# Patient Record
Sex: MALE | Race: BLACK OR AFRICAN AMERICAN | NOT HISPANIC OR LATINO
[De-identification: names, ages, dates, MRNs, and addresses within clinical notes are randomized per-mention and may not be internally consistent; named-entity substitution may affect disease eponyms.]

---

## 2018-02-27 ENCOUNTER — RESULT REVIEW (OUTPATIENT)
Age: 63
End: 2018-02-27

## 2018-04-19 ENCOUNTER — APPOINTMENT (OUTPATIENT)
Dept: SURGERY | Facility: CLINIC | Age: 63
End: 2018-04-19
Payer: COMMERCIAL

## 2018-04-19 VITALS
DIASTOLIC BLOOD PRESSURE: 89 MMHG | HEART RATE: 111 BPM | WEIGHT: 160 LBS | TEMPERATURE: 98.2 F | HEIGHT: 65 IN | BODY MASS INDEX: 26.66 KG/M2 | SYSTOLIC BLOOD PRESSURE: 155 MMHG

## 2018-04-19 PROCEDURE — 99243 OFF/OP CNSLTJ NEW/EST LOW 30: CPT

## 2018-05-02 ENCOUNTER — FORM ENCOUNTER (OUTPATIENT)
Age: 63
End: 2018-05-02

## 2018-05-03 ENCOUNTER — OUTPATIENT (OUTPATIENT)
Dept: OUTPATIENT SERVICES | Facility: HOSPITAL | Age: 63
LOS: 1 days | End: 2018-05-03
Payer: COMMERCIAL

## 2018-05-03 ENCOUNTER — APPOINTMENT (OUTPATIENT)
Dept: CT IMAGING | Facility: IMAGING CENTER | Age: 63
End: 2018-05-03
Payer: COMMERCIAL

## 2018-05-03 DIAGNOSIS — D49.0 NEOPLASM OF UNSPECIFIED BEHAVIOR OF DIGESTIVE SYSTEM: ICD-10-CM

## 2018-05-03 DIAGNOSIS — R16.0 HEPATOMEGALY, NOT ELSEWHERE CLASSIFIED: ICD-10-CM

## 2018-05-03 PROCEDURE — 74160 CT ABDOMEN W/CONTRAST: CPT

## 2018-05-03 PROCEDURE — 74160 CT ABDOMEN W/CONTRAST: CPT | Mod: 26

## 2018-05-03 PROCEDURE — 82565 ASSAY OF CREATININE: CPT

## 2018-05-31 ENCOUNTER — APPOINTMENT (OUTPATIENT)
Dept: INFECTIOUS DISEASE | Facility: CLINIC | Age: 63
End: 2018-05-31
Payer: COMMERCIAL

## 2018-05-31 VITALS
HEIGHT: 65 IN | TEMPERATURE: 98.5 F | RESPIRATION RATE: 18 BRPM | DIASTOLIC BLOOD PRESSURE: 95 MMHG | BODY MASS INDEX: 26.99 KG/M2 | WEIGHT: 162 LBS | SYSTOLIC BLOOD PRESSURE: 154 MMHG | OXYGEN SATURATION: 97 % | HEART RATE: 99 BPM

## 2018-05-31 DIAGNOSIS — Z85.46 PERSONAL HISTORY OF MALIGNANT NEOPLASM OF PROSTATE: ICD-10-CM

## 2018-05-31 DIAGNOSIS — D72.10 EOSINOPHILIA, UNSPECIFIED: ICD-10-CM

## 2018-05-31 LAB
BASOPHILS # BLD AUTO: 0.03 K/UL
BASOPHILS NFR BLD AUTO: 0.5 %
EOSINOPHIL # BLD AUTO: 0.75 K/UL
EOSINOPHIL NFR BLD AUTO: 13 %
HCT VFR BLD CALC: 45.7 %
HGB BLD-MCNC: 14.9 G/DL
IMM GRANULOCYTES NFR BLD AUTO: 0 %
LYMPHOCYTES # BLD AUTO: 1.94 K/UL
LYMPHOCYTES NFR BLD AUTO: 33.5 %
MAN DIFF?: NORMAL
MCHC RBC-ENTMCNC: 31.1 PG
MCHC RBC-ENTMCNC: 32.6 GM/DL
MCV RBC AUTO: 95.4 FL
MONOCYTES # BLD AUTO: 0.44 K/UL
MONOCYTES NFR BLD AUTO: 7.6 %
NEUTROPHILS # BLD AUTO: 2.63 K/UL
NEUTROPHILS NFR BLD AUTO: 45.4 %
PLATELET # BLD AUTO: 242 K/UL
RBC # BLD: 4.79 M/UL
RBC # FLD: 12.3 %
WBC # FLD AUTO: 5.79 K/UL

## 2018-05-31 PROCEDURE — 99204 OFFICE O/P NEW MOD 45 MIN: CPT

## 2018-05-31 RX ORDER — AMLODIPINE BESYLATE 5 MG/1
5 TABLET ORAL
Refills: 0 | Status: ACTIVE | COMMUNITY

## 2018-06-04 LAB
ADJUSTED MITOGEN: >10 IU/ML
ADJUSTED TB AG: 2.28 IU/ML
ALBUMIN SERPL ELPH-MCNC: 4.1 G/DL
ALP BLD-CCNC: 85 U/L
ALT SERPL-CCNC: 26 U/L
ANION GAP SERPL CALC-SCNC: 16 MMOL/L
APPEARANCE: CLEAR
AST SERPL-CCNC: 28 U/L
BACTERIA: NEGATIVE
BILIRUB SERPL-MCNC: 0.4 MG/DL
BILIRUBIN URINE: NEGATIVE
BLOOD URINE: ABNORMAL
BUN SERPL-MCNC: 15 MG/DL
CALCIUM SERPL-MCNC: 9.4 MG/DL
CHLORIDE SERPL-SCNC: 104 MMOL/L
CO2 SERPL-SCNC: 22 MMOL/L
COLOR: YELLOW
CREAT SERPL-MCNC: 1.23 MG/DL
GLUCOSE QUALITATIVE U: NEGATIVE MG/DL
GLUCOSE SERPL-MCNC: 95 MG/DL
HBV CORE IGG+IGM SER QL: NONREACTIVE
HBV SURFACE AB SER QL: ABNORMAL
HBV SURFACE AG SER QL: NONREACTIVE
HCV AB SER QL: NONREACTIVE
HCV S/CO RATIO: 0.43 S/CO
HIV1+2 AB SPEC QL IA.RAPID: NONREACTIVE
HYALINE CASTS: 0 /LPF
KETONES URINE: NEGATIVE
LEUKOCYTE ESTERASE URINE: NEGATIVE
M TB IFN-G BLD-IMP: POSITIVE
MICROSCOPIC-UA: NORMAL
NITRITE URINE: NEGATIVE
PH URINE: 6.5
POTASSIUM SERPL-SCNC: 3.6 MMOL/L
PROT SERPL-MCNC: 8 G/DL
PROTEIN URINE: ABNORMAL MG/DL
QUANTIFERON GOLD NIL: 0.33 IU/ML
RED BLOOD CELLS URINE: 6 /HPF
SODIUM SERPL-SCNC: 142 MMOL/L
SPECIFIC GRAVITY URINE: 1.02
SQUAMOUS EPITHELIAL CELLS: 0 /HPF
UROBILINOGEN URINE: NEGATIVE MG/DL
WHITE BLOOD CELLS URINE: 1 /HPF

## 2018-06-05 LAB
BACTERIA BLD CULT: NORMAL
BACTERIA BLD CULT: NORMAL
E HISTOLYT AB SER-ACNC: NEGATIVE
ECHINOCOCCUS AB TITR SER: NEGATIVE
STRONGYLOIDES AB SER IA-ACNC: POSITIVE

## 2018-06-06 ENCOUNTER — FORM ENCOUNTER (OUTPATIENT)
Age: 63
End: 2018-06-06

## 2018-06-07 ENCOUNTER — APPOINTMENT (OUTPATIENT)
Dept: MRI IMAGING | Facility: IMAGING CENTER | Age: 63
End: 2018-06-07
Payer: COMMERCIAL

## 2018-06-07 ENCOUNTER — OUTPATIENT (OUTPATIENT)
Dept: OUTPATIENT SERVICES | Facility: HOSPITAL | Age: 63
LOS: 1 days | End: 2018-06-07
Payer: COMMERCIAL

## 2018-06-07 DIAGNOSIS — R16.0 HEPATOMEGALY, NOT ELSEWHERE CLASSIFIED: ICD-10-CM

## 2018-06-07 LAB
FILARIA IGG SER-ACNC: 1.44
SCHISTOSOMA IGG SER QL: NORMAL

## 2018-06-07 PROCEDURE — 74183 MRI ABD W/O CNTR FLWD CNTR: CPT | Mod: 26

## 2018-06-07 PROCEDURE — A9585: CPT

## 2018-06-07 PROCEDURE — 74183 MRI ABD W/O CNTR FLWD CNTR: CPT

## 2018-06-11 LAB
DEPRECATED O AND P PREP STL: ABNORMAL
DEPRECATED O AND P PREP STL: NORMAL
DEPRECATED O AND P PREP STL: NORMAL
GI PCR PANEL, STOOL: ABNORMAL

## 2018-06-15 ENCOUNTER — APPOINTMENT (OUTPATIENT)
Dept: INFECTIOUS DISEASE | Facility: CLINIC | Age: 63
End: 2018-06-15
Payer: COMMERCIAL

## 2018-06-15 VITALS
WEIGHT: 165 LBS | DIASTOLIC BLOOD PRESSURE: 86 MMHG | BODY MASS INDEX: 27.49 KG/M2 | TEMPERATURE: 98.3 F | HEIGHT: 65 IN | OXYGEN SATURATION: 93 % | RESPIRATION RATE: 18 BRPM | SYSTOLIC BLOOD PRESSURE: 145 MMHG | HEART RATE: 104 BPM

## 2018-06-15 DIAGNOSIS — B78.9 STRONGYLOIDIASIS, UNSPECIFIED: ICD-10-CM

## 2018-06-15 DIAGNOSIS — R76.11 NONSPECIFIC REACTION TO TUBERCULIN SKIN TEST W/OUT ACTIVE TUBERCULOSIS: ICD-10-CM

## 2018-06-15 PROCEDURE — 99214 OFFICE O/P EST MOD 30 MIN: CPT

## 2018-06-15 RX ORDER — HYDROCODONE BITARTRATE AND ACETAMINOPHEN 5; 300 MG/1; MG/1
5-300 TABLET ORAL
Qty: 12 | Refills: 0 | Status: COMPLETED | COMMUNITY
Start: 2018-02-21

## 2018-06-15 RX ORDER — LIDOCAINE 5 G/100G
5 OINTMENT TOPICAL
Qty: 106 | Refills: 0 | Status: COMPLETED | COMMUNITY
Start: 2018-05-04

## 2018-06-15 RX ORDER — AMOXICILLIN 875 MG/1
875 TABLET, FILM COATED ORAL
Qty: 14 | Refills: 0 | Status: COMPLETED | COMMUNITY
Start: 2018-02-21

## 2018-06-15 RX ORDER — ASPIRIN 81 MG/1
81 TABLET, CHEWABLE ORAL
Refills: 0 | Status: ACTIVE | COMMUNITY

## 2018-06-15 RX ORDER — CHLORHEXIDINE GLUCONATE, 0.12% ORAL RINSE 1.2 MG/ML
0.12 SOLUTION DENTAL
Qty: 473 | Refills: 0 | Status: COMPLETED | COMMUNITY
Start: 2018-02-21

## 2018-08-03 ENCOUNTER — APPOINTMENT (OUTPATIENT)
Dept: INFECTIOUS DISEASE | Facility: CLINIC | Age: 63
End: 2018-08-03
Payer: COMMERCIAL

## 2018-08-03 VITALS
SYSTOLIC BLOOD PRESSURE: 144 MMHG | WEIGHT: 165 LBS | BODY MASS INDEX: 27.49 KG/M2 | HEIGHT: 65 IN | OXYGEN SATURATION: 98 % | DIASTOLIC BLOOD PRESSURE: 89 MMHG | TEMPERATURE: 98.7 F | RESPIRATION RATE: 18 BRPM | HEART RATE: 90 BPM

## 2018-08-03 PROCEDURE — 99214 OFFICE O/P EST MOD 30 MIN: CPT

## 2018-08-03 RX ORDER — NAPROXEN 500 MG/1
500 TABLET ORAL
Qty: 60 | Refills: 0 | Status: DISCONTINUED | COMMUNITY
Start: 2018-05-04 | End: 2018-08-03

## 2018-08-06 LAB
ALBUMIN SERPL ELPH-MCNC: 4.1 G/DL
ALP BLD-CCNC: 77 U/L
ALT SERPL-CCNC: 19 U/L
ANION GAP SERPL CALC-SCNC: 14 MMOL/L
AST SERPL-CCNC: 26 U/L
BASOPHILS # BLD AUTO: 0.03 K/UL
BASOPHILS NFR BLD AUTO: 0.6 %
BILIRUB SERPL-MCNC: 0.3 MG/DL
BUN SERPL-MCNC: 13 MG/DL
CALCIUM SERPL-MCNC: 9.3 MG/DL
CHLORIDE SERPL-SCNC: 104 MMOL/L
CO2 SERPL-SCNC: 25 MMOL/L
CREAT SERPL-MCNC: 1.24 MG/DL
EOSINOPHIL # BLD AUTO: 0.18 K/UL
EOSINOPHIL NFR BLD AUTO: 3.8 %
GLUCOSE SERPL-MCNC: 101 MG/DL
HCT VFR BLD CALC: 43.8 %
HGB BLD-MCNC: 14.6 G/DL
IMM GRANULOCYTES NFR BLD AUTO: 0.2 %
LYMPHOCYTES # BLD AUTO: 2.35 K/UL
LYMPHOCYTES NFR BLD AUTO: 49.6 %
MAN DIFF?: NORMAL
MCHC RBC-ENTMCNC: 31.8 PG
MCHC RBC-ENTMCNC: 33.3 GM/DL
MCV RBC AUTO: 95.4 FL
MONOCYTES # BLD AUTO: 0.3 K/UL
MONOCYTES NFR BLD AUTO: 6.3 %
NEUTROPHILS # BLD AUTO: 1.87 K/UL
NEUTROPHILS NFR BLD AUTO: 39.5 %
PLATELET # BLD AUTO: 257 K/UL
POTASSIUM SERPL-SCNC: 3.7 MMOL/L
PROT SERPL-MCNC: 7.5 G/DL
RBC # BLD: 4.59 M/UL
RBC # FLD: 12.5 %
SODIUM SERPL-SCNC: 143 MMOL/L
WBC # FLD AUTO: 4.74 K/UL

## 2018-08-07 LAB — STRONGYLOIDES AB SER IA-ACNC: POSITIVE

## 2018-08-27 LAB
DEPRECATED O AND P PREP STL: NORMAL
GI PCR PANEL, STOOL: NORMAL

## 2018-11-09 ENCOUNTER — APPOINTMENT (OUTPATIENT)
Dept: INFECTIOUS DISEASE | Facility: CLINIC | Age: 63
End: 2018-11-09
Payer: COMMERCIAL

## 2018-11-09 VITALS
HEIGHT: 65 IN | BODY MASS INDEX: 26.82 KG/M2 | SYSTOLIC BLOOD PRESSURE: 148 MMHG | HEART RATE: 85 BPM | TEMPERATURE: 97.7 F | DIASTOLIC BLOOD PRESSURE: 94 MMHG | OXYGEN SATURATION: 98 % | WEIGHT: 161 LBS

## 2018-11-09 PROCEDURE — 99214 OFFICE O/P EST MOD 30 MIN: CPT

## 2018-12-20 ENCOUNTER — APPOINTMENT (OUTPATIENT)
Dept: SURGICAL ONCOLOGY | Facility: CLINIC | Age: 63
End: 2018-12-20
Payer: COMMERCIAL

## 2018-12-20 VITALS
HEIGHT: 65 IN | RESPIRATION RATE: 15 BRPM | WEIGHT: 165 LBS | BODY MASS INDEX: 27.49 KG/M2 | HEART RATE: 105 BPM | DIASTOLIC BLOOD PRESSURE: 90 MMHG | SYSTOLIC BLOOD PRESSURE: 158 MMHG

## 2018-12-20 DIAGNOSIS — Z86.79 PERSONAL HISTORY OF OTHER DISEASES OF THE CIRCULATORY SYSTEM: ICD-10-CM

## 2018-12-20 DIAGNOSIS — K76.9 LIVER DISEASE, UNSPECIFIED: ICD-10-CM

## 2018-12-20 PROCEDURE — 99244 OFF/OP CNSLTJ NEW/EST MOD 40: CPT

## 2018-12-20 RX ORDER — IVERMECTIN 3 MG/1
3 TABLET ORAL DAILY
Qty: 10 | Refills: 0 | Status: DISCONTINUED | COMMUNITY
Start: 2018-06-15 | End: 2018-12-20

## 2018-12-28 ENCOUNTER — OTHER (OUTPATIENT)
Age: 63
End: 2018-12-28

## 2019-01-11 ENCOUNTER — FORM ENCOUNTER (OUTPATIENT)
Age: 64
End: 2019-01-11

## 2019-01-12 ENCOUNTER — OUTPATIENT (OUTPATIENT)
Dept: OUTPATIENT SERVICES | Facility: HOSPITAL | Age: 64
LOS: 1 days | End: 2019-01-12
Payer: COMMERCIAL

## 2019-01-12 ENCOUNTER — APPOINTMENT (OUTPATIENT)
Dept: MRI IMAGING | Facility: IMAGING CENTER | Age: 64
End: 2019-01-12
Payer: COMMERCIAL

## 2019-01-12 DIAGNOSIS — B78.9 STRONGYLOIDIASIS, UNSPECIFIED: ICD-10-CM

## 2019-01-12 DIAGNOSIS — K76.9 LIVER DISEASE, UNSPECIFIED: ICD-10-CM

## 2019-01-12 DIAGNOSIS — D72.1 EOSINOPHILIA: ICD-10-CM

## 2019-01-12 DIAGNOSIS — R76.11 NONSPECIFIC REACTION TO TUBERCULIN SKIN TEST WITHOUT ACTIVE TUBERCULOSIS: ICD-10-CM

## 2019-01-12 PROCEDURE — A9585: CPT

## 2019-01-12 PROCEDURE — 74183 MRI ABD W/O CNTR FLWD CNTR: CPT

## 2019-01-12 PROCEDURE — 74183 MRI ABD W/O CNTR FLWD CNTR: CPT | Mod: 26

## 2019-11-12 ENCOUNTER — OUTPATIENT (OUTPATIENT)
Dept: OUTPATIENT SERVICES | Facility: HOSPITAL | Age: 64
LOS: 1 days | End: 2019-11-12
Payer: COMMERCIAL

## 2019-11-12 ENCOUNTER — APPOINTMENT (OUTPATIENT)
Dept: MRI IMAGING | Facility: IMAGING CENTER | Age: 64
End: 2019-11-12
Payer: COMMERCIAL

## 2019-11-12 DIAGNOSIS — R16.0 HEPATOMEGALY, NOT ELSEWHERE CLASSIFIED: ICD-10-CM

## 2019-11-12 PROCEDURE — A9585: CPT

## 2019-11-12 PROCEDURE — 74183 MRI ABD W/O CNTR FLWD CNTR: CPT | Mod: 26

## 2019-11-12 PROCEDURE — 74183 MRI ABD W/O CNTR FLWD CNTR: CPT

## 2020-12-31 PROBLEM — D72.10 EOSINOPHILIA: Status: ACTIVE | Noted: 2018-05-31

## 2021-08-09 ENCOUNTER — INPATIENT (INPATIENT)
Facility: HOSPITAL | Age: 66
LOS: 5 days | Discharge: HOME | End: 2021-08-15
Attending: INTERNAL MEDICINE | Admitting: INTERNAL MEDICINE
Payer: MEDICARE

## 2021-08-09 VITALS
SYSTOLIC BLOOD PRESSURE: 118 MMHG | DIASTOLIC BLOOD PRESSURE: 75 MMHG | TEMPERATURE: 98 F | RESPIRATION RATE: 16 BRPM | OXYGEN SATURATION: 100 % | HEIGHT: 66 IN | HEART RATE: 124 BPM

## 2021-08-09 LAB
ALBUMIN SERPL ELPH-MCNC: 3.9 G/DL — SIGNIFICANT CHANGE UP (ref 3.3–5)
ALP SERPL-CCNC: 65 U/L — SIGNIFICANT CHANGE UP (ref 40–120)
ALT FLD-CCNC: 14 U/L — SIGNIFICANT CHANGE UP (ref 4–41)
ANION GAP SERPL CALC-SCNC: 18 MMOL/L — HIGH (ref 7–14)
APTT BLD: 25.5 SEC — LOW (ref 27–36.3)
AST SERPL-CCNC: 24 U/L — SIGNIFICANT CHANGE UP (ref 4–40)
BASOPHILS # BLD AUTO: 0.03 K/UL — SIGNIFICANT CHANGE UP (ref 0–0.2)
BASOPHILS NFR BLD AUTO: 0.3 % — SIGNIFICANT CHANGE UP (ref 0–2)
BILIRUB SERPL-MCNC: <0.2 MG/DL — SIGNIFICANT CHANGE UP (ref 0.2–1.2)
BLD GP AB SCN SERPL QL: NEGATIVE — SIGNIFICANT CHANGE UP
BUN SERPL-MCNC: 20 MG/DL — SIGNIFICANT CHANGE UP (ref 7–23)
CALCIUM SERPL-MCNC: 9.5 MG/DL — SIGNIFICANT CHANGE UP (ref 8.4–10.5)
CHLORIDE SERPL-SCNC: 104 MMOL/L — SIGNIFICANT CHANGE UP (ref 98–107)
CO2 SERPL-SCNC: 19 MMOL/L — LOW (ref 22–31)
CREAT SERPL-MCNC: 1.58 MG/DL — HIGH (ref 0.5–1.3)
EOSINOPHIL # BLD AUTO: 0.02 K/UL — SIGNIFICANT CHANGE UP (ref 0–0.5)
EOSINOPHIL NFR BLD AUTO: 0.2 % — SIGNIFICANT CHANGE UP (ref 0–6)
GLUCOSE SERPL-MCNC: 142 MG/DL — HIGH (ref 70–99)
HCT VFR BLD CALC: 27 % — LOW (ref 39–50)
HGB BLD-MCNC: 8.5 G/DL — LOW (ref 13–17)
IANC: 7.45 K/UL — SIGNIFICANT CHANGE UP (ref 1.5–8.5)
IMM GRANULOCYTES NFR BLD AUTO: 0.5 % — SIGNIFICANT CHANGE UP (ref 0–1.5)
INR BLD: 1.21 RATIO — HIGH (ref 0.88–1.16)
LYMPHOCYTES # BLD AUTO: 2.73 K/UL — SIGNIFICANT CHANGE UP (ref 1–3.3)
LYMPHOCYTES # BLD AUTO: 24.9 % — SIGNIFICANT CHANGE UP (ref 13–44)
MAGNESIUM SERPL-MCNC: 2.2 MG/DL — SIGNIFICANT CHANGE UP (ref 1.6–2.6)
MCHC RBC-ENTMCNC: 29.8 PG — SIGNIFICANT CHANGE UP (ref 27–34)
MCHC RBC-ENTMCNC: 31.5 GM/DL — LOW (ref 32–36)
MCV RBC AUTO: 94.7 FL — SIGNIFICANT CHANGE UP (ref 80–100)
MONOCYTES # BLD AUTO: 0.69 K/UL — SIGNIFICANT CHANGE UP (ref 0–0.9)
MONOCYTES NFR BLD AUTO: 6.3 % — SIGNIFICANT CHANGE UP (ref 2–14)
NEUTROPHILS # BLD AUTO: 7.45 K/UL — HIGH (ref 1.8–7.4)
NEUTROPHILS NFR BLD AUTO: 67.8 % — SIGNIFICANT CHANGE UP (ref 43–77)
NRBC # BLD: 0 /100 WBCS — SIGNIFICANT CHANGE UP
NRBC # FLD: 0 K/UL — SIGNIFICANT CHANGE UP
OB PNL STL: POSITIVE
PHOSPHATE SERPL-MCNC: 4.5 MG/DL — SIGNIFICANT CHANGE UP (ref 2.5–4.5)
PLATELET # BLD AUTO: 287 K/UL — SIGNIFICANT CHANGE UP (ref 150–400)
POTASSIUM SERPL-MCNC: 4.3 MMOL/L — SIGNIFICANT CHANGE UP (ref 3.5–5.3)
POTASSIUM SERPL-SCNC: 4.3 MMOL/L — SIGNIFICANT CHANGE UP (ref 3.5–5.3)
PROT SERPL-MCNC: 7.4 G/DL — SIGNIFICANT CHANGE UP (ref 6–8.3)
PROTHROM AB SERPL-ACNC: 13.7 SEC — HIGH (ref 10.6–13.6)
RBC # BLD: 2.85 M/UL — LOW (ref 4.2–5.8)
RBC # FLD: 12.9 % — SIGNIFICANT CHANGE UP (ref 10.3–14.5)
RH IG SCN BLD-IMP: POSITIVE — SIGNIFICANT CHANGE UP
SODIUM SERPL-SCNC: 141 MMOL/L — SIGNIFICANT CHANGE UP (ref 135–145)
WBC # BLD: 10.98 K/UL — HIGH (ref 3.8–10.5)
WBC # FLD AUTO: 10.98 K/UL — HIGH (ref 3.8–10.5)

## 2021-08-09 PROCEDURE — 99285 EMERGENCY DEPT VISIT HI MDM: CPT

## 2021-08-09 PROCEDURE — 74174 CTA ABD&PLVS W/CONTRAST: CPT | Mod: 26

## 2021-08-09 RX ORDER — PANTOPRAZOLE SODIUM 20 MG/1
40 TABLET, DELAYED RELEASE ORAL DAILY
Refills: 0 | Status: DISCONTINUED | OUTPATIENT
Start: 2021-08-09 | End: 2021-08-12

## 2021-08-09 RX ADMIN — PANTOPRAZOLE SODIUM 40 MILLIGRAM(S): 20 TABLET, DELAYED RELEASE ORAL at 22:42

## 2021-08-09 NOTE — ED PROVIDER NOTE - OBJECTIVE STATEMENT
65 y.o M with PMH of HTN, diverticulosis presents to the hospital for 5 days of BRBPR. Patient stated that 5 days prior to admission he experienced lower abdominal pain, emesis (non-bloody, non-bilious), and BRBPR. Patient states that he felt weak and needing to grasp on the sides of the basin for him to get up. The bleeding slowed down until 1 day prior to admission. The repeat episode of bleeding was stated to be less than his prior episode. The patient then saw his GI doctor and was instructed to go to the ED to get a CT scan and to be admitted for further GI work up

## 2021-08-09 NOTE — ED PROVIDER NOTE - PHYSICAL EXAMINATION
VITALS:     GENERAL: NAD, lying in bed comfortably  HEAD:  Atraumatic, Normocephalic  EYES: EOMI, PERRLA, conjunctival pallor  NECK: Supple  CHEST/LUNG: Clear to auscultation bilaterally; No rales, rhonchi, wheezing, or rubs. Unlabored respirations  HEART: Regular rate and rhythm; No murmurs, rubs, or gallops  ABDOMEN: Bowel sounds present; Soft, Nontender, Nondistended.   RECTAL: rectal vault empty. stool light red  EXTREMITIES:  2+ Peripheral Pulses, brisk capillary refill. No clubbing, cyanosis, or edema  NERVOUS SYSTEM:  Alert & Oriented X3, speech clear. No deficits   MSK: FROM all 4 extremities, full and equal strength  SKIN: No rashes or lesions

## 2021-08-09 NOTE — ED ADULT TRIAGE NOTE - CHIEF COMPLAINT QUOTE
Pt. c/o bloody stools, dizziness, vomiting and weakness since Thursday. States he had several episodes of BRBPR. Sent from MD for CT. Takes baby ASA daily but didn't take today. Denies abdominal pain or fever.

## 2021-08-09 NOTE — ED ADULT NURSE NOTE - OBJECTIVE STATEMENT
Petr RN- Received pt in room 16. pt A&OX3. Pt with hx of BPH, HTN, on daily aspirin. Pt c/o bloody stools, dizziness, vomiting and weakness since Thursday worsening. pt states he had several episodes of BRBPR and sent by MD for CT. pt in no distress at this time. respirations are equal and nonlabored, no respiratory distress noted. pt denies any cp, sob, cough, fever/chills, headache. labs sent pt stable, safety maintained. will endorse report to primary RN Dorita for further plan.

## 2021-08-09 NOTE — ED PROVIDER NOTE - CLINICAL SUMMARY MEDICAL DECISION MAKING FREE TEXT BOX
65 y.o M with PMH of HTN, diverticulosis presents to the hospital for 5 days of BRBPR being admitted for GIB  -likely diverticular bleed given history/presentation  -CT angio A/P negative, bleed not brisk enough to capture, but hemoglobin drop from baseline noted  -to hold off transfusion for now. goal >7.0  -protonix IV qD for now  -admitted to medicine

## 2021-08-09 NOTE — ED PROVIDER NOTE - ATTENDING CONTRIBUTION TO CARE
Pt w/ h/o GI bleed presents with 5 days of BRBPR. No syncopal episodes, abd pain, fevers, palpitations, cp/sob. On exam no active bleeding, abd s/nt, pt with drop in hgb from baseline, will admit to medicine for monitoring, rpt cbc in am, GI consult for poss scope.

## 2021-08-10 DIAGNOSIS — D62 ACUTE POSTHEMORRHAGIC ANEMIA: ICD-10-CM

## 2021-08-10 DIAGNOSIS — K92.2 GASTROINTESTINAL HEMORRHAGE, UNSPECIFIED: ICD-10-CM

## 2021-08-10 DIAGNOSIS — N17.9 ACUTE KIDNEY FAILURE, UNSPECIFIED: ICD-10-CM

## 2021-08-10 DIAGNOSIS — I10 ESSENTIAL (PRIMARY) HYPERTENSION: ICD-10-CM

## 2021-08-10 DIAGNOSIS — Z29.9 ENCOUNTER FOR PROPHYLACTIC MEASURES, UNSPECIFIED: ICD-10-CM

## 2021-08-10 DIAGNOSIS — N40.0 BENIGN PROSTATIC HYPERPLASIA WITHOUT LOWER URINARY TRACT SYMPTOMS: ICD-10-CM

## 2021-08-10 LAB
ANION GAP SERPL CALC-SCNC: 12 MMOL/L — SIGNIFICANT CHANGE UP (ref 7–14)
APTT BLD: 23.8 SEC — LOW (ref 27–36.3)
BASOPHILS # BLD AUTO: 0.03 K/UL — SIGNIFICANT CHANGE UP (ref 0–0.2)
BASOPHILS NFR BLD AUTO: 0.4 % — SIGNIFICANT CHANGE UP (ref 0–2)
BLD GP AB SCN SERPL QL: NEGATIVE — SIGNIFICANT CHANGE UP
BUN SERPL-MCNC: 17 MG/DL — SIGNIFICANT CHANGE UP (ref 7–23)
CALCIUM SERPL-MCNC: 8.7 MG/DL — SIGNIFICANT CHANGE UP (ref 8.4–10.5)
CHLORIDE SERPL-SCNC: 109 MMOL/L — HIGH (ref 98–107)
CO2 SERPL-SCNC: 22 MMOL/L — SIGNIFICANT CHANGE UP (ref 22–31)
CREAT SERPL-MCNC: 1.42 MG/DL — HIGH (ref 0.5–1.3)
EOSINOPHIL # BLD AUTO: 0.07 K/UL — SIGNIFICANT CHANGE UP (ref 0–0.5)
EOSINOPHIL NFR BLD AUTO: 0.9 % — SIGNIFICANT CHANGE UP (ref 0–6)
FERRITIN SERPL-MCNC: 68 NG/ML — SIGNIFICANT CHANGE UP (ref 30–400)
FOLATE SERPL-MCNC: 20 NG/ML — HIGH (ref 3.1–17.5)
GLUCOSE SERPL-MCNC: 97 MG/DL — SIGNIFICANT CHANGE UP (ref 70–99)
HCT VFR BLD CALC: 20.6 % — CRITICAL LOW (ref 39–50)
HCT VFR BLD CALC: 21.4 % — LOW (ref 39–50)
HCT VFR BLD CALC: 28.5 % — LOW (ref 39–50)
HGB BLD-MCNC: 6.7 G/DL — CRITICAL LOW (ref 13–17)
HGB BLD-MCNC: 6.9 G/DL — CRITICAL LOW (ref 13–17)
HGB BLD-MCNC: 9.3 G/DL — LOW (ref 13–17)
IANC: 5.37 K/UL — SIGNIFICANT CHANGE UP (ref 1.5–8.5)
IMM GRANULOCYTES NFR BLD AUTO: 0.1 % — SIGNIFICANT CHANGE UP (ref 0–1.5)
INR BLD: 1.17 RATIO — HIGH (ref 0.88–1.16)
IRON SATN MFR SERPL: 13 % — LOW (ref 14–50)
IRON SATN MFR SERPL: 25 UG/DL — LOW (ref 45–165)
LYMPHOCYTES # BLD AUTO: 1.87 K/UL — SIGNIFICANT CHANGE UP (ref 1–3.3)
LYMPHOCYTES # BLD AUTO: 23.8 % — SIGNIFICANT CHANGE UP (ref 13–44)
MCHC RBC-ENTMCNC: 30.4 PG — SIGNIFICANT CHANGE UP (ref 27–34)
MCHC RBC-ENTMCNC: 30.5 PG — SIGNIFICANT CHANGE UP (ref 27–34)
MCHC RBC-ENTMCNC: 30.7 PG — SIGNIFICANT CHANGE UP (ref 27–34)
MCHC RBC-ENTMCNC: 32.2 GM/DL — SIGNIFICANT CHANGE UP (ref 32–36)
MCHC RBC-ENTMCNC: 32.5 GM/DL — SIGNIFICANT CHANGE UP (ref 32–36)
MCHC RBC-ENTMCNC: 32.6 GM/DL — SIGNIFICANT CHANGE UP (ref 32–36)
MCV RBC AUTO: 93.1 FL — SIGNIFICANT CHANGE UP (ref 80–100)
MCV RBC AUTO: 94.5 FL — SIGNIFICANT CHANGE UP (ref 80–100)
MCV RBC AUTO: 94.7 FL — SIGNIFICANT CHANGE UP (ref 80–100)
MONOCYTES # BLD AUTO: 0.5 K/UL — SIGNIFICANT CHANGE UP (ref 0–0.9)
MONOCYTES NFR BLD AUTO: 6.4 % — SIGNIFICANT CHANGE UP (ref 2–14)
NEUTROPHILS # BLD AUTO: 5.37 K/UL — SIGNIFICANT CHANGE UP (ref 1.8–7.4)
NEUTROPHILS NFR BLD AUTO: 68.4 % — SIGNIFICANT CHANGE UP (ref 43–77)
NRBC # BLD: 0 /100 WBCS — SIGNIFICANT CHANGE UP
NRBC # FLD: 0 K/UL — SIGNIFICANT CHANGE UP
NRBC # FLD: 0.02 K/UL — HIGH
NRBC # FLD: 0.02 K/UL — HIGH
PLATELET # BLD AUTO: 217 K/UL — SIGNIFICANT CHANGE UP (ref 150–400)
PLATELET # BLD AUTO: 238 K/UL — SIGNIFICANT CHANGE UP (ref 150–400)
PLATELET # BLD AUTO: 265 K/UL — SIGNIFICANT CHANGE UP (ref 150–400)
POTASSIUM SERPL-MCNC: 3.8 MMOL/L — SIGNIFICANT CHANGE UP (ref 3.5–5.3)
POTASSIUM SERPL-SCNC: 3.8 MMOL/L — SIGNIFICANT CHANGE UP (ref 3.5–5.3)
PROTHROM AB SERPL-ACNC: 13.4 SEC — SIGNIFICANT CHANGE UP (ref 10.6–13.6)
RBC # BLD: 2.18 M/UL — LOW (ref 4.2–5.8)
RBC # BLD: 2.26 M/UL — LOW (ref 4.2–5.8)
RBC # BLD: 3.06 M/UL — LOW (ref 4.2–5.8)
RBC # FLD: 12.8 % — SIGNIFICANT CHANGE UP (ref 10.3–14.5)
RBC # FLD: 12.9 % — SIGNIFICANT CHANGE UP (ref 10.3–14.5)
RBC # FLD: 13 % — SIGNIFICANT CHANGE UP (ref 10.3–14.5)
RH IG SCN BLD-IMP: POSITIVE — SIGNIFICANT CHANGE UP
SARS-COV-2 RNA SPEC QL NAA+PROBE: SIGNIFICANT CHANGE UP
SODIUM SERPL-SCNC: 143 MMOL/L — SIGNIFICANT CHANGE UP (ref 135–145)
TIBC SERPL-MCNC: 187 UG/DL — LOW (ref 220–430)
UIBC SERPL-MCNC: 162 UG/DL — SIGNIFICANT CHANGE UP (ref 110–370)
VIT B12 SERPL-MCNC: 687 PG/ML — SIGNIFICANT CHANGE UP (ref 200–900)
WBC # BLD: 7.85 K/UL — SIGNIFICANT CHANGE UP (ref 3.8–10.5)
WBC # BLD: 8.81 K/UL — SIGNIFICANT CHANGE UP (ref 3.8–10.5)
WBC # BLD: 9.53 K/UL — SIGNIFICANT CHANGE UP (ref 3.8–10.5)
WBC # FLD AUTO: 7.85 K/UL — SIGNIFICANT CHANGE UP (ref 3.8–10.5)
WBC # FLD AUTO: 8.81 K/UL — SIGNIFICANT CHANGE UP (ref 3.8–10.5)
WBC # FLD AUTO: 9.53 K/UL — SIGNIFICANT CHANGE UP (ref 3.8–10.5)

## 2021-08-10 PROCEDURE — 99223 1ST HOSP IP/OBS HIGH 75: CPT

## 2021-08-10 PROCEDURE — 99222 1ST HOSP IP/OBS MODERATE 55: CPT | Mod: GC

## 2021-08-10 RX ORDER — SOD SULF/SODIUM/NAHCO3/KCL/PEG
4000 SOLUTION, RECONSTITUTED, ORAL ORAL ONCE
Refills: 0 | Status: COMPLETED | OUTPATIENT
Start: 2021-08-10 | End: 2021-08-10

## 2021-08-10 RX ORDER — SODIUM CHLORIDE 9 MG/ML
1000 INJECTION, SOLUTION INTRAVENOUS ONCE
Refills: 0 | Status: COMPLETED | OUTPATIENT
Start: 2021-08-10 | End: 2021-08-10

## 2021-08-10 RX ORDER — TAMSULOSIN HYDROCHLORIDE 0.4 MG/1
0.4 CAPSULE ORAL AT BEDTIME
Refills: 0 | Status: DISCONTINUED | OUTPATIENT
Start: 2021-08-10 | End: 2021-08-15

## 2021-08-10 RX ORDER — FOLIC ACID 0.8 MG
1 TABLET ORAL DAILY
Refills: 0 | Status: DISCONTINUED | OUTPATIENT
Start: 2021-08-10 | End: 2021-08-15

## 2021-08-10 RX ORDER — ACETAMINOPHEN 500 MG
650 TABLET ORAL EVERY 6 HOURS
Refills: 0 | Status: DISCONTINUED | OUTPATIENT
Start: 2021-08-10 | End: 2021-08-15

## 2021-08-10 RX ADMIN — Medication 4000 MILLILITER(S): at 18:48

## 2021-08-10 RX ADMIN — SODIUM CHLORIDE 1000 MILLILITER(S): 9 INJECTION, SOLUTION INTRAVENOUS at 02:00

## 2021-08-10 RX ADMIN — Medication 1 MILLIGRAM(S): at 15:53

## 2021-08-10 RX ADMIN — TAMSULOSIN HYDROCHLORIDE 0.4 MILLIGRAM(S): 0.4 CAPSULE ORAL at 21:39

## 2021-08-10 RX ADMIN — Medication 20 MILLIGRAM(S): at 21:39

## 2021-08-10 RX ADMIN — PANTOPRAZOLE SODIUM 40 MILLIGRAM(S): 20 TABLET, DELAYED RELEASE ORAL at 12:22

## 2021-08-10 NOTE — H&P ADULT - ASSESSMENT
65M with PMHx HTN, diverticulosis, prostate cancer s/p prostatectomy presenting with 5 days of BRBPR. Admitted for GIB w/u

## 2021-08-10 NOTE — H&P ADULT - NSHPPHYSICALEXAM_GEN_ALL_CORE
PHYSICAL EXAM:  Vital Signs Last 24 Hrs  T(C): 37.1 (08-09-21 @ 23:36)  T(F): 98.7 (08-09-21 @ 23:36), Max: 98.7 (08-09-21 @ 23:36)  HR: 103 (08-09-21 @ 23:36) (103 - 124)  BP: 127/72 (08-09-21 @ 23:36)  BP(mean): --  RR: 18 (08-09-21 @ 23:36) (16 - 18)  SpO2: 99% (08-09-21 @ 23:36) (99% - 100%)  Wt(kg): --    Constitutional: NAD, awake and alert, well developed  EYES: EOMI, conjunctiva clear  ENT:  Normal Hearing, no tonsillar exudates   Neck: Soft and supple , no thyromegaly   Respiratory: Breath sounds are clear bilaterally, No wheezing, rales or rhonchi, no tachypnea, no accessory muscle use  Cardiovascular: S1 and S2, regular rate and rhythm, no Murmurs, gallops or rubs, no JVD, no leg edema  Gastrointestinal: Bowel Sounds present, soft, nontender, nondistended, no guarding, no rebound  Extremities: No cyanosis or clubbing; warm to touch  Vascular: 2+ peripheral pulses lower ex  Neurological: No focal deficits, CN II-XII intact bilaterally, sensation to light touch intact in all extremities. Pupils are equally reactive to light and symmetrical in size.   Musculoskeletal: 5/5 strength b/l upper and lower extremities; no joint swelling.  Skin: No rashes, no ulcerations

## 2021-08-10 NOTE — H&P ADULT - PROBLEM SELECTOR PLAN 1
Hgb 8.5, normotensive but with mild tachycardia. Currently asymptomatic. Last bloody BM was yesterday  Rectal bleeding and suspect possible diverticulosis vs AVM vs hemorrhoids. Likely will require C-scope  Given patient's reported hx of gastric ulcer will place on PPI for now though much higher suspicion LGIB based off history  -repeat CBC this AM, active T&S, IV access  -transfuse goal hgb >7  -occ positive  -GI consult emailed  -ferritin, iron with binding capacity, B12, folate  -hold ASA  -CLD  -CTA without active extravasation. Diverticulosis

## 2021-08-10 NOTE — CONSULT NOTE ADULT - ATTENDING COMMENTS
pt seen and examined agree with plan above
Patient seen/examined. History/PE as noted. Requested to evaluate patient for hematochezia. Indicates onset 8/5 with painless bright red blood per rectum that recurred 8/6 and then subsequently on 8/8. Experienced some postural dizziness but no syncope. Had episode of nonbloody emesis. No abdominal pain. History noted for apparent episode of GI bleeding approximately 2010 had a colonoscopy and upper endoscopy at that time. Patient vague but indicates abnormal upper endoscopy with possible ulcer and he describes having been told of "serrated appearance". PE/labs as noted. Not able to perform LENKA- in ER hallway. Patient with acute overt GI bleed-hematochezia. Likely lower GI bleed from diverticulosis. Possible upper GI bleed with rapid transit but suspect less likely. Recommendations as noted. Okay for clear liquids today. Pantoprazole 40 mg q.d. for now and continue to monitor serial hemoglobin/hematocrit. Schedule for colonoscopy and upper endoscopy on 8/11.

## 2021-08-10 NOTE — CONSULT NOTE ADULT - SUBJECTIVE AND OBJECTIVE BOX
[facility]   MD LIAM PINO DO ANAM SIDDIQUI ANGELA WONG, PA        TEL:  OFFICE: [phone]  DR GRIMALDO CELL: [phone]  DR. WALSH CELL: [phone]  DR. JUNE CELL: [phone]  CARROLL KRISHNAMURTHY CELL: [phone]    From 5pm-7am answering service [phone]    --- INITIAL RENAL CONSULT NOTE ---date of service 08-10-21 @ 13:16    HPI:  65M with PMHx HTN, diverticulosis, prostate cancer s/p prostatectomy presenting with 5 days of BRBPR. Multiple episode of blood BM worsen lightheadedness and weakness. denied abd pain, fever or chills.  last GI work up in 2018  nephrology consulted for cristo        Allergies:  No Known Allergies      PAST MEDICAL & SURGICAL HISTORY:  HTN (hypertension)  on med x 3 yrs    BPH (benign prostatic hypertrophy)    H/O endoscopy    H/O colonoscopy  rectal bleed  no issues found    H/O prostate biopsy        Home Medications Reviewed    Hospital Medications:   MEDICATIONS  (STANDING):  folic acid 1 milliGRAM(s) Oral daily  pantoprazole  Injectable 40 milliGRAM(s) IV Push daily  tamsulosin 0.4 milliGRAM(s) Oral at bedtime      SOCIAL HISTORY:  Denies ETOh, Smoking,     FAMILY HISTORY:  No pertinent family history in first degree relatives        REVIEW OF SYSTEMS:  CONSTITUTIONAL: No weakness, fevers or chills  EYES/ENT: No visual changes;  No vertigo or throat pain   NECK: No pain or stiffness  RESPIRATORY: No cough, wheezing, hemoptysis; No shortness of breath  CARDIOVASCULAR: No chest pain or palpitations.  GASTROINTESTINAL: see hpi  GENITOURINARY: No dysuria, frequency, foamy urine, urinary urgency, incontinence or hematuria  NEUROLOGICAL: No numbness or weakness  SKIN: No itching, burning, rashes, or lesions   VASCULAR: No bilateral lower extremity edema.   All other review of systems is negative unless indicated above.    VITALS:  T(F): 98.4 (08-10-21 @ 12:00), Max: 98.7 (08-09-21 @ 23:36)  HR: 95 (08-10-21 @ 12:00)  BP: 138/71 (08-10-21 @ 12:00)  RR: 18 (08-10-21 @ 12:00)  SpO2: 100% (08-10-21 @ 12:00)  Wt(kg): --    Height (cm): 167.6 (08-09 @ 18:09)    PHYSICAL EXAM:  Constitutional: NAD  HEENT: anicteric sclera, oropharynx clear, MMM  Neck: No JVD  Respiratory: CTAB, no wheezes, rales or rhonchi  Cardiovascular: S1, S2, RRR  Gastrointestinal: BS+, soft, NT/ND  Extremities: No cyanosis or clubbing. No peripheral edema  Neurological: A/O x 3, no focal deficits  Psychiatric: Normal mood, normal affect  : No CVA tenderness. No higginbotham.   Skin: No rashes    LABS:  08-10    143  |  109<H>  |  17  ----------------------------<  97  3.8   |  22  |  1.42<H>    Ca    8.7      10 Aug 2021 07:00  Phos  4.5     08-09  Mg     2.20     08-09    TPro  7.4  /  Alb  3.9  /  TBili  <0.2  /  DBili      /  AST  24  /  ALT  14  /  AlkPhos  65  08-09    Creatinine Trend: 1.42 <--, 1.58 <--                        6.9    7.85  )-----------( 238      ( 10 Aug 2021 10:44 )             21.4     Urine Studies:        RADIOLOGY & ADDITIONAL STUDIES:

## 2021-08-10 NOTE — CONSULT NOTE ADULT - ASSESSMENT
ALVARO CEJA is a 65y Male with h/o prostate cancer s/p prostatectomy (no chemo/radiation), reportedly h/o gastric ulcer s/p ablation (before 2010) and presents today as a new consult for hematochezia.    Impression:  #. Hematochezia most likely 2/2 diverticular bleeding vs rectal hemorrhoids, less likely 2/2 gastric/duodenal ulceration - the intermittent episodes of bleeding more suggestive of diverticulosis, although unable to do LENKA to confirm color and will benefit from evaluation for both upper and lower GIB.      Recommendations:  - Keep NPO  - Plan for EGD/colonoscopy tomorrow 8/11  - Prep ordered to start today at 1800  - Continue IV PPI  - Trend Hgb, Transfuse if Hgb < 7    Thank you for involving us in this patient's care.    Case discussed with Attending, Dr. MAGGI Galdamez.    Joanna Guerrier MD  Gastroenterology/Hepatology Fellow, PGY-V  Available via [employer] Teams    NON-URGENT CONSULTS:  Please email [email or web address]  AT NIGHT AND ON WEEKENDS:  Contact on-call GI fellow via answering service ([phone]) from 5pm-8am and on weekends/holidays  MONDAY-FRIDAY 8AM-5PM:  Pager# [phone] ([facility])  GI Phone# [phone] ([facility])     ALVARO CEJA is a 65y Male with h/o prostate cancer s/p prostatectomy (no chemo/radiation), reportedly h/o gastric ulcer s/p ablation (before 2010) and presents today as a new consult for hematochezia.    Impression:  #. Hematochezia most likely 2/2 diverticular bleeding vs rectal hemorrhoids, less likely 2/2 gastric/duodenal ulceration - the intermittent episodes of bleeding more suggestive of diverticulosis, although unable to do LENKA to confirm color and will benefit from evaluation for both upper and lower GIB.      Recommendations:  - Keep NPO  - Plan for EGD/colonoscopy tomorrow 8/11  - Prep ordered to start today at 1800  - Continue IV PPI  - Trend Hgb, Transfuse if Hgb < 7    Thank you for involving us in this patient's care.    Case discussed with Attending, Dr. MAGGI Galdamez.    Joanna Guerrier MD  Gastroenterology/Hepatology Fellow, PGY-V  Available via [employer] Teams    NON-URGENT CONSULTS:  Please email [email or web address]  AT NIGHT AND ON WEEKENDS:  Contact on-call GI fellow via answering service ([phone]) from 5pm-8am and on weekends/holidays  MONDAY-FRIDAY 8AM-5PM:  Pager# [phone] ([facility])  GI Phone# [phone] ([facility])

## 2021-08-10 NOTE — CONSULT NOTE ADULT - ASSESSMENT
Assessment and Plan    65M with PMHx HTN, diverticulosis, prostate cancer s/p prostatectomy presenting with 5 days of BRBPR. 5 days ago patient had 3 large episodes of bloody BMs    EKG: NSR no STT changes    1. Anemia  -2/2 GIB  -s/p PRBC transfusion  -continue to monitor H/H  -plan for colonoscopy, pt denies CP, SOB or palpitations. optimized from cardiac standpoint, RCRI class I, 3.9% risk of 30 day MACE    2. JOYCE  -creat improving  -f/u renal    3. HTN  -controlled  -continue to monitor BP

## 2021-08-10 NOTE — H&P ADULT - PROBLEM SELECTOR PROBLEM 3
Benign prostatic hyperplasia, unspecified whether lower urinary tract symptoms present Hypertension, unspecified type JOYCE (acute kidney injury)

## 2021-08-10 NOTE — H&P ADULT - PROBLEM SELECTOR PLAN 3
Hold home amlodipine 5mg qd for now given normotensive and w/u GIB ongoing Likely due to volume depletion in setting of decreased PO intake and blood loss  -1L IV bolus now and recheck BMP   -bladder scan

## 2021-08-10 NOTE — H&P ADULT - NSHPREVIEWOFSYSTEMS_GEN_ALL_CORE
ROS:    Constitutional: [ ] fevers [ ] chills   HEENT: [ ] dry eyes [ ] eye irritation   CV: [ ] chest pain [ ] orthopnea [ ] palpitations   Resp: [ ] cough [ ] shortness of breath [ ] dyspnea [ ] wheezing   GI: [ ] nausea [x ] vomiting [ ] diarrhea [ ] constipation [x ] abd pain [ ] dysphagia [x] hematochezia  : [ ] dysuria [ ] nocturia [ ] hematuria [ ] increased urinary frequency  Musculoskeletal: [ ] back pain [ ] myalgias [ ] arthralgias [ ] fracture  Skin: [ ] rash [ ] itch  Neurological: [ ] headache [ ] dizziness [ ] syncope   Psychiatric: [ ] anxiety [ ] depression  Endocrine: [ ] diabetes [ ] thyroid problem  Hematologic/Lymphatic: [x ] anemia [x ] bleeding problem  Allergic/Immunologic: [ ] itchy eyes [ ] nasal discharge   [x ] All other systems negative

## 2021-08-10 NOTE — ED ADULT NURSE REASSESSMENT NOTE - NS ED NURSE REASSESS COMMENT FT1
critical lab value received, provider paged, #[phone]. patient remains A&OX4. vital signs stable. Provider aware.
Facilitator RN:

## 2021-08-10 NOTE — H&P ADULT - PROBLEM SELECTOR PROBLEM 4
Need for prophylactic measure Benign prostatic hyperplasia, unspecified whether lower urinary tract symptoms present Hypertension, unspecified type

## 2021-08-10 NOTE — H&P ADULT - NSHPLABSRESULTS_GEN_ALL_CORE
I have personally reviewed this patient's labs below:                        8.5    10.98 )-----------( 287      ( 09 Aug 2021 21:51 )             27.0     08-09-21 @ 21:51    141  |  104  |  20             --------------------------< 142<H>     4.3  |  19<L>  | 1.58<H>    eGFR AA: 52<L>  eGFR N-AA: 45<L>    Calcium: 9.5  Phosphorus: 4.5  Magnesium: 2.20    AST: 24    ALT: 14  AlkPhos: 65  Protein: 7.4  Albumin: 3.9  TBili: <0.2  D-Bili: --      EKG ordered and pending    Imaging personally reviewed below:  EXAM:  CT ANGIO ABD PELV (W)AW IC        PROCEDURE DATE:  Aug  9 2021         INTERPRETATION:  CLINICAL INFORMATION: Bright red blood per rectum.    COMPARISON: None.    CONTRAST/COMPLICATIONS:  IV Contrast: Omnipaque 350  90 cc administered   10 cc discarded  Oral Contrast: NONE  Complications: None reported at time of study completion    PROCEDURE:  CT of the Abdomen and Pelvis was performed.  Precontrast, Arterial and Delayed phases were performed.  Sagittal and coronal reformats were performed.    FINDINGS:  LOWER CHEST: Bibasilar subsegmental atelectasis. Mild bilateral gynecomastia.    LIVER: Tiny low attenuation focus in the posterior right hepatic dome too small to characterize.  BILE DUCTS: Normal caliber.  GALLBLADDER: Cholelithiasis.  SPLEEN: Within normal limits.  PANCREAS: Within normal limits.  ADRENALS: Within normal limits.  KIDNEYS/URETERS: Redemonstration of bilateral renal cysts. Bilateral renal subcentimeter hypodense lesions which are too small for accurate characterization. No hydronephrosis.    BLADDER: Within normal limits.  REPRODUCTIVE ORGANS: Prostatectomy.    BOWEL: No intraluminal extravasation of intravenous contrast to suggest an active gastrointestinal hemorrhage. Colonic diverticulosis without diverticulitis. Small hiatal hernia. No bowel obstruction or inflammation. Appendix is normal.  PERITONEUM: No ascites.  VESSELS: Within normal limits.  RETROPERITONEUM/LYMPH NODES: No lymphadenopathy.  ABDOMINAL WALL: Small fat-containing umbilical hernia.  BONES: Degenerative changes of the spine.    IMPRESSION:  No evidence of an active gastrointestinal hemorrhage.

## 2021-08-10 NOTE — CONSULT NOTE ADULT - SUBJECTIVE AND OBJECTIVE BOX
Hay Mera MD  Interventional Cardiology / Advance Heart Failure and Cardiac Transplant Specialist  [place] Office : [address] [place] [address] [place] [address]  Tel:   [place] Office : [address] [facility] [place] [address]  Tel: [phone]  Cell : [phone]    HISTORY OF PRESENTING ILLNESS:  65M with PMHx HTN, diverticulosis, prostate cancer s/p prostatectomy presenting with 5 days of BRBPR. 5 days ago patient had 3 large episodes of bloody BMs. He then had difficulty getting up from toilet due to lightheadedness. He also had two episodes of vomiting the next day without blood. The next two days he had decreased BM amount and without blood. He also had lower abdominal pain that was mild and he attributed to decreased PO intake. He had some water and sprite and the pain resolved. Since he has been able to tolerate PO but much less overall. Three days ago he saw his PMD who referred him to GI and he saw GI today who sent him to ED. His last bloody BM was yesterday for two episodes but were much smaller in quantity. Denies fevers, chills, CP, SOB or active bleeding elsewhere. Patient denies any cardiac history. Denies chest pain, SOB or palpitations   	  MEDICATIONS:  tamsulosin 0.4 milliGRAM(s) Oral at bedtime        acetaminophen   Tablet .. 650 milliGRAM(s) Oral every 6 hours PRN    bisacodyl 20 milliGRAM(s) Oral once  pantoprazole  Injectable 40 milliGRAM(s) IV Push daily  polyethylene glycol/electrolyte Solution. 4000 milliLiter(s) Oral once      folic acid 1 milliGRAM(s) Oral daily      PAST MEDICAL/SURGICAL HISTORY  PAST MEDICAL & SURGICAL HISTORY:  HTN (hypertension)  on med x 3 yrs    BPH (benign prostatic hypertrophy)    H/O endoscopy    H/O colonoscopy  rectal bleed  no issues found    H/O prostate biopsy        SOCIAL HISTORY: Substance Use (street drugs): ( x ) never used  (  ) other:    FAMILY HISTORY:  No pertinent family history in first degree relatives        REVIEW OF SYSTEMS:  CONSTITUTIONAL: No fever, weight loss, or fatigue  EYES: No eye pain, visual disturbances, or discharge  ENMT:  No difficulty hearing, tinnitus, vertigo; No sinus or throat pain  BREASTS: No pain, masses, or nipple discharge  GASTROINTESTINAL: No abdominal or epigastric pain. No nausea, vomiting, or hematemesis; No diarrhea or constipation. No melena or hematochezia.  GENITOURINARY: No dysuria, frequency, hematuria, or incontinence  NEUROLOGICAL: No headaches, memory loss, loss of strength, numbness, or tremors  ENDOCRINE: No heat or cold intolerance; No hair loss  MUSCULOSKELETAL: No joint pain or swelling; No muscle, back, or extremity pain  PSYCHIATRIC: No depression, anxiety, mood swings, or difficulty sleeping  HEME/LYMPH: No easy bruising, or bleeding gums  All others negative    PHYSICAL EXAM:  T(C): 36.9 (08-10-21 @ 12:00), Max: 37.1 (08-09-21 @ 23:36)  HR: 83 (08-10-21 @ 13:24) (62 - 124)  BP: 124/70 (08-10-21 @ 13:24) (110/63 - 138/71)  RR: 18 (08-10-21 @ 13:24) (16 - 18)  SpO2: 100% (08-10-21 @ 12:00) (99% - 100%)  Wt(kg): --  I&O's Summary    Height (cm): 167.6 (08-09 @ 18:09)    GENERAL: NAD  EYES: EOMI, PERRLA, conjunctiva and sclera clear  ENMT: No tonsillar erythema, exudates, or enlargement  Cardiovascular: Normal S1 S2, No JVD, No murmurs, No edema  Respiratory: Lungs clear to auscultation	  Gastrointestinal:  Soft, Non-tender, + BS	  Extremities: No edema                                    6.9    7.85  )-----------( 238      ( 10 Aug 2021 10:44 )             21.4     08-10    143  |  109<H>  |  17  ----------------------------<  97  3.8   |  22  |  1.42<H>    Ca    8.7      10 Aug 2021 07:00  Phos  4.5     08-09  Mg     2.20     08-09    TPro  7.4  /  Alb  3.9  /  TBili  <0.2  /  DBili  x   /  AST  24  /  ALT  14  /  AlkPhos  65  08-09    proBNP:   Lipid Profile:   HgA1c:   TSH:     Consultant(s) Notes Reviewed:  [x ] YES  [ ] NO    Care Discussed with Consultants/Other Providers [ x] YES  [ ] NO    Imaging Personally Reviewed independently:  [x] YES  [ ] NO    All labs, radiologic studies, vitals, orders and medications list reviewed. Patient is seen and examined at bedside. Case discussed with medical team.

## 2021-08-10 NOTE — H&P ADULT - HISTORY OF PRESENT ILLNESS
65M with PMHx HTN, diverticulosis, prostate cancer s/p prostatectomy presenting with 5 days of BRBPR. 5 days ago patient had 3 large episodes of bloody BMs. He then had difficulty getting up from toilet due to lightheadedness. He also had two episodes of vomiting the next day without blood. The next two days he had decreased BM amount and without blood. He also had lower abdominal pain that was mild and he attributed to decreased PO intake. He had some water and sprite and the pain resolved. Since he has been able to tolerate PO but much less overall. Three days ago he saw his PMD who referred him to GI and he saw GI today who sent him to ED. His last bloody BM was yesterday for two episodes but were much smaller in quantity. Denies fevers, chills, CP, SOB or active bleeding elsewhere. Currently without LH, dizziness, fatigue as well. Wants to take PO. Last C-scope was 2018 which he believes to be normal. States he had hx of gastric ulcers in the past that were intervened on 20 years ago and has been asymptomatic since. In 2012 he had his first episode of bloody BMs but self resolved and ever since only rarely occurs.    In ED, mildly tachycardic 90-100s, normotensive. Guiac positive. Hgb 8.5. Ordered for 1L IVF

## 2021-08-10 NOTE — ED ADULT NURSE REASSESSMENT NOTE - COMFORT CARE
Stretcher in lowest position, wheels locked, appropriate side rails in place, call bell in reach./darkened lights/plan of care explained

## 2021-08-10 NOTE — H&P ADULT - NSICDXPASTSURGICALHX_GEN_ALL_CORE_FT
PAST SURGICAL HISTORY:  H/O colonoscopy rectal bleed  no issues found    H/O endoscopy     H/O prostate biopsy

## 2021-08-10 NOTE — CONSULT NOTE ADULT - SUBJECTIVE AND OBJECTIVE BOX
Chief Complaint:  BRBPR      HPI:ALVARO CEJA is a 65y Male with h/o prostate cancer s/p prostatectomy (no chemo/radiation),  h/o gastric ulcer (before 2010) and presents today as a new consult for BRBPR.    Patient states that on Thursday he had multiple BMs that gushed out bright red blood in his stool that was noted in the toilet bowl water and mixed in with the stool. The following day on Friday, he again had multiple BMs with gushes of even more bright red blood that filled the toilet and that was mixed in with the stool. However, this episode was accompanied by sharp LLQ abdominal pain that felt like constipation pain, according to the patient (though he denied constipation)    PMHX/PSHX:  HTN (hypertension)    BPH (benign prostatic hypertrophy)    H/O endoscopy    H/O colonoscopy    H/O prostate biopsy      Allergies:  No Known Allergies      Home Medications: reviewed  Hospital Medications:  acetaminophen   Tablet .. 650 milliGRAM(s) Oral every 6 hours PRN  bisacodyl 20 milliGRAM(s) Oral once  folic acid 1 milliGRAM(s) Oral daily  pantoprazole  Injectable 40 milliGRAM(s) IV Push daily  polyethylene glycol/electrolyte Solution. 4000 milliLiter(s) Oral once  tamsulosin 0.4 milliGRAM(s) Oral at bedtime      Social History:   Tob: Denies  EtOH: Denies  Illicit Drugs: Denies    Family history:  No pertinent family history in first degree relatives      Denies family history of colon cancer/polyps, stomach cancer/polyps, pancreatic cancer/masses, liver cancer/disease, ovarian cancer and endometrial cancer.    ROS:   General:  No  fevers, chills, night sweats, fatigue  Eyes:  Good vision, no reported pain  ENT:  No sore throat, pain, runny nose  CV:  No pain, palpitations  Pulm:  No dyspnea, cough  GI:  See HPI, otherwise negative  :  No  incontinence, nocturia  Muscle:  No pain, weakness  Neuro:  No memory problems  Psych:  No insomnia, mood problems, depression  Endocrine:  No polyuria, polydipsia, cold/heat intolerance  Heme:  No petechiae, ecchymosis, easy bruisability  Skin:  No rash    PHYSICAL EXAM:   Vital Signs:  Vital Signs Last 24 Hrs  T(C): 36.9 (10 Aug 2021 12:00), Max: 37.1 (09 Aug 2021 23:36)  T(F): 98.4 (10 Aug 2021 12:00), Max: 98.7 (09 Aug 2021 23:36)  HR: 83 (10 Aug 2021 13:24) (62 - 124)  BP: 124/70 (10 Aug 2021 13:24) (110/63 - 138/71)  BP(mean): --  RR: 18 (10 Aug 2021 13:24) (16 - 18)  SpO2: 100% (10 Aug 2021 12:00) (99% - 100%)  Daily Height in cm: 167.64 (09 Aug 2021 18:09)    Daily     GENERAL: no acute distress  NEURO: alert  HEENT: anicteric sclera, no conjunctival pallor appreciated  CHEST: no respiratory distress, no accessory muscle use  CARDIAC: regular rate, rhythm  ABDOMEN: soft, non-tender, non-distended, no rebound or guarding  EXTREMITIES: warm, well perfused, no edema  SKIN: no lesions noted    LABS: reviewed                        6.9    7.85  )-----------( 238      ( 10 Aug 2021 10:44 )             21.4     08-10    143  |  109<H>  |  17  ----------------------------<  97  3.8   |  22  |  1.42<H>    Ca    8.7      10 Aug 2021 07:00  Phos  4.5     08-09  Mg     2.20     08-09    TPro  7.4  /  Alb  3.9  /  TBili  <0.2  /  DBili  x   /  AST  24  /  ALT  14  /  AlkPhos  65  08-09    LIVER FUNCTIONS - ( 09 Aug 2021 21:51 )  Alb: 3.9 g/dL / Pro: 7.4 g/dL / ALK PHOS: 65 U/L / ALT: 14 U/L / AST: 24 U/L / GGT: x               Diagnostic Studies: see sunrise for full report         Chief Complaint:  BRBPR      HPI:ALVARO CEJA is a 65y Male with h/o prostate cancer s/p prostatectomy (no chemo/radiation), reportedly h/o gastric ulcer s/p ablation (before 2010) and presents today as a new consult for BRBPR.    Patient states that on Thursday he had multiple BMs that gushed out bright red blood in his stool that was noted in the toilet bowl water and mixed in with the stool. The following day on Friday, he again had multiple BMs with gushes of even more bright red blood that filled the toilet and that was mixed in with the stool. However, this episode was accompanied by sharp LLQ abdominal pain that felt like constipation pain, according to the patient (though he denied constipation) and this episode was c/b non bilious, non bloody vomiting. The following day the bleeding stopped (Saturday), but then restarted overnight on Sunday, into Monday morning. States he contact local GI after and was instructed to go to the ED. He denies n/constipation or diarrhea, no abdominal pain, no f/c, night sweats but reports about 10 pound weight loss. On baby ASA, no AC, no NSAID use, no alcohol use and no smoking. States that his last bloody BM was on Monday.    Of note, Mr. Ceja's report of previous events are varied, but in summary, he initially reported 1-2 mos of intermittent BRBPR, but then later denied it. He also mentioned that prior to 2010, he was having abdominal pain and subsequently underwent an EGD and was told that he had a gastric ulcer that was burned. He was given medication for it (he cannot recall the name) and has never had a similar pain again. However, in 2010, while working as a [employer], he starting having similar episodes rectal bleeding that he is having now, but less severe. Says he underwent EGD and was told the lining of his stomach or duoden was serrated and that sometimes things may get stuck in it and that is all he could recall from that procedure, but at that same time, says he had a stool study for bacteria done and was given medication for this. Later on in 2018, he had a colonoscopy for bloody stools again, says it was normal and he was instructed to repeat it in 4-5 years     In the ED, he was normotensive but tachycardic with Hgb 8.5    PMHX/PSHX:  HTN (hypertension)    BPH (benign prostatic hypertrophy)    H/O endoscopy    H/O colonoscopy    H/O prostate biopsy      Allergies:  No Known Allergies      Home Medications: reviewed  Hospital Medications:  acetaminophen   Tablet .. 650 milliGRAM(s) Oral every 6 hours PRN  bisacodyl 20 milliGRAM(s) Oral once  folic acid 1 milliGRAM(s) Oral daily  pantoprazole  Injectable 40 milliGRAM(s) IV Push daily  polyethylene glycol/electrolyte Solution. 4000 milliLiter(s) Oral once  tamsulosin 0.4 milliGRAM(s) Oral at bedtime      Social History:   Tob: Denies  EtOH: Denies  Illicit Drugs: Denies    Family history:  No pertinent family history in first degree relatives      Denies family history of colon cancer/polyps, stomach cancer/polyps, pancreatic cancer/masses, liver cancer/disease, ovarian cancer and endometrial cancer.    ROS:   General:  No  fevers, chills, night sweats, fatigue  Eyes:  Good vision, no reported pain  ENT:  No sore throat, pain, runny nose  CV:  No pain, palpitations  Pulm:  No dyspnea, cough  GI:  See HPI, otherwise negative  :  No  incontinence, nocturia  Muscle:  No pain, weakness  Neuro:  No memory problems  Psych:  No insomnia, mood problems, depression  Endocrine:  No polyuria, polydipsia, cold/heat intolerance  Heme:  No petechiae, ecchymosis, easy bruisability  Skin:  No rash    PHYSICAL EXAM:   Vital Signs:  Vital Signs Last 24 Hrs  T(C): 36.9 (10 Aug 2021 12:00), Max: 37.1 (09 Aug 2021 23:36)  T(F): 98.4 (10 Aug 2021 12:00), Max: 98.7 (09 Aug 2021 23:36)  HR: 83 (10 Aug 2021 13:24) (62 - 124)  BP: 124/70 (10 Aug 2021 13:24) (110/63 - 138/71)  BP(mean): --  RR: 18 (10 Aug 2021 13:24) (16 - 18)  SpO2: 100% (10 Aug 2021 12:00) (99% - 100%)  Daily Height in cm: 167.64 (09 Aug 2021 18:09)    Daily     GENERAL: no acute distress  NEURO: alert  HEENT: anicteric sclera, no conjunctival pallor appreciated  CHEST: no respiratory distress, no accessory muscle use  CARDIAC: regular rate, rhythm  ABDOMEN: soft, non-tender, non-distended, no rebound or guarding  EXTREMITIES: warm, well perfused, no edema  SKIN: no lesions noted    LABS: reviewed                        6.9    7.85  )-----------( 238      ( 10 Aug 2021 10:44 )             21.4     08-10    143  |  109<H>  |  17  ----------------------------<  97  3.8   |  22  |  1.42<H>    Ca    8.7      10 Aug 2021 07:00  Phos  4.5     08-09  Mg     2.20     08-09    TPro  7.4  /  Alb  3.9  /  TBili  <0.2  /  DBili  x   /  AST  24  /  ALT  14  /  AlkPhos  65  08-09    LIVER FUNCTIONS - ( 09 Aug 2021 21:51 )  Alb: 3.9 g/dL / Pro: 7.4 g/dL / ALK PHOS: 65 U/L / ALT: 14 U/L / AST: 24 U/L / GGT: x               Diagnostic Studies: see sunrise for full report         Chief Complaint:  BRBPR      HPI:ALVARO CEJA is a 65y Male with h/o prostate cancer s/p prostatectomy (no chemo/radiation), reportedly h/o gastric ulcer s/p ablation (before 2010) and presents today as a new consult for BRBPR.    Patient states that on Thursday he had multiple BMs that gushed out bright red blood in his stool that was noted in the toilet bowl water and mixed in with the stool. The following day on Friday, he again had multiple BMs with gushes of even more bright red blood that filled the toilet and that was mixed in with the stool. However, this episode was accompanied by sharp LLQ abdominal pain that felt like constipation pain, according to the patient (though he denied constipation) and this episode was c/b non bilious, non bloody vomiting. The following day the bleeding stopped (Saturday), but then restarted overnight on Sunday, into Monday morning. States he contact local GI after and was instructed to go to the ED. He denies n/constipation or diarrhea, no abdominal pain, no f/c, night sweats but reports about 10 pound weight loss. On baby ASA, no AC, no NSAID use, no alcohol use and no smoking. States that his last bloody BM was on Monday.    Of note, Mr. Ceja's report of previous events are varied, but in summary, he initially reported 1-2 mos of intermittent BRBPR, but then later denied it. He also mentioned that prior to 2010, he was having abdominal pain and subsequently underwent an EGD and was told that he had a gastric ulcer that was burned. He was given medication for it (he cannot recall the name) and has never had a similar pain again. However, in 2010, while working as a [employer], he starting having similar episodes rectal bleeding that he is having now, but less severe. Says he underwent EGD and was told the lining of his stomach or duoden was serrated and that sometimes things may get stuck in it and that is all he could recall from that procedure, but at that same time, says he had a stool study for bacteria done and was given medication for this. Later on in 2018, he had a colonoscopy for bloody stools again, says it was normal and he was instructed to repeat it in 4-5 years     In the ED, he was normotensive but tachycardic with Hgb 8.5, the following morning it was 6.7, patient denied any BMs, remained HDS in NAD and he was given 1 unit of pRBCs.     PMHX/PSHX:  HTN (hypertension)    BPH (benign prostatic hypertrophy)    H/O endoscopy    H/O colonoscopy    H/O prostate biopsy      Allergies:  No Known Allergies      Home Medications: reviewed  Hospital Medications:  acetaminophen   Tablet .. 650 milliGRAM(s) Oral every 6 hours PRN  bisacodyl 20 milliGRAM(s) Oral once  folic acid 1 milliGRAM(s) Oral daily  pantoprazole  Injectable 40 milliGRAM(s) IV Push daily  polyethylene glycol/electrolyte Solution. 4000 milliLiter(s) Oral once  tamsulosin 0.4 milliGRAM(s) Oral at bedtime      Social History:   Tob: Denies  EtOH: Denies  Illicit Drugs: Denies    Family history:  No pertinent family history in first degree relatives      Denies family history of colon cancer/polyps, stomach cancer/polyps, pancreatic cancer/masses, liver cancer/disease, ovarian cancer and endometrial cancer.    ROS:   General:  No  fevers, chills, night sweats, fatigue  Eyes:  Good vision, no reported pain  ENT:  No sore throat, pain, runny nose  CV:  No pain, palpitations  Pulm:  No dyspnea, cough  GI:  See HPI, otherwise negative  :  No  incontinence, nocturia  Muscle:  No pain, weakness  Neuro:  No memory problems  Psych:  No insomnia, mood problems, depression  Endocrine:  No polyuria, polydipsia, cold/heat intolerance  Heme:  No petechiae, ecchymosis, easy bruisability  Skin:  No rash    PHYSICAL EXAM:   Vital Signs:  Vital Signs Last 24 Hrs  T(C): 36.9 (10 Aug 2021 12:00), Max: 37.1 (09 Aug 2021 23:36)  T(F): 98.4 (10 Aug 2021 12:00), Max: 98.7 (09 Aug 2021 23:36)  HR: 83 (10 Aug 2021 13:24) (62 - 124)  BP: 124/70 (10 Aug 2021 13:24) (110/63 - 138/71)  BP(mean): --  RR: 18 (10 Aug 2021 13:24) (16 - 18)  SpO2: 100% (10 Aug 2021 12:00) (99% - 100%)  Daily Height in cm: 167.64 (09 Aug 2021 18:09)    Daily     GENERAL: no acute distress  NEURO: alert  HEENT: anicteric sclera, no conjunctival pallor appreciated  CHEST: no respiratory distress, no accessory muscle use  CARDIAC: regular rate, rhythm  ABDOMEN: soft, non-tender, non-distended, no rebound or guarding  EXTREMITIES: warm, well perfused, no edema  SKIN: no lesions noted    LABS: reviewed                        6.9    7.85  )-----------( 238      ( 10 Aug 2021 10:44 )             21.4     08-10    143  |  109<H>  |  17  ----------------------------<  97  3.8   |  22  |  1.42<H>    Ca    8.7      10 Aug 2021 07:00  Phos  4.5     08-09  Mg     2.20     08-09    TPro  7.4  /  Alb  3.9  /  TBili  <0.2  /  DBili  x   /  AST  24  /  ALT  14  /  AlkPhos  65  08-09    LIVER FUNCTIONS - ( 09 Aug 2021 21:51 )  Alb: 3.9 g/dL / Pro: 7.4 g/dL / ALK PHOS: 65 U/L / ALT: 14 U/L / AST: 24 U/L / GGT: x               Diagnostic Studies: see sunrise for full report         Chief Complaint:  BRBPR      HPI:ALVARO CEJA is a 65y Male with h/o prostate cancer s/p prostatectomy (no chemo/radiation), reportedly h/o gastric ulcer s/p ablation (before 2010) and presents today as a new consult for BRBPR.    Patient states that on Thursday he had multiple BMs that gushed out bright red blood in his stool that was noted in the toilet bowl water and mixed in with the stool. The following day on Friday, he again had multiple BMs with gushes of even more bright red blood that filled the toilet and that was mixed in with the stool. However, this episode was accompanied by sharp LLQ abdominal pain that felt like constipation pain, according to the patient (though he denied constipation) and this episode was c/b non bilious, non bloody vomiting. The following day the bleeding stopped (Saturday), but then restarted overnight on Sunday, into Monday morning. States he contact local GI after and was instructed to go to the ED. He denies n/constipation or diarrhea, no abdominal pain, no f/c, night sweats but reports about 10 pound weight loss. On baby ASA, no AC, no NSAID use, no alcohol use and no smoking. States that his last bloody BM was on Monday.    Of note, Mr. Ceja's report of previous events are varied, but in summary, he initially reported 1-2 mos of intermittent BRBPR, but then later denied it. He also mentioned that prior to 2010, he was having abdominal pain and subsequently underwent an EGD and was told that he had a gastric ulcer that was burned. He was given medication for it (he cannot recall the name) and has never had a similar pain again. However, in 2010, while working as a [employer], he starting having similar episodes rectal bleeding that he is having now, but less severe. Says he underwent EGD and was told the lining of his stomach or duoden was serrated and that sometimes things may get stuck in it and that is all he could recall from that procedure, but at that same time, says he had a stool study for bacteria done and was given medication for this. Later on in 2018, he had a colonoscopy for bloody stools again, says it was normal and he was instructed to repeat it in 4-5 years     In the ED, he was normotensive but tachycardic with Hgb 8.5, the following morning it was 6.7, patient denied any BMs, remained HDS in NAD and he was given 1 unit of pRBCs. INR 1.2, , Cr 1.58, BUN 20. CTA negative for extravasation, +diverticulosis.    PMHX/PSHX:  HTN (hypertension)    BPH (benign prostatic hypertrophy)    H/O endoscopy    H/O colonoscopy    H/O prostate biopsy      Allergies:  No Known Allergies      Home Medications: reviewed  Hospital Medications:  acetaminophen   Tablet .. 650 milliGRAM(s) Oral every 6 hours PRN  bisacodyl 20 milliGRAM(s) Oral once  folic acid 1 milliGRAM(s) Oral daily  pantoprazole  Injectable 40 milliGRAM(s) IV Push daily  polyethylene glycol/electrolyte Solution. 4000 milliLiter(s) Oral once  tamsulosin 0.4 milliGRAM(s) Oral at bedtime      Social History:   Tob: Denies  EtOH: Denies  Illicit Drugs: Denies    Family history:  No pertinent family history in first degree relatives      Denies family history of colon cancer/polyps, stomach cancer/polyps, pancreatic cancer/masses, liver cancer/disease, ovarian cancer and endometrial cancer.    ROS: complete and normal except as mentioned above    PHYSICAL EXAM:   Vital Signs:  Vital Signs Last 24 Hrs  T(C): 36.9 (10 Aug 2021 12:00), Max: 37.1 (09 Aug 2021 23:36)  T(F): 98.4 (10 Aug 2021 12:00), Max: 98.7 (09 Aug 2021 23:36)  HR: 83 (10 Aug 2021 13:24) (62 - 124)  BP: 124/70 (10 Aug 2021 13:24) (110/63 - 138/71)  BP(mean): --  RR: 18 (10 Aug 2021 13:24) (16 - 18)  SpO2: 100% (10 Aug 2021 12:00) (99% - 100%)  Daily Height in cm: 167.64 (09 Aug 2021 18:09)    Daily     GENERAL: no acute distress  NEURO: alert  HEENT: anicteric sclera, no conjunctival pallor appreciated  CHEST: no respiratory distress, no accessory muscle use  CARDIAC: regular rate, rhythm  ABDOMEN: soft, non-tender, non-distended, no rebound or guarding  EXTREMITIES: warm, well perfused, no edema  LENKA: deferred, patient in ED hallway, no privacy  SKIN: no lesions noted    LABS: reviewed                        6.9    7.85  )-----------( 238      ( 10 Aug 2021 10:44 )             21.4     08-10    143  |  109<H>  |  17  ----------------------------<  97  3.8   |  22  |  1.42<H>    Ca    8.7      10 Aug 2021 07:00  Phos  4.5     08-09  Mg     2.20     08-09    TPro  7.4  /  Alb  3.9  /  TBili  <0.2  /  DBili  x   /  AST  24  /  ALT  14  /  AlkPhos  65  08-09    LIVER FUNCTIONS - ( 09 Aug 2021 21:51 )  Alb: 3.9 g/dL / Pro: 7.4 g/dL / ALK PHOS: 65 U/L / ALT: 14 U/L / AST: 24 U/L / GGT: x               Diagnostic Studies: see sunrise for full report         Chief Complaint:  BRBPR      HPI:ALVARO CEJA is a 65y Male with h/o prostate cancer s/p prostatectomy (no chemo/radiation), reportedly h/o gastric ulcer s/p ablation (before 2010) and presents today as a new consult for BRBPR.    Patient states that on Thursday he had multiple BMs that gushed out bright red blood in his stool that was noted in the toilet bowl water and mixed in with the stool. The following day on Friday, he again had multiple BMs with gushes of even more bright red blood that filled the toilet and that was mixed in with the stool. However, this episode was accompanied by sharp LLQ abdominal pain that felt like constipation pain, according to the patient (though he denied constipation) and this episode was c/b non bilious, non bloody vomiting. The following day the bleeding stopped (Saturday), but then restarted overnight on Sunday, into Monday morning. States he contact local GI after and was instructed to go to the ED. He denies n/constipation or diarrhea, no abdominal pain, no f/c, night sweats but reports about 10 pound weight loss. On baby ASA, no AC, no NSAID use, no alcohol use and no smoking. States that his last bloody BM was on Monday.    Of note, Mr. Ceja's report of previous events are varied, but in summary, he initially reported 1-2 mos of intermittent BRBPR, but then later denied it. He also mentioned that prior to 2010, he was having abdominal pain and subsequently underwent an EGD and was told that he had a gastric ulcer that was burned. He was given medication for it (he cannot recall the name) and has never had a similar pain again. However, in 2010, while working as a [employer], he starting having similar episodes rectal bleeding that he is having now, but less severe. Says he underwent EGD and was told the lining of his stomach or duoden was serrated and that sometimes things may get stuck in it and that is all he could recall from that procedure, but at that same time, says he had a stool study for bacteria done and was given medication for this. Later on in 2018, he had a colonoscopy for bloody stools again, says it was normal and he was instructed to repeat it in 4-5 years     In the ED, he was normotensive but tachycardic with Hgb 8.5, the following morning it was 6.7, patient denied any BMs, remained HDS in NAD and he was given 1 unit of pRBCs. INR 1.2, , Cr 1.58, BUN 20. CTA negative for extravasation, +diverticulosis.    PMHX/PSHX:  HTN (hypertension)    BPH (benign prostatic hypertrophy)    H/O endoscopy    H/O colonoscopy    H/O prostate biopsy      Allergies:  No Known Allergies      Home Medications: reviewed  Hospital Medications:  acetaminophen   Tablet .. 650 milliGRAM(s) Oral every 6 hours PRN  bisacodyl 20 milliGRAM(s) Oral once  folic acid 1 milliGRAM(s) Oral daily  pantoprazole  Injectable 40 milliGRAM(s) IV Push daily  polyethylene glycol/electrolyte Solution. 4000 milliLiter(s) Oral once  tamsulosin 0.4 milliGRAM(s) Oral at bedtime      Social History:   Tob: Denies  EtOH: Denies  Illicit Drugs: Denies    Family history:  No pertinent family history in first degree relatives      Denies family history of colon cancer/polyps, stomach cancer/polyps, pancreatic cancer/masses, liver cancer/disease, ovarian cancer and endometrial cancer.    ROS: complete and normal except as mentioned above    PHYSICAL EXAM:   Vital Signs:  Vital Signs Last 24 Hrs  T(C): 36.9 (10 Aug 2021 12:00), Max: 37.1 (09 Aug 2021 23:36)  T(F): 98.4 (10 Aug 2021 12:00), Max: 98.7 (09 Aug 2021 23:36)  HR: 83 (10 Aug 2021 13:24) (62 - 124)  BP: 124/70 (10 Aug 2021 13:24) (110/63 - 138/71)  BP(mean): --  RR: 18 (10 Aug 2021 13:24) (16 - 18)  SpO2: 100% (10 Aug 2021 12:00) (99% - 100%)  Daily Height in cm: 167.64 (09 Aug 2021 18:09)    Daily     GENERAL: no acute distress  NEURO: alert  HEENT: anicteric sclera but pigmented, no conjunctival pallor appreciated  CHEST: no respiratory distress, no accessory muscle use  CARDIAC: regular rate, rhythm  ABDOMEN: soft, non-tender, non-distended, no rebound or guarding  EXTREMITIES: warm, well perfused, no edema  LENKA: deferred, patient in ED hallway, no privacy  SKIN: no lesions noted    LABS: reviewed                        6.9    7.85  )-----------( 238      ( 10 Aug 2021 10:44 )             21.4     08-10    143  |  109<H>  |  17  ----------------------------<  97  3.8   |  22  |  1.42<H>    Ca    8.7      10 Aug 2021 07:00  Phos  4.5     08-09  Mg     2.20     08-09    TPro  7.4  /  Alb  3.9  /  TBili  <0.2  /  DBili  x   /  AST  24  /  ALT  14  /  AlkPhos  65  08-09    LIVER FUNCTIONS - ( 09 Aug 2021 21:51 )  Alb: 3.9 g/dL / Pro: 7.4 g/dL / ALK PHOS: 65 U/L / ALT: 14 U/L / AST: 24 U/L / GGT: x               Diagnostic Studies: see sunrise for full report

## 2021-08-10 NOTE — CONSULT NOTE ADULT - ASSESSMENT
65M with PMHx HTN, diverticulosis, prostate cancer s/p prostatectomy presenting with 5 days of BRBPR. Multiple episode of blood BM worsen lightheadedness and weakness. denied abd pain, fever or chills.  last GI work up in 2018  nephrology consulted for joyce    JOYCE  pt denied any hx of ckd  admitted with scr 1.58 now improving  JOYCE possible ATN vs prerenal  check ua, urine cr, na  transfuse per team  monitor  avoid nephrotoxic agents    anemia  likely GIb  GI eval  transfuse per team  monitor

## 2021-08-11 ENCOUNTER — RESULT REVIEW (OUTPATIENT)
Age: 66
End: 2021-08-11

## 2021-08-11 LAB
ANION GAP SERPL CALC-SCNC: 15 MMOL/L — HIGH (ref 7–14)
BUN SERPL-MCNC: 12 MG/DL — SIGNIFICANT CHANGE UP (ref 7–23)
CALCIUM SERPL-MCNC: 8.6 MG/DL — SIGNIFICANT CHANGE UP (ref 8.4–10.5)
CHLORIDE SERPL-SCNC: 103 MMOL/L — SIGNIFICANT CHANGE UP (ref 98–107)
CO2 SERPL-SCNC: 21 MMOL/L — LOW (ref 22–31)
CREAT SERPL-MCNC: 1.3 MG/DL — SIGNIFICANT CHANGE UP (ref 0.5–1.3)
GLUCOSE SERPL-MCNC: 79 MG/DL — SIGNIFICANT CHANGE UP (ref 70–99)
HCT VFR BLD CALC: 25.5 % — LOW (ref 39–50)
HGB BLD-MCNC: 8.6 G/DL — LOW (ref 13–17)
MAGNESIUM SERPL-MCNC: 1.9 MG/DL — SIGNIFICANT CHANGE UP (ref 1.6–2.6)
MCHC RBC-ENTMCNC: 30.7 PG — SIGNIFICANT CHANGE UP (ref 27–34)
MCHC RBC-ENTMCNC: 33.7 GM/DL — SIGNIFICANT CHANGE UP (ref 32–36)
MCV RBC AUTO: 91.1 FL — SIGNIFICANT CHANGE UP (ref 80–100)
NRBC # BLD: 0 /100 WBCS — SIGNIFICANT CHANGE UP
NRBC # FLD: 0.03 K/UL — HIGH
PHOSPHATE SERPL-MCNC: 3.9 MG/DL — SIGNIFICANT CHANGE UP (ref 2.5–4.5)
PLATELET # BLD AUTO: 271 K/UL — SIGNIFICANT CHANGE UP (ref 150–400)
POTASSIUM SERPL-MCNC: 3.4 MMOL/L — LOW (ref 3.5–5.3)
POTASSIUM SERPL-SCNC: 3.4 MMOL/L — LOW (ref 3.5–5.3)
RBC # BLD: 2.8 M/UL — LOW (ref 4.2–5.8)
RBC # FLD: 12.9 % — SIGNIFICANT CHANGE UP (ref 10.3–14.5)
SODIUM SERPL-SCNC: 139 MMOL/L — SIGNIFICANT CHANGE UP (ref 135–145)
WBC # BLD: 8.26 K/UL — SIGNIFICANT CHANGE UP (ref 3.8–10.5)
WBC # FLD AUTO: 8.26 K/UL — SIGNIFICANT CHANGE UP (ref 3.8–10.5)

## 2021-08-11 PROCEDURE — 88305 TISSUE EXAM BY PATHOLOGIST: CPT | Mod: 26

## 2021-08-11 PROCEDURE — 43235 EGD DIAGNOSTIC BRUSH WASH: CPT | Mod: GC

## 2021-08-11 PROCEDURE — 45385 COLONOSCOPY W/LESION REMOVAL: CPT | Mod: GC

## 2021-08-11 RX ORDER — METOPROLOL TARTRATE 50 MG
2.5 TABLET ORAL ONCE
Refills: 0 | Status: COMPLETED | OUTPATIENT
Start: 2021-08-11 | End: 2021-08-11

## 2021-08-11 RX ORDER — ASPIRIN/CALCIUM CARB/MAGNESIUM 324 MG
81 TABLET ORAL DAILY
Refills: 0 | Status: DISCONTINUED | OUTPATIENT
Start: 2021-08-12 | End: 2021-08-15

## 2021-08-11 RX ORDER — POTASSIUM CHLORIDE 20 MEQ
10 PACKET (EA) ORAL
Refills: 0 | Status: COMPLETED | OUTPATIENT
Start: 2021-08-11 | End: 2021-08-11

## 2021-08-11 RX ADMIN — PANTOPRAZOLE SODIUM 40 MILLIGRAM(S): 20 TABLET, DELAYED RELEASE ORAL at 17:28

## 2021-08-11 RX ADMIN — TAMSULOSIN HYDROCHLORIDE 0.4 MILLIGRAM(S): 0.4 CAPSULE ORAL at 21:36

## 2021-08-11 RX ADMIN — Medication 1 MILLIGRAM(S): at 17:28

## 2021-08-11 RX ADMIN — Medication 100 MILLIEQUIVALENT(S): at 17:28

## 2021-08-11 RX ADMIN — Medication 2.5 MILLIGRAM(S): at 10:00

## 2021-08-11 RX ADMIN — Medication 100 MILLIEQUIVALENT(S): at 19:36

## 2021-08-11 RX ADMIN — Medication 2.5 MILLIGRAM(S): at 12:00

## 2021-08-11 RX ADMIN — Medication 100 MILLIEQUIVALENT(S): at 21:35

## 2021-08-11 NOTE — PROGRESS NOTE ADULT - ASSESSMENT
65M with PMHx HTN, diverticulosis, prostate cancer s/p prostatectomy presenting with 5 days of BRBPR. Multiple episode of blood BM worsen lightheadedness and weakness. denied abd pain, fever or chills.  last GI work up in 2018  nephrology consulted for joyce    JOYCE  pt denied any hx of ckd  admitted with scr 1.58 now improving  JOYCE possible ATN vs prerenal  check ua, urine cr, na  transfuse per team  s/p ct with contrast 8/10  monitor  avoid nephrotoxic agents    anemia  likely GIb  f/u GI  transfuse per team  monitor

## 2021-08-11 NOTE — CHART NOTE - NSCHARTNOTEFT_GEN_A_CORE
Notified by Endoscopy NP. Pt's Hr in the 130's  Pt seen and examined at endoscopy suit. Pt did not appear to be in any acute distress. Pt denied Chest pain, SOB, Dizziness.  Pt stated that he had pain to Right Shoulder and this is not new it is his arthritic pain. Pt also stated that he was feeling a little anxious   as well.   Vital Signs Last 24 Hrs  T(C): 36.8 (11 Aug 2021 09:21), Max: 37 (11 Aug 2021 05:39)  T(F): 98.3 (11 Aug 2021 09:21), Max: 98.6 (11 Aug 2021 05:39)  HR: 126 (11 Aug 2021 09:21) (83 - 126)  BP: 137/79 (11 Aug 2021 09:21) (110/70 - 138/72)  BP(mean): --  RR: 19 (11 Aug 2021 09:21) (17 - 19)  SpO2: 97% (11 Aug 2021 09:21) (97% - 100%)    REVIEW OF SYSTEMS:  CONSTITUTIONAL: No weakness, fevers or chills  EYES/ENT: No visual changes  RESPIRATORY: No cough, wheezing, no shortness of breath  CARDIOVASCULAR: No chest pain denies palpitations   GASTROINTESTINAL: Abd soft nondistended, no hematezia   GENITOURINARY: No dysuria, frequency, urinary urgency, incontinence or hematuria  NEUROLOGICAL: No numbness or weakness  SKIN: No itching, burning, rashes, or lesions   VASCULAR: No bilateral lower extremity edema.       PHYSICAL EXAM:  Constitutional: NAD  Neck: No JVD  Respiratory: Clear to B/L auscultations, no wheezes, rales or rhonchi  Cardiovascular: S1, S2, RRR, Tachycardia  Gastrointestinal: BS+, soft, NT/ND  Extremities: No cyanosis or clubbing. No peripheral edema  Neurological: A/O x 3, no focal deficits  Psychiatric: Normal mood, normal affect  : No CVA tenderness.   Skin: No rashes    65M with PMHx HTN, diverticulosis, prostate cancer s/p prostatectomy presenting with 5 days of BRBPR. now with Tachycardia     Plan  EKG: Sinus tachycardia L anterior fasicular block  Supplement K  Pt cleared to proceed with Endoscopy by Cardiology EKG reviewed and rec Lopressor 2.5 mg X's 1  Pt also seen by attending and pt is also medically cleared to proceed with endoscopy  Plan discussed with Endoscopy NP and Pt's Nurse  Will continue to monitor patients status.

## 2021-08-11 NOTE — PROGRESS NOTE ADULT - ASSESSMENT
65M with PMHx HTN, diverticulosis, prostate cancer s/p prostatectomy presenting with 5 days of BRBPR. Admitted for GIB w/u     Problem/Plan - 1:  ·  Problem: Gastrointestinal hemorrhage, unspecified gastrointestinal hemorrhage type.  Plan: Awaiting EGD and Colonoscopy.   Hgb 8.5, normotensive but with mild tachycardia. Currently asymptomatic. Last bloody BM was yesterday  Rectal bleeding and suspect possible diverticulosis vs AVM vs hemorrhoids. Likely will require C-scope  Given patient's reported hx of gastric ulcer will place on PPI for now though much higher suspicion LGIB based off history  -repeat CBC this AM, active T&S, IV access  -transfuse goal hgb >7  -occ positive  -GI consult emailed  -ferritin, iron with binding capacity, B12, folate  -hold ASA  -CLD  -CTA without active extravasation. Diverticulosis.      Problem/Plan - 2:  ·  Problem: Anemia due to acute blood loss.  Plan: Plan as above  GI c/s, repeat CBC, transfuse hgb <7.5.      Problem/Plan - 3:  ·  Problem: JOYCE (acute kidney injury). Plan: Resolved.  -Likely due to volume depletion in setting of decreased PO intake and blood loss  -1L IV bolus now and recheck BMP   -bladder scan.     Problem/Plan - 4:  ·  Problem: Hypertension, unspecified type. Plan: Hold home amlodipine 5mg qd for now given normotensive and w/u GIB ongoing.     Problem/Plan - 5:  ·  Problem: Benign prostatic hyperplasia, unspecified whether lower urinary tract symptoms present. Plan: C/w flomax.     Problem/Plan - 6:  Problem: Sinus Tachycardia . Plan: IV BB . Cardiology helping.

## 2021-08-12 LAB
ANION GAP SERPL CALC-SCNC: 15 MMOL/L — HIGH (ref 7–14)
BUN SERPL-MCNC: 8 MG/DL — SIGNIFICANT CHANGE UP (ref 7–23)
CALCIUM SERPL-MCNC: 8.1 MG/DL — LOW (ref 8.4–10.5)
CHLORIDE SERPL-SCNC: 102 MMOL/L — SIGNIFICANT CHANGE UP (ref 98–107)
CO2 SERPL-SCNC: 22 MMOL/L — SIGNIFICANT CHANGE UP (ref 22–31)
CREAT SERPL-MCNC: 1.24 MG/DL — SIGNIFICANT CHANGE UP (ref 0.5–1.3)
GLUCOSE SERPL-MCNC: 90 MG/DL — SIGNIFICANT CHANGE UP (ref 70–99)
HCT VFR BLD CALC: 22.3 % — LOW (ref 39–50)
HCT VFR BLD CALC: 23.6 % — LOW (ref 39–50)
HGB BLD-MCNC: 7.4 G/DL — LOW (ref 13–17)
HGB BLD-MCNC: 7.9 G/DL — LOW (ref 13–17)
MAGNESIUM SERPL-MCNC: 1.7 MG/DL — SIGNIFICANT CHANGE UP (ref 1.6–2.6)
MCHC RBC-ENTMCNC: 30.7 PG — SIGNIFICANT CHANGE UP (ref 27–34)
MCHC RBC-ENTMCNC: 31 PG — SIGNIFICANT CHANGE UP (ref 27–34)
MCHC RBC-ENTMCNC: 33.2 GM/DL — SIGNIFICANT CHANGE UP (ref 32–36)
MCHC RBC-ENTMCNC: 33.5 GM/DL — SIGNIFICANT CHANGE UP (ref 32–36)
MCV RBC AUTO: 91.8 FL — SIGNIFICANT CHANGE UP (ref 80–100)
MCV RBC AUTO: 93.3 FL — SIGNIFICANT CHANGE UP (ref 80–100)
NRBC # BLD: 0 /100 WBCS — SIGNIFICANT CHANGE UP
NRBC # BLD: 0 /100 WBCS — SIGNIFICANT CHANGE UP
NRBC # FLD: 0 K/UL — SIGNIFICANT CHANGE UP
NRBC # FLD: 0.02 K/UL — HIGH
PHOSPHATE SERPL-MCNC: 3.2 MG/DL — SIGNIFICANT CHANGE UP (ref 2.5–4.5)
PLATELET # BLD AUTO: 249 K/UL — SIGNIFICANT CHANGE UP (ref 150–400)
PLATELET # BLD AUTO: 275 K/UL — SIGNIFICANT CHANGE UP (ref 150–400)
POTASSIUM SERPL-MCNC: 3.7 MMOL/L — SIGNIFICANT CHANGE UP (ref 3.5–5.3)
POTASSIUM SERPL-SCNC: 3.7 MMOL/L — SIGNIFICANT CHANGE UP (ref 3.5–5.3)
RBC # BLD: 2.39 M/UL — LOW (ref 4.2–5.8)
RBC # BLD: 2.57 M/UL — LOW (ref 4.2–5.8)
RBC # FLD: 13 % — SIGNIFICANT CHANGE UP (ref 10.3–14.5)
RBC # FLD: 13.2 % — SIGNIFICANT CHANGE UP (ref 10.3–14.5)
SODIUM SERPL-SCNC: 139 MMOL/L — SIGNIFICANT CHANGE UP (ref 135–145)
WBC # BLD: 5.33 K/UL — SIGNIFICANT CHANGE UP (ref 3.8–10.5)
WBC # BLD: 5.68 K/UL — SIGNIFICANT CHANGE UP (ref 3.8–10.5)
WBC # FLD AUTO: 5.33 K/UL — SIGNIFICANT CHANGE UP (ref 3.8–10.5)
WBC # FLD AUTO: 5.68 K/UL — SIGNIFICANT CHANGE UP (ref 3.8–10.5)

## 2021-08-12 PROCEDURE — 99232 SBSQ HOSP IP/OBS MODERATE 35: CPT | Mod: GC

## 2021-08-12 RX ORDER — PANTOPRAZOLE SODIUM 20 MG/1
40 TABLET, DELAYED RELEASE ORAL
Refills: 0 | Status: DISCONTINUED | OUTPATIENT
Start: 2021-08-12 | End: 2021-08-15

## 2021-08-12 RX ADMIN — Medication 81 MILLIGRAM(S): at 12:23

## 2021-08-12 RX ADMIN — TAMSULOSIN HYDROCHLORIDE 0.4 MILLIGRAM(S): 0.4 CAPSULE ORAL at 21:54

## 2021-08-12 RX ADMIN — PANTOPRAZOLE SODIUM 40 MILLIGRAM(S): 20 TABLET, DELAYED RELEASE ORAL at 12:23

## 2021-08-12 RX ADMIN — Medication 1 MILLIGRAM(S): at 12:23

## 2021-08-12 NOTE — PROGRESS NOTE ADULT - ASSESSMENT
ALVARO CEJA is a 65y Male with h/o prostate cancer s/p prostatectomy (no chemo/radiation), reportedly h/o gastric ulcer s/p ablation (before 2010) and presents today as a new consult for hematochezia.    Impression:  #. Hematochezia most likely 2/2 diverticular bleeding - the intermittent episodes of bleeding more suggestive of diverticulosis and patient with extensive pancolonic diverticulosis on colonoscopy, negative EGD.     Recommendations:  - Advance to regular diet  - Trend Hgb, Transfuse if Hgb < 7  - Okay to resume home ASA once hgb stable  - Transition to po pantoprazole  - If develops overt rectal bleeding or worsening anemia, consider CTA.    Thank you for involving us in this patient's care. Signing off.    Case discussed with ---    Joanna Guerrier MD  Gastroenterology/Hepatology Fellow, PGY-V  Available via [employer] Teams    NON-URGENT CONSULTS:  Please email [email or web address]  [email or web address]  AT NIGHT AND ON WEEKENDS:  Contact on-call GI fellow via answering service ([phone]) from 5pm-8am and on weekends/holidays  MONDAY-FRIDAY 8AM-5PM:  Pager# [phone] ([facility])  GI Phone# [phone] ([facility])     ALVARO CEJA is a 65y Male with h/o prostate cancer s/p prostatectomy (no chemo/radiation), reportedly h/o gastric ulcer s/p ablation (before 2010) and presents today as a new consult for hematochezia.    Impression:  #. Hematochezia most likely 2/2 diverticular bleeding - the intermittent episodes of bleeding more suggestive of diverticulosis and patient with extensive pancolonic diverticulosis on colonoscopy, negative EGD. However, he continues to have a decrease in Hgb in setting of low Fe and warrants further investigation.    Recommendations:  - Plan for capsule endoscopy tomorrow morning  - NPO after midnight  - Trend Hgb, Transfuse if Hgb < 7  - Continue to hold ASA  - c/w IV PPI     Thank you for involving us in this patient's care.  .    Patient seen and discussed with Attending, Dr. Karl Galdamez.    Joanna Guerrier MD  Gastroenterology/Hepatology Fellow, PGY-V  Available via [employer] Teams    NON-URGENT CONSULTS:  Please email [email or web address]  [email or web address]  AT NIGHT AND ON WEEKENDS:  Contact on-call GI fellow via answering service ([phone]) from 5pm-8am and on weekends/holidays  MONDAY-FRIDAY 8AM-5PM:  Pager# [phone] ([facility])  GI Phone# [phone] ([facility])

## 2021-08-12 NOTE — PROGRESS NOTE ADULT - ATTENDING COMMENTS
pt seen and examined agree with plan above
Decrease hemoglobin 7.4 g noted. Feels well. No bowel movement. No further hematochezia. Stable status post colonoscopy/EGD. Likely diverticular bleed. However, in view of detection of iron deficiency and decreased hemoglobin will plan for small bowel capsule endoscopy. Risks, benefits and alternatives discussed.

## 2021-08-12 NOTE — PROGRESS NOTE ADULT - ASSESSMENT
65M with PMHx HTN, diverticulosis, prostate cancer s/p prostatectomy presenting with 5 days of BRBPR. Admitted for GIB w/u     Problem/Plan - 1:  ·  Problem: Gastrointestinal hemorrhage, unspecified gastrointestinal hemorrhage type.  Plan: S/P EGD and Colonoscopy. Awaiting Capsule Endoscopy.   Hgb 8.5, normotensive but with mild tachycardia. Currently asymptomatic. Last bloody BM was yesterday  Rectal bleeding and suspect possible diverticulosis vs AVM vs hemorrhoids. Likely will require C-scope  Given patient's reported hx of gastric ulcer will place on PPI for now though much higher suspicion LGIB based off history  -occ positive  -GI consult emailed  -ferritin, iron with binding capacity, B12, folate  -hold ASA  -CLD  -CTA without active extravasation. Diverticulosis.      Problem/Plan - 2:  ·  Problem: Anemia due to acute blood loss.  Plan: Plan as above  GI c/s, repeat CBC, transfuse hgb <7.5.      Problem/Plan - 3:  ·  Problem: JOYCE (acute kidney injury). Plan: Resolved.  -Likely due to volume depletion in setting of decreased PO intake and blood loss  -1L IV bolus now and recheck BMP   -bladder scan.     Problem/Plan - 4:  ·  Problem: Hypertension, unspecified type. Plan: Hold home amlodipine 5mg qd for now given normotensive and w/u GIB ongoing.     Problem/Plan - 5:  ·  Problem: Benign prostatic hyperplasia, unspecified whether lower urinary tract symptoms present. Plan: C/w flomax.     Problem/Plan - 6:  Problem: Sinus Tachycardia . Plan: IV BB . Cardiology helping.

## 2021-08-12 NOTE — PROGRESS NOTE ADULT - ASSESSMENT
Assessment and Plan    65M with PMHx HTN, diverticulosis, prostate cancer s/p prostatectomy presenting with 5 days of BRBPR. 5 days ago patient had 3 large episodes of bloody BMs    EKG: NSR no STT changes    1. Anemia  -2/2 GIB  -s/p PRBC transfusion  -continue to monitor H/H  -s/p EGD/colonoscopy f/u GI    2. JOYCE  -creat improving  -f/u renal    3. HTN  -controlled  -continue to monitor BP     4. Tachycardia  -sinus tach s/p IV lopressor yesterday  -denies CP, SOB  -orthostatics today negative  -PO fluid intake encouraged  -possibly 2/2 anemia Assessment and Plan    65M with PMHx HTN, diverticulosis, prostate cancer s/p prostatectomy presenting with 5 days of BRBPR. 5 days ago patient had 3 large episodes of bloody BMs    EKG: NSR no STT changes    1. Anemia  -2/2 GIB  -s/p PRBC transfusion  -continue to monitor H/H  -s/p EGD/colonoscopy f/u GI, likely diverticular bleed    2. JOYCE  -creat improving  -f/u renal    3. HTN  -controlled  -continue to monitor BP     4. Tachycardia  -sinus tach s/p IV lopressor yesterday  -denies CP, SOB  -orthostatics today negative  -PO fluid intake encouraged  -possibly 2/2 anemia

## 2021-08-13 LAB
ANION GAP SERPL CALC-SCNC: 12 MMOL/L — SIGNIFICANT CHANGE UP (ref 7–14)
BUN SERPL-MCNC: 9 MG/DL — SIGNIFICANT CHANGE UP (ref 7–23)
CALCIUM SERPL-MCNC: 8.5 MG/DL — SIGNIFICANT CHANGE UP (ref 8.4–10.5)
CHLORIDE SERPL-SCNC: 107 MMOL/L — SIGNIFICANT CHANGE UP (ref 98–107)
CO2 SERPL-SCNC: 23 MMOL/L — SIGNIFICANT CHANGE UP (ref 22–31)
CREAT SERPL-MCNC: 1.24 MG/DL — SIGNIFICANT CHANGE UP (ref 0.5–1.3)
GLUCOSE SERPL-MCNC: 89 MG/DL — SIGNIFICANT CHANGE UP (ref 70–99)
HCT VFR BLD CALC: 23.4 % — LOW (ref 39–50)
HGB BLD-MCNC: 7.6 G/DL — LOW (ref 13–17)
MAGNESIUM SERPL-MCNC: 1.9 MG/DL — SIGNIFICANT CHANGE UP (ref 1.6–2.6)
MCHC RBC-ENTMCNC: 30 PG — SIGNIFICANT CHANGE UP (ref 27–34)
MCHC RBC-ENTMCNC: 32.5 GM/DL — SIGNIFICANT CHANGE UP (ref 32–36)
MCV RBC AUTO: 92.5 FL — SIGNIFICANT CHANGE UP (ref 80–100)
NRBC # BLD: 0 /100 WBCS — SIGNIFICANT CHANGE UP
NRBC # FLD: 0.02 K/UL — HIGH
PHOSPHATE SERPL-MCNC: 3 MG/DL — SIGNIFICANT CHANGE UP (ref 2.5–4.5)
PLATELET # BLD AUTO: 306 K/UL — SIGNIFICANT CHANGE UP (ref 150–400)
POTASSIUM SERPL-MCNC: 3.9 MMOL/L — SIGNIFICANT CHANGE UP (ref 3.5–5.3)
POTASSIUM SERPL-SCNC: 3.9 MMOL/L — SIGNIFICANT CHANGE UP (ref 3.5–5.3)
RBC # BLD: 2.53 M/UL — LOW (ref 4.2–5.8)
RBC # FLD: 13.2 % — SIGNIFICANT CHANGE UP (ref 10.3–14.5)
SODIUM SERPL-SCNC: 142 MMOL/L — SIGNIFICANT CHANGE UP (ref 135–145)
SURGICAL PATHOLOGY STUDY: SIGNIFICANT CHANGE UP
WBC # BLD: 6.04 K/UL — SIGNIFICANT CHANGE UP (ref 3.8–10.5)
WBC # FLD AUTO: 6.04 K/UL — SIGNIFICANT CHANGE UP (ref 3.8–10.5)

## 2021-08-13 PROCEDURE — 93010 ELECTROCARDIOGRAM REPORT: CPT

## 2021-08-13 RX ADMIN — PANTOPRAZOLE SODIUM 40 MILLIGRAM(S): 20 TABLET, DELAYED RELEASE ORAL at 08:27

## 2021-08-13 RX ADMIN — Medication 1 MILLIGRAM(S): at 12:33

## 2021-08-13 RX ADMIN — Medication 81 MILLIGRAM(S): at 12:35

## 2021-08-13 RX ADMIN — TAMSULOSIN HYDROCHLORIDE 0.4 MILLIGRAM(S): 0.4 CAPSULE ORAL at 22:07

## 2021-08-13 NOTE — PROGRESS NOTE ADULT - ASSESSMENT
65M with PMHx HTN, diverticulosis, prostate cancer s/p prostatectomy presenting with 5 days of BRBPR. Multiple episode of blood BM worsen lightheadedness and weakness. denied abd pain, fever or chills.  last GI work up in 2018  nephrology consulted for joyce    JOYCE  admitted with scr 1.58   JOYCE possible ATN vs prerenal  s/p ct with contrast 8/10  Renal function stable today  monitor  avoid nephrotoxic agents    anemia  f/u GI  transfuse per team  monitor Hb  s/p EGD and Colonscopy  pending capsule study

## 2021-08-13 NOTE — PROGRESS NOTE ADULT - ASSESSMENT
Assessment and Plan    65M with PMHx HTN, diverticulosis, prostate cancer s/p prostatectomy presenting with 5 days of BRBPR. 5 days ago patient had 3 large episodes of bloody BMs    EKG: NSR no STT changes    1. Anemia  -2/2 GIB  -s/p PRBC transfusion  -continue to monitor H/H  -s/p EGD/colonoscopy f/u GI, likely diverticular bleed  -for capsule endoscopy     2. JOYCE  -creat improving  -f/u renal    3. HTN  -controlled  -continue to monitor BP     4. Tachycardia  -sinus tach s/p IV lopressor yesterday  -denies CP, SOB  -orthostatics today negative  -PO fluid intake encouraged  -possibly 2/2 anemia

## 2021-08-13 NOTE — CHART NOTE - NSCHARTNOTEFT_GEN_A_CORE
Risks of video capsule endoscopy explained, including risk of retained capsule, potentially requiring surgery for removal.  Patient understands and agrees to risks.    Capsule ID: [id number]    Capsule swallowed at: 10:00 AM    Keep patient NPO now.  Resume Clear liquid diet at:  12:00 PM  Resume regular consistency diet at: 2:00 PM    Equipment can be removed at: 10:00 PM today    GI fellow will retrieve equipment in the morning.    NO MRI UNTIL CAPSULE CLEARANCE CONFIRMED. CAPSULE IS NOT MRI COMPATIBLE.

## 2021-08-14 LAB
ANION GAP SERPL CALC-SCNC: 12 MMOL/L — SIGNIFICANT CHANGE UP (ref 7–14)
BLD GP AB SCN SERPL QL: NEGATIVE — SIGNIFICANT CHANGE UP
BUN SERPL-MCNC: 16 MG/DL — SIGNIFICANT CHANGE UP (ref 7–23)
CALCIUM SERPL-MCNC: 8.5 MG/DL — SIGNIFICANT CHANGE UP (ref 8.4–10.5)
CHLORIDE SERPL-SCNC: 107 MMOL/L — SIGNIFICANT CHANGE UP (ref 98–107)
CO2 SERPL-SCNC: 23 MMOL/L — SIGNIFICANT CHANGE UP (ref 22–31)
CREAT SERPL-MCNC: 1.41 MG/DL — HIGH (ref 0.5–1.3)
GLUCOSE SERPL-MCNC: 92 MG/DL — SIGNIFICANT CHANGE UP (ref 70–99)
HCT VFR BLD CALC: 24.4 % — LOW (ref 39–50)
HGB BLD-MCNC: 8 G/DL — LOW (ref 13–17)
MAGNESIUM SERPL-MCNC: 2 MG/DL — SIGNIFICANT CHANGE UP (ref 1.6–2.6)
MCHC RBC-ENTMCNC: 30.3 PG — SIGNIFICANT CHANGE UP (ref 27–34)
MCHC RBC-ENTMCNC: 32.8 GM/DL — SIGNIFICANT CHANGE UP (ref 32–36)
MCV RBC AUTO: 92.4 FL — SIGNIFICANT CHANGE UP (ref 80–100)
NRBC # BLD: 0 /100 WBCS — SIGNIFICANT CHANGE UP
NRBC # FLD: 0.04 K/UL — HIGH
PHOSPHATE SERPL-MCNC: 3.5 MG/DL — SIGNIFICANT CHANGE UP (ref 2.5–4.5)
PLATELET # BLD AUTO: 344 K/UL — SIGNIFICANT CHANGE UP (ref 150–400)
POTASSIUM SERPL-MCNC: 3.8 MMOL/L — SIGNIFICANT CHANGE UP (ref 3.5–5.3)
POTASSIUM SERPL-SCNC: 3.8 MMOL/L — SIGNIFICANT CHANGE UP (ref 3.5–5.3)
RBC # BLD: 2.64 M/UL — LOW (ref 4.2–5.8)
RBC # FLD: 13.2 % — SIGNIFICANT CHANGE UP (ref 10.3–14.5)
RH IG SCN BLD-IMP: POSITIVE — SIGNIFICANT CHANGE UP
SODIUM SERPL-SCNC: 142 MMOL/L — SIGNIFICANT CHANGE UP (ref 135–145)
WBC # BLD: 6.86 K/UL — SIGNIFICANT CHANGE UP (ref 3.8–10.5)
WBC # FLD AUTO: 6.86 K/UL — SIGNIFICANT CHANGE UP (ref 3.8–10.5)

## 2021-08-14 RX ADMIN — Medication 1 MILLIGRAM(S): at 13:01

## 2021-08-14 RX ADMIN — TAMSULOSIN HYDROCHLORIDE 0.4 MILLIGRAM(S): 0.4 CAPSULE ORAL at 21:52

## 2021-08-14 RX ADMIN — PANTOPRAZOLE SODIUM 40 MILLIGRAM(S): 20 TABLET, DELAYED RELEASE ORAL at 07:03

## 2021-08-14 RX ADMIN — Medication 81 MILLIGRAM(S): at 13:01

## 2021-08-14 NOTE — CHART NOTE - NSCHARTNOTESELECT_GEN_ALL_CORE
Event Note
ACP-NP note/Event Note
ACP/Event Note
Event Note
GI/Event Note
Video capsule endoscopy/Event Note

## 2021-08-14 NOTE — PROGRESS NOTE ADULT - ASSESSMENT
65M with PMHx HTN, diverticulosis, prostate cancer s/p prostatectomy presenting with 5 days of BRBPR. Admitted for GIB w/u     Problem/Plan - 1:  ·  Problem: Gastrointestinal hemorrhage, unspecified gastrointestinal hemorrhage type.  Plan: S/P EGD and Colonoscopy. S/P Capsule Endoscopy.   Hgb 8.5, normotensive but with mild tachycardia. Currently asymptomatic. Last bloody BM was yesterday  Rectal bleeding and suspect possible diverticulosis vs AVM vs hemorrhoids. Likely will require C-scope  Given patient's reported hx of gastric ulcer will place on PPI for now though much higher suspicion LGIB based off history  -occ positive  -GI consult emailed  -ferritin, iron with binding capacity, B12, folate  -hold ASA  -CLD  -CTA without active extravasation. Diverticulosis.      Problem/Plan - 2:  ·  Problem: Anemia due to acute blood loss.  Plan: Plan as above  GI c/s, repeat CBC, transfuse hgb <7.5.      Problem/Plan - 3:  ·  Problem: JOYCE (acute kidney injury). Plan: Resolved.  -Likely due to volume depletion in setting of decreased PO intake and blood loss  -1L IV bolus now and recheck BMP   -bladder scan.     Problem/Plan - 4:  ·  Problem: Hypertension, unspecified type. Plan: Hold home amlodipine 5mg qd for now given normotensive and w/u GIB ongoing.     Problem/Plan - 5:  ·  Problem: Benign prostatic hyperplasia, unspecified whether lower urinary tract symptoms present. Plan: C/w flomax.     Problem/Plan - 6:  Problem: Sinus Tachycardia . Plan: IV BB . Cardiology helping.     Disposition : DC planning home.

## 2021-08-14 NOTE — CHART NOTE - NSCHARTNOTEFT_GEN_A_CORE
Capsule endoscopy without active bleeding or sources of bleeding seen. No further plan for endoscopic evaluation. GI bleeding may have been 2/2 diverticulosis seen on colonoscopy.     Thank you for involving us in the care of this patient, please reach out if any further questions.     Juan Lau MD  Gastroenterology Fellow, PGY5    Available on [employer] Teams  [phone] ([facility])  [phone] ([facility])  Please contact on call fellow weekdays after 5pm-7am and weekends: [phone]

## 2021-08-14 NOTE — PROGRESS NOTE ADULT - ASSESSMENT
65M with PMHx HTN, diverticulosis, prostate cancer s/p prostatectomy presenting with 5 days of BRBPR. Multiple episode of blood BM worsen lightheadedness and weakness. denied abd pain, fever or chills.  last GI work up in 2018  nephrology consulted for joyce    JOYCE  admitted with scr 1.58   JOYCE initially pre-renal   s/p ct with contrast 8/10  Renal function worsening today possibly sec to contrast   monitor  avoid nephrotoxic agents    anemia  f/u GI  transfuse per team  monitor Hb  s/p EGD and Colonscopy  pending capsule study

## 2021-08-14 NOTE — PATIENT PROFILE ADULT - BRAND OF COVID-19 VACCINATION
[employer] dose 1 and 2
fell from 6 stairs. Pt missed the step lost balance & fell C/O  pain in head & neck  right fourth finger

## 2021-08-14 NOTE — CHART NOTE - NSCHARTNOTEFT_GEN_A_CORE
Notified by RN that patient tachycardic to 112. Patient asymptomatic and currently has no complaints. EKG NSR at 93 bpm with TWI in V1. QTc 449. Will continue to monitor.     Vital Signs Last 24 Hrs  T(C): 36.7 (13 Aug 2021 21:11), Max: 36.9 (13 Aug 2021 05:49)  T(F): 98 (13 Aug 2021 21:11), Max: 98.4 (13 Aug 2021 05:49)  HR: 112 (13 Aug 2021 22:11) (92 - 116)  BP: 110/60 (13 Aug 2021 21:11) (110/60 - 145/93)  BP(mean): --  RR: 18 (13 Aug 2021 21:11) (16 - 18)  SpO2: 100% (13 Aug 2021 21:11) (99% - 100%) Notified by RN that patient tachycardic to 112. Patient asymptomatic and currently has no complaints. EKG NSR at 93 bpm with TWI in V1. QTc 449. Unable to locate previous EKG for comparison. Will continue to monitor. EKG to be repeated in AM to assess for TWI progression. Cardiology following.     Vital Signs Last 24 Hrs  T(C): 36.7 (13 Aug 2021 21:11), Max: 36.9 (13 Aug 2021 05:49)  T(F): 98 (13 Aug 2021 21:11), Max: 98.4 (13 Aug 2021 05:49)  HR: 112 (13 Aug 2021 22:11) (92 - 116)  BP: 110/60 (13 Aug 2021 21:11) (110/60 - 145/93)  BP(mean): --  RR: 18 (13 Aug 2021 21:11) (16 - 18)  SpO2: 100% (13 Aug 2021 21:11) (99% - 100%)

## 2021-08-15 ENCOUNTER — TRANSCRIPTION ENCOUNTER (OUTPATIENT)
Age: 66
End: 2021-08-15

## 2021-08-15 VITALS
SYSTOLIC BLOOD PRESSURE: 132 MMHG | DIASTOLIC BLOOD PRESSURE: 80 MMHG | HEART RATE: 86 BPM | RESPIRATION RATE: 18 BRPM | TEMPERATURE: 98 F | OXYGEN SATURATION: 100 %

## 2021-08-15 LAB
ANION GAP SERPL CALC-SCNC: 12 MMOL/L — SIGNIFICANT CHANGE UP (ref 7–14)
BUN SERPL-MCNC: 13 MG/DL — SIGNIFICANT CHANGE UP (ref 7–23)
CALCIUM SERPL-MCNC: 8.6 MG/DL — SIGNIFICANT CHANGE UP (ref 8.4–10.5)
CHLORIDE SERPL-SCNC: 105 MMOL/L — SIGNIFICANT CHANGE UP (ref 98–107)
CO2 SERPL-SCNC: 22 MMOL/L — SIGNIFICANT CHANGE UP (ref 22–31)
CREAT SERPL-MCNC: 1.3 MG/DL — SIGNIFICANT CHANGE UP (ref 0.5–1.3)
GLUCOSE SERPL-MCNC: 87 MG/DL — SIGNIFICANT CHANGE UP (ref 70–99)
HCT VFR BLD CALC: 24 % — LOW (ref 39–50)
HGB BLD-MCNC: 8 G/DL — LOW (ref 13–17)
MAGNESIUM SERPL-MCNC: 2 MG/DL — SIGNIFICANT CHANGE UP (ref 1.6–2.6)
MCHC RBC-ENTMCNC: 30.5 PG — SIGNIFICANT CHANGE UP (ref 27–34)
MCHC RBC-ENTMCNC: 33.3 GM/DL — SIGNIFICANT CHANGE UP (ref 32–36)
MCV RBC AUTO: 91.6 FL — SIGNIFICANT CHANGE UP (ref 80–100)
NRBC # BLD: 0 /100 WBCS — SIGNIFICANT CHANGE UP
NRBC # FLD: 0.02 K/UL — HIGH
PHOSPHATE SERPL-MCNC: 3.4 MG/DL — SIGNIFICANT CHANGE UP (ref 2.5–4.5)
PLATELET # BLD AUTO: 373 K/UL — SIGNIFICANT CHANGE UP (ref 150–400)
POTASSIUM SERPL-MCNC: 3.6 MMOL/L — SIGNIFICANT CHANGE UP (ref 3.5–5.3)
POTASSIUM SERPL-SCNC: 3.6 MMOL/L — SIGNIFICANT CHANGE UP (ref 3.5–5.3)
RBC # BLD: 2.62 M/UL — LOW (ref 4.2–5.8)
RBC # FLD: 13.4 % — SIGNIFICANT CHANGE UP (ref 10.3–14.5)
SODIUM SERPL-SCNC: 139 MMOL/L — SIGNIFICANT CHANGE UP (ref 135–145)
WBC # BLD: 6.28 K/UL — SIGNIFICANT CHANGE UP (ref 3.8–10.5)
WBC # FLD AUTO: 6.28 K/UL — SIGNIFICANT CHANGE UP (ref 3.8–10.5)

## 2021-08-15 RX ORDER — PANTOPRAZOLE SODIUM 20 MG/1
1 TABLET, DELAYED RELEASE ORAL
Qty: 30 | Refills: 0
Start: 2021-08-15 | End: 2021-09-13

## 2021-08-15 RX ADMIN — Medication 1 MILLIGRAM(S): at 11:58

## 2021-08-15 RX ADMIN — Medication 81 MILLIGRAM(S): at 11:58

## 2021-08-15 RX ADMIN — PANTOPRAZOLE SODIUM 40 MILLIGRAM(S): 20 TABLET, DELAYED RELEASE ORAL at 07:26

## 2021-08-15 NOTE — PROGRESS NOTE ADULT - SUBJECTIVE AND OBJECTIVE BOX
Date of Service  : 08-12-21 @ 14:59    INTERVAL HPI/OVERNIGHT EVENTS: I feel fine.   Vital Signs Last 24 Hrs  T(C): 36.7 (12 Aug 2021 13:17), Max: 37 (12 Aug 2021 11:54)  T(F): 98.1 (12 Aug 2021 13:17), Max: 98.6 (12 Aug 2021 11:54)  HR: 89 (12 Aug 2021 13:17) (89 - 112)  BP: 124/77 (12 Aug 2021 13:17) (104/66 - 127/68)  BP(mean): --  RR: 16 (12 Aug 2021 05:41) (16 - 16)  SpO2: 99% (12 Aug 2021 13:17) (97% - 100%)  I&O's Summary    MEDICATIONS  (STANDING):  aspirin  chewable 81 milliGRAM(s) Oral daily  folic acid 1 milliGRAM(s) Oral daily  pantoprazole    Tablet 40 milliGRAM(s) Oral before breakfast  tamsulosin 0.4 milliGRAM(s) Oral at bedtime    MEDICATIONS  (PRN):  acetaminophen   Tablet .. 650 milliGRAM(s) Oral every 6 hours PRN Temp greater or equal to 38.5C (101.3F), Mild Pain (1 - 3)    LABS:                        7.9    5.68  )-----------( 275      ( 12 Aug 2021 12:51 )             23.6     08-12    139  |  102  |  8   ----------------------------<  90  3.7   |  22  |  1.24    Ca    8.1<L>      12 Aug 2021 06:13  Phos  3.2     08-12  Mg     1.70     08-12          CAPILLARY BLOOD GLUCOSE              REVIEW OF SYSTEMS:  CONSTITUTIONAL: No fever, weight loss, or fatigue  EYES: No eye pain, visual disturbances, or discharge  ENMT:  No difficulty hearing, tinnitus, vertigo; No sinus or throat pain  NECK: No pain or stiffness  RESPIRATORY: No cough, wheezing, chills or hemoptysis; No shortness of breath  CARDIOVASCULAR: No chest pain, palpitations, dizziness, or leg swelling  GASTROINTESTINAL: No abdominal or epigastric pain. No nausea, vomiting, or hematemesis; No diarrhea or constipation. No melena or hematochezia.  GENITOURINARY: No dysuria, frequency, hematuria, or incontinence  NEUROLOGICAL: No headaches, memory loss, loss of strength, numbness, or tremors  SKIN: No itching, burning, rashes, or lesions   ENDOCRINE: No heat or cold intolerance; No hair loss  MUSCULOSKELETAL: No joint pain or swelling; No muscle, back, or extremity pain  PSYCHIATRIC: No depression, anxiety, mood swings, or difficulty sleeping  HEME/LYMPH: No easy bruising, or bleeding gums  ALLERY AND IMMUNOLOGIC: No hives or eczema    RADIOLOGY & ADDITIONAL TESTS:    Consultant(s) Notes Reviewed:  [x ] YES  [ ] NO    PHYSICAL EXAM:  GENERAL: NAD, well-groomed, well-developed,not in any distress ,  HEAD:  Atraumatic, Normocephalic  EYES: EOMI, PERRLA, conjunctiva and sclera clear  ENMT: No tonsillar erythema, exudates, or enlargement; Moist mucous membranes, Good dentition, No lesions  NECK: Supple, No JVD, Normal thyroid  NERVOUS SYSTEM:  Alert & Oriented X3, No focal deficit   CHEST/LUNG: Good air entry bilateral with no  rales, rhonchi, wheezing, or rubs  HEART: Regular rate and rhythm; No murmurs, rubs, or gallops  ABDOMEN: Soft, Nontender, Nondistended; Bowel sounds present  EXTREMITIES:  2+ Peripheral Pulses, No clubbing, cyanosis, or edema  SKIN: No rashes or lesions    Care Discussed with Consultants/Other Providers [ x] YES  [ ] NO
Hay Mera MD  Interventional Cardiology / Advance Heart Failure and Cardiac Transplant Specialist  [place] Office : [address] [place] [address] [place]. [address]  Tel:   [place] Office : [address] [facility] [place] [address]  Tel: [phone]  Cell : [phone]    Pt is lying in bed comfortable not in distress, no chest pains no SOB no palpitations  	  MEDICATIONS:  aspirin  chewable 81 milliGRAM(s) Oral daily  tamsulosin 0.4 milliGRAM(s) Oral at bedtime        acetaminophen   Tablet .. 650 milliGRAM(s) Oral every 6 hours PRN    pantoprazole    Tablet 40 milliGRAM(s) Oral before breakfast      folic acid 1 milliGRAM(s) Oral daily      PAST MEDICAL/SURGICAL HISTORY  PAST MEDICAL & SURGICAL HISTORY:  HTN (hypertension)  on med x 3 yrs    BPH (benign prostatic hypertrophy)    H/O endoscopy    H/O colonoscopy  rectal bleed  no issues found    H/O prostate biopsy        SOCIAL HISTORY: Substance Use (street drugs): ( x ) never used  (  ) other:    FAMILY HISTORY:  No pertinent family history in first degree relatives        REVIEW OF SYSTEMS:  CONSTITUTIONAL: No fever, weight loss, or fatigue  EYES: No eye pain, visual disturbances, or discharge  ENMT:  No difficulty hearing, tinnitus, vertigo; No sinus or throat pain  BREASTS: No pain, masses, or nipple discharge  GASTROINTESTINAL: No abdominal or epigastric pain. No nausea, vomiting, or hematemesis; No diarrhea or constipation. No melena or hematochezia.  GENITOURINARY: No dysuria, frequency, hematuria, or incontinence  NEUROLOGICAL: No headaches, memory loss, loss of strength, numbness, or tremors  ENDOCRINE: No heat or cold intolerance; No hair loss  MUSCULOSKELETAL: No joint pain or swelling; No muscle, back, or extremity pain  PSYCHIATRIC: No depression, anxiety, mood swings, or difficulty sleeping  HEME/LYMPH: No easy bruising, or bleeding gums  All others negative    PHYSICAL EXAM:  T(C): 36.7 (08-13-21 @ 21:11), Max: 36.9 (08-13-21 @ 05:49)  HR: 112 (08-13-21 @ 22:11) (92 - 116)  BP: 110/60 (08-13-21 @ 21:11) (110/60 - 145/93)  RR: 18 (08-13-21 @ 21:11) (16 - 18)  SpO2: 100% (08-13-21 @ 21:11) (99% - 100%)  Wt(kg): --  I&O's Summary        GENERAL: NAD  EYES: EOMI, PERRLA, conjunctiva and sclera clear  ENMT: No tonsillar erythema, exudates, or enlargement; Moist mucous membranes, Good dentition, No lesions  Cardiovascular: Normal S1 S2, No JVD, No murmurs, No edema  Respiratory: Lungs clear to auscultation	  Gastrointestinal:  Soft, Non-tender, + BS	  Extremities: Normal range of motion, No clubbing, cyanosis or edema  LYMPH: No lymphadenopathy noted  NERVOUS SYSTEM:  Alert & Oriented X3, Good concentration; Motor Strength 5/5 B/L upper and lower extremities; DTRs 2+ intact and symmetric                                    7.6    6.04  )-----------( 306      ( 13 Aug 2021 08:41 )             23.4     08-13    142  |  107  |  9   ----------------------------<  89  3.9   |  23  |  1.24    Ca    8.5      13 Aug 2021 08:41  Phos  3.0     08-13  Mg     1.90     08-13      proBNP:   Lipid Profile:   HgA1c:   TSH:     Consultant(s) Notes Reviewed:  [x ] YES  [ ] NO    Care Discussed with Consultants/Other Providers [ x] YES  [ ] NO    Imaging Personally Reviewed independently:  [x] YES  [ ] NO    All labs, radiologic studies, vitals, orders and medications list reviewed. Patient is seen and examined at bedside. Case discussed with medical team.        
Hay Mera MD  Interventional Cardiology / Advance Heart Failure and Cardiac Transplant Specialist  [place] Office : [address] [place] [address] [place]  Tel:   [place] Office : [address] [facility] [place] [address]  Tel: [phone]  Cell : [phone]    Subjective/Overnight events: Pt is lying in bed comfortable not in distress, no chest pains no SOB no palpitations  	  MEDICATIONS:  aspirin  chewable 81 milliGRAM(s) Oral daily  tamsulosin 0.4 milliGRAM(s) Oral at bedtime        acetaminophen   Tablet .. 650 milliGRAM(s) Oral every 6 hours PRN    pantoprazole    Tablet 40 milliGRAM(s) Oral before breakfast      folic acid 1 milliGRAM(s) Oral daily      PAST MEDICAL/SURGICAL HISTORY  PAST MEDICAL & SURGICAL HISTORY:  HTN (hypertension)  on med x 3 yrs    BPH (benign prostatic hypertrophy)    H/O endoscopy    H/O colonoscopy  rectal bleed  no issues found    H/O prostate biopsy        SOCIAL HISTORY: Substance Use (street drugs): ( x ) never used  (  ) other:    FAMILY HISTORY:  No pertinent family history in first degree relatives        REVIEW OF SYSTEMS:  CONSTITUTIONAL: No fever, weight loss, or fatigue  EYES: No eye pain, visual disturbances, or discharge  ENMT:  No difficulty hearing, tinnitus, vertigo; No sinus or throat pain  BREASTS: No pain, masses, or nipple discharge  GASTROINTESTINAL: No abdominal or epigastric pain. No nausea, vomiting, or hematemesis; No diarrhea or constipation. No melena or hematochezia.  GENITOURINARY: No dysuria, frequency, hematuria, or incontinence  NEUROLOGICAL: No headaches, memory loss, loss of strength, numbness, or tremors  ENDOCRINE: No heat or cold intolerance; No hair loss  MUSCULOSKELETAL: No joint pain or swelling; No muscle, back, or extremity pain  PSYCHIATRIC: No depression, anxiety, mood swings, or difficulty sleeping  HEME/LYMPH: No easy bruising, or bleeding gums  All others negative    PHYSICAL EXAM:  T(C): 36.5 (08-15-21 @ 07:28), Max: 37.1 (08-15-21 @ 02:21)  HR: 88 (08-15-21 @ 07:28) (86 - 96)  BP: 141/84 (08-15-21 @ 07:28) (121/64 - 141/84)  RR: 18 (08-15-21 @ 07:28) (17 - 18)  SpO2: 100% (08-15-21 @ 07:28) (99% - 100%)  Wt(kg): --  I&O's Summary        GENERAL: NAD  EYES: EOMI, PERRLA, conjunctiva and sclera clear  ENMT: No tonsillar erythema, exudates, or enlargement; Moist mucous membranes, Good dentition, No lesions  Cardiovascular: Normal S1 S2, No JVD, No murmurs, No edema  Respiratory: Lungs clear to auscultation	  Gastrointestinal:  Soft, Non-tender, + BS	  Extremities: Normal range of motion, No clubbing, cyanosis or edema  LYMPH: No lymphadenopathy noted  NERVOUS SYSTEM:  Alert & Oriented X3, Good concentration; Motor Strength 5/5 B/L upper and lower extremities; DTRs 2+ intact and symmetric                                    8.0    6.28  )-----------( 373      ( 15 Aug 2021 07:04 )             24.0     08-15    139  |  105  |  13  ----------------------------<  87  3.6   |  22  |  1.30    Ca    8.6      15 Aug 2021 07:04  Phos  3.4     08-15  Mg     2.00     08-15      proBNP:   Lipid Profile:   HgA1c:   TSH:     Consultant(s) Notes Reviewed:  [x ] YES  [ ] NO    Care Discussed with Consultants/Other Providers [ x] YES  [ ] NO    Imaging Personally Reviewed independently:  [x] YES  [ ] NO    All labs, radiologic studies, vitals, orders and medications list reviewed. Patient is seen and examined at bedside. Case discussed with medical team.        
[facility]   MD ERIC PINO MD RUORU WONG, PA    TEL:  OFFICE: [phone]  DR GRIMALDO CELL: [phone]  GAURAV KRISHNAMURTHY CELL: [phone]  DR. JUNE CELL: [phone]      FROM 5 PM - 7 AM PLEASE CALL ANSWERING SERVICE: [phone]    RENAL FOLLOW UP NOTE--Date of Service 08-15-21 @ 10:09  --------------------------------------------------------------------------------  HPI:      Pt seen and examined at bedside.   Denies SOB, chest pain     PAST HISTORY  --------------------------------------------------------------------------------  No significant changes to PMH, PSH, FHx, SHx, unless otherwise noted    ALLERGIES & MEDICATIONS  --------------------------------------------------------------------------------  Allergies    No Known Allergies    Intolerances      Standing Inpatient Medications  aspirin  chewable 81 milliGRAM(s) Oral daily  folic acid 1 milliGRAM(s) Oral daily  pantoprazole    Tablet 40 milliGRAM(s) Oral before breakfast  tamsulosin 0.4 milliGRAM(s) Oral at bedtime    PRN Inpatient Medications  acetaminophen   Tablet .. 650 milliGRAM(s) Oral every 6 hours PRN      REVIEW OF SYSTEMS  --------------------------------------------------------------------------------  General: no fever  CVS: no chest pain  RESP: no sob, no cough  ABD: no abdominal pain  : no dysuria,  MSK: no edema     VITALS/PHYSICAL EXAM  --------------------------------------------------------------------------------  T(C): 36.5 (08-15-21 @ 07:28), Max: 37.1 (08-15-21 @ 02:21)  HR: 88 (08-15-21 @ 07:28) (86 - 96)  BP: 141/84 (08-15-21 @ 07:28) (121/64 - 141/84)  RR: 18 (08-15-21 @ 07:28) (17 - 18)  SpO2: 100% (08-15-21 @ 07:28) (98% - 100%)  Wt(kg): --        Physical Exam:  	Gen: NAD  	HEENT: MMM  	Pulm: CTA B/L  	CV: S1S2  	Abd: Soft, +BS  	Ext: No LE edema B/L                      Neuro: Awake   	Skin: Warm and Dry   	Vascular access: NO HD catheter            no  higginbotham  LABS/STUDIES  --------------------------------------------------------------------------------              8.0    6.28  >-----------<  373      [08-15-21 @ 07:04]              24.0     139  |  105  |  13  ----------------------------<  87      [08-15-21 @ 07:04]  3.6   |  22  |  1.30        Ca     8.6     [08-15-21 @ 07:04]      Mg     2.00     [08-15-21 @ 07:04]      Phos  3.4     [08-15-21 @ 07:04]            Creatinine Trend:  SCr 1.30 [08-15 @ 07:04]  SCr 1.41 [08-14 @ 06:25]  SCr 1.24 [08-13 @ 08:41]  SCr 1.24 [08-12 @ 06:13]  SCr 1.30 [08-11 @ 06:49]        Iron 25, TIBC 187, %sat 13      [08-10-21 @ 07:00]  Ferritin 68      [08-10-21 @ 07:00]      
[facility]   MD ERIC PINO MD RUORU WONG, PA    TEL:  OFFICE: [phone]  DR GRIMALDO CELL: [phone]  GAURAV KRISHNAMURTHY CELL: [phone]  DR. JUNE CELL: [phone]      FROM 5 PM - 7 AM PLEASE CALL ANSWERING SERVICE: [phone]    RENAL FOLLOW UP NOTE--Date of Service 08-12-21 @ 10:51  --------------------------------------------------------------------------------  HPI:      Pt seen and examined at bedside.   Denies SOB, chest pain     PAST HISTORY  --------------------------------------------------------------------------------  No significant changes to PMH, PSH, FHx, SHx, unless otherwise noted    ALLERGIES & MEDICATIONS  --------------------------------------------------------------------------------  Allergies    No Known Allergies    Intolerances      Standing Inpatient Medications  aspirin  chewable 81 milliGRAM(s) Oral daily  folic acid 1 milliGRAM(s) Oral daily  pantoprazole    Tablet 40 milliGRAM(s) Oral before breakfast  tamsulosin 0.4 milliGRAM(s) Oral at bedtime    PRN Inpatient Medications  acetaminophen   Tablet .. 650 milliGRAM(s) Oral every 6 hours PRN      REVIEW OF SYSTEMS  --------------------------------------------------------------------------------  General: no fever  CVS: no chest pain  RESP: no sob, no cough  ABD: no abdominal pain  : no dysuria,  MSK: no edema     VITALS/PHYSICAL EXAM  --------------------------------------------------------------------------------  T(C): 36.6 (08-12-21 @ 05:41), Max: 36.6 (08-11-21 @ 21:33)  HR: 91 (08-12-21 @ 05:41) (91 - 118)  BP: 127/68 (08-12-21 @ 05:41) (100/60 - 127/68)  RR: 16 (08-12-21 @ 05:41) (16 - 25)  SpO2: 99% (08-12-21 @ 05:41) (97% - 99%)  Wt(kg): --  Height (cm): 165.1 (08-11-21 @ 09:21)  Weight (kg): 71.5 (08-11-21 @ 09:21)  BMI (kg/m2): 26.2 (08-11-21 @ 09:21)  BSA (m2): 1.79 (08-11-21 @ 09:21)      Physical Exam:  	Gen: NAD  	HEENT: MMM  	Pulm: CTA B/L  	CV: S1S2  	Abd: Soft, +BS  	Ext: No LE edema B/L                      Neuro: Awake   	Skin: Warm and Dry   	Vascular access: NO HD catheter             no higginbotham  LABS/STUDIES  --------------------------------------------------------------------------------              7.4    5.33  >-----------<  249      [08-12-21 @ 06:13]              22.3     139  |  102  |  8   ----------------------------<  90      [08-12-21 @ 06:13]  3.7   |  22  |  1.24        Ca     8.1     [08-12-21 @ 06:13]      Mg     1.70     [08-12-21 @ 06:13]      Phos  3.2     [08-12-21 @ 06:13]            Creatinine Trend:  SCr 1.24 [08-12 @ 06:13]  SCr 1.30 [08-11 @ 06:49]  SCr 1.42 [08-10 @ 07:00]  SCr 1.58 [08-09 @ 21:51]        Iron 25, TIBC 187, %sat 13      [08-10-21 @ 07:00]  Ferritin 68      [08-10-21 @ 07:00]      
Hay Mera MD  Interventional Cardiology / Advance Heart Failure and Cardiac Transplant Specialist  [place] Office : [address] [place] [address] [place]  Tel:   [place] Office : [address] [facility] [place] [address]  Tel: [phone]  Cell : [phone]    Subjective/Overnight events: Pt is lying in bed comfortable not in distress, no chest pains no SOB no palpitations  	  MEDICATIONS:  aspirin  chewable 81 milliGRAM(s) Oral daily  tamsulosin 0.4 milliGRAM(s) Oral at bedtime        acetaminophen   Tablet .. 650 milliGRAM(s) Oral every 6 hours PRN    pantoprazole    Tablet 40 milliGRAM(s) Oral before breakfast      folic acid 1 milliGRAM(s) Oral daily      PAST MEDICAL/SURGICAL HISTORY  PAST MEDICAL & SURGICAL HISTORY:  HTN (hypertension)  on med x 3 yrs    BPH (benign prostatic hypertrophy)    H/O endoscopy    H/O colonoscopy  rectal bleed  no issues found    H/O prostate biopsy        SOCIAL HISTORY: Substance Use (street drugs): ( x ) never used  (  ) other:    FAMILY HISTORY:  No pertinent family history in first degree relatives        REVIEW OF SYSTEMS:  CONSTITUTIONAL: No fever, weight loss, or fatigue  EYES: No eye pain, visual disturbances, or discharge  ENMT:  No difficulty hearing, tinnitus, vertigo; No sinus or throat pain  BREASTS: No pain, masses, or nipple discharge  GASTROINTESTINAL: No abdominal or epigastric pain. No nausea, vomiting, or hematemesis; No diarrhea or constipation. No melena or hematochezia.  GENITOURINARY: No dysuria, frequency, hematuria, or incontinence  NEUROLOGICAL: No headaches, memory loss, loss of strength, numbness, or tremors  ENDOCRINE: No heat or cold intolerance; No hair loss  MUSCULOSKELETAL: No joint pain or swelling; No muscle, back, or extremity pain  PSYCHIATRIC: No depression, anxiety, mood swings, or difficulty sleeping  HEME/LYMPH: No easy bruising, or bleeding gums  All others negative    PHYSICAL EXAM:  T(C): 37 (08-12-21 @ 11:54), Max: 37 (08-12-21 @ 11:54)  HR: 112 (08-12-21 @ 11:56) (91 - 112)  BP: 104/66 (08-12-21 @ 11:56) (100/60 - 127/68)  RR: 16 (08-12-21 @ 05:41) (16 - 25)  SpO2: 100% (08-12-21 @ 11:56) (97% - 100%)  Wt(kg): --  I&O's Summary        GENERAL: NAD  EYES: EOMI, PERRLA, conjunctiva and sclera clear  ENMT: No tonsillar erythema, exudates, or enlargement; Moist mucous membranes, Good dentition, No lesions  Cardiovascular: Normal S1 S2, No JVD, No murmurs, No edema  Respiratory: Lungs clear to auscultation	  Gastrointestinal:  Soft, Non-tender, + BS	  Extremities: Normal range of motion, No clubbing, cyanosis or edema  LYMPH: No lymphadenopathy noted  NERVOUS SYSTEM:  Alert & Oriented X3, Good concentration; Motor Strength 5/5 B/L upper and lower extremities; DTRs 2+ intact and symmetric                                    7.4    5.33  )-----------( 249      ( 12 Aug 2021 06:13 )             22.3     08-12    139  |  102  |  8   ----------------------------<  90  3.7   |  22  |  1.24    Ca    8.1<L>      12 Aug 2021 06:13  Phos  3.2     08-12  Mg     1.70     08-12      proBNP:   Lipid Profile:   HgA1c:   TSH:     Consultant(s) Notes Reviewed:  [x ] YES  [ ] NO    Care Discussed with Consultants/Other Providers [ x] YES  [ ] NO    Imaging Personally Reviewed independently:  [x] YES  [ ] NO    All labs, radiologic studies, vitals, orders and medications list reviewed. Patient is seen and examined at bedside. Case discussed with medical team.        
Hay Mera MD  Interventional Cardiology / Advance Heart Failure and Cardiac Transplant Specialist  [place] Office : [address] [place] [address] [address]. [address]  Tel:   [place] Office : [address] [facility] [place] [address]  Tel: [phone]  Cell : [phone]    Subjective/Overnight events: Pt is lying in bed comfortable not in distress, no chest pains no SOB no palpitations  	  MEDICATIONS:  aspirin  chewable 81 milliGRAM(s) Oral daily  tamsulosin 0.4 milliGRAM(s) Oral at bedtime        acetaminophen   Tablet .. 650 milliGRAM(s) Oral every 6 hours PRN    pantoprazole    Tablet 40 milliGRAM(s) Oral before breakfast      folic acid 1 milliGRAM(s) Oral daily      PAST MEDICAL/SURGICAL HISTORY  PAST MEDICAL & SURGICAL HISTORY:  HTN (hypertension)  on med x 3 yrs    BPH (benign prostatic hypertrophy)    H/O endoscopy    H/O colonoscopy  rectal bleed  no issues found    H/O prostate biopsy        SOCIAL HISTORY: Substance Use (street drugs): ( x ) never used  (  ) other:    FAMILY HISTORY:  No pertinent family history in first degree relatives        REVIEW OF SYSTEMS:  CONSTITUTIONAL: No fever, weight loss, or fatigue  EYES: No eye pain, visual disturbances, or discharge  ENMT:  No difficulty hearing, tinnitus, vertigo; No sinus or throat pain  BREASTS: No pain, masses, or nipple discharge  GASTROINTESTINAL: No abdominal or epigastric pain. No nausea, vomiting, or hematemesis; No diarrhea or constipation. No melena or hematochezia.  GENITOURINARY: No dysuria, frequency, hematuria, or incontinence  NEUROLOGICAL: No headaches, memory loss, loss of strength, numbness, or tremors  ENDOCRINE: No heat or cold intolerance; No hair loss  MUSCULOSKELETAL: No joint pain or swelling; No muscle, back, or extremity pain  PSYCHIATRIC: No depression, anxiety, mood swings, or difficulty sleeping  HEME/LYMPH: No easy bruising, or bleeding gums  All others negative    PHYSICAL EXAM:  T(C): 36.4 (08-14-21 @ 10:35), Max: 37.1 (08-14-21 @ 06:13)  HR: 93 (08-14-21 @ 10:35) (81 - 116)  BP: 130/75 (08-14-21 @ 10:35) (110/60 - 138/79)  RR: 18 (08-14-21 @ 10:35) (16 - 18)  SpO2: 98% (08-14-21 @ 10:35) (96% - 100%)  Wt(kg): --  I&O's Summary        GENERAL: NAD  EYES: EOMI, PERRLA, conjunctiva and sclera clear  ENMT: No tonsillar erythema, exudates, or enlargement; Moist mucous membranes, Good dentition, No lesions  Cardiovascular: Normal S1 S2, No JVD, No murmurs, No edema  Respiratory: Lungs clear to auscultation	  Gastrointestinal:  Soft, Non-tender, + BS	  Extremities: Normal range of motion, No clubbing, cyanosis or edema  LYMPH: No lymphadenopathy noted  NERVOUS SYSTEM:  Alert & Oriented X3, Good concentration; Motor Strength 5/5 B/L upper and lower extremities; DTRs 2+ intact and symmetric                                    8.0    6.86  )-----------( 344      ( 14 Aug 2021 06:25 )             24.4     08-14    142  |  107  |  16  ----------------------------<  92  3.8   |  23  |  1.41<H>    Ca    8.5      14 Aug 2021 06:25  Phos  3.5     08-14  Mg     2.00     08-14      proBNP:   Lipid Profile:   HgA1c:   TSH:     Consultant(s) Notes Reviewed:  [x ] YES  [ ] NO    Care Discussed with Consultants/Other Providers [ x] YES  [ ] NO    Imaging Personally Reviewed independently:  [x] YES  [ ] NO    All labs, radiologic studies, vitals, orders and medications list reviewed. Patient is seen and examined at bedside. Case discussed with medical team.        
Chief Complaint:  hematochezia      Reason for consult: hematochezia    Interval Events:   Tolerated EGD/colon; EGD was normal, colonoscopy revealed pancolonic diverticulosis, non bleeding internal hemorrhoids, and 2 polyps s/p bx  Denies any BMs since just prior to the colonoscopy, no n/v/abdominal pain  Hgb 7.4 from  8.6    Hospital Medications:  acetaminophen   Tablet .. 650 milliGRAM(s) Oral every 6 hours PRN  aspirin  chewable 81 milliGRAM(s) Oral daily  folic acid 1 milliGRAM(s) Oral daily  pantoprazole  Injectable 40 milliGRAM(s) IV Push daily  tamsulosin 0.4 milliGRAM(s) Oral at bedtime      ROS: complete and normal except as listed above    PHYSICAL EXAM:   Vital Signs:  Vital Signs Last 24 Hrs  T(C): 36.6 (12 Aug 2021 05:41), Max: 36.6 (11 Aug 2021 21:33)  T(F): 97.8 (12 Aug 2021 05:41), Max: 97.8 (11 Aug 2021 21:33)  HR: 91 (12 Aug 2021 05:41) (91 - 130)  BP: 127/68 (12 Aug 2021 05:41) (100/60 - 127/76)  BP(mean): 86 (11 Aug 2021 12:10) (74 - 93)  RR: 16 (12 Aug 2021 05:41) (16 - 25)  SpO2: 99% (12 Aug 2021 05:41) (97% - 100%)  Daily     Daily     GENERAL: no acute distress  NEURO: alert  HEENT: anicteric sclera but pigmented, no conjunctival pallor appreciated  CHEST: no respiratory distress, no accessory muscle use  CARDIAC: regular rate, rhythm  ABDOMEN: soft, non-tender, non-distended, no rebound or guarding  EXTREMITIES: warm, well perfused, no edema  LENKA: deferred   SKIN: no lesions noted    LABS: reviewed                        7.4    5.33  )-----------( 249      ( 12 Aug 2021 06:13 )             22.3     08-12    139  |  102  |  8   ----------------------------<  90  3.7   |  22  |  1.24    Ca    8.1<L>      12 Aug 2021 06:13  Phos  3.2     08-12  Mg     1.70     08-12          Interval Diagnostic Studies: see sunrise for full report  
[facility]   MD LIAM PINO DO ANAM SIDDIQUI ANGELA WONG, PA    TEL:  OFFICE: [phone]  DR GRIMALDO CELL: [phone]  DR. WALSH CELL: [phone]  DR. JUNE CELL: [phone]  CARROLL KRISHNAMURTHY CELL: [phone]    From 5pm-7am Answering Service [phone]    -- RENAL FOLLOW UP NOTE ---Date of Service 08-11-21 @ 16:18    Patient is a 65y old  Male who presents with a chief complaint of BRBPR x5 days (11 Aug 2021 10:18)      Patient seen and examined at bedside. No chest pain/sob    VITALS:  T(F): 98.3 (08-11-21 @ 09:21), Max: 98.6 (08-11-21 @ 05:39)  HR: 106 (08-11-21 @ 14:45)  BP: 107/64 (08-11-21 @ 14:45)  RR: 20 (08-11-21 @ 14:45)  SpO2: 98% (08-11-21 @ 14:45)  Wt(kg): --    Height (cm): 165.1 (08-11 @ 09:21)  Weight (kg): 71.5 (08-11 @ 09:21)  BMI (kg/m2): 26.2 (08-11 @ 09:21)  BSA (m2): 1.79 (08-11 @ 09:21)    PHYSICAL EXAM:  Constitutional: NAD  Neck: No JVD  Respiratory: CTAB, no wheezes, rales or rhonchi  Cardiovascular: S1, S2, RRR  Gastrointestinal: BS+, soft, NT/ND  Extremities: No peripheral edema    Hospital Medications:   MEDICATIONS  (STANDING):  folic acid 1 milliGRAM(s) Oral daily  pantoprazole  Injectable 40 milliGRAM(s) IV Push daily  potassium chloride  10 mEq/100 mL IVPB 10 milliEquivalent(s) IV Intermittent every 1 hour  tamsulosin 0.4 milliGRAM(s) Oral at bedtime      LABS:  08-11    139  |  103  |  12  ----------------------------<  79  3.4<L>   |  21<L>  |  1.30    Ca    8.6      11 Aug 2021 06:49  Phos  3.9     08-11  Mg     1.90     08-11    TPro  7.4  /  Alb  3.9  /  TBili  <0.2  /  DBili      /  AST  24  /  ALT  14  /  AlkPhos  65  08-09    Creatinine Trend: 1.30 <--, 1.42 <--, 1.58 <--    Phosphorus Level, Serum: 3.9 mg/dL (08-11 @ 06:49)                              8.6    8.26  )-----------( 271      ( 11 Aug 2021 06:49 )             25.5     Urine Studies:      Iron 25, TIBC 187, %sat 13      [08-10-21 @ 07:00]  Ferritin 68      [08-10-21 @ 07:00]        RADIOLOGY & ADDITIONAL STUDIES:  
Date of Service  : 08-11-21 @ 10:18    INTERVAL HPI/OVERNIGHT EVENTS: For scopes but having tachycardia . Denies CP ,Palpitation etc.   Vital Signs Last 24 Hrs  T(C): 36.8 (11 Aug 2021 09:21), Max: 37 (11 Aug 2021 05:39)  T(F): 98.3 (11 Aug 2021 09:21), Max: 98.6 (11 Aug 2021 05:39)  HR: 108 (11 Aug 2021 10:05) (83 - 130)  BP: 127/76 (11 Aug 2021 10:05) (110/70 - 138/72)  BP(mean): 92 (11 Aug 2021 10:05) (92 - 93)  RR: 20 (11 Aug 2021 10:05) (17 - 20)  SpO2: 97% (11 Aug 2021 10:05) (97% - 100%)  I&O's Summary    MEDICATIONS  (STANDING):  folic acid 1 milliGRAM(s) Oral daily  pantoprazole  Injectable 40 milliGRAM(s) IV Push daily  potassium chloride  10 mEq/100 mL IVPB 10 milliEquivalent(s) IV Intermittent every 1 hour  tamsulosin 0.4 milliGRAM(s) Oral at bedtime    MEDICATIONS  (PRN):  acetaminophen   Tablet .. 650 milliGRAM(s) Oral every 6 hours PRN Temp greater or equal to 38.5C (101.3F), Mild Pain (1 - 3)    LABS:                        8.6    8.26  )-----------( 271      ( 11 Aug 2021 06:49 )             25.5     08-11    139  |  103  |  12  ----------------------------<  79  3.4<L>   |  21<L>  |  1.30    Ca    8.6      11 Aug 2021 06:49  Phos  3.9     08-11  Mg     1.90     08-11    TPro  7.4  /  Alb  3.9  /  TBili  <0.2  /  DBili  x   /  AST  24  /  ALT  14  /  AlkPhos  65  08-09    PT/INR - ( 10 Aug 2021 10:44 )   PT: 13.4 sec;   INR: 1.17 ratio         PTT - ( 10 Aug 2021 10:44 )  PTT:23.8 sec    CAPILLARY BLOOD GLUCOSE              REVIEW OF SYSTEMS:  CONSTITUTIONAL: No fever, weight loss, or fatigue  EYES: No eye pain, visual disturbances, or discharge  ENMT:  No difficulty hearing, tinnitus, vertigo; No sinus or throat pain  NECK: No pain or stiffness  RESPIRATORY: No cough, wheezing, chills or hemoptysis; No shortness of breath  CARDIOVASCULAR: No chest pain, palpitations, dizziness, or leg swelling  GASTROINTESTINAL: No abdominal or epigastric pain. No nausea, vomiting, or hematemesis; No diarrhea or constipation. No melena or hematochezia.  GENITOURINARY: No dysuria, frequency, hematuria, or incontinence  NEUROLOGICAL: No headaches, memory loss, loss of strength, numbness, or tremors      Consultant(s) Notes Reviewed:  [x ] YES  [ ] NO    PHYSICAL EXAM:  GENERAL: NAD, well-groomed, well-developed,not in any distress ,  HEAD:  Atraumatic, Normocephalic  EYES: EOMI, PERRLA, conjunctiva and sclera clear  ENMT: No tonsillar erythema, exudates, or enlargement; Moist mucous membranes, Good dentition, No lesions  NECK: Supple, No JVD, Normal thyroid  NERVOUS SYSTEM:  Alert & Oriented X3, No focal deficit   CHEST/LUNG: Good air entry bilateral with no  rales, rhonchi, wheezing, or rubs  HEART: Regular rate and rhythm; No murmurs, rubs, or gallops  ABDOMEN: Soft, Nontender, Nondistended; Bowel sounds present  EXTREMITIES:  2+ Peripheral Pulses, No clubbing, cyanosis, or edema  SKIN: No rashes or lesions    Care Discussed with Consultants/Other Providers [ x] YES  [ ] NO
Date of Service  : 08-13-21    INTERVAL HPI/OVERNIGHT EVENTS: I feel fine.   Vital Signs Last 24 Hrs  T(C): 36.8 (13 Aug 2021 10:23), Max: 36.9 (13 Aug 2021 05:49)  T(F): 98.3 (13 Aug 2021 10:23), Max: 98.4 (13 Aug 2021 05:49)  HR: 92 (13 Aug 2021 10:23) (79 - 96)  BP: 128/86 (13 Aug 2021 10:23) (124/77 - 145/93)  BP(mean): --  RR: 17 (13 Aug 2021 10:23) (16 - 18)  SpO2: 100% (13 Aug 2021 10:23) (99% - 100%)  I&O's Summary    MEDICATIONS  (STANDING):  aspirin  chewable 81 milliGRAM(s) Oral daily  folic acid 1 milliGRAM(s) Oral daily  pantoprazole    Tablet 40 milliGRAM(s) Oral before breakfast  tamsulosin 0.4 milliGRAM(s) Oral at bedtime    MEDICATIONS  (PRN):  acetaminophen   Tablet .. 650 milliGRAM(s) Oral every 6 hours PRN Temp greater or equal to 38.5C (101.3F), Mild Pain (1 - 3)    LABS:                        7.6    6.04  )-----------( 306      ( 13 Aug 2021 08:41 )             23.4     08-13    142  |  107  |  9   ----------------------------<  89  3.9   |  23  |  1.24    Ca    8.5      13 Aug 2021 08:41  Phos  3.0     08-13  Mg     1.90     08-13          CAPILLARY BLOOD GLUCOSE              REVIEW OF SYSTEMS:  CONSTITUTIONAL: No fever, weight loss, or fatigue  EYES: No eye pain, visual disturbances, or discharge  ENMT:  No difficulty hearing, tinnitus, vertigo; No sinus or throat pain  NECK: No pain or stiffness  RESPIRATORY: No cough, wheezing, chills or hemoptysis; No shortness of breath  CARDIOVASCULAR: No chest pain, palpitations, dizziness, or leg swelling  GASTROINTESTINAL: No abdominal or epigastric pain. No nausea, vomiting, or hematemesis; No diarrhea or constipation. No melena or hematochezia.  GENITOURINARY: No dysuria, frequency, hematuria, or incontinence  NEUROLOGICAL: No headaches, memory loss, loss of strength, numbness, or tremors  SKIN: No itching, burning, rashes, or lesions   ENDOCRINE: No heat or cold intolerance; No hair loss  MUSCULOSKELETAL: No joint pain or swelling; No muscle, back, or extremity pain  PSYCHIATRIC: No depression, anxiety, mood swings, or difficulty sleeping  HEME/LYMPH: No easy bruising, or bleeding gums  ALLERY AND IMMUNOLOGIC: No hives or eczema    RADIOLOGY & ADDITIONAL TESTS:    Consultant(s) Notes Reviewed:  [x ] YES  [ ] NO    PHYSICAL EXAM:  GENERAL: NAD, well-groomed, well-developed,not in any distress ,  HEAD:  Atraumatic, Normocephalic  EYES: EOMI, PERRLA, conjunctiva and sclera clear  ENMT: No tonsillar erythema, exudates, or enlargement; Moist mucous membranes, Good dentition, No lesions  NECK: Supple, No JVD, Normal thyroid  NERVOUS SYSTEM:  Alert & Oriented X3, No focal deficit   CHEST/LUNG: Good air entry bilateral with no  rales, rhonchi, wheezing, or rubs  HEART: Regular rate and rhythm; No murmurs, rubs, or gallops  ABDOMEN: Soft, Nontender, Nondistended; Bowel sounds present  EXTREMITIES:  2+ Peripheral Pulses, No clubbing, cyanosis, or edema  SKIN: No rashes or lesions    Care Discussed with Consultants/Other Providers [ x] YES  [ ] NO
Date of Service  : 08-14-21 @ 17:37    INTERVAL HPI/OVERNIGHT EVENTS:  Vital Signs Last 24 Hrs  T(C): 36.7 (14 Aug 2021 14:35), Max: 37.1 (14 Aug 2021 06:13)  T(F): 98 (14 Aug 2021 14:35), Max: 98.7 (14 Aug 2021 06:13)  HR: 86 (14 Aug 2021 14:35) (81 - 116)  BP: 134/85 (14 Aug 2021 14:35) (110/60 - 134/85)  BP(mean): --  RR: 18 (14 Aug 2021 14:35) (17 - 18)  SpO2: 99% (14 Aug 2021 14:35) (96% - 100%)  I&O's Summary    MEDICATIONS  (STANDING):  aspirin  chewable 81 milliGRAM(s) Oral daily  folic acid 1 milliGRAM(s) Oral daily  pantoprazole    Tablet 40 milliGRAM(s) Oral before breakfast  tamsulosin 0.4 milliGRAM(s) Oral at bedtime    MEDICATIONS  (PRN):  acetaminophen   Tablet .. 650 milliGRAM(s) Oral every 6 hours PRN Temp greater or equal to 38.5C (101.3F), Mild Pain (1 - 3)    LABS:                        8.0    6.86  )-----------( 344      ( 14 Aug 2021 06:25 )             24.4     08-14    142  |  107  |  16  ----------------------------<  92  3.8   |  23  |  1.41<H>    Ca    8.5      14 Aug 2021 06:25  Phos  3.5     08-14  Mg     2.00     08-14          CAPILLARY BLOOD GLUCOSE              REVIEW OF SYSTEMS:  CONSTITUTIONAL: No fever, weight loss, or fatigue  EYES: No eye pain, visual disturbances, or discharge  ENMT:  No difficulty hearing, tinnitus, vertigo; No sinus or throat pain  NECK: No pain or stiffness  RESPIRATORY: No cough, wheezing, chills or hemoptysis; No shortness of breath  CARDIOVASCULAR: No chest pain, palpitations, dizziness, or leg swelling  GASTROINTESTINAL: No abdominal or epigastric pain. No nausea, vomiting, or hematemesis; No diarrhea or constipation. No melena or hematochezia.  GENITOURINARY: No dysuria, frequency, hematuria, or incontinence  NEUROLOGICAL: No headaches, memory loss, loss of strength, numbness, or tremors  SKIN: No itching, burning, rashes, or lesions   ENDOCRINE: No heat or cold intolerance; No hair loss  MUSCULOSKELETAL: No joint pain or swelling; No muscle, back, or extremity pain  PSYCHIATRIC: No depression, anxiety, mood swings, or difficulty sleeping  HEME/LYMPH: No easy bruising, or bleeding gums  ALLERY AND IMMUNOLOGIC: No hives or eczema    RADIOLOGY & ADDITIONAL TESTS:    Consultant(s) Notes Reviewed:  [x ] YES  [ ] NO    PHYSICAL EXAM:  GENERAL: NAD, well-groomed, well-developed,not in any distress ,  HEAD:  Atraumatic, Normocephalic  EYES: EOMI, PERRLA, conjunctiva and sclera clear  ENMT: No tonsillar erythema, exudates, or enlargement; Moist mucous membranes, Good dentition, No lesions  NECK: Supple, No JVD, Normal thyroid  NERVOUS SYSTEM:  Alert & Oriented X3, No focal deficit   CHEST/LUNG: Good air entry bilateral with no  rales, rhonchi, wheezing, or rubs  HEART: Regular rate and rhythm; No murmurs, rubs, or gallops  ABDOMEN: Soft, Nontender, Nondistended; Bowel sounds present  EXTREMITIES:  2+ Peripheral Pulses, No clubbing, cyanosis, or edema  SKIN: No rashes or lesions    Care Discussed with Consultants/Other Providers [ x] YES  [ ] NO
[facility]   MD LIAM PINO DO ANAM SIDDIQUI ANGELA WONG, PA    TEL:  OFFICE: [phone]  DR GRIMALDO CELL: [phone]  DR. WALSH CELL: [phone]  DR. JUNE CELL: [phone]  CARROLL KRISHNAMURTHY CELL: [phone]    From 5pm-7am Answering Service [phone]    -- RENAL FOLLOW UP NOTE ---Date of Service 08-13-21 @ 14:29    Patient is a 65y old  Male who presents with a chief complaint of BRBPR x5 days (13 Aug 2021 12:06)      Patient seen and examined at bedside. No chest pain/sob    VITALS:  T(F): 98.3 (08-13-21 @ 10:23), Max: 98.4 (08-13-21 @ 05:49)  HR: 92 (08-13-21 @ 10:23)  BP: 128/86 (08-13-21 @ 10:23)  RR: 17 (08-13-21 @ 10:23)  SpO2: 100% (08-13-21 @ 10:23)  Wt(kg): --        PHYSICAL EXAM:  Constitutional: NAD  Neck: No JVD  Respiratory: CTAB, no wheezes, rales or rhonchi  Cardiovascular: S1, S2, RRR  Gastrointestinal: BS+, soft, NT/ND  Extremities: No peripheral edema    Hospital Medications:   MEDICATIONS  (STANDING):  aspirin  chewable 81 milliGRAM(s) Oral daily  folic acid 1 milliGRAM(s) Oral daily  pantoprazole    Tablet 40 milliGRAM(s) Oral before breakfast  tamsulosin 0.4 milliGRAM(s) Oral at bedtime      LABS:  08-13    142  |  107  |  9   ----------------------------<  89  3.9   |  23  |  1.24    Ca    8.5      13 Aug 2021 08:41  Phos  3.0     08-13  Mg     1.90     08-13      Creatinine Trend: 1.24 <--, 1.24 <--, 1.30 <--, 1.42 <--, 1.58 <--    Phosphorus Level, Serum: 3.0 mg/dL (08-13 @ 08:41)                              7.6    6.04  )-----------( 306      ( 13 Aug 2021 08:41 )             23.4     Urine Studies:      Iron 25, TIBC 187, %sat 13      [08-10-21 @ 07:00]  Ferritin 68      [08-10-21 @ 07:00]        RADIOLOGY & ADDITIONAL STUDIES:  
[facility] [place] [facility]   MD ERIC PINO MD RUORU WONG, PA    TEL:  OFFICE: [phone]  DR GRIMALDO CELL: [phone]  GAURAV KRISHNAMURTHY CELL: [phone]  DR. JUNE CELL: [phone]      FROM 5 PM - 7 AM PLEASE CALL ANSWERING SERVICE: [phone]    RENAL FOLLOW UP NOTE--Date of Service 08-14-21 @ 11:07  --------------------------------------------------------------------------------  HPI:      Pt seen and examined at bedside.   Denies SOB, chest pain     PAST HISTORY  --------------------------------------------------------------------------------  No significant changes to PMH, PSH, FHx, SHx, unless otherwise noted    ALLERGIES & MEDICATIONS  --------------------------------------------------------------------------------  Allergies    No Known Allergies    Intolerances      Standing Inpatient Medications  aspirin  chewable 81 milliGRAM(s) Oral daily  folic acid 1 milliGRAM(s) Oral daily  pantoprazole    Tablet 40 milliGRAM(s) Oral before breakfast  tamsulosin 0.4 milliGRAM(s) Oral at bedtime    PRN Inpatient Medications  acetaminophen   Tablet .. 650 milliGRAM(s) Oral every 6 hours PRN      REVIEW OF SYSTEMS  --------------------------------------------------------------------------------  General: no fever  CVS: no chest pain  RESP: no sob, no cough  ABD: no abdominal pain  : no dysuria,  MSK: no edema     VITALS/PHYSICAL EXAM  --------------------------------------------------------------------------------  T(C): 36.4 (08-14-21 @ 10:35), Max: 37.1 (08-14-21 @ 06:13)  HR: 93 (08-14-21 @ 10:35) (81 - 116)  BP: 130/75 (08-14-21 @ 10:35) (110/60 - 138/79)  RR: 18 (08-14-21 @ 10:35) (16 - 18)  SpO2: 98% (08-14-21 @ 10:35) (96% - 100%)  Wt(kg): --        Physical Exam:  	Gen: NAD  	HEENT: MMM  	Pulm: CTA B/L  	CV: S1S2  	Abd: Soft, +BS  	Ext: No LE edema B/L                      Neuro: Awake   	Skin: Warm and Dry   	Vascular access: NO HD catheter            no  higginbotham  LABS/STUDIES  --------------------------------------------------------------------------------              8.0    6.86  >-----------<  344      [08-14-21 @ 06:25]              24.4     142  |  107  |  16  ----------------------------<  92      [08-14-21 @ 06:25]  3.8   |  23  |  1.41        Ca     8.5     [08-14-21 @ 06:25]      Mg     2.00     [08-14-21 @ 06:25]      Phos  3.5     [08-14-21 @ 06:25]            Creatinine Trend:  SCr 1.41 [08-14 @ 06:25]  SCr 1.24 [08-13 @ 08:41]  SCr 1.24 [08-12 @ 06:13]  SCr 1.30 [08-11 @ 06:49]  SCr 1.42 [08-10 @ 07:00]        Iron 25, TIBC 187, %sat 13      [08-10-21 @ 07:00]  Ferritin 68      [08-10-21 @ 07:00]

## 2021-08-15 NOTE — PROGRESS NOTE ADULT - ASSESSMENT
65M with PMHx HTN, diverticulosis, prostate cancer s/p prostatectomy presenting with 5 days of BRBPR. Multiple episode of blood BM worsen lightheadedness and weakness. denied abd pain, fever or chills.  last GI work up in 2018  nephrology consulted for joyce    JOYCE  admitted with scr 1.58   JOYCE initially pre-renal   s/p ct with contrast 8/10  Renal function improving today  monitor  avoid nephrotoxic agents    anemia  transfuse per team  monitor Hb  s/p EGD and Colonscopy  capsule study neg   Likely sec to diverticular bleed per GI

## 2021-08-15 NOTE — DISCHARGE NOTE PROVIDER - NSDCMRMEDTOKEN_GEN_ALL_CORE_FT
amLODIPine 5 mg oral tablet: 1 tab(s) orally once a day  aspirin 81 mg oral delayed release tablet: 1 tab(s) orally once a day  Flomax 0.4 mg oral capsule: 1 cap(s) orally once a day  folic acid 1 mg oral tablet: 1 tab(s) orally once a day  pantoprazole 40 mg oral delayed release tablet: 1 tab(s) orally once a day (before a meal)

## 2021-08-15 NOTE — PROGRESS NOTE ADULT - REASON FOR ADMISSION
BRBPR x5 days

## 2021-08-15 NOTE — DISCHARGE NOTE NURSING/CASE MANAGEMENT/SOCIAL WORK - PATIENT PORTAL LINK FT
You can access the FollowMyHealth Patient Portal offered by [facility] by registering at the following website: [email or web address]. By joining [employer]’s FollowMyHealth portal, you will also be able to view your health information using other applications (apps) compatible with our system.

## 2021-08-15 NOTE — DISCHARGE NOTE PROVIDER - NSDCCPCAREPLAN_GEN_ALL_CORE_FT
PRINCIPAL DISCHARGE DIAGNOSIS  Diagnosis: Gastrointestinal hemorrhage, unspecified gastrointestinal hemorrhage type  Assessment and Plan of Treatment: You recieved 1 unit of blood. You were evaluated by gastroenterology. You underwent an EGD which was normal. You underwent a colonoscopy which showed diverticulosis (out pouching of colon), polyps and non bleeding internal hemorrhoids. You underwent a video capsule endoscopy which was negative. Please continue protonix. Monitor for recurrence of bleeding. Out patient follow up with primry care provider and gastroenterologist.      SECONDARY DISCHARGE DIAGNOSES  Diagnosis: Hypertension, unspecified type  Assessment and Plan of Treatment: Continue current blood pressure medication regimen as directed. Monitor for any visual changes, headaches or dizziness.  Monitor blood pressure regularly.  Follow up with your PCP for further management for high blood pressure, please call to make appointment within 1 week of discharge    Diagnosis: Benign prostatic hyperplasia, unspecified whether lower urinary tract symptoms present  Assessment and Plan of Treatment: Continue with flomax.

## 2021-08-15 NOTE — PROGRESS NOTE ADULT - PROVIDER SPECIALTY LIST ADULT
Internal Medicine
Gastroenterology
Internal Medicine
Nephrology
Cardiology
Cardiology
Internal Medicine
Nephrology
Cardiology
Cardiology
Internal Medicine
Nephrology

## 2021-08-15 NOTE — DISCHARGE NOTE PROVIDER - NSFOLLOWUPCLINICS_GEN_ALL_ED_FT
[facility] Gastroenter[facility]  Gastroenterology  [address] [place] [address] [address]  [place] [address]  Phone: [phone]  Fax:

## 2021-08-15 NOTE — DISCHARGE NOTE PROVIDER - HOSPITAL COURSE
65M with PMHx HTN, diverticulosis, prostate cancer s/p prostatectomy presenting with 5 days of BRBPR. Admitted for GIB w/u.    Hospital course:    Anemia 2/2 gastrointestinal hemorrhage  -Hgb 8.5, normotensive but with mild tachycardia  -Rectal bleeding and suspect possible diverticulosis vs AVM vs hemorrhoids  -Given patient's reported hx of gastric ulcer, placed on PPI though much higher suspicion LGIB based off history  -ASA initially held   -Occ positive  -S/p 1 u prbc   -GI consulted   -CTA without active extravasation, +Diverticulosis.   -S/p EGD: Normal EGD no bleeding.   -S/p Colonoscopy: marked diverticulosis, polypectomy x 2, non-bleeding internal hemorrhoids GIB likely 2/2 diverticulosis --> Path of transverse colon c/w tubular adenoma and path of descending colon with polyp neg for dysplasia   -Video capsule endoscopy negative  -GI bleeding may have been 2/2 diverticulosis seen on colonoscopy.   -Hemoglobin stable prior to discharge, cleared by GI for discharge     Hypertension, unspecified type.  -Held home amlodipine given normotensive and GI workup   -Resume amlodipine upon discharge     Benign prostatic hyperplasia, unspecified whether lower urinary tract symptoms present.   -Continued with flomax.     Pt medically stable for discharge on 8/15/21 as per discussion with Dr. Grullon.   Dispo: home

## 2021-08-15 NOTE — PROGRESS NOTE ADULT - ASSESSMENT
Assessment and Plan    65M with PMHx HTN, diverticulosis, prostate cancer s/p prostatectomy presenting with 5 days of BRBPR. 5 days ago patient had 3 large episodes of bloody BMs    EKG: NSR no STT changes    1. Anemia  -2/2 GIB  -s/p PRBC transfusion  -continue to monitor H/H  -s/p EGD/colonoscopy f/u GI, likely diverticular bleed  -for capsule endoscopy     2. JOYCE  -creat improving  -f/u renal    3. HTN  -controlled  -continue to monitor BP     4. Tachycardia  -sinus tach s/p IV lopressor   -denies CP, SOB  -orthostatics negative  -PO fluid intake encouraged  -possibly 2/2 anemia  -improving

## 2021-12-19 NOTE — ED ADULT NURSE NOTE - NS ED PATIENT SAFETY CONCERN
[facility] Hospitalist Service  At the heart of better care     Advance Care Planning   Admit Date:  12/15/2021  3:57 PM   Name:  Henny Shaw   Age:  79 y.o. Sex:  female  :  1941   MRN:  [id number]   Room:  [address] [place] [address] is able to make her own decisions: No    If pt unable to make decisions, POA/surrogate decision maker:  Pt's daughter, [phone]    Other members present in the meeting:   none    Patient / surrogate decision-maker directed:  Code Status: DO NOT RESUSCITATE, DO NOT INTUBATE -okay for other aggressive medical and surgical intervention    Other ACP topics discussed, if applicable:   none    Patient or surrogate consented to discussion of the current conditions, workup, management plans, prognosis, and understand the risk for further deterioration. Time spent: 20 minutes in direct discussion (face to face and/or over phone).     Signed:  Carlos Brock MD No

## 2023-11-11 ENCOUNTER — INPATIENT (INPATIENT)
Facility: HOSPITAL | Age: 68
LOS: 16 days | Discharge: HOME HEALTH | End: 2023-11-28
Attending: STUDENT IN AN ORGANIZED HEALTH CARE EDUCATION/TRAINING PROGRAM | Admitting: STUDENT IN AN ORGANIZED HEALTH CARE EDUCATION/TRAINING PROGRAM
Payer: MEDICARE

## 2023-11-11 VITALS
SYSTOLIC BLOOD PRESSURE: 133 MMHG | HEART RATE: 96 BPM | TEMPERATURE: 98 F | DIASTOLIC BLOOD PRESSURE: 64 MMHG | OXYGEN SATURATION: 100 % | RESPIRATION RATE: 15 BRPM

## 2023-11-11 DIAGNOSIS — R53.1 WEAKNESS: ICD-10-CM

## 2023-11-11 DIAGNOSIS — K76.9 LIVER DISEASE, UNSPECIFIED: ICD-10-CM

## 2023-11-11 DIAGNOSIS — I10 ESSENTIAL (PRIMARY) HYPERTENSION: ICD-10-CM

## 2023-11-11 DIAGNOSIS — N18.9 CHRONIC KIDNEY DISEASE, UNSPECIFIED: ICD-10-CM

## 2023-11-11 DIAGNOSIS — I81 PORTAL VEIN THROMBOSIS: ICD-10-CM

## 2023-11-11 DIAGNOSIS — Z29.9 ENCOUNTER FOR PROPHYLACTIC MEASURES, UNSPECIFIED: ICD-10-CM

## 2023-11-11 DIAGNOSIS — D72.829 ELEVATED WHITE BLOOD CELL COUNT, UNSPECIFIED: ICD-10-CM

## 2023-11-11 LAB
ALBUMIN SERPL ELPH-MCNC: 2.8 G/DL — LOW (ref 3.3–5)
ALBUMIN SERPL ELPH-MCNC: 2.8 G/DL — LOW (ref 3.3–5)
ALP SERPL-CCNC: 128 U/L — HIGH (ref 40–120)
ALP SERPL-CCNC: 128 U/L — HIGH (ref 40–120)
ALT FLD-CCNC: 33 U/L — SIGNIFICANT CHANGE UP (ref 4–41)
ALT FLD-CCNC: 33 U/L — SIGNIFICANT CHANGE UP (ref 4–41)
AMORPH SED URNS QL MICRO: PRESENT
AMORPH SED URNS QL MICRO: PRESENT
ANION GAP SERPL CALC-SCNC: 16 MMOL/L — HIGH (ref 7–14)
ANION GAP SERPL CALC-SCNC: 16 MMOL/L — HIGH (ref 7–14)
APPEARANCE UR: ABNORMAL
APPEARANCE UR: ABNORMAL
APTT BLD: 28.3 SEC — SIGNIFICANT CHANGE UP (ref 24.5–35.6)
APTT BLD: 28.3 SEC — SIGNIFICANT CHANGE UP (ref 24.5–35.6)
AST SERPL-CCNC: 46 U/L — HIGH (ref 4–40)
AST SERPL-CCNC: 46 U/L — HIGH (ref 4–40)
BACTERIA # UR AUTO: NEGATIVE /HPF — SIGNIFICANT CHANGE UP
BACTERIA # UR AUTO: NEGATIVE /HPF — SIGNIFICANT CHANGE UP
BILIRUB SERPL-MCNC: 2 MG/DL — HIGH (ref 0.2–1.2)
BILIRUB SERPL-MCNC: 2 MG/DL — HIGH (ref 0.2–1.2)
BILIRUB UR-MCNC: ABNORMAL
BILIRUB UR-MCNC: ABNORMAL
BUN SERPL-MCNC: 26 MG/DL — HIGH (ref 7–23)
BUN SERPL-MCNC: 26 MG/DL — HIGH (ref 7–23)
CALCIUM SERPL-MCNC: 8.8 MG/DL — SIGNIFICANT CHANGE UP (ref 8.4–10.5)
CALCIUM SERPL-MCNC: 8.8 MG/DL — SIGNIFICANT CHANGE UP (ref 8.4–10.5)
CAST: 8 /LPF — HIGH (ref 0–4)
CAST: 8 /LPF — HIGH (ref 0–4)
CHLORIDE SERPL-SCNC: 105 MMOL/L — SIGNIFICANT CHANGE UP (ref 98–107)
CHLORIDE SERPL-SCNC: 105 MMOL/L — SIGNIFICANT CHANGE UP (ref 98–107)
CO2 SERPL-SCNC: 24 MMOL/L — SIGNIFICANT CHANGE UP (ref 22–31)
CO2 SERPL-SCNC: 24 MMOL/L — SIGNIFICANT CHANGE UP (ref 22–31)
COLOR SPEC: SIGNIFICANT CHANGE UP
COLOR SPEC: SIGNIFICANT CHANGE UP
CREAT SERPL-MCNC: 1.46 MG/DL — HIGH (ref 0.5–1.3)
CREAT SERPL-MCNC: 1.46 MG/DL — HIGH (ref 0.5–1.3)
DIFF PNL FLD: NEGATIVE — SIGNIFICANT CHANGE UP
DIFF PNL FLD: NEGATIVE — SIGNIFICANT CHANGE UP
EGFR: 52 ML/MIN/1.73M2 — LOW
EGFR: 52 ML/MIN/1.73M2 — LOW
GIANT PLATELETS BLD QL SMEAR: PRESENT — SIGNIFICANT CHANGE UP
GIANT PLATELETS BLD QL SMEAR: PRESENT — SIGNIFICANT CHANGE UP
GLUCOSE SERPL-MCNC: 111 MG/DL — HIGH (ref 70–99)
GLUCOSE SERPL-MCNC: 111 MG/DL — HIGH (ref 70–99)
GLUCOSE UR QL: NEGATIVE MG/DL — SIGNIFICANT CHANGE UP
GLUCOSE UR QL: NEGATIVE MG/DL — SIGNIFICANT CHANGE UP
HAV IGM SER-ACNC: SIGNIFICANT CHANGE UP
HAV IGM SER-ACNC: SIGNIFICANT CHANGE UP
HBV CORE IGM SER-ACNC: SIGNIFICANT CHANGE UP
HBV CORE IGM SER-ACNC: SIGNIFICANT CHANGE UP
HBV SURFACE AG SER-ACNC: SIGNIFICANT CHANGE UP
HBV SURFACE AG SER-ACNC: SIGNIFICANT CHANGE UP
HCT VFR BLD CALC: 24.9 % — LOW (ref 39–50)
HCT VFR BLD CALC: 24.9 % — LOW (ref 39–50)
HCV AB S/CO SERPL IA: 0.16 S/CO — SIGNIFICANT CHANGE UP (ref 0–0.99)
HCV AB S/CO SERPL IA: 0.16 S/CO — SIGNIFICANT CHANGE UP (ref 0–0.99)
HCV AB SERPL-IMP: SIGNIFICANT CHANGE UP
HCV AB SERPL-IMP: SIGNIFICANT CHANGE UP
HGB BLD-MCNC: 8 G/DL — LOW (ref 13–17)
HGB BLD-MCNC: 8 G/DL — LOW (ref 13–17)
KETONES UR-MCNC: ABNORMAL MG/DL
KETONES UR-MCNC: ABNORMAL MG/DL
LEUKOCYTE ESTERASE UR-ACNC: NEGATIVE — SIGNIFICANT CHANGE UP
LEUKOCYTE ESTERASE UR-ACNC: NEGATIVE — SIGNIFICANT CHANGE UP
MANUAL SMEAR VERIFICATION: SIGNIFICANT CHANGE UP
MANUAL SMEAR VERIFICATION: SIGNIFICANT CHANGE UP
MCHC RBC-ENTMCNC: 30.3 PG — SIGNIFICANT CHANGE UP (ref 27–34)
MCHC RBC-ENTMCNC: 30.3 PG — SIGNIFICANT CHANGE UP (ref 27–34)
MCHC RBC-ENTMCNC: 32.1 GM/DL — SIGNIFICANT CHANGE UP (ref 32–36)
MCHC RBC-ENTMCNC: 32.1 GM/DL — SIGNIFICANT CHANGE UP (ref 32–36)
MCV RBC AUTO: 94.3 FL — SIGNIFICANT CHANGE UP (ref 80–100)
MCV RBC AUTO: 94.3 FL — SIGNIFICANT CHANGE UP (ref 80–100)
NEUTS BAND # BLD: 0.9 % — SIGNIFICANT CHANGE UP (ref 0–6)
NEUTS BAND # BLD: 0.9 % — SIGNIFICANT CHANGE UP (ref 0–6)
NITRITE UR-MCNC: NEGATIVE — SIGNIFICANT CHANGE UP
NITRITE UR-MCNC: NEGATIVE — SIGNIFICANT CHANGE UP
NRBC # BLD: 0 /100 WBCS — SIGNIFICANT CHANGE UP (ref 0–0)
NRBC # BLD: 0 /100 WBCS — SIGNIFICANT CHANGE UP (ref 0–0)
NRBC # BLD: 1 /100 — HIGH (ref 0–0)
NRBC # BLD: 1 /100 — HIGH (ref 0–0)
NRBC # FLD: 0.04 K/UL — HIGH (ref 0–0)
NRBC # FLD: 0.04 K/UL — HIGH (ref 0–0)
NT-PROBNP SERPL-SCNC: 775 PG/ML — HIGH
NT-PROBNP SERPL-SCNC: 775 PG/ML — HIGH
PH UR: 6 — SIGNIFICANT CHANGE UP (ref 5–8)
PH UR: 6 — SIGNIFICANT CHANGE UP (ref 5–8)
PLAT MORPH BLD: NORMAL — SIGNIFICANT CHANGE UP
PLAT MORPH BLD: NORMAL — SIGNIFICANT CHANGE UP
PLATELET # BLD AUTO: 412 K/UL — HIGH (ref 150–400)
PLATELET # BLD AUTO: 412 K/UL — HIGH (ref 150–400)
PLATELET COUNT - ESTIMATE: NORMAL — SIGNIFICANT CHANGE UP
PLATELET COUNT - ESTIMATE: NORMAL — SIGNIFICANT CHANGE UP
POIKILOCYTOSIS BLD QL AUTO: SIGNIFICANT CHANGE UP
POIKILOCYTOSIS BLD QL AUTO: SIGNIFICANT CHANGE UP
POLYCHROMASIA BLD QL SMEAR: SLIGHT — SIGNIFICANT CHANGE UP
POLYCHROMASIA BLD QL SMEAR: SLIGHT — SIGNIFICANT CHANGE UP
POTASSIUM SERPL-MCNC: 3.1 MMOL/L — LOW (ref 3.5–5.3)
POTASSIUM SERPL-MCNC: 3.1 MMOL/L — LOW (ref 3.5–5.3)
POTASSIUM SERPL-SCNC: 3.1 MMOL/L — LOW (ref 3.5–5.3)
POTASSIUM SERPL-SCNC: 3.1 MMOL/L — LOW (ref 3.5–5.3)
PROT SERPL-MCNC: 7.4 G/DL — SIGNIFICANT CHANGE UP (ref 6–8.3)
PROT SERPL-MCNC: 7.4 G/DL — SIGNIFICANT CHANGE UP (ref 6–8.3)
PROT UR-MCNC: 30 MG/DL
PROT UR-MCNC: 30 MG/DL
RBC # BLD: 2.64 M/UL — LOW (ref 4.2–5.8)
RBC # BLD: 2.64 M/UL — LOW (ref 4.2–5.8)
RBC # FLD: 16.6 % — HIGH (ref 10.3–14.5)
RBC # FLD: 16.6 % — HIGH (ref 10.3–14.5)
RBC BLD AUTO: NORMAL — SIGNIFICANT CHANGE UP
RBC BLD AUTO: NORMAL — SIGNIFICANT CHANGE UP
RBC CASTS # UR COMP ASSIST: 1 /HPF — SIGNIFICANT CHANGE UP (ref 0–4)
RBC CASTS # UR COMP ASSIST: 1 /HPF — SIGNIFICANT CHANGE UP (ref 0–4)
REVIEW: SIGNIFICANT CHANGE UP
REVIEW: SIGNIFICANT CHANGE UP
SODIUM SERPL-SCNC: 145 MMOL/L — SIGNIFICANT CHANGE UP (ref 135–145)
SODIUM SERPL-SCNC: 145 MMOL/L — SIGNIFICANT CHANGE UP (ref 135–145)
SP GR SPEC: 1.02 — SIGNIFICANT CHANGE UP (ref 1–1.03)
SP GR SPEC: 1.02 — SIGNIFICANT CHANGE UP (ref 1–1.03)
SQUAMOUS # UR AUTO: 2 /HPF — SIGNIFICANT CHANGE UP (ref 0–5)
SQUAMOUS # UR AUTO: 2 /HPF — SIGNIFICANT CHANGE UP (ref 0–5)
TARGETS BLD QL SMEAR: SIGNIFICANT CHANGE UP
TARGETS BLD QL SMEAR: SIGNIFICANT CHANGE UP
TROPONIN T, HIGH SENSITIVITY RESULT: 22 NG/L — SIGNIFICANT CHANGE UP
TROPONIN T, HIGH SENSITIVITY RESULT: 22 NG/L — SIGNIFICANT CHANGE UP
TROPONIN T, HIGH SENSITIVITY RESULT: 23 NG/L — SIGNIFICANT CHANGE UP
TROPONIN T, HIGH SENSITIVITY RESULT: 23 NG/L — SIGNIFICANT CHANGE UP
TSH SERPL-MCNC: 1.65 UIU/ML — SIGNIFICANT CHANGE UP (ref 0.27–4.2)
TSH SERPL-MCNC: 1.65 UIU/ML — SIGNIFICANT CHANGE UP (ref 0.27–4.2)
UROBILINOGEN FLD QL: 4 MG/DL (ref 0.2–1)
UROBILINOGEN FLD QL: 4 MG/DL (ref 0.2–1)
WBC # BLD: 24.48 K/UL — HIGH (ref 3.8–10.5)
WBC # BLD: 24.48 K/UL — HIGH (ref 3.8–10.5)
WBC # FLD AUTO: 24.48 K/UL — HIGH (ref 3.8–10.5)
WBC # FLD AUTO: 24.48 K/UL — HIGH (ref 3.8–10.5)
WBC UR QL: 1 /HPF — SIGNIFICANT CHANGE UP (ref 0–5)
WBC UR QL: 1 /HPF — SIGNIFICANT CHANGE UP (ref 0–5)

## 2023-11-11 PROCEDURE — 99223 1ST HOSP IP/OBS HIGH 75: CPT

## 2023-11-11 PROCEDURE — 93975 VASCULAR STUDY: CPT | Mod: 26

## 2023-11-11 PROCEDURE — 74177 CT ABD & PELVIS W/CONTRAST: CPT | Mod: 26,MA

## 2023-11-11 PROCEDURE — 71250 CT THORAX DX C-: CPT | Mod: 26

## 2023-11-11 PROCEDURE — 71046 X-RAY EXAM CHEST 2 VIEWS: CPT | Mod: 26

## 2023-11-11 PROCEDURE — 99285 EMERGENCY DEPT VISIT HI MDM: CPT | Mod: 25

## 2023-11-11 RX ORDER — ASPIRIN/CALCIUM CARB/MAGNESIUM 324 MG
1 TABLET ORAL
Qty: 0 | Refills: 0 | DISCHARGE

## 2023-11-11 RX ORDER — AMLODIPINE BESYLATE 2.5 MG/1
1 TABLET ORAL
Qty: 0 | Refills: 0 | DISCHARGE

## 2023-11-11 RX ORDER — TAMSULOSIN HYDROCHLORIDE 0.4 MG/1
0.4 CAPSULE ORAL AT BEDTIME
Refills: 0 | Status: DISCONTINUED | OUTPATIENT
Start: 2023-11-11 | End: 2023-11-28

## 2023-11-11 RX ORDER — AMLODIPINE BESYLATE 2.5 MG/1
5 TABLET ORAL DAILY
Refills: 0 | Status: DISCONTINUED | OUTPATIENT
Start: 2023-11-11 | End: 2023-11-28

## 2023-11-11 RX ORDER — FOLIC ACID 0.8 MG
1 TABLET ORAL DAILY
Refills: 0 | Status: DISCONTINUED | OUTPATIENT
Start: 2023-11-11 | End: 2023-11-28

## 2023-11-11 RX ORDER — TAMSULOSIN HYDROCHLORIDE 0.4 MG/1
1 CAPSULE ORAL
Qty: 0 | Refills: 0 | DISCHARGE

## 2023-11-11 RX ORDER — HEPARIN SODIUM 5000 [USP'U]/ML
5500 INJECTION INTRAVENOUS; SUBCUTANEOUS EVERY 6 HOURS
Refills: 0 | Status: DISCONTINUED | OUTPATIENT
Start: 2023-11-11 | End: 2023-11-13

## 2023-11-11 RX ORDER — HEPARIN SODIUM 5000 [USP'U]/ML
INJECTION INTRAVENOUS; SUBCUTANEOUS
Qty: 25000 | Refills: 0 | Status: DISCONTINUED | OUTPATIENT
Start: 2023-11-11 | End: 2023-11-13

## 2023-11-11 RX ORDER — POTASSIUM CHLORIDE 20 MEQ
40 PACKET (EA) ORAL EVERY 4 HOURS
Refills: 0 | Status: COMPLETED | OUTPATIENT
Start: 2023-11-11 | End: 2023-11-11

## 2023-11-11 RX ORDER — PANTOPRAZOLE SODIUM 20 MG/1
40 TABLET, DELAYED RELEASE ORAL
Refills: 0 | Status: DISCONTINUED | OUTPATIENT
Start: 2023-11-11 | End: 2023-11-21

## 2023-11-11 RX ORDER — HEPARIN SODIUM 5000 [USP'U]/ML
2500 INJECTION INTRAVENOUS; SUBCUTANEOUS EVERY 6 HOURS
Refills: 0 | Status: DISCONTINUED | OUTPATIENT
Start: 2023-11-11 | End: 2023-11-13

## 2023-11-11 RX ORDER — SODIUM CHLORIDE 9 MG/ML
1000 INJECTION INTRAMUSCULAR; INTRAVENOUS; SUBCUTANEOUS ONCE
Refills: 0 | Status: COMPLETED | OUTPATIENT
Start: 2023-11-11 | End: 2023-11-11

## 2023-11-11 RX ORDER — POTASSIUM CHLORIDE 20 MEQ
40 PACKET (EA) ORAL ONCE
Refills: 0 | Status: COMPLETED | OUTPATIENT
Start: 2023-11-11 | End: 2023-11-11

## 2023-11-11 RX ORDER — ASPIRIN/CALCIUM CARB/MAGNESIUM 324 MG
81 TABLET ORAL DAILY
Refills: 0 | Status: DISCONTINUED | OUTPATIENT
Start: 2023-11-11 | End: 2023-11-17

## 2023-11-11 RX ORDER — HEPARIN SODIUM 5000 [USP'U]/ML
5000 INJECTION INTRAVENOUS; SUBCUTANEOUS EVERY 8 HOURS
Refills: 0 | Status: DISCONTINUED | OUTPATIENT
Start: 2023-11-11 | End: 2023-11-11

## 2023-11-11 RX ORDER — FOLIC ACID 0.8 MG
1 TABLET ORAL
Qty: 0 | Refills: 0 | DISCHARGE

## 2023-11-11 RX ORDER — SODIUM CHLORIDE 9 MG/ML
1000 INJECTION, SOLUTION INTRAVENOUS ONCE
Refills: 0 | Status: COMPLETED | OUTPATIENT
Start: 2023-11-11 | End: 2023-11-11

## 2023-11-11 RX ADMIN — Medication 40 MILLIEQUIVALENT(S): at 21:17

## 2023-11-11 RX ADMIN — Medication 40 MILLIEQUIVALENT(S): at 08:54

## 2023-11-11 RX ADMIN — Medication 40 MILLIEQUIVALENT(S): at 18:57

## 2023-11-11 RX ADMIN — SODIUM CHLORIDE 1000 MILLILITER(S): 9 INJECTION, SOLUTION INTRAVENOUS at 08:54

## 2023-11-11 RX ADMIN — SODIUM CHLORIDE 1000 MILLILITER(S): 9 INJECTION INTRAMUSCULAR; INTRAVENOUS; SUBCUTANEOUS at 07:08

## 2023-11-11 RX ADMIN — HEPARIN SODIUM 1200 UNIT(S)/HR: 5000 INJECTION INTRAVENOUS; SUBCUTANEOUS at 22:03

## 2023-11-11 RX ADMIN — TAMSULOSIN HYDROCHLORIDE 0.4 MILLIGRAM(S): 0.4 CAPSULE ORAL at 21:17

## 2023-11-11 NOTE — H&P ADULT - PROBLEM SELECTOR PLAN 6
F: IVF  E: replete PRN  N: Regular  A: OOBAT    DVT ppx: full Lovenox 1mg/kg BID  GI ppx: Protonix 40mg qD    Code status: Full code  Dispo: active F: IVF  E: replete PRN  N: Regular  A: OOBAT    DVT ppx: full AC  GI ppx: Protonix 40mg qD    Code status: Full code  Dispo: active

## 2023-11-11 NOTE — ED PROVIDER NOTE - PROGRESS NOTE DETAILS
Chelo Galaviz PGY2: Pt signed out to me. Pt reassessed and resting comfortably. Pt presenting with early satiety and weight loss, concerning for possible malignancy. CT AP ordered to eval for intra-abdominal malignancy. Pending labs. Chelo Galaviz PGY2: Labs showed elevated LFTs and bilirubin. CT shows intrahepatic mass concerning for possible malignancy. Pt informed of findings along with daughter on phone. Questions answered. Pt demonstrated understanding of findings. CT also shows possible filling defect. Spoke with inpatient team and they recommend US with doppler to further evaluate. Pt TBA to medicine.

## 2023-11-11 NOTE — H&P ADULT - PROBLEM SELECTOR PLAN 1
Recent hx weight loss, fatigue and poor PO intake  CT evidence of new R hepatic lesion with possible portal vein thrombus, labs c/f biliary obstruction with increased Tbili and LFTs, leukocytosis  Hx of prostate cancer s/p prostatectomy, does not follow with Urology or Onc  Recent hx of ? malaria although parasite smear negative     Plan    - CT chest  - f/u MRCP w/wo IV contrast  - will reach out IR for possible liver biopsy  - continue to monitor for now

## 2023-11-11 NOTE — ED PROVIDER NOTE - CLINICAL SUMMARY MEDICAL DECISION MAKING FREE TEXT BOX
69 yo M with h/o HTN who presents to the ED with 1 month of generalized weakness, decreased PO intake, and weight loss (25 lb over the past month). Has been following with his PMD. Differential is wide including malignancy (has B symptoms), electrolyte abnormality, infection (pneumonia, UTI). Plan for labs, IV fluids, XR, UA.

## 2023-11-11 NOTE — H&P ADULT - ASSESSMENT
68M PMH HTN, BPH, prostate cancer s/p prostatectomy 2012, diverticulosis presenting with 5 weeks of generalized weakness, 25 lb weight loss, and poor PO intake, recently treated for suspected malaria (unclear if confirmed with blood work), found to have evidence of new right hepatic lesion with suspected portal venous thrombosis c/f malignancy

## 2023-11-11 NOTE — ED ADULT TRIAGE NOTE - CHIEF COMPLAINT QUOTE
presents c/o poor PO intake and generalized weakness x5 weeks. unintentional weight loss of 22lbs. Pt treated for malaria 1 week ago. No complaints of chest pain, headache,  dizziness, SOB, fever, chills verbalized. Phx HTN BPH

## 2023-11-11 NOTE — H&P ADULT - NSHPLABSRESULTS_GEN_ALL_CORE
.  LABS:                         8.0    24.48 )-----------( 412      ( 2023 07:02 )             24.9         145  |  105  |  26<H>  ----------------------------<  111<H>  3.1<L>   |  24  |  1.46<H>    Ca    8.8      2023 07:02    TPro  7.4  /  Alb  2.8<L>  /  TBili  2.0<H>  /  DBili  x   /  AST  46<H>  /  ALT  33  /  AlkPhos  128<H>        Urinalysis Basic - ( 2023 08:40 )    Color: Dark Yellow / Appearance: Cloudy / S.016 / pH: x  Gluc: x / Ketone: Trace mg/dL  / Bili: Moderate / Urobili: 4.0 mg/dL   Blood: x / Protein: 30 mg/dL / Nitrite: Negative   Leuk Esterase: Negative / RBC: 1 /HPF / WBC 1 /HPF   Sq Epi: x / Non Sq Epi: 2 /HPF / Bacteria: Negative /HPF            RADIOLOGY, EKG & ADDITIONAL TESTS: Reviewed.

## 2023-11-11 NOTE — H&P ADULT - PROBLEM SELECTOR PLAN 3
WBC 25K no fever at this time  Previously treated for ? malaria  Likely 2/2 to malignancy as no clear evidence of infection at this time    Plan  - monitor off abx at this time  - if fever spike will culture and start abx

## 2023-11-11 NOTE — H&P ADULT - PROBLEM SELECTOR PLAN 2
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    Plan  - full Lovenox 1mg/kg BID CT c/f portal vein thrombus pending official read  Duplex US Abdomen occlusive thrombosis of the main and left portal veins with avascular thrombus.    Plan  - full AC

## 2023-11-11 NOTE — H&P ADULT - NSHPPHYSICALEXAM_GEN_ALL_CORE
PHYSICAL EXAM:   T(C): 37.4 (11-11-23 @ 15:13), Max: 37.6 (11-11-23 @ 12:26)  T(F): 99.3 (11-11-23 @ 15:13), Max: 99.6 (11-11-23 @ 12:26)  HR: 97 (11-11-23 @ 15:13) (91 - 97)  BP: 126/74 (11-11-23 @ 15:13) (122/78 - 140/79)  RR: 18 (11-11-23 @ 15:13) (15 - 18)  SpO2: 100% (11-11-23 @ 15:13) (98% - 100%)    GENERAL: Well appearing, NAD  HEENT: NCAT, EOMI, PERRL, no scleral icterus, corneal arcus b/l  NECK: Supple, No JVD  LUNG: Clear to auscultation bilaterally; No wheeze, crackles or rhonci  HEART: Regular rate and rhythm; S1, S2, No murmurs, rubs, or gallops  ABDOMEN: Soft, Nontender, Nondistended, No rebound, gaurding  EXTREMITIES:  No LE edema, 2+ Peripheral Pulses, No clubbing, cyanosis, or edema, shin scars old  PSYCH: AAOx3  NEUROLOGY: non-focal deficits, strength 5/5 in all extremities, sensation intact  SKIN: No rashes or lesions

## 2023-11-11 NOTE — H&P ADULT - HISTORY OF PRESENT ILLNESS
68M PMH HTN, BPH, prostate cancer s/p prostatectomy 2012, diverticulosis presenting with 5 weeks of generalized weakness, 25 lb weight loss, and poor PO intake. Per pt, started having cold sensation as well as shivers 5 weeks ago after which his PMD trailed ibuprofen and abx for possible UTI. Patients symptoms persisted and PMD felt presentation was consistent with malaria (traveled to [facility] 1 year ago, unclear if malaria blood work was performed) after which pt was treated with mefloquine 5 tablets PO 5 days ago. Patient endorsed that his shivers and chills resolved but he continued to have weight loss and poor PO intake. Tried to eat yesterday and then had an episode of vomiting. Has been taking in liquids without difficulty. Also c/o generalized weakness- has to stop walking every few blocks secondary to weakness. Saw his PMD yesterday and was told he had "sepsis" which concerned the daughter. Denies any fever, chills, headache, visual changes, sore throat, cough, runny nose, chest pain, abdominal pain, n/v/d.    In ED patient was afebrile and hemodynamically stable. Labs were significant for leukocytosis to 25 with elevated Tbili and LFTs. Blood parasite screen was negative. CT imaging was obtained which showed a new indeterminate right hepatic lesion concerning for malignancy as well as an expansile filling defect in the right portal, left portal and main hepatic veins, Patient was given IVF and admitted to medicine for further workup,

## 2023-11-11 NOTE — ED PROVIDER NOTE - OBJECTIVE STATEMENT
69 yo M with h/o HTN who presents to the ED with 1 month of generalized weakness and decreased PO intake. When the symptoms began he also had chills at home. Saw his PMD who thought he might have malaria (traveled to [place] 1 year ago, unclear if malaria test was performed) and was treated for malaria with mefloquine 5 tablets PO. He reports that the chills have resolved but has not been eating because "food repulses him." Tried to eat yesterday and then had an episode of vomiting. Has been taking in liquids without difficulty. Also c/o generalized weakness- has to stop walking every few blocks secondary to weakness. Saw his PMD yesterday and was told he had "sepsis" which concerned the daughter. Pt also reports 25 lb weight loss in the past month. No fever, chills, headache, visual changes, sore throat, cough, runny nose, chest pain, abdominal pain, n/v/d.

## 2023-11-11 NOTE — ED ADULT NURSE NOTE - OBJECTIVE STATEMENT
Patient received in room 26, A&Ox4 ambulatory at baseline c/o weakness x5 weeks and decrease PO intake. Weakness began with "cold symptoms," that never subsided, pt went to pcp and was given medications for malaria treatment which provided some relief but continues to have worsened weakness and decreased appetite. Denies SOB, CP, N/V/D, dizziness, lightheadedness. Pt's daughter at bedside and states he has lost close to 22lbs within the 5 weeks. MD at bedside. Breathing even and unlabored. Pending labs.

## 2023-11-11 NOTE — ED ADULT NURSE REASSESSMENT NOTE - NS ED NURSE REASSESS COMMENT FT1
Break RN: received report from CATHY Banda. pt AAOx3, stable resting in bed in non acute distress. Respirations even and unlabored. Denies pain/discomfort. Pt awaiting admission. VS as noted. Safety maintained.

## 2023-11-11 NOTE — H&P ADULT - NSHPREVIEWOFSYSTEMS_GEN_ALL_CORE
REVIEW OF SYSTEMS:    CONSTITUTIONAL: Endorses weakness, No recent fevers or chills  EYES/ENT: No visual changes;  No vertigo or throat pain   NECK: No pain or stiffness  RESPIRATORY: Occasional cough, No wheezing, hemoptysis; No shortness of breath  CARDIOVASCULAR: No chest pain or palpitations  GASTROINTESTINAL: No abdominal or epigastric pain. No nausea, vomiting, or hematemesis; No diarrhea or constipation. No melena or hematochezia.  GENITOURINARY: No dysuria, frequency or hematuria  NEUROLOGICAL: No numbness or weakness  SKIN: No itching, burning, rashes, or lesions   All other review of systems is negative unless indicated above.

## 2023-11-11 NOTE — ED PROVIDER NOTE - ENMT, MLM
Airway patent, Nasal mucosa clear. Throat has no vesicles, no oropharyngeal exudates and uvula is midline.  Dry mucous membranes

## 2023-11-11 NOTE — PATIENT PROFILE ADULT - FALL HARM RISK - HARM RISK INTERVENTIONS

## 2023-11-12 LAB
ALBUMIN SERPL ELPH-MCNC: 2.4 G/DL — LOW (ref 3.3–5)
ALBUMIN SERPL ELPH-MCNC: 2.4 G/DL — LOW (ref 3.3–5)
ALP SERPL-CCNC: 121 U/L — HIGH (ref 40–120)
ALP SERPL-CCNC: 121 U/L — HIGH (ref 40–120)
ALT FLD-CCNC: 33 U/L — SIGNIFICANT CHANGE UP (ref 4–41)
ALT FLD-CCNC: 33 U/L — SIGNIFICANT CHANGE UP (ref 4–41)
ANION GAP SERPL CALC-SCNC: 13 MMOL/L — SIGNIFICANT CHANGE UP (ref 7–14)
ANION GAP SERPL CALC-SCNC: 13 MMOL/L — SIGNIFICANT CHANGE UP (ref 7–14)
APTT BLD: 37.1 SEC — HIGH (ref 24.5–35.6)
APTT BLD: 37.1 SEC — HIGH (ref 24.5–35.6)
APTT BLD: 57.3 SEC — HIGH (ref 24.5–35.6)
APTT BLD: 57.3 SEC — HIGH (ref 24.5–35.6)
APTT BLD: 68.4 SEC — HIGH (ref 24.5–35.6)
APTT BLD: 68.4 SEC — HIGH (ref 24.5–35.6)
AST SERPL-CCNC: 39 U/L — SIGNIFICANT CHANGE UP (ref 4–40)
AST SERPL-CCNC: 39 U/L — SIGNIFICANT CHANGE UP (ref 4–40)
BASOPHILS # BLD AUTO: 0.02 K/UL — SIGNIFICANT CHANGE UP (ref 0–0.2)
BASOPHILS # BLD AUTO: 0.02 K/UL — SIGNIFICANT CHANGE UP (ref 0–0.2)
BASOPHILS NFR BLD AUTO: 0.1 % — SIGNIFICANT CHANGE UP (ref 0–2)
BASOPHILS NFR BLD AUTO: 0.1 % — SIGNIFICANT CHANGE UP (ref 0–2)
BILIRUB SERPL-MCNC: 2.1 MG/DL — HIGH (ref 0.2–1.2)
BILIRUB SERPL-MCNC: 2.1 MG/DL — HIGH (ref 0.2–1.2)
BUN SERPL-MCNC: 19 MG/DL — SIGNIFICANT CHANGE UP (ref 7–23)
BUN SERPL-MCNC: 19 MG/DL — SIGNIFICANT CHANGE UP (ref 7–23)
CALCIUM SERPL-MCNC: 8.4 MG/DL — SIGNIFICANT CHANGE UP (ref 8.4–10.5)
CALCIUM SERPL-MCNC: 8.4 MG/DL — SIGNIFICANT CHANGE UP (ref 8.4–10.5)
CHLORIDE SERPL-SCNC: 107 MMOL/L — SIGNIFICANT CHANGE UP (ref 98–107)
CHLORIDE SERPL-SCNC: 107 MMOL/L — SIGNIFICANT CHANGE UP (ref 98–107)
CO2 SERPL-SCNC: 23 MMOL/L — SIGNIFICANT CHANGE UP (ref 22–31)
CO2 SERPL-SCNC: 23 MMOL/L — SIGNIFICANT CHANGE UP (ref 22–31)
CREAT SERPL-MCNC: 1.27 MG/DL — SIGNIFICANT CHANGE UP (ref 0.5–1.3)
CREAT SERPL-MCNC: 1.27 MG/DL — SIGNIFICANT CHANGE UP (ref 0.5–1.3)
EGFR: 61 ML/MIN/1.73M2 — SIGNIFICANT CHANGE UP
EGFR: 61 ML/MIN/1.73M2 — SIGNIFICANT CHANGE UP
EOSINOPHIL # BLD AUTO: 0 K/UL — SIGNIFICANT CHANGE UP (ref 0–0.5)
EOSINOPHIL # BLD AUTO: 0 K/UL — SIGNIFICANT CHANGE UP (ref 0–0.5)
EOSINOPHIL NFR BLD AUTO: 0 % — SIGNIFICANT CHANGE UP (ref 0–6)
EOSINOPHIL NFR BLD AUTO: 0 % — SIGNIFICANT CHANGE UP (ref 0–6)
GLUCOSE SERPL-MCNC: 103 MG/DL — HIGH (ref 70–99)
GLUCOSE SERPL-MCNC: 103 MG/DL — HIGH (ref 70–99)
HCT VFR BLD CALC: 23.1 % — LOW (ref 39–50)
HCT VFR BLD CALC: 23.1 % — LOW (ref 39–50)
HCV AB S/CO SERPL IA: 0.2 S/CO — SIGNIFICANT CHANGE UP (ref 0–0.99)
HCV AB S/CO SERPL IA: 0.2 S/CO — SIGNIFICANT CHANGE UP (ref 0–0.99)
HCV AB SERPL-IMP: SIGNIFICANT CHANGE UP
HCV AB SERPL-IMP: SIGNIFICANT CHANGE UP
HGB BLD-MCNC: 7.3 G/DL — LOW (ref 13–17)
HGB BLD-MCNC: 7.3 G/DL — LOW (ref 13–17)
IANC: 17.61 K/UL — HIGH (ref 1.8–7.4)
IANC: 17.61 K/UL — HIGH (ref 1.8–7.4)
IMM GRANULOCYTES NFR BLD AUTO: 1.1 % — HIGH (ref 0–0.9)
IMM GRANULOCYTES NFR BLD AUTO: 1.1 % — HIGH (ref 0–0.9)
LYMPHOCYTES # BLD AUTO: 10.4 % — LOW (ref 13–44)
LYMPHOCYTES # BLD AUTO: 10.4 % — LOW (ref 13–44)
LYMPHOCYTES # BLD AUTO: 2.22 K/UL — SIGNIFICANT CHANGE UP (ref 1–3.3)
LYMPHOCYTES # BLD AUTO: 2.22 K/UL — SIGNIFICANT CHANGE UP (ref 1–3.3)
MAGNESIUM SERPL-MCNC: 2.1 MG/DL — SIGNIFICANT CHANGE UP (ref 1.6–2.6)
MAGNESIUM SERPL-MCNC: 2.1 MG/DL — SIGNIFICANT CHANGE UP (ref 1.6–2.6)
MCHC RBC-ENTMCNC: 29.9 PG — SIGNIFICANT CHANGE UP (ref 27–34)
MCHC RBC-ENTMCNC: 29.9 PG — SIGNIFICANT CHANGE UP (ref 27–34)
MCHC RBC-ENTMCNC: 31.6 GM/DL — LOW (ref 32–36)
MCHC RBC-ENTMCNC: 31.6 GM/DL — LOW (ref 32–36)
MCV RBC AUTO: 94.7 FL — SIGNIFICANT CHANGE UP (ref 80–100)
MCV RBC AUTO: 94.7 FL — SIGNIFICANT CHANGE UP (ref 80–100)
MONOCYTES # BLD AUTO: 1.24 K/UL — HIGH (ref 0–0.9)
MONOCYTES # BLD AUTO: 1.24 K/UL — HIGH (ref 0–0.9)
MONOCYTES NFR BLD AUTO: 5.8 % — SIGNIFICANT CHANGE UP (ref 2–14)
MONOCYTES NFR BLD AUTO: 5.8 % — SIGNIFICANT CHANGE UP (ref 2–14)
NEUTROPHILS # BLD AUTO: 17.61 K/UL — HIGH (ref 1.8–7.4)
NEUTROPHILS # BLD AUTO: 17.61 K/UL — HIGH (ref 1.8–7.4)
NEUTROPHILS NFR BLD AUTO: 82.6 % — HIGH (ref 43–77)
NEUTROPHILS NFR BLD AUTO: 82.6 % — HIGH (ref 43–77)
NRBC # BLD: 0 /100 WBCS — SIGNIFICANT CHANGE UP (ref 0–0)
NRBC # BLD: 0 /100 WBCS — SIGNIFICANT CHANGE UP (ref 0–0)
NRBC # FLD: 0.03 K/UL — HIGH (ref 0–0)
NRBC # FLD: 0.03 K/UL — HIGH (ref 0–0)
PHOSPHATE SERPL-MCNC: 2.1 MG/DL — LOW (ref 2.5–4.5)
PHOSPHATE SERPL-MCNC: 2.1 MG/DL — LOW (ref 2.5–4.5)
PLATELET # BLD AUTO: 399 K/UL — SIGNIFICANT CHANGE UP (ref 150–400)
PLATELET # BLD AUTO: 399 K/UL — SIGNIFICANT CHANGE UP (ref 150–400)
POTASSIUM SERPL-MCNC: 3.7 MMOL/L — SIGNIFICANT CHANGE UP (ref 3.5–5.3)
POTASSIUM SERPL-MCNC: 3.7 MMOL/L — SIGNIFICANT CHANGE UP (ref 3.5–5.3)
POTASSIUM SERPL-SCNC: 3.7 MMOL/L — SIGNIFICANT CHANGE UP (ref 3.5–5.3)
POTASSIUM SERPL-SCNC: 3.7 MMOL/L — SIGNIFICANT CHANGE UP (ref 3.5–5.3)
PROT SERPL-MCNC: 6.8 G/DL — SIGNIFICANT CHANGE UP (ref 6–8.3)
PROT SERPL-MCNC: 6.8 G/DL — SIGNIFICANT CHANGE UP (ref 6–8.3)
RBC # BLD: 2.44 M/UL — LOW (ref 4.2–5.8)
RBC # BLD: 2.44 M/UL — LOW (ref 4.2–5.8)
RBC # FLD: 17 % — HIGH (ref 10.3–14.5)
RBC # FLD: 17 % — HIGH (ref 10.3–14.5)
SODIUM SERPL-SCNC: 143 MMOL/L — SIGNIFICANT CHANGE UP (ref 135–145)
SODIUM SERPL-SCNC: 143 MMOL/L — SIGNIFICANT CHANGE UP (ref 135–145)
WBC # BLD: 21.33 K/UL — HIGH (ref 3.8–10.5)
WBC # BLD: 21.33 K/UL — HIGH (ref 3.8–10.5)
WBC # FLD AUTO: 21.33 K/UL — HIGH (ref 3.8–10.5)
WBC # FLD AUTO: 21.33 K/UL — HIGH (ref 3.8–10.5)

## 2023-11-12 PROCEDURE — 99233 SBSQ HOSP IP/OBS HIGH 50: CPT

## 2023-11-12 RX ADMIN — HEPARIN SODIUM 1500 UNIT(S)/HR: 5000 INJECTION INTRAVENOUS; SUBCUTANEOUS at 05:55

## 2023-11-12 RX ADMIN — HEPARIN SODIUM 1600 UNIT(S)/HR: 5000 INJECTION INTRAVENOUS; SUBCUTANEOUS at 19:28

## 2023-11-12 RX ADMIN — HEPARIN SODIUM 1600 UNIT(S)/HR: 5000 INJECTION INTRAVENOUS; SUBCUTANEOUS at 20:22

## 2023-11-12 RX ADMIN — AMLODIPINE BESYLATE 5 MILLIGRAM(S): 2.5 TABLET ORAL at 05:53

## 2023-11-12 RX ADMIN — HEPARIN SODIUM 1600 UNIT(S)/HR: 5000 INJECTION INTRAVENOUS; SUBCUTANEOUS at 16:52

## 2023-11-12 RX ADMIN — HEPARIN SODIUM 1500 UNIT(S)/HR: 5000 INJECTION INTRAVENOUS; SUBCUTANEOUS at 08:42

## 2023-11-12 RX ADMIN — PANTOPRAZOLE SODIUM 40 MILLIGRAM(S): 20 TABLET, DELAYED RELEASE ORAL at 05:53

## 2023-11-12 RX ADMIN — HEPARIN SODIUM 2500 UNIT(S): 5000 INJECTION INTRAVENOUS; SUBCUTANEOUS at 12:50

## 2023-11-12 RX ADMIN — HEPARIN SODIUM 1600 UNIT(S)/HR: 5000 INJECTION INTRAVENOUS; SUBCUTANEOUS at 12:47

## 2023-11-12 RX ADMIN — HEPARIN SODIUM 1600 UNIT(S)/HR: 5000 INJECTION INTRAVENOUS; SUBCUTANEOUS at 20:26

## 2023-11-12 RX ADMIN — Medication 1 MILLIGRAM(S): at 11:33

## 2023-11-12 RX ADMIN — HEPARIN SODIUM 5500 UNIT(S): 5000 INJECTION INTRAVENOUS; SUBCUTANEOUS at 05:53

## 2023-11-12 RX ADMIN — TAMSULOSIN HYDROCHLORIDE 0.4 MILLIGRAM(S): 0.4 CAPSULE ORAL at 21:22

## 2023-11-12 RX ADMIN — Medication 81 MILLIGRAM(S): at 11:32

## 2023-11-12 NOTE — PROGRESS NOTE ADULT - SUBJECTIVE AND OBJECTIVE BOX
***************************************************************  Antoine Martinez, PGY1  Internal Medicine   TEAMS Preferred  ***************************************************************    ALVARO CEJA  68y Male  MRN: [id number]  11-11-23 (1d)    Patient is a 68y old  Male who presents with a chief complaint of     SUBJECTIVE / OVERNIGHT EVENTS:   No acute overnight events.   Patient seen and examined at bedside this AM.  Denies any CP, SOB, N/V, constipation, diarrhea, abdominal pain, dysuria, fever, chills, or headaches.     12 Point ROS negative with the exception of the above    MEDICATIONS  (STANDING):  amLODIPine   Tablet 5 milliGRAM(s) Oral daily  aspirin enteric coated 81 milliGRAM(s) Oral daily  folic acid 1 milliGRAM(s) Oral daily  heparin  Infusion.  Unit(s)/Hr (12 mL/Hr) IV Continuous <Continuous>  pantoprazole    Tablet 40 milliGRAM(s) Oral before breakfast  tamsulosin 0.4 milliGRAM(s) Oral at bedtime    MEDICATIONS  (PRN):  heparin   Injectable 5500 Unit(s) IV Push every 6 hours PRN For aPTT less than 40  heparin   Injectable 2500 Unit(s) IV Push every 6 hours PRN For aPTT between 40 - 57      OBJECTIVE:  Vital Signs Last 24 Hrs  T(C): 36.5 (12 Nov 2023 06:25), Max: 37.6 (11 Nov 2023 12:26)  T(F): 97.7 (12 Nov 2023 06:25), Max: 99.6 (11 Nov 2023 12:26)  HR: 95 (12 Nov 2023 06:25) (91 - 98)  BP: 118/68 (12 Nov 2023 06:25) (118/68 - 140/79)  BP(mean): --  RR: 18 (12 Nov 2023 06:25) (15 - 18)  SpO2: 97% (12 Nov 2023 06:25) (97% - 100%)    Parameters below as of 12 Nov 2023 06:25  Patient On (Oxygen Delivery Method): room air        I&O's Summary      PHYSICAL EXAM:  GENERAL: Laying comfortably, NAD  HEENT: NCAT, PERRLA, EOMI, no scleral icterus, no LAD  NECK: No JVD, supple  LUNG: CTABL; No wheezes, crackles, or rhonchi  HEART: RRR; normal S1/S2; No murmurs, rubs, or gallops  ABDOMEN: +BS, soft, nontender, nondistended, no HSM; No rebound, guarding, or rigidity  EXTREMITIES:  No LE edema b/l, 2+ Peripheral Pulses, No clubbing or cyanosis  NEUROLOGY: AOx3, non-focal, strength 5/5 in all extremities, sensation intact  PSYCH: calm and cooperative  SKIN: No rashes or lesions    LABS:                        7.3    21.33 )-----------( 399      ( 12 Nov 2023 04:43 )             23.1     Auto Eosinophil # 0.00  / Auto Eosinophil % 0.0   / Auto Neutrophil # 17.61 / Auto Neutrophil % 82.6  / BANDS % x                            8.0    24.48 )-----------( 412      ( 11 Nov 2023 07:02 )             24.9     Auto Eosinophil # 0.00  / Auto Eosinophil % 0.0   / Auto Neutrophil # 22.57 / Auto Neutrophil % 91.3  / BANDS % 0.9      11-12    143  |  107  |  19  ----------------------------<  103<H>  3.7   |  23  |  1.27  11-11    145  |  105  |  26<H>  ----------------------------<  111<H>  3.1<L>   |  24  |  1.46<H>    Ca    8.4      12 Nov 2023 04:43  Ca    8.8      11 Nov 2023 07:02  Phos  2.1     11-12  Mg     2.10     11-12    TPro  6.8  /  Alb  2.4<L>  /  TBili  2.1<H>  /  DBili  x   /  AST  39  /  ALT  33  /  AlkPhos  121<H>  11-12  TPro  7.4  /  Alb  2.8<L>  /  TBili  2.0<H>  /  DBili  x   /  AST  46<H>  /  ALT  33  /  AlkPhos  128<H>  11-11    PTT - ( 12 Nov 2023 04:43 )  PTT:37.1 sec      Urinalysis Basic - ( 12 Nov 2023 04:43 )    Color: x / Appearance: x / SG: x / pH: x  Gluc: 103 mg/dL / Ketone: x  / Bili: x / Urobili: x   Blood: x / Protein: x / Nitrite: x   Leuk Esterase: x / RBC: x / WBC x   Sq Epi: x / Non Sq Epi: x / Bacteria: x          CAPILLARY BLOOD GLUCOSE            RADIOLOGY & ADDITIONAL TESTS:     ***************************************************************  Antoine Martinez, PGY1  Internal Medicine   TEAMS Preferred  ***************************************************************    ALVARO CEJA  68y Male  MRN: [id number]  11-11-23 (1d)    Patient is a 68y old  Male who presents with a chief complaint of     SUBJECTIVE / OVERNIGHT EVENTS:   No acute overnight events.   Patient seen and examined at bedside this AM. Doing well. No nausea or vomiting. HAd BM  Denies any CP, SOB, N/V, constipation, diarrhea, abdominal pain, dysuria, fever, chills, or headaches.     12 Point ROS negative with the exception of the above    MEDICATIONS  (STANDING):  amLODIPine   Tablet 5 milliGRAM(s) Oral daily  aspirin enteric coated 81 milliGRAM(s) Oral daily  folic acid 1 milliGRAM(s) Oral daily  heparin  Infusion.  Unit(s)/Hr (12 mL/Hr) IV Continuous <Continuous>  pantoprazole    Tablet 40 milliGRAM(s) Oral before breakfast  tamsulosin 0.4 milliGRAM(s) Oral at bedtime    MEDICATIONS  (PRN):  heparin   Injectable 5500 Unit(s) IV Push every 6 hours PRN For aPTT less than 40  heparin   Injectable 2500 Unit(s) IV Push every 6 hours PRN For aPTT between 40 - 57      OBJECTIVE:  Vital Signs Last 24 Hrs  T(C): 36.5 (12 Nov 2023 06:25), Max: 37.6 (11 Nov 2023 12:26)  T(F): 97.7 (12 Nov 2023 06:25), Max: 99.6 (11 Nov 2023 12:26)  HR: 95 (12 Nov 2023 06:25) (91 - 98)  BP: 118/68 (12 Nov 2023 06:25) (118/68 - 140/79)  BP(mean): --  RR: 18 (12 Nov 2023 06:25) (15 - 18)  SpO2: 97% (12 Nov 2023 06:25) (97% - 100%)    Parameters below as of 12 Nov 2023 06:25  Patient On (Oxygen Delivery Method): room air        I&O's Summary      PHYSICAL EXAM:  GENERAL: Laying comfortably, NAD  HEENT: NCAT, PERRLA, EOMI, no scleral icterus, no LAD  LUNG: CTABL; No wheezes, crackles, or rhonchi  HEART: RRR; normal S1/S2; No murmurs, rubs, or gallops  ABDOMEN: +BS, soft, nontender, nondistended, no HSM; No rebound, guarding, or rigidity  EXTREMITIES:  No LE edema b/l, 2+ Peripheral Pulses, No clubbing or cyanosis  NEUROLOGY: AOx3, non-focal    LABS:                        7.3    21.33 )-----------( 399      ( 12 Nov 2023 04:43 )             23.1     Auto Eosinophil # 0.00  / Auto Eosinophil % 0.0   / Auto Neutrophil # 17.61 / Auto Neutrophil % 82.6  / BANDS % x                            8.0    24.48 )-----------( 412      ( 11 Nov 2023 07:02 )             24.9     Auto Eosinophil # 0.00  / Auto Eosinophil % 0.0   / Auto Neutrophil # 22.57 / Auto Neutrophil % 91.3  / BANDS % 0.9      11-12    143  |  107  |  19  ----------------------------<  103<H>  3.7   |  23  |  1.27  11-11    145  |  105  |  26<H>  ----------------------------<  111<H>  3.1<L>   |  24  |  1.46<H>    Ca    8.4      12 Nov 2023 04:43  Ca    8.8      11 Nov 2023 07:02  Phos  2.1     11-12  Mg     2.10     11-12    TPro  6.8  /  Alb  2.4<L>  /  TBili  2.1<H>  /  DBili  x   /  AST  39  /  ALT  33  /  AlkPhos  121<H>  11-12  TPro  7.4  /  Alb  2.8<L>  /  TBili  2.0<H>  /  DBili  x   /  AST  46<H>  /  ALT  33  /  AlkPhos  128<H>  11-11    PTT - ( 12 Nov 2023 04:43 )  PTT:37.1 sec      Urinalysis Basic - ( 12 Nov 2023 04:43 )    Color: x / Appearance: x / SG: x / pH: x  Gluc: 103 mg/dL / Ketone: x  / Bili: x / Urobili: x   Blood: x / Protein: x / Nitrite: x   Leuk Esterase: x / RBC: x / WBC x   Sq Epi: x / Non Sq Epi: x / Bacteria: x          CAPILLARY BLOOD GLUCOSE            RADIOLOGY & ADDITIONAL TESTS:  CT CHest  IMPRESSION: No evidence of intrathoracic metastatic disease.    Right mid to lower lung areas of subsegmental linear and dependent   atelectasis.    For complete evaluation of the abdominal organs, please refer to   dedicated abdominal CT performed earlier on the same day. Persistent mild   delayed renal parenchymal enhancement on this noncontrast CT suggestive   of renal dysfunction or acute tubular necrosis. Correlation with   creatinine levels is recommended.

## 2023-11-12 NOTE — PROGRESS NOTE ADULT - ATTENDING COMMENTS
69 y/o M with pmhx of  HTN, BPH, prostate cancer s/p prostatectomy 2012, diverticulosis presenting with 5 weeks of generalized weakness, Patient also endorsing 25 lb unintentional weight loss over the same time period. Also endorse some episodes of chills. Patient was seen by PMD and given abx for both suspected UTI and malaria, which he stats improved his chills but his weakness and fatigue remained. He also endorse  decreased appetite and tried to force himself to eat which resulted in NBNB emesis which prompted his daughter to bring him to the hospital.     IN ED, VSS. CT A/P notable for new right hepatic liver mass concerning for malignancy as well as filling defect in hepatic veins concerning  for thrombus. Patient seen and examined at bedside with two daughters. Continues to report poor appetite. Denies alcohol or smoking or hepatitis hx. Exam overall  unremarkable.     #New Liver Mass  - new 3.8 x 3.1 cm lobulate right hepatic mass concerning for malignancy  - CT chest: no intrathoracic mets  - pending MRCP results will either need GI or IR for biopsy       #Hepatic Vein Thrombus   - Seen on CT imaging and confirmed on U/D abdomen, likely in setting of possible malignancy    - given elevated Cr, will opt for heparin gtt for full AC     #Poor appetite  -added ensure  -Nutrition consult    Rest of plan as stated above. Discussed with HS 5.

## 2023-11-12 NOTE — PROGRESS NOTE ADULT - PROBLEM SELECTOR PLAN 2
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    Plan  - full AC CT c/f portal vein thrombus pending official read  Duplex US Abdomen occlusive thrombosis of the main and left portal veins with avascular thrombus.    Plan  - full AC  - continue to monitor

## 2023-11-12 NOTE — PROGRESS NOTE ADULT - PROBLEM SELECTOR PLAN 1
Recent hx weight loss, fatigue and poor PO intake  CT evidence of new R hepatic lesion with possible portal vein thrombus, labs c/f biliary obstruction with increased Tbili and LFTs, leukocytosis  Hx of prostate cancer s/p prostatectomy, does not follow with Urology or Onc  Recent hx of ? malaria although parasite smear negative     Plan    - CT chest  - f/u MRCP w/wo IV contrast  - will reach out IR for possible liver biopsy  - continue to monitor for now Recent hx weight loss, fatigue and poor PO intake  CT evidence of new R hepatic lesion with possible portal vein thrombus, labs c/f biliary obstruction with increased Tbili and LFTs, leukocytosis  Hx of prostate cancer s/p prostatectomy, does not follow with Urology or Onc  Recent hx of ? malaria although parasite smear negative   CT chest without evidence of malignancy or metastasis    Plan  - f/u MRCP w/wo IV contrast  - will reach out IR for possible liver biopsy  - continue to monitor for now

## 2023-11-12 NOTE — PROGRESS NOTE ADULT - PROBLEM SELECTOR PLAN 6
F: IVF  E: replete PRN  N: Regular  A: OOBAT    DVT ppx: full AC  GI ppx: Protonix 40mg qD    Code status: Full code  Dispo: active

## 2023-11-13 LAB
ANION GAP SERPL CALC-SCNC: 14 MMOL/L — SIGNIFICANT CHANGE UP (ref 7–14)
ANION GAP SERPL CALC-SCNC: 14 MMOL/L — SIGNIFICANT CHANGE UP (ref 7–14)
APTT BLD: 71.6 SEC — HIGH (ref 24.5–35.6)
APTT BLD: 71.6 SEC — HIGH (ref 24.5–35.6)
BILIRUB SERPL-MCNC: 3.1 MG/DL — HIGH (ref 0.2–1.2)
BILIRUB SERPL-MCNC: 3.1 MG/DL — HIGH (ref 0.2–1.2)
BUN SERPL-MCNC: 12 MG/DL — SIGNIFICANT CHANGE UP (ref 7–23)
BUN SERPL-MCNC: 12 MG/DL — SIGNIFICANT CHANGE UP (ref 7–23)
CALCIUM SERPL-MCNC: 8 MG/DL — LOW (ref 8.4–10.5)
CALCIUM SERPL-MCNC: 8 MG/DL — LOW (ref 8.4–10.5)
CEA SERPL-MCNC: 6.3 NG/ML — HIGH (ref 1–3.8)
CEA SERPL-MCNC: 6.3 NG/ML — HIGH (ref 1–3.8)
CHLORIDE SERPL-SCNC: 105 MMOL/L — SIGNIFICANT CHANGE UP (ref 98–107)
CHLORIDE SERPL-SCNC: 105 MMOL/L — SIGNIFICANT CHANGE UP (ref 98–107)
CO2 SERPL-SCNC: 22 MMOL/L — SIGNIFICANT CHANGE UP (ref 22–31)
CO2 SERPL-SCNC: 22 MMOL/L — SIGNIFICANT CHANGE UP (ref 22–31)
CREAT SERPL-MCNC: 1.16 MG/DL — SIGNIFICANT CHANGE UP (ref 0.5–1.3)
CREAT SERPL-MCNC: 1.16 MG/DL — SIGNIFICANT CHANGE UP (ref 0.5–1.3)
EGFR: 68 ML/MIN/1.73M2 — SIGNIFICANT CHANGE UP
EGFR: 68 ML/MIN/1.73M2 — SIGNIFICANT CHANGE UP
FERRITIN SERPL-MCNC: 1415 NG/ML — HIGH (ref 30–400)
FERRITIN SERPL-MCNC: 1415 NG/ML — HIGH (ref 30–400)
GLUCOSE SERPL-MCNC: 94 MG/DL — SIGNIFICANT CHANGE UP (ref 70–99)
GLUCOSE SERPL-MCNC: 94 MG/DL — SIGNIFICANT CHANGE UP (ref 70–99)
HCT VFR BLD CALC: 22.4 % — LOW (ref 39–50)
HCT VFR BLD CALC: 22.4 % — LOW (ref 39–50)
HGB BLD-MCNC: 7.2 G/DL — LOW (ref 13–17)
HGB BLD-MCNC: 7.2 G/DL — LOW (ref 13–17)
INR BLD: 1.49 RATIO — HIGH (ref 0.85–1.18)
INR BLD: 1.49 RATIO — HIGH (ref 0.85–1.18)
IRON SATN MFR SERPL: 14 % — SIGNIFICANT CHANGE UP (ref 14–50)
IRON SATN MFR SERPL: 14 % — SIGNIFICANT CHANGE UP (ref 14–50)
IRON SATN MFR SERPL: 17 UG/DL — LOW (ref 45–165)
IRON SATN MFR SERPL: 17 UG/DL — LOW (ref 45–165)
MAGNESIUM SERPL-MCNC: 2 MG/DL — SIGNIFICANT CHANGE UP (ref 1.6–2.6)
MAGNESIUM SERPL-MCNC: 2 MG/DL — SIGNIFICANT CHANGE UP (ref 1.6–2.6)
MCHC RBC-ENTMCNC: 29.5 PG — SIGNIFICANT CHANGE UP (ref 27–34)
MCHC RBC-ENTMCNC: 29.5 PG — SIGNIFICANT CHANGE UP (ref 27–34)
MCHC RBC-ENTMCNC: 32.1 GM/DL — SIGNIFICANT CHANGE UP (ref 32–36)
MCHC RBC-ENTMCNC: 32.1 GM/DL — SIGNIFICANT CHANGE UP (ref 32–36)
MCV RBC AUTO: 91.8 FL — SIGNIFICANT CHANGE UP (ref 80–100)
MCV RBC AUTO: 91.8 FL — SIGNIFICANT CHANGE UP (ref 80–100)
MELD SCORE WITH DIALYSIS: 28 POINTS — SIGNIFICANT CHANGE UP
MELD SCORE WITH DIALYSIS: 28 POINTS — SIGNIFICANT CHANGE UP
MELD SCORE WITHOUT DIALYSIS: 17 POINTS — SIGNIFICANT CHANGE UP
MELD SCORE WITHOUT DIALYSIS: 17 POINTS — SIGNIFICANT CHANGE UP
NRBC # BLD: 0 /100 WBCS — SIGNIFICANT CHANGE UP (ref 0–0)
NRBC # BLD: 0 /100 WBCS — SIGNIFICANT CHANGE UP (ref 0–0)
NRBC # FLD: 0.04 K/UL — HIGH (ref 0–0)
NRBC # FLD: 0.04 K/UL — HIGH (ref 0–0)
PHOSPHATE SERPL-MCNC: 2.7 MG/DL — SIGNIFICANT CHANGE UP (ref 2.5–4.5)
PHOSPHATE SERPL-MCNC: 2.7 MG/DL — SIGNIFICANT CHANGE UP (ref 2.5–4.5)
PLATELET # BLD AUTO: 379 K/UL — SIGNIFICANT CHANGE UP (ref 150–400)
PLATELET # BLD AUTO: 379 K/UL — SIGNIFICANT CHANGE UP (ref 150–400)
POTASSIUM SERPL-MCNC: 3.4 MMOL/L — LOW (ref 3.5–5.3)
POTASSIUM SERPL-MCNC: 3.4 MMOL/L — LOW (ref 3.5–5.3)
POTASSIUM SERPL-SCNC: 3.4 MMOL/L — LOW (ref 3.5–5.3)
POTASSIUM SERPL-SCNC: 3.4 MMOL/L — LOW (ref 3.5–5.3)
PROTHROM AB SERPL-ACNC: 16.6 SEC — HIGH (ref 9.5–13)
PROTHROM AB SERPL-ACNC: 16.6 SEC — HIGH (ref 9.5–13)
RBC # BLD: 2.44 M/UL — LOW (ref 4.2–5.8)
RBC # BLD: 2.44 M/UL — LOW (ref 4.2–5.8)
RBC # FLD: 17.4 % — HIGH (ref 10.3–14.5)
RBC # FLD: 17.4 % — HIGH (ref 10.3–14.5)
SODIUM SERPL-SCNC: 141 MMOL/L — SIGNIFICANT CHANGE UP (ref 135–145)
SODIUM SERPL-SCNC: 141 MMOL/L — SIGNIFICANT CHANGE UP (ref 135–145)
TIBC SERPL-MCNC: 125 UG/DL — LOW (ref 220–430)
TIBC SERPL-MCNC: 125 UG/DL — LOW (ref 220–430)
UIBC SERPL-MCNC: 108 UG/DL — LOW (ref 110–370)
UIBC SERPL-MCNC: 108 UG/DL — LOW (ref 110–370)
WBC # BLD: 23.74 K/UL — HIGH (ref 3.8–10.5)
WBC # BLD: 23.74 K/UL — HIGH (ref 3.8–10.5)
WBC # FLD AUTO: 23.74 K/UL — HIGH (ref 3.8–10.5)
WBC # FLD AUTO: 23.74 K/UL — HIGH (ref 3.8–10.5)

## 2023-11-13 PROCEDURE — 99233 SBSQ HOSP IP/OBS HIGH 50: CPT | Mod: GC

## 2023-11-13 RX ORDER — PIPERACILLIN AND TAZOBACTAM 4; .5 G/20ML; G/20ML
3.38 INJECTION, POWDER, LYOPHILIZED, FOR SOLUTION INTRAVENOUS ONCE
Refills: 0 | Status: COMPLETED | OUTPATIENT
Start: 2023-11-14 | End: 2023-11-14

## 2023-11-13 RX ORDER — POTASSIUM CHLORIDE 20 MEQ
10 PACKET (EA) ORAL
Refills: 0 | Status: COMPLETED | OUTPATIENT
Start: 2023-11-13 | End: 2023-11-13

## 2023-11-13 RX ORDER — ENOXAPARIN SODIUM 100 MG/ML
70 INJECTION SUBCUTANEOUS EVERY 12 HOURS
Refills: 0 | Status: DISCONTINUED | OUTPATIENT
Start: 2023-11-13 | End: 2023-11-15

## 2023-11-13 RX ORDER — PIPERACILLIN AND TAZOBACTAM 4; .5 G/20ML; G/20ML
3.38 INJECTION, POWDER, LYOPHILIZED, FOR SOLUTION INTRAVENOUS EVERY 8 HOURS
Refills: 0 | Status: COMPLETED | OUTPATIENT
Start: 2023-11-14 | End: 2023-11-21

## 2023-11-13 RX ORDER — VANCOMYCIN HCL 1 G
1000 VIAL (EA) INTRAVENOUS EVERY 12 HOURS
Refills: 0 | Status: DISCONTINUED | OUTPATIENT
Start: 2023-11-13 | End: 2023-11-14

## 2023-11-13 RX ORDER — PIPERACILLIN AND TAZOBACTAM 4; .5 G/20ML; G/20ML
3.38 INJECTION, POWDER, LYOPHILIZED, FOR SOLUTION INTRAVENOUS ONCE
Refills: 0 | Status: COMPLETED | OUTPATIENT
Start: 2023-11-13 | End: 2023-11-13

## 2023-11-13 RX ADMIN — AMLODIPINE BESYLATE 5 MILLIGRAM(S): 2.5 TABLET ORAL at 05:05

## 2023-11-13 RX ADMIN — HEPARIN SODIUM 1600 UNIT(S)/HR: 5000 INJECTION INTRAVENOUS; SUBCUTANEOUS at 09:14

## 2023-11-13 RX ADMIN — Medication 100 MILLIEQUIVALENT(S): at 13:14

## 2023-11-13 RX ADMIN — PANTOPRAZOLE SODIUM 40 MILLIGRAM(S): 20 TABLET, DELAYED RELEASE ORAL at 05:05

## 2023-11-13 RX ADMIN — Medication 100 MILLIEQUIVALENT(S): at 09:13

## 2023-11-13 RX ADMIN — ENOXAPARIN SODIUM 70 MILLIGRAM(S): 100 INJECTION SUBCUTANEOUS at 18:02

## 2023-11-13 RX ADMIN — Medication 100 MILLIEQUIVALENT(S): at 11:06

## 2023-11-13 RX ADMIN — Medication 1 MILLIGRAM(S): at 11:07

## 2023-11-13 RX ADMIN — HEPARIN SODIUM 1600 UNIT(S)/HR: 5000 INJECTION INTRAVENOUS; SUBCUTANEOUS at 02:32

## 2023-11-13 RX ADMIN — Medication 81 MILLIGRAM(S): at 11:06

## 2023-11-13 RX ADMIN — PIPERACILLIN AND TAZOBACTAM 200 GRAM(S): 4; .5 INJECTION, POWDER, LYOPHILIZED, FOR SOLUTION INTRAVENOUS at 23:22

## 2023-11-13 RX ADMIN — HEPARIN SODIUM 1600 UNIT(S)/HR: 5000 INJECTION INTRAVENOUS; SUBCUTANEOUS at 08:15

## 2023-11-13 RX ADMIN — TAMSULOSIN HYDROCHLORIDE 0.4 MILLIGRAM(S): 0.4 CAPSULE ORAL at 22:23

## 2023-11-13 NOTE — DIETITIAN INITIAL EVALUATION ADULT - NSICDXPASTMEDICALHX_GEN_ALL_CORE_FT
PAST MEDICAL HISTORY:  BPH (benign prostatic hypertrophy)     HTN (hypertension) on med x 3 yrs    Prostate cancer

## 2023-11-13 NOTE — DIETITIAN NUTRITION RISK NOTIFICATION - TREATMENT: THE FOLLOWING DIET HAS BEEN RECOMMENDED
Diet, Regular:   DASH/TLC {Sodium & Cholesterol Restricted} (DASH)  Supplement Feeding Modality:  Oral  Ensure Enlive Cans or Servings Per Day:  2       Frequency:  Daily (11-12-23 @ 18:37) [Active]

## 2023-11-13 NOTE — PROGRESS NOTE ADULT - PROBLEM SELECTOR PLAN 2
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    Plan  - full AC  - continue to monitor CT c/f portal vein thrombus pending official read  Duplex US Abdomen occlusive thrombosis of the main and left portal veins with avascular thrombus.    Plan  - full AC with Lovenox  - continue to monitor

## 2023-11-13 NOTE — DIETITIAN INITIAL EVALUATION ADULT - ADD RECOMMEND
1. Encourage & assist Pt with meals; Monitor PO diet tolerance;   2. Honor food preferences; Add appetite stimulant to boost Pt's PO intake/appetite;   3. Add Multivitamins 1 tab daily for micronutrient coverage;   4. Monitor labs, hydration status;

## 2023-11-13 NOTE — PROGRESS NOTE ADULT - ATTENDING COMMENTS
69 yo M PMH HTN, BPH, prostate cancer s/p prostatectomy 2012, diverticulosis presenting with 5 weeks of generalized weakness, 25 lb weight loss, and poor PO intake and subjective chills. + travel to [place] returned in 2021. asymptomatic until 5 weeks ago. + treated for malaria.     Hepatic lesion- wt loss and decreased PO intake concern for malignancy                       - Ct R heaptic lesion                        - check CEA; CA 19-9; AFP                       -  MRCP   Portal vein thrombus- CT expansile filling defect in the right portal, left portal and main hepatic veins, which may represent tumor thrombus versus bland thrombus. US doppler occlusive thrombus main and Lt portal veins with avascular thrombus.                         - change heparin to lovenox   Leukocytosis - hx of poss treatement for malaria. no fever documented here. Malaria screen neg. will need to d/w ID if low suspicion for malaria. Bcx in lab   elevated Cr- poss poor PO intake                   - now improved  s/p fluids                    - monitor Cr               Anemia- no overt GIB; check Fe studies

## 2023-11-13 NOTE — DIETITIAN INITIAL EVALUATION ADULT - PERTINENT LABORATORY DATA
11-13    141  |  105  |  12  ----------------------------<  94  3.4<L>   |  22  |  1.16    Ca    8.0<L>      13 Nov 2023 06:09  Phos  2.7     11-13  Mg     2.00     11-13    TPro  x   /  Alb  x   /  TBili  3.1<H>  /  DBili  x   /  AST  x   /  ALT  x   /  AlkPhos  x   11-13

## 2023-11-13 NOTE — DIETITIAN INITIAL EVALUATION ADULT - NS FNS DIET ORDER
Regular: DASH/TLC {Sodium & Cholesterol Restricted}   Supplement Feeding Modality:  Oral  Ensure Enlive Cans or Servings Per Day:  2       Frequency:  Daily (11-12-23 @ 18:37)

## 2023-11-13 NOTE — PROGRESS NOTE ADULT - PROBLEM SELECTOR PLAN 3
WBC 25K no fever at this time  Previously treated for ? malaria  Likely 2/2 to malignancy as no clear evidence of infection at this time    Plan  - monitor off abx at this time  - if fever spike will culture and start abx WBC 25K no fever at this time  Previously treated for ? malaria  Likely 2/2 to malignancy as no clear evidence of infection at this time    Plan  - f/u BCx  - monitor off abx at this time  - if fever spike will culture and start abx

## 2023-11-13 NOTE — PROGRESS NOTE ADULT - SUBJECTIVE AND OBJECTIVE BOX
***************************************************************  Antoine Martinez, PGY1  Internal Medicine   TEAMS Preferred  ***************************************************************    ALVARO CEJA  68y Male  MRN: [id number]  11-11-23 (2d)    Patient is a 68y old  Male who presents with a chief complaint of     SUBJECTIVE / OVERNIGHT EVENTS:   No acute overnight events.   Patient seen and examined at bedside this AM.  TELEMETRY/EKG  Denies any CP, SOB, N/V, constipation, diarrhea, abdominal pain, dysuria, fever, chills, or headaches.     12 Point ROS negative with the exception of the above    MEDICATIONS  (STANDING):  amLODIPine   Tablet 5 milliGRAM(s) Oral daily  aspirin enteric coated 81 milliGRAM(s) Oral daily  folic acid 1 milliGRAM(s) Oral daily  heparin  Infusion.  Unit(s)/Hr (12 mL/Hr) IV Continuous <Continuous>  pantoprazole    Tablet 40 milliGRAM(s) Oral before breakfast  tamsulosin 0.4 milliGRAM(s) Oral at bedtime    MEDICATIONS  (PRN):  heparin   Injectable 5500 Unit(s) IV Push every 6 hours PRN For aPTT less than 40  heparin   Injectable 2500 Unit(s) IV Push every 6 hours PRN For aPTT between 40 - 57      OBJECTIVE:  Vital Signs Last 24 Hrs  T(C): 37.5 (12 Nov 2023 20:43), Max: 37.5 (12 Nov 2023 20:43)  T(F): 99.5 (12 Nov 2023 20:43), Max: 99.5 (12 Nov 2023 20:43)  HR: 94 (12 Nov 2023 20:43) (94 - 101)  BP: 115/62 (12 Nov 2023 20:43) (115/62 - 125/65)  BP(mean): --  RR: 18 (12 Nov 2023 20:43) (18 - 18)  SpO2: 95% (12 Nov 2023 20:43) (95% - 96%)    Parameters below as of 12 Nov 2023 20:43  Patient On (Oxygen Delivery Method): room air        I&O's Summary    12 Nov 2023 07:01  -  13 Nov 2023 07:00  --------------------------------------------------------  IN: 155 mL / OUT: 0 mL / NET: 155 mL        PHYSICAL EXAM:  GENERAL: Laying comfortably, NAD  HEENT: NCAT, PERRLA, EOMI, no scleral icterus, no LAD  NECK: No JVD, supple  LUNG: CTABL; No wheezes, crackles, or rhonchi  HEART: RRR; normal S1/S2; No murmurs, rubs, or gallops  ABDOMEN: +BS, soft, nontender, nondistended, no HSM; No rebound, guarding, or rigidity  EXTREMITIES:  No LE edema b/l, 2+ Peripheral Pulses, No clubbing or cyanosis  NEUROLOGY: AOx3, non-focal, strength 5/5 in all extremities, sensation intact  PSYCH: calm and cooperative  SKIN: No rashes or lesions    LABS:                        7.2    23.74 )-----------( 379      ( 13 Nov 2023 06:09 )             22.4     Auto Eosinophil # x     / Auto Eosinophil % x     / Auto Neutrophil # x     / Auto Neutrophil % x     / BANDS % x                            7.3    21.33 )-----------( 399      ( 12 Nov 2023 04:43 )             23.1     Auto Eosinophil # 0.00  / Auto Eosinophil % 0.0   / Auto Neutrophil # 17.61 / Auto Neutrophil % 82.6  / BANDS % x        11-12    143  |  107  |  19  ----------------------------<  103<H>  3.7   |  23  |  1.27  11-11    145  |  105  |  26<H>  ----------------------------<  111<H>  3.1<L>   |  24  |  1.46<H>    Ca    8.4      12 Nov 2023 04:43  Ca    8.8      11 Nov 2023 07:02  Phos  2.1     11-12  Mg     2.10     11-12    TPro  6.8  /  Alb  2.4<L>  /  TBili  2.1<H>  /  DBili  x   /  AST  39  /  ALT  33  /  AlkPhos  121<H>  11-12  TPro  7.4  /  Alb  2.8<L>  /  TBili  2.0<H>  /  DBili  x   /  AST  46<H>  /  ALT  33  /  AlkPhos  128<H>  11-11    PT/INR - ( 13 Nov 2023 06:09 )   PT: 16.6 sec;   INR: 1.49 ratio         PTT - ( 13 Nov 2023 01:35 )  PTT:71.6 sec      Urinalysis Basic - ( 12 Nov 2023 04:43 )    Color: x / Appearance: x / SG: x / pH: x  Gluc: 103 mg/dL / Ketone: x  / Bili: x / Urobili: x   Blood: x / Protein: x / Nitrite: x   Leuk Esterase: x / RBC: x / WBC x   Sq Epi: x / Non Sq Epi: x / Bacteria: x          CAPILLARY BLOOD GLUCOSE            RADIOLOGY & ADDITIONAL TESTS:     ***************************************************************  Antoine Martinez, PGY1  Internal Medicine   TEAMS Preferred  ***************************************************************    ALVARO CEJA  68y Male  MRN: [id number]  11-11-23 (2d)    Patient is a 68y old  Male who presents with a chief complaint of     SUBJECTIVE / OVERNIGHT EVENTS:   No acute overnight events.   Patient seen and examined at bedside this AM. Mentioned a "Hicup" sensation after coughing. Does not get worse with food. Has poor appetite  Denies any CP, SOB, N/V, constipation, diarrhea, abdominal pain, dysuria, fever, chills, or headaches.     12 Point ROS negative with the exception of the above    MEDICATIONS  (STANDING):  amLODIPine   Tablet 5 milliGRAM(s) Oral daily  aspirin enteric coated 81 milliGRAM(s) Oral daily  folic acid 1 milliGRAM(s) Oral daily  heparin  Infusion.  Unit(s)/Hr (12 mL/Hr) IV Continuous <Continuous>  pantoprazole    Tablet 40 milliGRAM(s) Oral before breakfast  tamsulosin 0.4 milliGRAM(s) Oral at bedtime    MEDICATIONS  (PRN):  heparin   Injectable 5500 Unit(s) IV Push every 6 hours PRN For aPTT less than 40  heparin   Injectable 2500 Unit(s) IV Push every 6 hours PRN For aPTT between 40 - 57      OBJECTIVE:  Vital Signs Last 24 Hrs  T(C): 37.5 (12 Nov 2023 20:43), Max: 37.5 (12 Nov 2023 20:43)  T(F): 99.5 (12 Nov 2023 20:43), Max: 99.5 (12 Nov 2023 20:43)  HR: 94 (12 Nov 2023 20:43) (94 - 101)  BP: 115/62 (12 Nov 2023 20:43) (115/62 - 125/65)  BP(mean): --  RR: 18 (12 Nov 2023 20:43) (18 - 18)  SpO2: 95% (12 Nov 2023 20:43) (95% - 96%)    Parameters below as of 12 Nov 2023 20:43  Patient On (Oxygen Delivery Method): room air        I&O's Summary    12 Nov 2023 07:01  -  13 Nov 2023 07:00  --------------------------------------------------------  IN: 155 mL / OUT: 0 mL / NET: 155 mL        PHYSICAL EXAM:  GENERAL: Laying comfortably, NAD  HEENT: NCAT, PERRLA, EOMI, no scleral icterus, no LAD  LUNG: CTABL; No wheezes, crackles, or rhonchi  HEART: RRR; normal S1/S2; No murmurs, rubs, or gallops  ABDOMEN: +BS, soft, nontender, nondistended, no HSM; No rebound, guarding, or rigidity  EXTREMITIES:  No LE edema b/l, 2+ Peripheral Pulses, No clubbing or cyanosis  NEUROLOGY: AOx3, non-focal    LABS:                        7.2    23.74 )-----------( 379      ( 13 Nov 2023 06:09 )             22.4     Auto Eosinophil # x     / Auto Eosinophil % x     / Auto Neutrophil # x     / Auto Neutrophil % x     / BANDS % x                            7.3    21.33 )-----------( 399      ( 12 Nov 2023 04:43 )             23.1     Auto Eosinophil # 0.00  / Auto Eosinophil % 0.0   / Auto Neutrophil # 17.61 / Auto Neutrophil % 82.6  / BANDS % x        11-12    143  |  107  |  19  ----------------------------<  103<H>  3.7   |  23  |  1.27  11-11    145  |  105  |  26<H>  ----------------------------<  111<H>  3.1<L>   |  24  |  1.46<H>    Ca    8.4      12 Nov 2023 04:43  Ca    8.8      11 Nov 2023 07:02  Phos  2.1     11-12  Mg     2.10     11-12    TPro  6.8  /  Alb  2.4<L>  /  TBili  2.1<H>  /  DBili  x   /  AST  39  /  ALT  33  /  AlkPhos  121<H>  11-12  TPro  7.4  /  Alb  2.8<L>  /  TBili  2.0<H>  /  DBili  x   /  AST  46<H>  /  ALT  33  /  AlkPhos  128<H>  11-11    PT/INR - ( 13 Nov 2023 06:09 )   PT: 16.6 sec;   INR: 1.49 ratio         PTT - ( 13 Nov 2023 01:35 )  PTT:71.6 sec      Urinalysis Basic - ( 12 Nov 2023 04:43 )    Color: x / Appearance: x / SG: x / pH: x  Gluc: 103 mg/dL / Ketone: x  / Bili: x / Urobili: x   Blood: x / Protein: x / Nitrite: x   Leuk Esterase: x / RBC: x / WBC x   Sq Epi: x / Non Sq Epi: x / Bacteria: x          CAPILLARY BLOOD GLUCOSE            RADIOLOGY & ADDITIONAL TESTS:

## 2023-11-13 NOTE — DIETITIAN INITIAL EVALUATION ADULT - OTHER INFO
Pt 69 yo male with PMHx of HTN, BPH, prostate cancer s/p prostatectomy 2012, diverticulosis presented with generalized weakness, weight loss, poor PO intake - per chart review. Of note, Pt recently got treated for suspected malaria; Pt found to have evidence of new right with suspected portal venous thrombosis c/f malignancy  68M PMH HTN, BPH, prostate cancer s/p prostatectomy 2012, diverticulosis presenting with 5 weeks of generalized weakness, 25 lb weight loss, and poor PO intake - per chart review. Of note, Pt recently got treated for suspected malaria; Pt with right hepatic lesion with suspected portal venous thrombosis c/f malignancy.     At time of visit, Pt awake, alert appears weak. Per Pt, his appetite not well for past 4-5 weeks and lost weight ~20-21# during that time frame. Of note, Pt's diet rx includes Ensure - 2x daily. RD encouraged Pt to drink PO supplements Ensure, as ordered. Pt C/O chewing difficulty. RD offered Soft & bite sized consistency meal option, Pt agreed to try. No report of swallowing difficulty; no report of vomiting or diarrhea @ this time.     Per Pt, his height: ~65" & his body weight: ~158#; food preferences discussed. Of note, Pt's diet rx includes DASH/TLC (cholesterol and sodium restricted) restrictions. Prescribed diet discussed with Pt; written materials also provided. RD answered concerns related to diet. RD remains available, Pt made aware.

## 2023-11-13 NOTE — PROGRESS NOTE ADULT - PROBLEM SELECTOR PLAN 1
Recent hx weight loss, fatigue and poor PO intake  CT evidence of new R hepatic lesion with possible portal vein thrombus, labs c/f biliary obstruction with increased Tbili and LFTs, leukocytosis  Hx of prostate cancer s/p prostatectomy, does not follow with Urology or Onc  Recent hx of ? malaria although parasite smear negative   CT chest without evidence of malignancy or metastasis    Plan  - f/u MRCP w/wo IV contrast  - will reach out IR for possible liver biopsy  - continue to monitor for now Recent hx weight loss, fatigue and poor PO intake  CT evidence of new R hepatic lesion with possible portal vein thrombus, labs c/f biliary obstruction with increased Tbili and LFTs, leukocytosis  Hx of prostate cancer s/p prostatectomy, does not follow with Urology or Onc  Recent hx of ? malaria although parasite smear negative   CT chest without evidence of malignancy or metastasis  CEA elevated, iron studies suggest anemia of chronic disease    Plan  - f/u AFP, CA 19-9  - f/u MRCP w/wo IV contrast  - will reach out IR for possible liver biopsy  - continue to monitor for now

## 2023-11-14 LAB
ALBUMIN SERPL ELPH-MCNC: 2.4 G/DL — LOW (ref 3.3–5)
ALBUMIN SERPL ELPH-MCNC: 2.4 G/DL — LOW (ref 3.3–5)
ALP SERPL-CCNC: 153 U/L — HIGH (ref 40–120)
ALP SERPL-CCNC: 153 U/L — HIGH (ref 40–120)
ALT FLD-CCNC: 46 U/L — HIGH (ref 4–41)
ALT FLD-CCNC: 46 U/L — HIGH (ref 4–41)
AMORPH CRY # UR COMP ASSIST: PRESENT
ANION GAP SERPL CALC-SCNC: 11 MMOL/L — SIGNIFICANT CHANGE UP (ref 7–14)
ANION GAP SERPL CALC-SCNC: 11 MMOL/L — SIGNIFICANT CHANGE UP (ref 7–14)
APPEARANCE UR: ABNORMAL
APTT BLD: 31.3 SEC — SIGNIFICANT CHANGE UP (ref 24.5–35.6)
APTT BLD: 31.3 SEC — SIGNIFICANT CHANGE UP (ref 24.5–35.6)
AST SERPL-CCNC: 68 U/L — HIGH (ref 4–40)
AST SERPL-CCNC: 68 U/L — HIGH (ref 4–40)
B PERT DNA SPEC QL NAA+PROBE: SIGNIFICANT CHANGE UP
B PERT DNA SPEC QL NAA+PROBE: SIGNIFICANT CHANGE UP
B PERT+PARAPERT DNA PNL SPEC NAA+PROBE: SIGNIFICANT CHANGE UP
B PERT+PARAPERT DNA PNL SPEC NAA+PROBE: SIGNIFICANT CHANGE UP
BACTERIA # UR AUTO: NEGATIVE /HPF — SIGNIFICANT CHANGE UP
BILIRUB SERPL-MCNC: 4.4 MG/DL — HIGH (ref 0.2–1.2)
BILIRUB SERPL-MCNC: 4.4 MG/DL — HIGH (ref 0.2–1.2)
BILIRUB UR-MCNC: ABNORMAL
BORDETELLA PARAPERTUSSIS (RAPRVP): SIGNIFICANT CHANGE UP
BORDETELLA PARAPERTUSSIS (RAPRVP): SIGNIFICANT CHANGE UP
BUN SERPL-MCNC: 12 MG/DL — SIGNIFICANT CHANGE UP (ref 7–23)
BUN SERPL-MCNC: 12 MG/DL — SIGNIFICANT CHANGE UP (ref 7–23)
C PNEUM DNA SPEC QL NAA+PROBE: SIGNIFICANT CHANGE UP
C PNEUM DNA SPEC QL NAA+PROBE: SIGNIFICANT CHANGE UP
CALCIUM SERPL-MCNC: 8.1 MG/DL — LOW (ref 8.4–10.5)
CALCIUM SERPL-MCNC: 8.1 MG/DL — LOW (ref 8.4–10.5)
CAST: 1 /LPF — SIGNIFICANT CHANGE UP (ref 0–4)
CAST: 1 /LPF — SIGNIFICANT CHANGE UP (ref 0–4)
CAST: 2 /LPF — SIGNIFICANT CHANGE UP (ref 0–4)
CAST: 2 /LPF — SIGNIFICANT CHANGE UP (ref 0–4)
CHLORIDE SERPL-SCNC: 103 MMOL/L — SIGNIFICANT CHANGE UP (ref 98–107)
CHLORIDE SERPL-SCNC: 103 MMOL/L — SIGNIFICANT CHANGE UP (ref 98–107)
CO2 SERPL-SCNC: 25 MMOL/L — SIGNIFICANT CHANGE UP (ref 22–31)
CO2 SERPL-SCNC: 25 MMOL/L — SIGNIFICANT CHANGE UP (ref 22–31)
COLOR SPEC: SIGNIFICANT CHANGE UP
CREAT SERPL-MCNC: 1.3 MG/DL — SIGNIFICANT CHANGE UP (ref 0.5–1.3)
CREAT SERPL-MCNC: 1.3 MG/DL — SIGNIFICANT CHANGE UP (ref 0.5–1.3)
DIFF PNL FLD: NEGATIVE — SIGNIFICANT CHANGE UP
EGFR: 59 ML/MIN/1.73M2 — LOW
EGFR: 59 ML/MIN/1.73M2 — LOW
FINE GRAN CASTS #/AREA URNS AUTO: PRESENT
FINE GRAN CASTS #/AREA URNS AUTO: PRESENT
FLUAV SUBTYP SPEC NAA+PROBE: SIGNIFICANT CHANGE UP
FLUAV SUBTYP SPEC NAA+PROBE: SIGNIFICANT CHANGE UP
FLUBV RNA SPEC QL NAA+PROBE: SIGNIFICANT CHANGE UP
FLUBV RNA SPEC QL NAA+PROBE: SIGNIFICANT CHANGE UP
GLUCOSE SERPL-MCNC: 116 MG/DL — HIGH (ref 70–99)
GLUCOSE SERPL-MCNC: 116 MG/DL — HIGH (ref 70–99)
GLUCOSE UR QL: NEGATIVE MG/DL — SIGNIFICANT CHANGE UP
HADV DNA SPEC QL NAA+PROBE: SIGNIFICANT CHANGE UP
HADV DNA SPEC QL NAA+PROBE: SIGNIFICANT CHANGE UP
HAV IGM SER-ACNC: SIGNIFICANT CHANGE UP
HAV IGM SER-ACNC: SIGNIFICANT CHANGE UP
HBV CORE IGM SER-ACNC: SIGNIFICANT CHANGE UP
HBV CORE IGM SER-ACNC: SIGNIFICANT CHANGE UP
HBV SURFACE AG SER-ACNC: SIGNIFICANT CHANGE UP
HBV SURFACE AG SER-ACNC: SIGNIFICANT CHANGE UP
HCOV 229E RNA SPEC QL NAA+PROBE: SIGNIFICANT CHANGE UP
HCOV 229E RNA SPEC QL NAA+PROBE: SIGNIFICANT CHANGE UP
HCOV HKU1 RNA SPEC QL NAA+PROBE: SIGNIFICANT CHANGE UP
HCOV HKU1 RNA SPEC QL NAA+PROBE: SIGNIFICANT CHANGE UP
HCOV NL63 RNA SPEC QL NAA+PROBE: SIGNIFICANT CHANGE UP
HCOV NL63 RNA SPEC QL NAA+PROBE: SIGNIFICANT CHANGE UP
HCOV OC43 RNA SPEC QL NAA+PROBE: SIGNIFICANT CHANGE UP
HCOV OC43 RNA SPEC QL NAA+PROBE: SIGNIFICANT CHANGE UP
HCT VFR BLD CALC: 21.7 % — LOW (ref 39–50)
HCT VFR BLD CALC: 21.7 % — LOW (ref 39–50)
HCV AB S/CO SERPL IA: 0.17 S/CO — SIGNIFICANT CHANGE UP (ref 0–0.99)
HCV AB S/CO SERPL IA: 0.17 S/CO — SIGNIFICANT CHANGE UP (ref 0–0.99)
HCV AB SERPL-IMP: SIGNIFICANT CHANGE UP
HCV AB SERPL-IMP: SIGNIFICANT CHANGE UP
HGB BLD-MCNC: 7.2 G/DL — LOW (ref 13–17)
HGB BLD-MCNC: 7.2 G/DL — LOW (ref 13–17)
HMPV RNA SPEC QL NAA+PROBE: SIGNIFICANT CHANGE UP
HMPV RNA SPEC QL NAA+PROBE: SIGNIFICANT CHANGE UP
HPIV1 RNA SPEC QL NAA+PROBE: SIGNIFICANT CHANGE UP
HPIV1 RNA SPEC QL NAA+PROBE: SIGNIFICANT CHANGE UP
HPIV2 RNA SPEC QL NAA+PROBE: SIGNIFICANT CHANGE UP
HPIV2 RNA SPEC QL NAA+PROBE: SIGNIFICANT CHANGE UP
HPIV3 RNA SPEC QL NAA+PROBE: SIGNIFICANT CHANGE UP
HPIV3 RNA SPEC QL NAA+PROBE: SIGNIFICANT CHANGE UP
HPIV4 RNA SPEC QL NAA+PROBE: SIGNIFICANT CHANGE UP
HPIV4 RNA SPEC QL NAA+PROBE: SIGNIFICANT CHANGE UP
KETONES UR-MCNC: ABNORMAL MG/DL
LEGIONELLA AG UR QL: NEGATIVE — SIGNIFICANT CHANGE UP
LEGIONELLA AG UR QL: NEGATIVE — SIGNIFICANT CHANGE UP
LEUKOCYTE ESTERASE UR-ACNC: ABNORMAL
M PNEUMO DNA SPEC QL NAA+PROBE: SIGNIFICANT CHANGE UP
M PNEUMO DNA SPEC QL NAA+PROBE: SIGNIFICANT CHANGE UP
MAGNESIUM SERPL-MCNC: 2 MG/DL — SIGNIFICANT CHANGE UP (ref 1.6–2.6)
MAGNESIUM SERPL-MCNC: 2 MG/DL — SIGNIFICANT CHANGE UP (ref 1.6–2.6)
MCHC RBC-ENTMCNC: 29.8 PG — SIGNIFICANT CHANGE UP (ref 27–34)
MCHC RBC-ENTMCNC: 29.8 PG — SIGNIFICANT CHANGE UP (ref 27–34)
MCHC RBC-ENTMCNC: 33.2 GM/DL — SIGNIFICANT CHANGE UP (ref 32–36)
MCHC RBC-ENTMCNC: 33.2 GM/DL — SIGNIFICANT CHANGE UP (ref 32–36)
MCV RBC AUTO: 89.7 FL — SIGNIFICANT CHANGE UP (ref 80–100)
MCV RBC AUTO: 89.7 FL — SIGNIFICANT CHANGE UP (ref 80–100)
MRSA PCR RESULT.: SIGNIFICANT CHANGE UP
MRSA PCR RESULT.: SIGNIFICANT CHANGE UP
NITRITE UR-MCNC: POSITIVE
NRBC # BLD: 0 /100 WBCS — SIGNIFICANT CHANGE UP (ref 0–0)
NRBC # BLD: 0 /100 WBCS — SIGNIFICANT CHANGE UP (ref 0–0)
NRBC # FLD: 0.05 K/UL — HIGH (ref 0–0)
NRBC # FLD: 0.05 K/UL — HIGH (ref 0–0)
PH UR: 5.5 — SIGNIFICANT CHANGE UP (ref 5–8)
PHOSPHATE SERPL-MCNC: 2.5 MG/DL — SIGNIFICANT CHANGE UP (ref 2.5–4.5)
PHOSPHATE SERPL-MCNC: 2.5 MG/DL — SIGNIFICANT CHANGE UP (ref 2.5–4.5)
PLATELET # BLD AUTO: 374 K/UL — SIGNIFICANT CHANGE UP (ref 150–400)
PLATELET # BLD AUTO: 374 K/UL — SIGNIFICANT CHANGE UP (ref 150–400)
POTASSIUM SERPL-MCNC: 3.5 MMOL/L — SIGNIFICANT CHANGE UP (ref 3.5–5.3)
POTASSIUM SERPL-MCNC: 3.5 MMOL/L — SIGNIFICANT CHANGE UP (ref 3.5–5.3)
POTASSIUM SERPL-SCNC: 3.5 MMOL/L — SIGNIFICANT CHANGE UP (ref 3.5–5.3)
POTASSIUM SERPL-SCNC: 3.5 MMOL/L — SIGNIFICANT CHANGE UP (ref 3.5–5.3)
PROCALCITONIN SERPL-MCNC: 1.73 NG/ML — HIGH (ref 0.02–0.1)
PROCALCITONIN SERPL-MCNC: 1.73 NG/ML — HIGH (ref 0.02–0.1)
PROT SERPL-MCNC: 7 G/DL — SIGNIFICANT CHANGE UP (ref 6–8.3)
PROT SERPL-MCNC: 7 G/DL — SIGNIFICANT CHANGE UP (ref 6–8.3)
PROT UR-MCNC: 100 MG/DL
RAPID RVP RESULT: SIGNIFICANT CHANGE UP
RAPID RVP RESULT: SIGNIFICANT CHANGE UP
RBC # BLD: 2.42 M/UL — LOW (ref 4.2–5.8)
RBC # BLD: 2.42 M/UL — LOW (ref 4.2–5.8)
RBC # FLD: 17.2 % — HIGH (ref 10.3–14.5)
RBC # FLD: 17.2 % — HIGH (ref 10.3–14.5)
RBC CASTS # UR COMP ASSIST: 17 /HPF — HIGH (ref 0–4)
RBC CASTS # UR COMP ASSIST: 17 /HPF — HIGH (ref 0–4)
RBC CASTS # UR COMP ASSIST: 24 /HPF — HIGH (ref 0–4)
RBC CASTS # UR COMP ASSIST: 24 /HPF — HIGH (ref 0–4)
REVIEW: SIGNIFICANT CHANGE UP
RSV RNA SPEC QL NAA+PROBE: SIGNIFICANT CHANGE UP
RSV RNA SPEC QL NAA+PROBE: SIGNIFICANT CHANGE UP
RV+EV RNA SPEC QL NAA+PROBE: SIGNIFICANT CHANGE UP
RV+EV RNA SPEC QL NAA+PROBE: SIGNIFICANT CHANGE UP
S AUREUS DNA NOSE QL NAA+PROBE: SIGNIFICANT CHANGE UP
S AUREUS DNA NOSE QL NAA+PROBE: SIGNIFICANT CHANGE UP
S PNEUM AG UR QL: NEGATIVE — SIGNIFICANT CHANGE UP
S PNEUM AG UR QL: NEGATIVE — SIGNIFICANT CHANGE UP
SARS-COV-2 RNA SPEC QL NAA+PROBE: SIGNIFICANT CHANGE UP
SARS-COV-2 RNA SPEC QL NAA+PROBE: SIGNIFICANT CHANGE UP
SODIUM SERPL-SCNC: 139 MMOL/L — SIGNIFICANT CHANGE UP (ref 135–145)
SODIUM SERPL-SCNC: 139 MMOL/L — SIGNIFICANT CHANGE UP (ref 135–145)
SP GR SPEC: 1.02 — SIGNIFICANT CHANGE UP (ref 1–1.03)
SP GR SPEC: 1.02 — SIGNIFICANT CHANGE UP (ref 1–1.03)
SP GR SPEC: 1.03 — SIGNIFICANT CHANGE UP (ref 1–1.03)
SP GR SPEC: 1.03 — SIGNIFICANT CHANGE UP (ref 1–1.03)
SQUAMOUS # UR AUTO: 3 /HPF — SIGNIFICANT CHANGE UP (ref 0–5)
UROBILINOGEN FLD QL: 4 MG/DL (ref 0.2–1)
UROBILINOGEN FLD QL: 4 MG/DL (ref 0.2–1)
UROBILINOGEN FLD QL: >=8 MG/DL (ref 0.2–1)
UROBILINOGEN FLD QL: >=8 MG/DL (ref 0.2–1)
WBC # BLD: 20.73 K/UL — HIGH (ref 3.8–10.5)
WBC # BLD: 20.73 K/UL — HIGH (ref 3.8–10.5)
WBC # FLD AUTO: 20.73 K/UL — HIGH (ref 3.8–10.5)
WBC # FLD AUTO: 20.73 K/UL — HIGH (ref 3.8–10.5)
WBC UR QL: 0 /HPF — SIGNIFICANT CHANGE UP (ref 0–5)

## 2023-11-14 PROCEDURE — 99222 1ST HOSP IP/OBS MODERATE 55: CPT

## 2023-11-14 PROCEDURE — 74183 MRI ABD W/O CNTR FLWD CNTR: CPT | Mod: 26

## 2023-11-14 PROCEDURE — 71045 X-RAY EXAM CHEST 1 VIEW: CPT | Mod: 26

## 2023-11-14 PROCEDURE — 99233 SBSQ HOSP IP/OBS HIGH 50: CPT | Mod: GC

## 2023-11-14 RX ADMIN — Medication 250 MILLIGRAM(S): at 05:53

## 2023-11-14 RX ADMIN — AMLODIPINE BESYLATE 5 MILLIGRAM(S): 2.5 TABLET ORAL at 05:54

## 2023-11-14 RX ADMIN — ENOXAPARIN SODIUM 70 MILLIGRAM(S): 100 INJECTION SUBCUTANEOUS at 18:54

## 2023-11-14 RX ADMIN — TAMSULOSIN HYDROCHLORIDE 0.4 MILLIGRAM(S): 0.4 CAPSULE ORAL at 22:38

## 2023-11-14 RX ADMIN — Medication 1 MILLIGRAM(S): at 11:32

## 2023-11-14 RX ADMIN — Medication 81 MILLIGRAM(S): at 11:32

## 2023-11-14 RX ADMIN — PIPERACILLIN AND TAZOBACTAM 25 GRAM(S): 4; .5 INJECTION, POWDER, LYOPHILIZED, FOR SOLUTION INTRAVENOUS at 07:03

## 2023-11-14 RX ADMIN — PANTOPRAZOLE SODIUM 40 MILLIGRAM(S): 20 TABLET, DELAYED RELEASE ORAL at 06:01

## 2023-11-14 RX ADMIN — PIPERACILLIN AND TAZOBACTAM 25 GRAM(S): 4; .5 INJECTION, POWDER, LYOPHILIZED, FOR SOLUTION INTRAVENOUS at 18:53

## 2023-11-14 RX ADMIN — ENOXAPARIN SODIUM 70 MILLIGRAM(S): 100 INJECTION SUBCUTANEOUS at 05:54

## 2023-11-14 RX ADMIN — PIPERACILLIN AND TAZOBACTAM 25 GRAM(S): 4; .5 INJECTION, POWDER, LYOPHILIZED, FOR SOLUTION INTRAVENOUS at 11:32

## 2023-11-14 NOTE — PROGRESS NOTE ADULT - PROBLEM SELECTOR PLAN 2
Recent hx weight loss, fatigue and poor PO intake  CT evidence of new R hepatic lesion with possible portal vein thrombus, labs c/f biliary obstruction with increased Tbili and LFTs, leukocytosis  Hx of prostate cancer s/p prostatectomy, does not follow with Urology or Onc  Recent hx of ? malaria although parasite smear negative   CT chest without evidence of malignancy or metastasis  CEA elevated, iron studies suggest anemia of chronic disease    Plan  - f/u AFP, CA 19-9  - f/u MRCP w/wo IV contrast  - will reach out IR for possible liver biopsy  - continue to monitor for now Recent hx weight loss, fatigue and poor PO intake  CT evidence of new R hepatic lesion with possible portal vein thrombus, labs c/f biliary obstruction with increased Tbili and LFTs, leukocytosis  Hx of prostate cancer s/p prostatectomy, does not follow with Urology or Onc  Recent hx of ? malaria although parasite smear negative   CT chest without evidence of malignancy or metastasis  CEA elevated, iron studies suggest anemia of chronic disease, LFTs worsening    Plan  - f/u AFP, CA 19-9  - f/u Hepatitis panel  - f/u MRCP w/wo IV contrast  - will reach out IR for possible liver biopsy  - continue to monitor for now

## 2023-11-14 NOTE — PROGRESS NOTE ADULT - PROBLEM SELECTOR PLAN 3
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    Plan  - full AC with Lovenox  - continue to monitor

## 2023-11-14 NOTE — CONSULT NOTE ADULT - ASSESSMENT
Blood pressure appears now much improved, no further recommendations from our service, we will sign off    Please call with any questions 69 yo M PMH HTN, BPH, prostate cancer s/p prostatectomy 2012, diverticulosis presenting with 5 weeks of generalized weakness, 25 lb weight loss, and poor PO intake and subjective fever/chills, now found to have hepatic mass concerning for malignancy and portal vein thrombus now on heparin gtt. ID consulted for recurrent fevers and suspicion for malaria.    #Fevers of unknown origin  - pt w/ fevers for past 5 weeks, was seen by PMD and given mefloquine for concern of malaria; per pt, no blood was taken for testing prior to initiation of treatment  - (11/11) bcx negative for blood parasite seen by giemsa stain  - leukocytosis downtrending (24.48 on admission --> 20.73 on 11/14)   - pt with fever of 101.1 on 11/13, was started on empiric tx with vanc/zosyn  - most likely 2/2 malignancy or portal vein thrombus, less likely to be due to malaria considering extended duration between last travel to [place] in 2021 and symptoms starting 5 weeks ago    Recommendations:  - recommend d/c vancomycin and continue zosyn for now to cover for potential abdominal infection, although low suspicion  - if BCx negative at 48hrs, would recommend d/c zosyn as well  - f/u MRCP    Recommendations preliminary until signed by attending.     Sandra Sotomayor MD PGY-1  Infectious Disease Resident 67 yo M PMH HTN, BPH, prostate cancer s/p prostatectomy 2012, diverticulosis presenting with 5 weeks of generalized weakness, 25 lb weight loss, and poor PO intake and subjective fever/chills, now found to have hepatic mass concerning for malignancy and portal vein thrombus now on heparin gtt. ID consulted for recurrent fevers and suspicion for malaria.    #Fevers of unknown origin  - pt w/ fevers for past 5 weeks, was seen by PMD and given mefloquine for concern of malaria; per pt, no blood was taken for testing prior to initiation of treatment  - (11/11) bcx negative for blood parasite seen by giemsa stain  - leukocytosis downtrending (24.48 on admission --> 20.73 on 11/14)   - pt with fever of 101.1 on 11/13, was started on empiric tx with vanc/zosyn  - most likely 2/2 malignancy or portal vein thrombus, less likely to be due to malaria considering extended duration between last travel to [place] in 2021 and symptoms starting 5 weeks ago    Recommendations:  - recommend d/c vancomycin and continue zosyn for now to cover for potential abdominal infection, although low suspicion  - if blood cultures from 11/13 are negative at 48hrs, would recommend d/c zosyn as well  - f/u MRCP    Recommendations preliminary until signed by attending.     Sandra Sotomayor MD PGY-1  Infectious Disease Resident

## 2023-11-14 NOTE — PROGRESS NOTE ADULT - SUBJECTIVE AND OBJECTIVE BOX
***************************************************************  Antoine Martinez, PGY1  Internal Medicine   TEAMS Preferred  ***************************************************************    ALVARO CEJA  68y Male  MRN: [id number]  11-11-23 (3d)    Patient is a 68y old  Male who presents with a chief complaint of Weakness     (13 Nov 2023 11:25)      SUBJECTIVE / OVERNIGHT EVENTS:   No acute overnight events.   Patient seen and examined at bedside this AM.  Denies any CP, SOB, N/V, constipation, diarrhea, abdominal pain, dysuria, fever, chills, or headaches.     12 Point ROS negative with the exception of the above    MEDICATIONS  (STANDING):  amLODIPine   Tablet 5 milliGRAM(s) Oral daily  aspirin enteric coated 81 milliGRAM(s) Oral daily  enoxaparin Injectable 70 milliGRAM(s) SubCutaneous every 12 hours  folic acid 1 milliGRAM(s) Oral daily  pantoprazole    Tablet 40 milliGRAM(s) Oral before breakfast  piperacillin/tazobactam IVPB.- 3.375 Gram(s) IV Intermittent once  piperacillin/tazobactam IVPB.- 3.375 Gram(s) IV Intermittent once  piperacillin/tazobactam IVPB.. 3.375 Gram(s) IV Intermittent every 8 hours  tamsulosin 0.4 milliGRAM(s) Oral at bedtime  vancomycin  IVPB 1000 milliGRAM(s) IV Intermittent every 12 hours    MEDICATIONS  (PRN):      OBJECTIVE:  Vital Signs Last 24 Hrs  T(C): 37.2 (14 Nov 2023 03:00), Max: 38.4 (13 Nov 2023 20:54)  T(F): 99 (14 Nov 2023 03:00), Max: 101.1 (13 Nov 2023 20:54)  HR: 102 (14 Nov 2023 03:00) (96 - 102)  BP: 114/67 (14 Nov 2023 03:00) (100/50 - 114/67)  BP(mean): 80 (13 Nov 2023 11:38) (80 - 80)  RR: 18 (14 Nov 2023 03:00) (18 - 18)  SpO2: 97% (14 Nov 2023 03:00) (93% - 97%)    Parameters below as of 13 Nov 2023 20:54  Patient On (Oxygen Delivery Method): room air        I&O's Summary    13 Nov 2023 07:01  -  14 Nov 2023 07:00  --------------------------------------------------------  IN: 380 mL / OUT: 800 mL / NET: -420 mL        PHYSICAL EXAM:  GENERAL: Laying comfortably, NAD  HEENT: NCAT, PERRLA, EOMI, no scleral icterus, no LAD  NECK: No JVD, supple  LUNG: CTABL; No wheezes, crackles, or rhonchi  HEART: RRR; normal S1/S2; No murmurs, rubs, or gallops  ABDOMEN: +BS, soft, nontender, nondistended, no HSM; No rebound, guarding, or rigidity  EXTREMITIES:  No LE edema b/l, 2+ Peripheral Pulses, No clubbing or cyanosis  NEUROLOGY: AOx3, non-focal, strength 5/5 in all extremities, sensation intact  PSYCH: calm and cooperative  SKIN: No rashes or lesions    LABS:                        7.2    20.73 )-----------( 374      ( 14 Nov 2023 06:04 )             21.7     Auto Eosinophil # x     / Auto Eosinophil % x     / Auto Neutrophil # x     / Auto Neutrophil % x     / BANDS % x                            7.2    23.74 )-----------( 379      ( 13 Nov 2023 06:09 )             22.4     Auto Eosinophil # x     / Auto Eosinophil % x     / Auto Neutrophil # x     / Auto Neutrophil % x     / BANDS % x        11-14    139  |  103  |  12  ----------------------------<  116<H>  3.5   |  25  |  1.30  11-13    141  |  105  |  12  ----------------------------<  94  3.4<L>   |  22  |  1.16  11-12    143  |  107  |  19  ----------------------------<  103<H>  3.7   |  23  |  1.27    Ca    8.1<L>      14 Nov 2023 06:04  Ca    8.0<L>      13 Nov 2023 06:09  Ca    8.4      12 Nov 2023 04:43  Phos  2.5     11-14  Mg     2.00     11-14    TPro  7.0  /  Alb  2.4<L>  /  TBili  4.4<H>  /  DBili  x   /  AST  68<H>  /  ALT  46<H>  /  AlkPhos  153<H>  11-14  TPro  x   /  Alb  x   /  TBili  3.1<H>  /  DBili  x   /  AST  x   /  ALT  x   /  AlkPhos  x   11-13  TPro  6.8  /  Alb  2.4<L>  /  TBili  2.1<H>  /  DBili  x   /  AST  39  /  ALT  33  /  AlkPhos  121<H>  11-12    PT/INR - ( 13 Nov 2023 06:09 )   PT: 16.6 sec;   INR: 1.49 ratio         PTT - ( 14 Nov 2023 06:04 )  PTT:31.3 sec      Urinalysis Basic - ( 14 Nov 2023 06:04 )    Color: x / Appearance: x / SG: x / pH: x  Gluc: 116 mg/dL / Ketone: x  / Bili: x / Urobili: x   Blood: x / Protein: x / Nitrite: x   Leuk Esterase: x / RBC: x / WBC x   Sq Epi: x / Non Sq Epi: x / Bacteria: x          CAPILLARY BLOOD GLUCOSE            RADIOLOGY & ADDITIONAL TESTS:     ***************************************************************  Antoine Martinez, PGY1  Internal Medicine   TEAMS Preferred  ***************************************************************    ALVARO CEJA  68y Male  MRN: [id number]  11-11-23 (3d)    Patient is a 68y old  Male who presents with a chief complaint of Weakness     (13 Nov 2023 11:25)      SUBJECTIVE / OVERNIGHT EVENTS:   No acute overnight events. Febrile to 101 overnight started on Vanc/Zosyn, Cultures order.   Patient seen and examined at bedside this AM. Continues to have repeated hicuping episodes, coughing, and difficulty breathing. Has not had such symptoms before. Denies night sweats, dysuria, diarrhea, fevers, or chills, CP, N/V, constipation, diarrhea, abdominal pain, dysuria, fever, chills, or headaches.     12 Point ROS negative with the exception of the above    MEDICATIONS  (STANDING):  amLODIPine   Tablet 5 milliGRAM(s) Oral daily  aspirin enteric coated 81 milliGRAM(s) Oral daily  enoxaparin Injectable 70 milliGRAM(s) SubCutaneous every 12 hours  folic acid 1 milliGRAM(s) Oral daily  pantoprazole    Tablet 40 milliGRAM(s) Oral before breakfast  piperacillin/tazobactam IVPB.- 3.375 Gram(s) IV Intermittent once  piperacillin/tazobactam IVPB.- 3.375 Gram(s) IV Intermittent once  piperacillin/tazobactam IVPB.. 3.375 Gram(s) IV Intermittent every 8 hours  tamsulosin 0.4 milliGRAM(s) Oral at bedtime  vancomycin  IVPB 1000 milliGRAM(s) IV Intermittent every 12 hours    MEDICATIONS  (PRN):      OBJECTIVE:  Vital Signs Last 24 Hrs  T(C): 37.2 (14 Nov 2023 03:00), Max: 38.4 (13 Nov 2023 20:54)  T(F): 99 (14 Nov 2023 03:00), Max: 101.1 (13 Nov 2023 20:54)  HR: 102 (14 Nov 2023 03:00) (96 - 102)  BP: 114/67 (14 Nov 2023 03:00) (100/50 - 114/67)  BP(mean): 80 (13 Nov 2023 11:38) (80 - 80)  RR: 18 (14 Nov 2023 03:00) (18 - 18)  SpO2: 97% (14 Nov 2023 03:00) (93% - 97%)    Parameters below as of 13 Nov 2023 20:54  Patient On (Oxygen Delivery Method): room air        I&O's Summary    13 Nov 2023 07:01  -  14 Nov 2023 07:00  --------------------------------------------------------  IN: 380 mL / OUT: 800 mL / NET: -420 mL        PHYSICAL EXAM:  GENERAL: Laying comfortably, NAD  HEENT: NCAT, PERRLA, EOMI, no scleral icterus, ?LAD along sternoclavicualr nodes and postauricular  LUNG: CTABL; No wheezes, crackles, or rhonchi  HEART: RRR; normal S1/S2; No murmurs, rubs, or gallops  ABDOMEN: +BS, soft, nontender, nondistended, no HSM; No rebound, guarding, or rigidity  EXTREMITIES:  No LE edema b/l, 2+ Peripheral Pulses, No clubbing or cyanosis  NEUROLOGY: AOx3, non-focal    LABS:                        7.2    20.73 )-----------( 374      ( 14 Nov 2023 06:04 )             21.7     Auto Eosinophil # x     / Auto Eosinophil % x     / Auto Neutrophil # x     / Auto Neutrophil % x     / BANDS % x                            7.2    23.74 )-----------( 379      ( 13 Nov 2023 06:09 )             22.4     Auto Eosinophil # x     / Auto Eosinophil % x     / Auto Neutrophil # x     / Auto Neutrophil % x     / BANDS % x        11-14    139  |  103  |  12  ----------------------------<  116<H>  3.5   |  25  |  1.30  11-13    141  |  105  |  12  ----------------------------<  94  3.4<L>   |  22  |  1.16  11-12    143  |  107  |  19  ----------------------------<  103<H>  3.7   |  23  |  1.27    Ca    8.1<L>      14 Nov 2023 06:04  Ca    8.0<L>      13 Nov 2023 06:09  Ca    8.4      12 Nov 2023 04:43  Phos  2.5     11-14  Mg     2.00     11-14    TPro  7.0  /  Alb  2.4<L>  /  TBili  4.4<H>  /  DBili  x   /  AST  68<H>  /  ALT  46<H>  /  AlkPhos  153<H>  11-14  TPro  x   /  Alb  x   /  TBili  3.1<H>  /  DBili  x   /  AST  x   /  ALT  x   /  AlkPhos  x   11-13  TPro  6.8  /  Alb  2.4<L>  /  TBili  2.1<H>  /  DBili  x   /  AST  39  /  ALT  33  /  AlkPhos  121<H>  11-12    PT/INR - ( 13 Nov 2023 06:09 )   PT: 16.6 sec;   INR: 1.49 ratio         PTT - ( 14 Nov 2023 06:04 )  PTT:31.3 sec      Urinalysis Basic - ( 14 Nov 2023 06:04 )    Color: x / Appearance: x / SG: x / pH: x  Gluc: 116 mg/dL / Ketone: x  / Bili: x / Urobili: x   Blood: x / Protein: x / Nitrite: x   Leuk Esterase: x / RBC: x / WBC x   Sq Epi: x / Non Sq Epi: x / Bacteria: x          CAPILLARY BLOOD GLUCOSE            RADIOLOGY & ADDITIONAL TESTS:

## 2023-11-14 NOTE — CONSULT NOTE ADULT - SUBJECTIVE AND OBJECTIVE BOX
Consultation Requested by:    Patient is a 68y old  Male who presents with a chief complaint of Weakness     (13 Nov 2023 11:25)    HPI:  68M PMH HTN, BPH, prostate cancer s/p prostatectomy 2012, diverticulosis presenting with 5 weeks of generalized weakness, 25 lb weight loss, and poor PO intake. Per pt, started having cold sensation as well as shivers 5 weeks ago after which his PMD trailed ibuprofen and abx for possible UTI. Patients symptoms persisted and PMD felt presentation was consistent with malaria (traveled to [facility] 1 year ago, unclear if malaria blood work was performed) after which pt was treated with mefloquine 5 tablets PO 5 days ago. Patient endorsed that his shivers and chills resolved but he continued to have weight loss and poor PO intake. Tried to eat yesterday and then had an episode of vomiting. Has been taking in liquids without difficulty. Also c/o generalized weakness- has to stop walking every few blocks secondary to weakness. Saw his PMD yesterday and was told he had "sepsis" which concerned the daughter. Denies any fever, chills, headache, visual changes, sore throat, cough, runny nose, chest pain, abdominal pain, n/v/d.    In ED patient was afebrile and hemodynamically stable. Labs were significant for leukocytosis to 25 with elevated Tbili and LFTs. Blood parasite screen was negative. CT imaging was obtained which showed a new indeterminate right hepatic lesion concerning for malignancy as well as an expansile filling defect in the right portal, left portal and main hepatic veins, Patient was given IVF and admitted to medicine for further workup, (11 Nov 2023 16:44)    ID was consulted for fever and suspicion of malaria. Pt states he has been having fever/chills for the past five weeks associated with weight loss of 25lbs. Pt states he was having fevers almost everyday for the past five weeks and he would take ibuprofen to help with the fever. Pt states he went to his PMD and was prescribed mefloquine and he took five tablets at one time and did not have any other treatment. Pt denies any recent ill contacts. Pt states he was born in [place] and has had malaria before while he was living there. [place] and the last time he visited [facility] was in 2021. Pt states his most recent travel was to [facility] in October of 2023 for three days. Pt denies any other recent travel.       REVIEW OF SYSTEMS  Constitutional:  (+) fever, (+) chills, (+) lethargy  ENMT: (-) nasal discharge, (-) sore throat. (-) neck pain or stiffness  Cardiac: (-) chest pain (-) palpitations  Respiratory:  (-) cough (-) respiratory distress.   GI:  (-) nausea (-) vomiting (-) diarrhea (-) constipation (-) abdominal pain.  :  (-) dysuria (-) frequency (-) burning.  Neuro:  (-) headache (-) numbness (-) tingling   Skin:  (-) rash  Except as documented in the HPI,  all other systems are negative      Recent Ill Contacts:	[X] No	[] Yes:  Recent Travel History:	[] No	[X] Yes:  Recent Animal/Insect Exposure/Tick Bites:	[X] No	[] Yes:    Allergies    No Known Allergies    Intolerances      Antimicrobials:  piperacillin/tazobactam IVPB.- 3.375 Gram(s) IV Intermittent once  piperacillin/tazobactam IVPB.- 3.375 Gram(s) IV Intermittent once  piperacillin/tazobactam IVPB.. 3.375 Gram(s) IV Intermittent every 8 hours  vancomycin  IVPB 1000 milliGRAM(s) IV Intermittent every 12 hours      Other Medications:  amLODIPine   Tablet 5 milliGRAM(s) Oral daily  aspirin enteric coated 81 milliGRAM(s) Oral daily  enoxaparin Injectable 70 milliGRAM(s) SubCutaneous every 12 hours  folic acid 1 milliGRAM(s) Oral daily  pantoprazole    Tablet 40 milliGRAM(s) Oral before breakfast  tamsulosin 0.4 milliGRAM(s) Oral at bedtime      FAMILY HISTORY:    PAST MEDICAL & SURGICAL HISTORY:  HTN (hypertension)  on med x 3 yrs      BPH (benign prostatic hypertrophy)      Prostate cancer      H/O endoscopy      H/O colonoscopy  rectal bleed  no issues found      H/O prostate biopsy        SOCIAL HISTORY:    IMMUNIZATIONS  [X] Up to Date		[] Not Up to Date:  Recent Immunizations:	[] No	[] Yes:    Daily     Daily   Head Circumference:  Vital Signs Last 24 Hrs  T(C): 37.2 (14 Nov 2023 03:00), Max: 38.4 (13 Nov 2023 20:54)  T(F): 99 (14 Nov 2023 03:00), Max: 101.1 (13 Nov 2023 20:54)  HR: 102 (14 Nov 2023 03:00) (96 - 102)  BP: 114/67 (14 Nov 2023 03:00) (100/50 - 114/67)  BP(mean): 80 (13 Nov 2023 11:38) (80 - 80)  RR: 18 (14 Nov 2023 03:00) (18 - 18)  SpO2: 97% (14 Nov 2023 03:00) (93% - 97%)    Parameters below as of 13 Nov 2023 20:54  Patient On (Oxygen Delivery Method): room air        PHYSICAL EXAM    PHYSICAL EXAM:  GENERAL: NAD, well-groomed, well-developed  HEAD:  Atraumatic, Normocephalic  EYES: scleral icterus noted  HEART: Regular rate and rhythm; No murmurs, rubs, or gallops  RESPIRATORY: CTA B/L, No wheezing, rhonchi, rales  ABDOMEN: Soft, Nontender, Nondistended  NEUROLOGY: A&Ox3, moving all extremities  EXTREMITIES: No clubbing, cyanosis, or edema  SKIN: warm, dry, normal color, no rash or abnormal lesions        Respiratory Support:		[X] No	[] Yes:  Vasoactive medication infusion:	[X] No	[] Yes:  Bladder catheter:		[X] No	[] Yes:  Other catheters or tubes:	[X] No	[] Yes:    Lab Results:                        7.2    20.73 )-----------( 374      ( 14 Nov 2023 06:04 )             21.7     11-14    139  |  103  |  12  ----------------------------<  116<H>  3.5   |  25  |  1.30    Ca    8.1<L>      14 Nov 2023 06:04  Phos  2.5     11-14  Mg     2.00     11-14    TPro  7.0  /  Alb  2.4<L>  /  TBili  4.4<H>  /  DBili  x   /  AST  68<H>  /  ALT  46<H>  /  AlkPhos  153<H>  11-14    LIVER FUNCTIONS - ( 14 Nov 2023 06:04 )  Alb: 2.4 g/dL / Pro: 7.0 g/dL / ALK PHOS: 153 U/L / ALT: 46 U/L / AST: 68 U/L / GGT: x           PT/INR - ( 13 Nov 2023 06:09 )   PT: 16.6 sec;   INR: 1.49 ratio         PTT - ( 14 Nov 2023 06:04 )  PTT:31.3 sec  Urinalysis Basic - ( 14 Nov 2023 06:04 )    Color: x / Appearance: x / SG: x / pH: x  Gluc: 116 mg/dL / Ketone: x  / Bili: x / Urobili: x   Blood: x / Protein: x / Nitrite: x   Leuk Esterase: x / RBC: x / WBC x   Sq Epi: x / Non Sq Epi: x / Bacteria: x    Radiology:    < from: CT Chest No Cont (11.11.23 @ 21:29) >  No evidence of intrathoracic metastatic disease.    Right mid to lower lung areas of subsegmental linear and dependent   atelectasis.    For complete evaluation of the abdominal organs, please refer to   dedicated abdominal CT performed earlier on the same day. Persistent mild   delayed renal parenchymal enhancement on this noncontrast CT suggestive   of renal dysfunction or acute tubular necrosis. Correlation with   creatinine levels is recommended.    < end of copied text >  < from: US Abdomen Doppler (11.11.23 @ 17:23) >  Occlusive thrombosis of the main and left portal veins with   avascular thrombus.    Heterogeneous mass in the right lobe of the liver better demonstrated on   same-day CT abdomen pelvis.    < end of copied text >  < from: CT Abdomen and Pelvis w/ IV Cont (11.11.23 @ 10:23) >  New indeterminate right hepatic lesion. Differential considerations   include cholangiocarcinoma, HCC or metastases. There is associated   expansile filling defect in the right portal, left portal and main   hepatic veins, which may represent tumor thrombus versus bland thrombus.   Recommend MRI abdomen with IV contrast for further characterization.    < end of copied text >  < from: Xray Chest 2 Views PA/Lat (11.11.23 @ 09:24) >  Trace right pleural effusion.  No focal opacity.    < end of copied text >

## 2023-11-14 NOTE — CONSULT NOTE ADULT - ATTENDING COMMENTS
This is a 67 y/o M w/ PMHx of HTN, BPH, prostate CA s/p prostatectomy 2012, diverticulosis who is presenting to [facility] on 11/14/23 for fevers a for 5 weeks, seen by his PMD, started on ibuprofen/antibiotic for possible UTI, however given persistence of fevers, there was a concern for malaria, and patient was given Mefloquine 5 days PTA. In the ED, pt was febrile with leukocytosis to 24. CT A/P with findings of 3.8 x 3.1 cm lobulated hypodense R hepatic lobe lesion, filling defect in R/L portal, main hepatic vein.   Pt was initially started on Vancomycin/Zosyn, ID was consulted for further recommendations.     #Fevers  #Leukocytosis   #R hepatic lobe lesion c/f malignancy   #Hepatic vein thrombosis     Overall, 69 yo M PMH HTN, BPH, prostate cancer s/p prostatectomy 2012, diverticulosis presenting for fevers/weight loss, found to have R hepatic lesions and hepatic vein thrombosis, c/f malignancy. ID consulted for SIRS w/o clear source.   At this time, favor malignancy and subsequent hepatic vein thrombosis as the cause of fevers/leukocytosis.     Plan:   1. D/c Vancomycin, c/w Zosyn for now   2. If BCx are negative for 48 hours, can likely stop Zosyn as well  3. Agree with MRCP   4. Low c/f malaria at this time, last time in [place] was 2021.     Thank you for this consult. Inpatient ID team will follow.    Ubaldo Marr M.D.  Attending Physician  Division of Infectious Diseases  Department of Medicine    Please contact through [employer].  Office: [phone] (after 5 PM or weekend)

## 2023-11-14 NOTE — PHYSICAL THERAPY INITIAL EVALUATION ADULT - PERTINENT HX OF CURRENT PROBLEM, REHAB EVAL
Pt is a 68 year old male who presented to hospital with generalized weakness, found to have evidence of new right hepatic lesion with suspected portal venous thrombosis.

## 2023-11-14 NOTE — PHYSICAL THERAPY INITIAL EVALUATION ADULT - ADDITIONAL COMMENTS
Pt lives with his children in an apartment with 0 stairs to enter; Pt resides on the first floor. prior to admission Pt was independent with all mobility and ambulated without an assistive device.    Pt. left comfortable in bed with all tubes/lines intact, head of the bed elevated, call bell in reach and in NAD.

## 2023-11-14 NOTE — PROGRESS NOTE ADULT - PROBLEM SELECTOR PLAN 1
WBC 25K, spiked fever to 101.1  Previously treated for ? malaria, possibly 2/2 to malignancy vs malaria flare vs infection of unclear etiology    Plan  - f/u ID recs  - c/w Vanc/Zosyn  - f/u BCx, UCx, Sputum  - monitor off abx at this time  - if fever spike will culture and start abx WBC 25K, spiked fever to 101.1  Previously treated for ? malaria, possibly 2/2 to malignancy vs malaria flare vs infection of unclear etiology    Plan  - f/u ID recs  - c/w Vanc/Zosyn  - f/u CXR  - f/u BCx, UCx, Sputum Cx, RVP, Legionella, Strep, Quant WBC 25K, spiked fever to 101.1  Previously treated for ? malaria, possibly 2/2 to malignancy vs malaria flare vs infection of unclear etiology  Apr ID recs     Plan  - f/u ID recs  - c/w Zosyn  - f/u CXR  - f/u BCx, UCx, Sputum Cx, RVP, Legionella, Strep, Quant

## 2023-11-14 NOTE — PROGRESS NOTE ADULT - ATTENDING COMMENTS
69 yo M PMH HTN, BPH, prostate cancer s/p prostatectomy 2012, diverticulosis presenting with 5 weeks of generalized weakness, 25 lb weight loss, and poor PO intake and subjective chills. + travel to malaria in 2021 returned in 2021. asymptomatic until 5 weeks ago. + treated for malaria. + hiccpus     Hepatic lesion- wt loss and decreased PO intake concern for malignancy                       - Ct R heaptic lesion w/ mild intrahepatic ductal dilatation : CT chest w/o overt PNA or metastatic disease                       - CEA mildly elevated                        - check CA 19-9; AFP                       -  MRCP                        - monitor LFTs- elevated TB rising fractionated primarily direct; mild rising transaminases and alk phos                        hiccups poss from Diaphragmatic irration- baclofen vs chlorpromazine trial.   Portal vein thrombus- CT expansile filling defect in the right portal, left portal and main hepatic veins, which may represent tumor thrombus versus bland thrombus. US doppler occlusive thrombus main and Lt portal veins with avascular thrombus.                         - c/w lovenox   Leukocytosis - hx of poss treatement for malaria.. Malaria screen neg. Bcx in lab. ID appreciated low suspicion for malaria. febrile 101 11/13; RVP neg; 11/11 initial UA neg; repeat UA + w/o overt symptoms;  BCx /UCx in lab; notes cough today no overt productive sputum; CT chest no overt PNA off vanco; c/w zosyn   elevated Cr- poss poor PO intake                   - now improved  s/p fluids                    - monitor Cr               Anemia- no overt GIB; fe studies consistent with AOCD 69 yo M PMH HTN, BPH, prostate cancer s/p prostatectomy 2012, diverticulosis presenting with 5 weeks of generalized weakness, 25 lb weight loss, and poor PO intake and subjective chills. + travel to malaria in 2021 returned in 2021. asymptomatic until 5 weeks ago. + treated for malaria. + hiccpus     Hepatic lesion- wt loss and decreased PO intake concern for malignancy                       - Ct R heaptic lesion w/ mild intrahepatic ductal dilatation : CT chest w/o overt PNA or metastatic disease                       - CEA mildly elevated; hep panel neg                        - check CA 19-9; AFP                       -  MRCP                        - monitor LFTs- elevated TB rising fractionated primarily direct; mild rising transaminases and alk phos                        hiccups poss from Diaphragmatic irration- baclofen vs chlorpromazine trial.   Portal vein thrombus- CT expansile filling defect in the right portal, left portal and main hepatic veins, which may represent tumor thrombus versus bland thrombus. US doppler occlusive thrombus main and Lt portal veins with avascular thrombus.                         - c/w lovenox   Leukocytosis - hx of poss treatement for malaria.. Malaria screen neg. Bcx in lab. ID appreciated low suspicion for malaria. febrile 101 11/13; RVP neg; 11/11 initial UA neg; repeat UA + w/o overt symptoms;  BCx /UCx in lab; notes cough today no overt productive sputum; CT chest no overt PNA repeat 11/14 CXR neg  off vanco; c/w zosyn   elevated Cr- poss poor PO intake                   - now improved  s/p fluids                    - monitor Cr               Anemia- no overt GIB; fe studies consistent with AOCD

## 2023-11-15 DIAGNOSIS — R06.6 HICCOUGH: ICD-10-CM

## 2023-11-15 LAB
ALBUMIN SERPL ELPH-MCNC: 2.2 G/DL — LOW (ref 3.3–5)
ALBUMIN SERPL ELPH-MCNC: 2.2 G/DL — LOW (ref 3.3–5)
ALP SERPL-CCNC: 141 U/L — HIGH (ref 40–120)
ALP SERPL-CCNC: 141 U/L — HIGH (ref 40–120)
ALPHA-1-FETOPROTEIN-L3: SIGNIFICANT CHANGE UP % (ref 0–9.9)
ALPHA-1-FETOPROTEIN-L3: SIGNIFICANT CHANGE UP % (ref 0–9.9)
ALPHA-1-FETOPROTEIN: 0.4 NG/ML — SIGNIFICANT CHANGE UP (ref 0–8.4)
ALPHA-1-FETOPROTEIN: 0.4 NG/ML — SIGNIFICANT CHANGE UP (ref 0–8.4)
ALT FLD-CCNC: 45 U/L — HIGH (ref 4–41)
ALT FLD-CCNC: 45 U/L — HIGH (ref 4–41)
ANION GAP SERPL CALC-SCNC: 12 MMOL/L — SIGNIFICANT CHANGE UP (ref 7–14)
ANION GAP SERPL CALC-SCNC: 12 MMOL/L — SIGNIFICANT CHANGE UP (ref 7–14)
APTT BLD: 35.5 SEC — SIGNIFICANT CHANGE UP (ref 24.5–35.6)
APTT BLD: 35.5 SEC — SIGNIFICANT CHANGE UP (ref 24.5–35.6)
AST SERPL-CCNC: 57 U/L — HIGH (ref 4–40)
AST SERPL-CCNC: 57 U/L — HIGH (ref 4–40)
BASOPHILS # BLD AUTO: 0.03 K/UL — SIGNIFICANT CHANGE UP (ref 0–0.2)
BASOPHILS # BLD AUTO: 0.03 K/UL — SIGNIFICANT CHANGE UP (ref 0–0.2)
BASOPHILS NFR BLD AUTO: 0.1 % — SIGNIFICANT CHANGE UP (ref 0–2)
BASOPHILS NFR BLD AUTO: 0.1 % — SIGNIFICANT CHANGE UP (ref 0–2)
BILIRUB DIRECT SERPL-MCNC: 2.6 MG/DL — HIGH (ref 0–0.3)
BILIRUB DIRECT SERPL-MCNC: 2.6 MG/DL — HIGH (ref 0–0.3)
BILIRUB SERPL-MCNC: 2.8 MG/DL — HIGH (ref 0.2–1.2)
BILIRUB SERPL-MCNC: 2.8 MG/DL — HIGH (ref 0.2–1.2)
BUN SERPL-MCNC: 13 MG/DL — SIGNIFICANT CHANGE UP (ref 7–23)
BUN SERPL-MCNC: 13 MG/DL — SIGNIFICANT CHANGE UP (ref 7–23)
CALCIUM SERPL-MCNC: 8.3 MG/DL — LOW (ref 8.4–10.5)
CALCIUM SERPL-MCNC: 8.3 MG/DL — LOW (ref 8.4–10.5)
CANCER AG19-9 SERPL-ACNC: 129 U/ML — HIGH
CANCER AG19-9 SERPL-ACNC: 129 U/ML — HIGH
CHLORIDE SERPL-SCNC: 102 MMOL/L — SIGNIFICANT CHANGE UP (ref 98–107)
CHLORIDE SERPL-SCNC: 102 MMOL/L — SIGNIFICANT CHANGE UP (ref 98–107)
CO2 SERPL-SCNC: 24 MMOL/L — SIGNIFICANT CHANGE UP (ref 22–31)
CO2 SERPL-SCNC: 24 MMOL/L — SIGNIFICANT CHANGE UP (ref 22–31)
CREAT SERPL-MCNC: 1.32 MG/DL — HIGH (ref 0.5–1.3)
CREAT SERPL-MCNC: 1.32 MG/DL — HIGH (ref 0.5–1.3)
CULTURE RESULTS: NO GROWTH — SIGNIFICANT CHANGE UP
CULTURE RESULTS: NO GROWTH — SIGNIFICANT CHANGE UP
EGFR: 58 ML/MIN/1.73M2 — LOW
EGFR: 58 ML/MIN/1.73M2 — LOW
EOSINOPHIL # BLD AUTO: 0.14 K/UL — SIGNIFICANT CHANGE UP (ref 0–0.5)
EOSINOPHIL # BLD AUTO: 0.14 K/UL — SIGNIFICANT CHANGE UP (ref 0–0.5)
EOSINOPHIL NFR BLD AUTO: 0.7 % — SIGNIFICANT CHANGE UP (ref 0–6)
EOSINOPHIL NFR BLD AUTO: 0.7 % — SIGNIFICANT CHANGE UP (ref 0–6)
GLUCOSE SERPL-MCNC: 95 MG/DL — SIGNIFICANT CHANGE UP (ref 70–99)
GLUCOSE SERPL-MCNC: 95 MG/DL — SIGNIFICANT CHANGE UP (ref 70–99)
HCT VFR BLD CALC: 22.2 % — LOW (ref 39–50)
HCT VFR BLD CALC: 22.2 % — LOW (ref 39–50)
HGB BLD-MCNC: 7.1 G/DL — LOW (ref 13–17)
HGB BLD-MCNC: 7.1 G/DL — LOW (ref 13–17)
IANC: 16.56 K/UL — HIGH (ref 1.8–7.4)
IANC: 16.56 K/UL — HIGH (ref 1.8–7.4)
IMM GRANULOCYTES NFR BLD AUTO: 1.4 % — HIGH (ref 0–0.9)
IMM GRANULOCYTES NFR BLD AUTO: 1.4 % — HIGH (ref 0–0.9)
INR BLD: 1.53 RATIO — HIGH (ref 0.85–1.18)
INR BLD: 1.53 RATIO — HIGH (ref 0.85–1.18)
LYMPHOCYTES # BLD AUTO: 10.6 % — LOW (ref 13–44)
LYMPHOCYTES # BLD AUTO: 10.6 % — LOW (ref 13–44)
LYMPHOCYTES # BLD AUTO: 2.14 K/UL — SIGNIFICANT CHANGE UP (ref 1–3.3)
LYMPHOCYTES # BLD AUTO: 2.14 K/UL — SIGNIFICANT CHANGE UP (ref 1–3.3)
MAGNESIUM SERPL-MCNC: 2 MG/DL — SIGNIFICANT CHANGE UP (ref 1.6–2.6)
MAGNESIUM SERPL-MCNC: 2 MG/DL — SIGNIFICANT CHANGE UP (ref 1.6–2.6)
MCHC RBC-ENTMCNC: 29.7 PG — SIGNIFICANT CHANGE UP (ref 27–34)
MCHC RBC-ENTMCNC: 29.7 PG — SIGNIFICANT CHANGE UP (ref 27–34)
MCHC RBC-ENTMCNC: 32 GM/DL — SIGNIFICANT CHANGE UP (ref 32–36)
MCHC RBC-ENTMCNC: 32 GM/DL — SIGNIFICANT CHANGE UP (ref 32–36)
MCV RBC AUTO: 92.9 FL — SIGNIFICANT CHANGE UP (ref 80–100)
MCV RBC AUTO: 92.9 FL — SIGNIFICANT CHANGE UP (ref 80–100)
MONOCYTES # BLD AUTO: 1.05 K/UL — HIGH (ref 0–0.9)
MONOCYTES # BLD AUTO: 1.05 K/UL — HIGH (ref 0–0.9)
MONOCYTES NFR BLD AUTO: 5.2 % — SIGNIFICANT CHANGE UP (ref 2–14)
MONOCYTES NFR BLD AUTO: 5.2 % — SIGNIFICANT CHANGE UP (ref 2–14)
NEUTROPHILS # BLD AUTO: 16.56 K/UL — HIGH (ref 1.8–7.4)
NEUTROPHILS # BLD AUTO: 16.56 K/UL — HIGH (ref 1.8–7.4)
NEUTROPHILS NFR BLD AUTO: 82 % — HIGH (ref 43–77)
NEUTROPHILS NFR BLD AUTO: 82 % — HIGH (ref 43–77)
NRBC # BLD: 0 /100 WBCS — SIGNIFICANT CHANGE UP (ref 0–0)
NRBC # BLD: 0 /100 WBCS — SIGNIFICANT CHANGE UP (ref 0–0)
NRBC # FLD: 0.06 K/UL — HIGH (ref 0–0)
NRBC # FLD: 0.06 K/UL — HIGH (ref 0–0)
PHOSPHATE SERPL-MCNC: 2.9 MG/DL — SIGNIFICANT CHANGE UP (ref 2.5–4.5)
PHOSPHATE SERPL-MCNC: 2.9 MG/DL — SIGNIFICANT CHANGE UP (ref 2.5–4.5)
PLATELET # BLD AUTO: 393 K/UL — SIGNIFICANT CHANGE UP (ref 150–400)
PLATELET # BLD AUTO: 393 K/UL — SIGNIFICANT CHANGE UP (ref 150–400)
POTASSIUM SERPL-MCNC: 3.5 MMOL/L — SIGNIFICANT CHANGE UP (ref 3.5–5.3)
POTASSIUM SERPL-MCNC: 3.5 MMOL/L — SIGNIFICANT CHANGE UP (ref 3.5–5.3)
POTASSIUM SERPL-SCNC: 3.5 MMOL/L — SIGNIFICANT CHANGE UP (ref 3.5–5.3)
POTASSIUM SERPL-SCNC: 3.5 MMOL/L — SIGNIFICANT CHANGE UP (ref 3.5–5.3)
PROT SERPL-MCNC: 6.7 G/DL — SIGNIFICANT CHANGE UP (ref 6–8.3)
PROT SERPL-MCNC: 6.7 G/DL — SIGNIFICANT CHANGE UP (ref 6–8.3)
PROTHROM AB SERPL-ACNC: 16.9 SEC — HIGH (ref 9.5–13)
PROTHROM AB SERPL-ACNC: 16.9 SEC — HIGH (ref 9.5–13)
RBC # BLD: 2.39 M/UL — LOW (ref 4.2–5.8)
RBC # BLD: 2.39 M/UL — LOW (ref 4.2–5.8)
RBC # FLD: 17.2 % — HIGH (ref 10.3–14.5)
RBC # FLD: 17.2 % — HIGH (ref 10.3–14.5)
SODIUM SERPL-SCNC: 138 MMOL/L — SIGNIFICANT CHANGE UP (ref 135–145)
SODIUM SERPL-SCNC: 138 MMOL/L — SIGNIFICANT CHANGE UP (ref 135–145)
SPECIMEN SOURCE: SIGNIFICANT CHANGE UP
SPECIMEN SOURCE: SIGNIFICANT CHANGE UP
WBC # BLD: 20.2 K/UL — HIGH (ref 3.8–10.5)
WBC # BLD: 20.2 K/UL — HIGH (ref 3.8–10.5)
WBC # FLD AUTO: 20.2 K/UL — HIGH (ref 3.8–10.5)
WBC # FLD AUTO: 20.2 K/UL — HIGH (ref 3.8–10.5)

## 2023-11-15 PROCEDURE — 99232 SBSQ HOSP IP/OBS MODERATE 35: CPT

## 2023-11-15 PROCEDURE — 93010 ELECTROCARDIOGRAM REPORT: CPT

## 2023-11-15 PROCEDURE — 99222 1ST HOSP IP/OBS MODERATE 55: CPT

## 2023-11-15 PROCEDURE — 99223 1ST HOSP IP/OBS HIGH 75: CPT | Mod: GC

## 2023-11-15 PROCEDURE — 99233 SBSQ HOSP IP/OBS HIGH 50: CPT | Mod: GC

## 2023-11-15 RX ORDER — HEPARIN SODIUM 5000 [USP'U]/ML
INJECTION INTRAVENOUS; SUBCUTANEOUS
Qty: 25000 | Refills: 0 | Status: DISCONTINUED | OUTPATIENT
Start: 2023-11-15 | End: 2023-11-16

## 2023-11-15 RX ORDER — BACLOFEN 100 %
5 POWDER (GRAM) MISCELLANEOUS EVERY 12 HOURS
Refills: 0 | Status: DISCONTINUED | OUTPATIENT
Start: 2023-11-15 | End: 2023-11-28

## 2023-11-15 RX ORDER — HEPARIN SODIUM 5000 [USP'U]/ML
2500 INJECTION INTRAVENOUS; SUBCUTANEOUS EVERY 6 HOURS
Refills: 0 | Status: DISCONTINUED | OUTPATIENT
Start: 2023-11-15 | End: 2023-11-16

## 2023-11-15 RX ORDER — HEPARIN SODIUM 5000 [USP'U]/ML
5500 INJECTION INTRAVENOUS; SUBCUTANEOUS EVERY 6 HOURS
Refills: 0 | Status: DISCONTINUED | OUTPATIENT
Start: 2023-11-15 | End: 2023-11-16

## 2023-11-15 RX ADMIN — PIPERACILLIN AND TAZOBACTAM 25 GRAM(S): 4; .5 INJECTION, POWDER, LYOPHILIZED, FOR SOLUTION INTRAVENOUS at 14:02

## 2023-11-15 RX ADMIN — HEPARIN SODIUM 1200 UNIT(S)/HR: 5000 INJECTION INTRAVENOUS; SUBCUTANEOUS at 18:32

## 2023-11-15 RX ADMIN — HEPARIN SODIUM 1200 UNIT(S)/HR: 5000 INJECTION INTRAVENOUS; SUBCUTANEOUS at 20:39

## 2023-11-15 RX ADMIN — Medication 81 MILLIGRAM(S): at 12:49

## 2023-11-15 RX ADMIN — Medication 1 MILLIGRAM(S): at 12:49

## 2023-11-15 RX ADMIN — PIPERACILLIN AND TAZOBACTAM 25 GRAM(S): 4; .5 INJECTION, POWDER, LYOPHILIZED, FOR SOLUTION INTRAVENOUS at 06:21

## 2023-11-15 RX ADMIN — PANTOPRAZOLE SODIUM 40 MILLIGRAM(S): 20 TABLET, DELAYED RELEASE ORAL at 06:21

## 2023-11-15 RX ADMIN — ENOXAPARIN SODIUM 70 MILLIGRAM(S): 100 INJECTION SUBCUTANEOUS at 06:22

## 2023-11-15 RX ADMIN — Medication 5 MILLIGRAM(S): at 09:51

## 2023-11-15 RX ADMIN — PIPERACILLIN AND TAZOBACTAM 25 GRAM(S): 4; .5 INJECTION, POWDER, LYOPHILIZED, FOR SOLUTION INTRAVENOUS at 22:20

## 2023-11-15 RX ADMIN — TAMSULOSIN HYDROCHLORIDE 0.4 MILLIGRAM(S): 0.4 CAPSULE ORAL at 22:21

## 2023-11-15 RX ADMIN — AMLODIPINE BESYLATE 5 MILLIGRAM(S): 2.5 TABLET ORAL at 14:02

## 2023-11-15 NOTE — PROGRESS NOTE ADULT - PROBLEM SELECTOR PLAN 3
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    Plan  - full AC with Lovenox  - continue to monitor Developed Hiccups, likely 2/2 to diaphragmatic irritation    Plan  - Baclofen  - EKG with QTc 470s  - hold off on Zofran

## 2023-11-15 NOTE — PROGRESS NOTE ADULT - PROBLEM SELECTOR PLAN 4
c/w home amlodipine  - c/w home ASA 81 CT c/f portal vein thrombus pending official read  Duplex US Abdomen occlusive thrombosis of the main and left portal veins with avascular thrombus.    Plan  - full AC with Heparin  - continue to monitor  - Heme consult for hypercoag workup

## 2023-11-15 NOTE — CONSULT NOTE ADULT - SUBJECTIVE AND OBJECTIVE BOX
Hematology Consult Note    HPI as per admitting team:   68M PMH HTN, BPH, prostate cancer s/p prostatectomy 2012, diverticulosis presenting with 5 weeks of generalized weakness, 25 lb weight loss, and poor PO intake. Per pt, started having cold sensation as well as shivers 5 weeks ago after which his PMD trailed ibuprofen and abx for possible UTI. Patients symptoms persisted and PMD felt presentation was consistent with malaria (traveled to [facility] 1 year ago, unclear if malaria blood work was performed) after which pt was treated with mefloquine 5 tablets PO 5 days ago. Patient endorsed that his shivers and chills resolved but he continued to have weight loss and poor PO intake. Tried to eat yesterday and then had an episode of vomiting. Has been taking in liquids without difficulty. Also c/o generalized weakness- has to stop walking every few blocks secondary to weakness. Saw his PMD yesterday and was told he had "sepsis" which concerned the daughter. Denies any fever, chills, headache, visual changes, sore throat, cough, runny nose, chest pain, abdominal pain, n/v/d.    In ED patient was afebrile and hemodynamically stable. Labs were significant for leukocytosis to 25 with elevated Tbili and LFTs. Blood parasite screen was negative. CT imaging was obtained which showed a new indeterminate right hepatic lesion concerning for malignancy as well as an expansile filling defect in the right portal, left portal and main hepatic veins, Patient was given IVF and admitted to medicine for further workup, (11 Nov 2023 16:44)    Heme hx:  History of prostate cancer s/p prostatectomy in 2012. Presented with chills, weakness, decreased appetite. During this admission, he has been having fevers and leukocytosis. CT abd/pelvis w/ IV contrast showed heterogeneous appearance of the liver, likely altered perfusion. New lobulated hypodense right hepatic lobe lesion measures 3.8 x 3.1 cm (2:27). Additional subcentimeter hypodense foci too small to characterize. Right portal, left portal, and main hepatic vein thromboses. MRCP 11/14/23 re-demonstrated right portal, left portal, and main hepatic vein thromboses and also shows liver abscesses.    REVIEW OF SYSTEMS:  CONSTITUTIONAL: No weakness, fevers or chills  EYES/ENT: No visual changes;  No vertigo or throat pain   NECK: No pain or stiffness  RESPIRATORY: No cough, wheezing, hemoptysis; No shortness of breath  CARDIOVASCULAR: No chest pain or palpitations  GASTROINTESTINAL: No abdominal or epigastric pain. No nausea, vomiting, or hematemesis; No diarrhea or constipation. No melena or hematochezia.  GENITOURINARY: No dysuria, frequency or hematuria  NEUROLOGICAL: No numbness or weakness  SKIN: No itching, burning, rashes, or lesions   All other review of systems is negative unless indicated above.    PAST MEDICAL & SURGICAL HISTORY:  HTN (hypertension)  on med x 3 yrs    BPH (benign prostatic hypertrophy)    Prostate cancer    H/O endoscopy    H/O colonoscopy  rectal bleed  no issues found    H/O prostate biopsy    FAMILY HISTORY:  No known family history of clotting disorders or cancer.    SOCIAL HISTORY:   Originally from [place]. No tobacco, EtOH, or illicit drug use. Lives with his daughter.    Allergies  No Known Allergies      MEDICATIONS  (STANDING):  amLODIPine   Tablet 5 milliGRAM(s) Oral daily  aspirin enteric coated 81 milliGRAM(s) Oral daily  folic acid 1 milliGRAM(s) Oral daily  heparin  Infusion.  Unit(s)/Hr (12 mL/Hr) IV Continuous <Continuous>  pantoprazole    Tablet 40 milliGRAM(s) Oral before breakfast  piperacillin/tazobactam IVPB.. 3.375 Gram(s) IV Intermittent every 8 hours  tamsulosin 0.4 milliGRAM(s) Oral at bedtime    MEDICATIONS  (PRN):  baclofen 5 milliGRAM(s) Oral every 12 hours PRN hiccups  heparin   Injectable 2500 Unit(s) IV Push every 6 hours PRN For aPTT between 40 - 57  heparin   Injectable 5500 Unit(s) IV Push every 6 hours PRN For aPTT less than 40      OBJECTIVE   T(F): 98.5 (11-15-23 @ 12:05), Max: 99.3 (11-15-23 @ 06:00)  HR: 96 (11-15-23 @ 14:02)  BP: 117/75 (11-15-23 @ 14:02)  RR: 18 (11-15-23 @ 12:05)  SpO2: 97% (11-15-23 @ 12:05)  Wt(kg): --    PHYSICAL EXAM   GENERAL: NAD, well-developed  HEAD:  Atraumatic, Normocephalic  EYES: EOMI, PERRLA, conjunctiva and sclera clear  CHEST/LUNG: Clear to auscultation bilaterally; No wheeze  HEART: Regular rate and rhythm; No murmurs, rubs, or gallops  ABDOMEN: Soft, tenderness of deep palpation of RUQ, Nondistended; Bowel sounds present. No hepatosplenomegaly appreciated  EXTREMITIES:  2+ Peripheral Pulses, No clubbing, cyanosis, or edema  NEUROLOGY: non-focal  SKIN: Round 3cm hyperpigmented round patch on lower RLE from prior trauma while playing soccer                          7.1    20.20 )-----------( 393      ( 15 Nov 2023 06:00 )             22.2       11-15    138  |  102  |  13  ----------------------------<  95  3.5   |  24  |  1.32<H>    Ca    8.3<L>      15 Nov 2023 06:00  Phos  2.9     11-15  Mg     2.00     11-15    TPro  6.7  /  Alb  2.2<L>  /  TBili  2.8<H>  /  DBili  2.6<H>  /  AST  57<H>  /  ALT  45<H>  /  AlkPhos  141<H>  11-15      Magnesium: 2.00 mg/dL (11-15 @ 06:00)  Phosphorus: 2.9 mg/dL (11-15 @ 06:00)    RADIOLOGY & ADDITIONAL TESTING:   < from: CT Abdomen and Pelvis w/ IV Cont (11.11.23 @ 10:23) >  FINDINGS:  LOWER CHEST: Bibasilar subsegmental atelectasis.    LIVER: Heterogeneous appearance of the liver, likely altered perfusion.   New lobulated hypodense right hepatic lobe lesion measures 3.8 x 3.1 cm   (2:27). Additional subcentimeter hypodense foci too small to characterize.  BILE DUCTS: Mild intrahepatic biliary dilation.  GALLBLADDER: Cholelithiasis.  SPLEEN: Within normal limits.  PANCREAS: Within normal limits.  ADRENALS: Within normal limits.  KIDNEYS/URETERS: No hydronephrosis. Right cysts and bilateral   subcentimeter hypodense foci too small to characterize.    BLADDER: Within normal limits.  REPRODUCTIVE ORGANS: Prostatectomy.    BOWEL: No bowel obstruction. Appendix is normal. Extensive colonic   diverticulosis, without diverticulitis.  PERITONEUM: No ascites.  VESSELS: Expansile filling defect of the right portal, left portal and   main portal veins. Hepatic veins are patent. The IVC and splenic veins   are patent.  RETROPERITONEUM/LYMPH NODES: No lymphadenopathy.  ABDOMINAL WALL: Tiny fat-containing umbilical hernia.  BONES: Degenerative changes. A peripherally sclerotic focus in the left   posterior iliac measures 1.1 x 0.8 cm, unchanged from 8/9/2021.    IMPRESSION:  New indeterminate right hepatic lesion. Differential considerations   include cholangiocarcinoma, HCC or metastases. There is associated   expansile filling defect in the right portal, left portal and main   hepatic veins, which may represent tumor thrombus versus bland thrombus.   Recommend MRI abdomen with IV contrast for further characterization.    Discussed with Dr. Andrea by Dr. Mclain on 11/11/2023 10:51 AM with   readback confirmation.    --- End of Report ---    DIOR MCLAIN MD; Resident Radiologist  This document has been electronically signed.  CARROLL EVANS MD; Attending Radiologist  This document has been electronically signed. Nov 11 2023 12:16PM    < end of copied text >      < from: MR MRCP w/wo IV Cont (11.14.23 @ 18:24) >  FINDINGS:  Limited by motion artifact.    LOWER CHEST: Within normal limits.    LIVER: 2 centrally T2 hyperintense lesion with peripheral enhancement,   surrounding parenchymal hyperemia, and edema consistent with abscess,   increased in size compared to prior exam. These measure as follows:  *  Segment 7/8 lesion measuring 7.0 x 6.7 x 6.9 cm  *  Segment 2 lesion measuring 5.0 x 4.6 x 5.3 cm.  BILE DUCTS: Mild intrahepatic biliary ductal dilatation.  GALLBLADDER: Cholelithiasis.  SPLEEN: Within normal limits.  PANCREAS: Within normal limits.  ADRENALS: Within normal limits.  KIDNEYS/URETERS: No hydronephrosis. Bilateral renal cysts.    VISUALIZED PORTIONS:  BOWEL: Colonic diverticulosis.  PERITONEUM: No ascites.  VESSELS: Expansile, nonenhancing filling defect again noted in the main,   right, and left portal veins. The portosplenic confluence, splenic vein,   and SMV are patent.  RETROPERITONEUM/LYMPH NODES: No lymphadenopathy.  ABDOMINAL WALL: Within normal limits.  BONES: Degenerative changes.    IMPRESSION:  Left and right hepatic lobe liver abscesses, increased in size since   prior CT scan 11/11/2023.    [place] thrombus involving the right, left, and main portal veins.    --- End of Report ---    PRANAY ISAAC MD; Resident Radiologist  This document has been electronically signed.  CHRISTIAN GRANT MD; Attending Radiologist  This document has been electronically signed. Nov 15 2023  9:15AM    < end of copied text >

## 2023-11-15 NOTE — PROGRESS NOTE ADULT - SUBJECTIVE AND OBJECTIVE BOX
***************************************************************  Antoine Martinez, PGY1  Internal Medicine   TEAMS Preferred  ***************************************************************    ALVARO CEJA  68y Male  MRN: [id number]  23 (4d)    Patient is a 68y old  Male who presents with a chief complaint of Weakness     (2023 11:25)      SUBJECTIVE / OVERNIGHT EVENTS:   No acute overnight events.   Patient seen and examined at bedside this AM.  Denies any CP, SOB, N/V, constipation, diarrhea, abdominal pain, dysuria, fever, chills, or headaches.     12 Point ROS negative with the exception of the above    MEDICATIONS  (STANDING):  amLODIPine   Tablet 5 milliGRAM(s) Oral daily  aspirin enteric coated 81 milliGRAM(s) Oral daily  enoxaparin Injectable 70 milliGRAM(s) SubCutaneous every 12 hours  folic acid 1 milliGRAM(s) Oral daily  pantoprazole    Tablet 40 milliGRAM(s) Oral before breakfast  piperacillin/tazobactam IVPB.. 3.375 Gram(s) IV Intermittent every 8 hours  tamsulosin 0.4 milliGRAM(s) Oral at bedtime    MEDICATIONS  (PRN):      OBJECTIVE:  Vital Signs Last 24 Hrs  T(C): 37.4 (15 Nov 2023 06:00), Max: 37.4 (15 Nov 2023 06:00)  T(F): 99.3 (15 Nov 2023 06:00), Max: 99.3 (15 Nov 2023 06:00)  HR: 105 (15 Nov 2023 06:00) (88 - 105)  BP: 101/60 (15 Nov 2023 06:00) (100/58 - 104/56)  BP(mean): --  RR: 18 (15 Nov 2023 06:00) (18 - 18)  SpO2: 97% (15 Nov 2023 06:00) (97% - 99%)    Parameters below as of 15 Nov 2023 06:00  Patient On (Oxygen Delivery Method): room air        I&O's Summary    2023 07:01  -  2023 07:00  --------------------------------------------------------  IN: 380 mL / OUT: 800 mL / NET: -420 mL    2023 07:01  -  15 2023 06:28  --------------------------------------------------------  IN: 125 mL / OUT: 250 mL / NET: -125 mL        PHYSICAL EXAM:  GENERAL: Laying comfortably, NAD  HEENT: NCAT, PERRLA, EOMI, no scleral icterus, no LAD  NECK: No JVD, supple  LUNG: CTABL; No wheezes, crackles, or rhonchi  HEART: RRR; normal S1/S2; No murmurs, rubs, or gallops  ABDOMEN: +BS, soft, nontender, nondistended, no HSM; No rebound, guarding, or rigidity  EXTREMITIES:  No LE edema b/l, 2+ Peripheral Pulses, No clubbing or cyanosis  NEUROLOGY: AOx3, non-focal, strength 5/5 in all extremities, sensation intact  PSYCH: calm and cooperative  SKIN: No rashes or lesions    LABS:                        7.2    20.73 )-----------( 374      ( 2023 06:04 )             21.7     Auto Eosinophil # 0.05  / Auto Eosinophil % 0.2   / Auto Neutrophil # 16.79 / Auto Neutrophil % 80.0  / BANDS % x        -14    139  |  103  |  12  ----------------------------<  116<H>  3.5   |  25  |  1.30  13    141  |  105  |  12  ----------------------------<  94  3.4<L>   |  22  |  1.16    Ca    8.1<L>      2023 06:04  Ca    8.0<L>      2023 06:09  Phos  2.5       Mg     2.00         TPro  7.0  /  Alb  2.4<L>  /  TBili  4.4<H>  /  DBili  3.9<H>  /  AST  68<H>  /  ALT  46<H>  /  AlkPhos  153<H>    TPro  x   /  Alb  x   /  TBili  3.1<H>  /  DBili  x   /  AST  x   /  ALT  x   /  AlkPhos  x       PTT - ( 2023 06:04 )  PTT:31.3 sec      Urinalysis Basic - ( 2023 12:10 )    Color: Dark Yellow / Appearance: Cloudy / S.025 / pH: x  Gluc: x / Ketone: Trace mg/dL  / Bili: Large / Urobili: 4.0 mg/dL   Blood: x / Protein: 100 mg/dL / Nitrite: Positive   Leuk Esterase: Trace / RBC: 17 /HPF / WBC 0 /HPF   Sq Epi: x / Non Sq Epi: 3 /HPF / Bacteria: Negative /HPF          CAPILLARY BLOOD GLUCOSE          Culture - Blood (collected 23 @ 13:15)  Source: .Blood Blood-Peripheral  Preliminary Report (23 @ 17:02):    No growth at 24 hours    Culture - Blood (collected 23 @ 13:00)  Source: .Blood Blood-Peripheral  Preliminary Report (23 @ 17:02):    No growth at 24 hours        RADIOLOGY & ADDITIONAL TESTS:     ***************************************************************  Antoine Martinez, PGY1  Internal Medicine   TEAMS Preferred  ***************************************************************    ALVARO CEJA  68y Male  MRN: [id number]  23 (4d)    Patient is a 68y old  Male who presents with a chief complaint of Weakness     (2023 11:25)      SUBJECTIVE / OVERNIGHT EVENTS:   No acute overnight events.   Patient seen and examined at bedside this AM.  Denies any CP, SOB, N/V, constipation, diarrhea, abdominal pain, dysuria, fever, chills, or headaches.     12 Point ROS negative with the exception of the above    MEDICATIONS  (STANDING):  amLODIPine   Tablet 5 milliGRAM(s) Oral daily  aspirin enteric coated 81 milliGRAM(s) Oral daily  enoxaparin Injectable 70 milliGRAM(s) SubCutaneous every 12 hours  folic acid 1 milliGRAM(s) Oral daily  pantoprazole    Tablet 40 milliGRAM(s) Oral before breakfast  piperacillin/tazobactam IVPB.. 3.375 Gram(s) IV Intermittent every 8 hours  tamsulosin 0.4 milliGRAM(s) Oral at bedtime    MEDICATIONS  (PRN):      OBJECTIVE:  Vital Signs Last 24 Hrs  T(C): 37.4 (15 Nov 2023 06:00), Max: 37.4 (15 Nov 2023 06:00)  T(F): 99.3 (15 Nov 2023 06:00), Max: 99.3 (15 Nov 2023 06:00)  HR: 105 (15 Nov 2023 06:00) (88 - 105)  BP: 101/60 (15 Nov 2023 06:00) (100/58 - 104/56)  BP(mean): --  RR: 18 (15 Nov 2023 06:00) (18 - 18)  SpO2: 97% (15 Nov 2023 06:00) (97% - 99%)    Parameters below as of 15 Nov 2023 06:00  Patient On (Oxygen Delivery Method): room air        I&O's Summary    2023 07:01  -  2023 07:00  --------------------------------------------------------  IN: 380 mL / OUT: 800 mL / NET: -420 mL    2023 07:01  -  15 2023 06:28  --------------------------------------------------------  IN: 125 mL / OUT: 250 mL / NET: -125 mL        PHYSICAL EXAM:  GENERAL: Laying comfortably, NAD  HEENT: NCAT, PERRLA, EOMI, no scleral icterus, no LAD  NECK: No JVD, supple  LUNG: CTABL; No wheezes, crackles, or rhonchi  HEART: RRR; normal S1/S2; No murmurs, rubs, or gallops  ABDOMEN: +BS, soft, nontender, nondistended, no HSM; No rebound, guarding, or rigidity  EXTREMITIES:  No LE edema b/l, 2+ Peripheral Pulses, No clubbing or cyanosis  NEUROLOGY: AOx3, non-focal, strength 5/5 in all extremities, sensation intact  PSYCH: calm and cooperative  SKIN: No rashes or lesions    LABS:                        7.2    20.73 )-----------( 374      ( 2023 06:04 )             21.7     Auto Eosinophil # 0.05  / Auto Eosinophil % 0.2   / Auto Neutrophil # 16.79 / Auto Neutrophil % 80.0  / BANDS % x        -14    139  |  103  |  12  ----------------------------<  116<H>  3.5   |  25  |  1.30  13    141  |  105  |  12  ----------------------------<  94  3.4<L>   |  22  |  1.16    Ca    8.1<L>      2023 06:04  Ca    8.0<L>      2023 06:09  Phos  2.5       Mg     2.00         TPro  7.0  /  Alb  2.4<L>  /  TBili  4.4<H>  /  DBili  3.9<H>  /  AST  68<H>  /  ALT  46<H>  /  AlkPhos  153<H>    TPro  x   /  Alb  x   /  TBili  3.1<H>  /  DBili  x   /  AST  x   /  ALT  x   /  AlkPhos  x       PTT - ( 2023 06:04 )  PTT:31.3 sec      Urinalysis Basic - ( 2023 12:10 )    Color: Dark Yellow / Appearance: Cloudy / S.025 / pH: x  Gluc: x / Ketone: Trace mg/dL  / Bili: Large / Urobili: 4.0 mg/dL   Blood: x / Protein: 100 mg/dL / Nitrite: Positive   Leuk Esterase: Trace / RBC: 17 /HPF / WBC 0 /HPF   Sq Epi: x / Non Sq Epi: 3 /HPF / Bacteria: Negative /HPF          CAPILLARY BLOOD GLUCOSE          Culture - Blood (collected 23 @ 13:15)  Source: .Blood Blood-Peripheral  Preliminary Report (23 @ 17:02):    No growth at 24 hours    Culture - Blood (collected 23 @ 13:00)  Source: .Blood Blood-Peripheral  Preliminary Report (23 @ 17:02):    No growth at 24 hours        RADIOLOGY & ADDITIONAL TESTS:    < from: MR MRCP w/wo IV Cont (23 @ 18:24) >  LOWER CHEST: Within normal limits.    LIVER: 2 centrally T2 hyperintense lesion with peripheral enhancement,   surrounding parenchymal hyperemia, and edema consistent with abscess,   increased in size compared to prior exam. These measure as follows:  *  Segment 7/8 lesion measuring 7.0 x 6.7 x 6.9 cm  *  Segment 2 lesion measuring 5.0 x 4.6 x 5.3 cm.  BILE DUCTS: Mild intrahepatic biliary ductal dilatation.  GALLBLADDER: Cholelithiasis.  SPLEEN: Within normal limits.  PANCREAS: Within normal limits.  ADRENALS: Within normal limits.  KIDNEYS/URETERS: No hydronephrosis. Bilateral renal cysts.    VISUALIZED PORTIONS:  BOWEL: Colonic diverticulosis.  PERITONEUM: No ascites.  VESSELS: Expansile, nonenhancing filling defect again noted in the main,   right, and left portal veins. The portosplenic confluence, splenic vein,   and SMV are patent.  RETROPERITONEUM/LYMPH NODES: No lymphadenopathy.  ABDOMINAL WALL: Within normal limits.  BONES: Degenerative changes.    IMPRESSION:  Left and right hepatic lobe liver abscesses, increased in size since   prior CT scan 2023.    [place] thrombus involving the right, left, and main portal veins.    --- End of Report ---    < end of copied text >       ***************************************************************  Antoine Martinez, PGY1  Internal Medicine   TEAMS Preferred  ***************************************************************    ALVARO CEJA  68y Male  MRN: [id number]  23 (4d)    Patient is a 68y old  Male who presents with a chief complaint of Weakness     (2023 11:25)      SUBJECTIVE / OVERNIGHT EVENTS:   No acute overnight events.   Patient seen and examined at bedside this AM. Continues to have Hicups no more SOB.   Denies any CP, SOB, N/V, constipation, diarrhea, abdominal pain, dysuria, fever, chills, or headaches.     12 Point ROS negative with the exception of the above    MEDICATIONS  (STANDING):  amLODIPine   Tablet 5 milliGRAM(s) Oral daily  aspirin enteric coated 81 milliGRAM(s) Oral daily  enoxaparin Injectable 70 milliGRAM(s) SubCutaneous every 12 hours  folic acid 1 milliGRAM(s) Oral daily  pantoprazole    Tablet 40 milliGRAM(s) Oral before breakfast  piperacillin/tazobactam IVPB.. 3.375 Gram(s) IV Intermittent every 8 hours  tamsulosin 0.4 milliGRAM(s) Oral at bedtime    MEDICATIONS  (PRN):      OBJECTIVE:  Vital Signs Last 24 Hrs  T(C): 37.4 (15 Nov 2023 06:00), Max: 37.4 (15 Nov 2023 06:00)  T(F): 99.3 (15 Nov 2023 06:00), Max: 99.3 (15 Nov 2023 06:00)  HR: 105 (15 Nov 2023 06:00) (88 - 105)  BP: 101/60 (15 Nov 2023 06:00) (100/58 - 104/56)  BP(mean): --  RR: 18 (15 Nov 2023 06:00) (18 - 18)  SpO2: 97% (15 Nov 2023 06:00) (97% - 99%)    Parameters below as of 15 Nov 2023 06:00  Patient On (Oxygen Delivery Method): room air        I&O's Summary    2023 07:01  -  2023 07:00  --------------------------------------------------------  IN: 380 mL / OUT: 800 mL / NET: -420 mL    2023 07:01  -  15 2023 06:28  --------------------------------------------------------  IN: 125 mL / OUT: 250 mL / NET: -125 mL        PHYSICAL EXAM:  GENERAL: Laying comfortably, NAD, hicupping  HEENT: NCAT, PERRLA, EOMI, no scleral icterus, no LAD  NECK: No JVD, supple  LUNG: CTABL; No wheezes, crackles, or rhonchi  HEART: RRR; normal S1/S2; No murmurs, rubs, or gallops  ABDOMEN: +BS, soft, nontender, nondistended, no HSM; No rebound, guarding, or rigidity  EXTREMITIES:  No LE edema b/l, 2+ Peripheral Pulses, No clubbing or cyanosis  NEUROLOGY: AOx3, non-focal, strength 5/5 in all extremities, sensation intact    LABS:                        7.2    20.73 )-----------( 374      ( 2023 06:04 )             21.7     Auto Eosinophil # 0.05  / Auto Eosinophil % 0.2   / Auto Neutrophil # 16.79 / Auto Neutrophil % 80.0  / BANDS % x        14    139  |  103  |  12  ----------------------------<  116<H>  3.5   |  25  |  1.30      141  |  105  |  12  ----------------------------<  94  3.4<L>   |  22  |  1.16    Ca    8.1<L>      2023 06:04  Ca    8.0<L>      2023 06:09  Phos  2.5       Mg     2.00         TPro  7.0  /  Alb  2.4<L>  /  TBili  4.4<H>  /  DBili  3.9<H>  /  AST  68<H>  /  ALT  46<H>  /  AlkPhos  153<H>    TPro  x   /  Alb  x   /  TBili  3.1<H>  /  DBili  x   /  AST  x   /  ALT  x   /  AlkPhos  x       PTT - ( 2023 06:04 )  PTT:31.3 sec      Urinalysis Basic - ( 2023 12:10 )    Color: Dark Yellow / Appearance: Cloudy / S.025 / pH: x  Gluc: x / Ketone: Trace mg/dL  / Bili: Large / Urobili: 4.0 mg/dL   Blood: x / Protein: 100 mg/dL / Nitrite: Positive   Leuk Esterase: Trace / RBC: 17 /HPF / WBC 0 /HPF   Sq Epi: x / Non Sq Epi: 3 /HPF / Bacteria: Negative /HPF          CAPILLARY BLOOD GLUCOSE          Culture - Blood (collected 23 @ 13:15)  Source: .Blood Blood-Peripheral  Preliminary Report (23 @ 17:02):    No growth at 24 hours    Culture - Blood (collected 23 @ 13:00)  Source: .Blood Blood-Peripheral  Preliminary Report (23 @ 17:02):    No growth at 24 hours        RADIOLOGY & ADDITIONAL TESTS:    MR MRCP w/wo IV Cont (23 @ 18:24) >  LOWER CHEST: Within normal limits.    LIVER: 2 centrally T2 hyperintense lesion with peripheral enhancement,   surrounding parenchymal hyperemia, and edema consistent with abscess,   increased in size compared to prior exam. These measure as follows:  *  Segment 7/8 lesion measuring 7.0 x 6.7 x 6.9 cm  *  Segment 2 lesion measuring 5.0 x 4.6 x 5.3 cm.  BILE DUCTS: Mild intrahepatic biliary ductal dilatation.  GALLBLADDER: Cholelithiasis.  SPLEEN: Within normal limits.  PANCREAS: Within normal limits.  ADRENALS: Within normal limits.  KIDNEYS/URETERS: No hydronephrosis. Bilateral renal cysts.    VISUALIZED PORTIONS:  BOWEL: Colonic diverticulosis.  PERITONEUM: No ascites.  VESSELS: Expansile, nonenhancing filling defect again noted in the main,   right, and left portal veins. The portosplenic confluence, splenic vein,   and SMV are patent.  RETROPERITONEUM/LYMPH NODES: No lymphadenopathy.  ABDOMINAL WALL: Within normal limits.  BONES: Degenerative changes.    IMPRESSION:  Left and right hepatic lobe liver abscesses, increased in size since   prior CT scan 2023.    [place] thrombus involving the right, left, and main portal veins.

## 2023-11-15 NOTE — CONSULT NOTE ADULT - SUBJECTIVE AND OBJECTIVE BOX
HPI:  68M PMH HTN, BPH, prostate cancer s/p prostatectomy 2012, diverticulosis presenting with 5 weeks of generalized weakness, 25 lb weight loss, and poor PO intake. Per pt, started having cold sensation as well as shivers 5 weeks ago after which his PMD trailed ibuprofen and abx for possible UTI. Patients symptoms persisted and PMD felt presentation was consistent with malaria (traveled to [facility] 1 year ago, unclear if malaria blood work was performed) after which pt was treated with mefloquine 5 tablets PO 5 days ago. Patient endorsed that his shivers and chills resolved but he continued to have weight loss and poor PO intake. In ED patient was afebrile and hemodynamically stable. Labs were significant for leukocytosis to 25 with elevated Tbili and LFTs. Blood parasite screen was negative. CT imaging was obtained which showed a new indeterminate right hepatic lesion concerning for malignancy as well as an expansile filling defect in the right portal, left portal and main hepatic veins. MRI obtained, now favoring liver abscess with intrahepatic duct dilation. GI called for further evaluation.      Allergies:  No Known Allergies        Hospital Medications:  amLODIPine   Tablet 5 milliGRAM(s) Oral daily  aspirin enteric coated 81 milliGRAM(s) Oral daily  baclofen 5 milliGRAM(s) Oral every 12 hours PRN  enoxaparin Injectable 70 milliGRAM(s) SubCutaneous every 12 hours  folic acid 1 milliGRAM(s) Oral daily  pantoprazole    Tablet 40 milliGRAM(s) Oral before breakfast  piperacillin/tazobactam IVPB.. 3.375 Gram(s) IV Intermittent every 8 hours  tamsulosin 0.4 milliGRAM(s) Oral at bedtime      PMHX/PSHX:  HTN (hypertension)    BPH (benign prostatic hypertrophy)    Prostate cancer    H/O endoscopy    H/O colonoscopy    H/O prostate biopsy        Family history:  No pertinent family history in first degree relatives        Social History: no smoking    ROS:   General:  No fevers, chills or night sweats  ENT:  No sore throat or dysphagia  CV:  No pain or palpitations  Resp:  No dyspnea, cough or  wheezing  GI:  as above  Skin:  No rash or edema  Neuro: no weakness   Hematologic: no bleeding  Musculoskeletal: no muscle pain or join pain  Psych: no agitation     : no dysuria      PHYSICAL EXAM:   GENERAL:  NAD, Appears stated age  HEENT:  NC/AT,  conjunctivae clear and pink, sclera -anicteric  CHEST:  CTA B/L, Normal effort  HEART:  RRR S1/S2,  ABDOMEN:  Soft, non-tender, non-distended,  no masses   EXTREMITIES:  No cyanosis or Edema  SKIN:  Warm & Dry. No rash or erythema  NEURO:  Alert, oriented, no focal deficit    Vital Signs:  Vital Signs Last 24 Hrs  T(C): 37.4 (15 Nov 2023 06:00), Max: 37.4 (15 Nov 2023 06:00)  T(F): 99.3 (15 Nov 2023 06:00), Max: 99.3 (15 Nov 2023 06:00)  HR: 105 (15 Nov 2023 06:00) (88 - 105)  BP: 101/60 (15 Nov 2023 06:00) (100/58 - 104/56)  BP(mean): --  RR: 18 (15 Nov 2023 06:00) (18 - 18)  SpO2: 97% (15 Nov 2023 06:00) (97% - 99%)    Parameters below as of 15 Nov 2023 06:00  Patient On (Oxygen Delivery Method): room air      Daily     Daily     LABS:                        7.1    20.20 )-----------( 393      ( 15 Nov 2023 06:00 )             22.2     Mean Cell Volume: 92.9 fL (11-15-23 @ 06:00)    11-15    138  |  102  |  13  ----------------------------<  95  3.5   |  24  |  1.32<H>    Ca    8.3<L>      15 Nov 2023 06:00  Phos  2.9     11-15  Mg     2.00     11-15    TPro  6.7  /  Alb  2.2<L>  /  TBili  2.8<H>  /  DBili  2.6<H>  /  AST  57<H>  /  ALT  45<H>  /  AlkPhos  141<H>  11-15    LIVER FUNCTIONS - ( 15 Nov 2023 06:00 )  Alb: 2.2 g/dL / Pro: 6.7 g/dL / ALK PHOS: 141 U/L / ALT: 45 U/L / AST: 57 U/L / GGT: x           PTT - ( 14 Nov 2023 06:04 )  PTT:31.3 sec  Urinalysis Basic - ( 15 Nov 2023 06:00 )    Color: x / Appearance: x / SG: x / pH: x  Gluc: 95 mg/dL / Ketone: x  / Bili: x / Urobili: x   Blood: x / Protein: x / Nitrite: x   Leuk Esterase: x / RBC: x / WBC x   Sq Epi: x / Non Sq Epi: x / Bacteria: x                              7.1    20.20 )-----------( 393      ( 15 Nov 2023 06:00 )             22.2                         7.2    20.73 )-----------( 374      ( 14 Nov 2023 06:04 )             21.7                         7.2    23.74 )-----------( 379      ( 13 Nov 2023 06:09 )             22.4     Imaging:  < from: MR MRCP w/wo IV Cont (11.14.23 @ 18:24) >  LIVER: 2 centrally T2 hyperintense lesion with peripheral enhancement,   surrounding parenchymal hyperemia, and edema consistent with abscess,   increased in size compared to prior exam. These measure as follows:  *  Segment 7/8 lesion measuring 7.0 x 6.7 x 6.9 cm  *  Segment 2 lesion measuring 5.0 x 4.6 x 5.3 cm.  BILE DUCTS: Mild intrahepatic biliary ductal dilatation.  GALLBLADDER: Cholelithiasis.  SPLEEN: Within normal limits.  PANCREAS: Within normal limits.  ADRENALS: Within normal limits.  KIDNEYS/URETERS: No hydronephrosis. Bilateral renal cysts.    VISUALIZED PORTIONS:  BOWEL: Colonic diverticulosis.  PERITONEUM: No ascites.  VESSELS: Expansile, nonenhancing filling defect again noted in the main,   right, and left portal veins. The portosplenic confluence, splenic vein,   and SMV are patent.  RETROPERITONEUM/LYMPH NODES: No lymphadenopathy.  ABDOMINAL WALL: Within normal limits.  BONES: Degenerative changes.    IMPRESSION:  Left and right hepatic lobe liver abscesses, increased in size since   prior CT scan 11/11/2023.    [place] thrombus involving the right, left, and main portal veins.    < end of copied text >

## 2023-11-15 NOTE — PROGRESS NOTE ADULT - PROBLEM SELECTOR PLAN 2
Recent hx weight loss, fatigue and poor PO intake  CT evidence of new R hepatic lesion with possible portal vein thrombus, labs c/f biliary obstruction with increased Tbili and LFTs, leukocytosis  Hx of prostate cancer s/p prostatectomy, does not follow with Urology or Onc  Recent hx of ? malaria although parasite smear negative   CT chest without evidence of malignancy or metastasis  CEA elevated, CA 19-9 elevated, iron studies suggest anemia of chronic disease, LFTs worsening    Plan  - f/u AFP  - f/u MRCP w/wo IV contrast  - will reach out IR for possible liver biopsy  - continue to monitor for now Recent hx weight loss, fatigue and poor PO intake  CT evidence of new R hepatic lesion with possible portal vein thrombus, labs c/f biliary obstruction with increased Tbili and LFTs, leukocytosis  Hx of prostate cancer s/p prostatectomy, does not follow with Urology or Onc  CT chest without evidence of malignancy or metastasis   CEA elevated, CA 19-9 elevated, iron studies suggest anemia of chronic disease, LFTs worsening  MRCP with hepatic abscess 7x6.7x6.9cm and 5x4.6x5.3, mild intrahepatic biliary duct dilation, increasing size  Likely source     Plan  - ERCP planned for tomorrow with GI  - NPO at midnight  - hold lovenox transition to IV heparin with hold at 3am  - will reach out IR for possible liver biopsy on Friday although need to hold Heparin

## 2023-11-15 NOTE — PROGRESS NOTE ADULT - PROBLEM SELECTOR PLAN 5
Cr 1.46 on admission, b/l 1.2-1.5  Likely chronic    Continue to monitor c/w home amlodipine  - c/w home ASA 81

## 2023-11-15 NOTE — PROGRESS NOTE ADULT - ATTENDING COMMENTS
69 yo M PMH HTN, BPH, prostate cancer s/p prostatectomy 2012, diverticulosis presenting with 5 weeks of generalized weakness, 25 lb weight loss, and poor PO intake and subjective chills. + travel to [place] in 2021 returned in 2021. asymptomatic until 5 weeks ago. + treated for malaria. + hiccpus + recent travel to [place] febrile 101 11/13; RVP neg; 11/11 initial UA neg; repeat UA + w/o overt symptoms;  Ucx Neg    Hepatic lesion- wt loss and decreased PO intake concern for malignancy/abscess                        - ID appreciated low suspicion for malaria.                       - Ct R heaptic lesion w/ mild intrahepatic ductal dilatation : CT chest w/o overt PNA or metastatic disease                       - CEA mildly elevated; hep panel neg;  129 elevated; procal elevated; AFP low                         - monitor LFTs TB downtrending and alk phos and AST/ALT dontrending                        -  MRCP with Lt and RT hepatic liver abscess increased since and bland thrombosis                         -  hiccups poss from Diaphragmatic irration- s/p baclofen improved                         - c/w zosyn                       - check e histolytica and strongyloides in lab and stool O/P                        -  NPO past MN for poss ERCP; IR c/s for hepatic abscess drainage  Portal vein thrombus- CT expansile filling defect in the right portal, left portal and main hepatic veins, which may represent tumor thrombus versus bland thrombus. US doppler occlusive thrombus main and Lt portal veins with avascular thrombus. MRI as above RT and Lt and main portal vein thrombosis                          -  on lovenox Hold AC past MN   elevated Cr- poss poor PO intake                   - now improved  s/p fluids                    - monitor Cr               Anemia- no overt GIB; fe studies consistent with AOCD

## 2023-11-15 NOTE — CONSULT NOTE ADULT - ATTENDING COMMENTS
68 year old man with hx of HTN, BPH, prostate cancer s/p prostatectomy 2012, diverticulosis presents with generalized weakness, cold sensation, 25 lb weight loss, and poor PO intake. Initially some concern for Malaria, but per ID - low suspicion as travel was not recent. Found to have liver lesions on CT initially concerned for masses, but subsequent MRI concerning for liver abscesses. DDx includes cholangitis given abnormal bili/liver enzymes and intrahepatic biliary dilation on MRI. Also with portal vein thrombosis (on Lovenox). Would benefit from ERCP for possible stent placement with concurrent antibiotics. Unable to perform today given patient has eaten and is on AC. Recommend continue antibiotics and keep NPO @ [facility] for ERCP tomorrow. D/w ID attending. 68 year old man with hx of HTN, BPH, prostate cancer s/p prostatectomy 2012, diverticulosis presents with generalized weakness, cold sensation, 25 lb weight loss, and poor PO intake. Initially some concern for Malaria, but per ID - low suspicion as travel was not recent. Found to have liver lesions on CT initially concerned for masses, but subsequent MRI concerning for liver abscesses. DDx includes cholangitis given abnormal bili/liver enzymes and intrahepatic biliary dilation on MRI. Also with portal vein thrombosis (on Lovenox). Would benefit from ERCP for possible stent placement with concurrent antibiotics. Unable to perform today given patient has eaten and is on AC. Recommend continue antibiotics, switch to Heparin ggt and keep NPO @ [place] for possible ERCP tomorrow. D/w ID attending. 68 year old man with hx of HTN, BPH, prostate cancer s/p prostatectomy 2012, diverticulosis presents with generalized weakness, cold sensation, 25 lb weight loss, and poor PO intake. Initially some concern for Malaria, but per ID - low suspicion as travel was not recent. Found to have liver lesions on CT initially concerned for masses, but subsequent MRI concerning for liver abscesses. DDx includes cholangitis given abnormal bili/liver enzymes and intrahepatic biliary dilation on MRI. Also with portal vein thrombosis (on Lovenox). Would benefit from ERCP for possible stent placement with concurrent antibiotics. Unable to perform today given patient has eaten and is on AC. Recommend continue antibiotics and switch to Heparin ggt. Agree with IR drainage of abscess and send for culture. Keep NPO @ [facility] for possible ERCP tomorrow. D/w ID attending. 68 year old man with hx of HTN, BPH, prostate cancer s/p prostatectomy 2012, diverticulosis presents with generalized weakness, cold sensation, 25 lb weight loss, and poor PO intake. Initially some concern for Malaria, but per ID - low suspicion as travel was not recent. Found to have liver lesions on CT initially concerned for masses, but subsequent MRI concerning for liver abscesses. DDx includes cholangitis given abnormal bili/liver enzymes and intrahepatic biliary dilation on MRI. Also with portal vein thrombosis (on Lovenox).  Recommend continue antibiotics and switch to Heparin ggt. Agree with IR drainage of abscess & send for culture as well as work up for Amebic liver abscess.  D/w ID attending. Pending clinical course we will determine need for ERCP.

## 2023-11-15 NOTE — PROGRESS NOTE ADULT - ASSESSMENT
This is a 69 y/o M w/ PMHx of HTN, BPH, prostate CA s/p prostatectomy 2012, diverticulosis who is presenting to [facility] on 11/14/23 for fevers a for 5 weeks, seen by his PMD, started on ibuprofen/antibiotic for possible UTI, however given persistence of fevers, there was a concern for malaria, and patient was given Mefloquine 5 days PTA. In the ED, pt was febrile with leukocytosis to 24. CT A/P with findings of 3.8 x 3.1 cm lobulated hypodense R hepatic lobe lesion, filling defect in R/L portal, main hepatic vein.   Pt was initially started on Vancomycin/Zosyn, ID was consulted for further recommendations.     #Liver abscess   #Fevers  #Leukocytosis   #R hepatic lobe lesion c/f malignancy   #Hepatic vein thrombosis     Overall, 69 yo M PMH HTN, BPH, prostate cancer s/p prostatectomy 2012, diverticulosis presenting for fevers/weight loss, found to have R hepatic lesions and hepatic vein thrombosis, c/f malignancy. ID consulted for SIRS w/o clear source.   MRCP w/ findings of liver abscess, IG consulted, pending ERCP     Plan:   1. C/w Zosyn for liver abscess  2. Send E histolytica AB  3. Will discuss with GI re ERCP and need for IR drainage of abscess     Thank you for this consult. Inpatient ID team will follow.    Ubaldo Marr M.D.  Attending Physician  Division of Infectious Diseases  Department of Medicine    Please contact through MS Teams message.  Office: [phone] (after 5 PM or weekend). This is a 69 y/o M w/ PMHx of HTN, BPH, prostate CA s/p prostatectomy 2012, diverticulosis who is presenting to [facility] on 11/14/23 for fevers a for 5 weeks, seen by his PMD, started on ibuprofen/antibiotic for possible UTI, however given persistence of fevers, there was a concern for malaria, and patient was given Mefloquine 5 days PTA. In the ED, pt was febrile with leukocytosis to 24. CT A/P with findings of 3.8 x 3.1 cm lobulated hypodense R hepatic lobe lesion, filling defect in R/L portal, main hepatic vein.   Pt was initially started on Vancomycin/Zosyn, ID was consulted for further recommendations.     #Liver abscess   #Fevers  #Leukocytosis   #R hepatic lobe lesion c/f malignancy   #Hepatic vein thrombosis     Overall, 67 yo M PMH HTN, BPH, prostate cancer s/p prostatectomy 2012, diverticulosis presenting for fevers/weight loss, found to have R hepatic lesions and hepatic vein thrombosis, c/f malignancy. ID consulted for SIRS w/o clear source.   MRCP w/ findings of liver abscess, IG consulted, pending ERCP     Plan:   1. C/w Zosyn for liver abscess  2. Send E histolytica AB  3. GI planning on ERCP   4. IR consult for liver abscess, please send bacterial Cx,  5. Send stool O&P     Thank you for this consult. Inpatient ID team will follow.    Ubaldo Marr M.D.  Attending Physician  Division of Infectious Diseases  Department of Medicine    Please contact through MS Teams message.  Office: [phone] (after 5 PM or weekend).

## 2023-11-15 NOTE — CONSULT NOTE ADULT - ATTENDING COMMENTS
-------------------------------------------------------------------------------------------------  Patient seen and examined on rounds on 11/15/23 with [facility] [place] Dr. Amber Ren and Dr. Brian Salcido  Briefly he is a 67 yo male admitted with weakness, fevers noted to have abnormal liver imaging including abscesses and portal/hepatic vein thromboses.  - continue heparin drip and transition to DOAC; short course of anticoagulation given inciting cause of intra-abdominal infection/liver abscesses; no role of hypercoaguable work-up at this time  - patient reported travel to [facility] about 2 months prior to coming to the ER. -------------------------------------------------------------------------------------------------  Patient seen and examined on rounds on 11/15/23 with [facility] [place] Dr. Amber Ren and Dr. Brian Salcido  Briefly he is a 69 yo male admitted with weakness, fevers noted to have abnormal liver imaging including abscesses and portal/hepatic vein thromboses.  - continue heparin drip and transition to DOAC; short course of anticoagulation given inciting cause of intra-abdominal infection/liver abscesses; no role of hypercoaguable work-up at this time  - patient reported travel to [facility] about 2 months prior to coming to the ER; would defer to primary team/ID service whether his travel exposure could have put him at risk for infectious complications of the liver.

## 2023-11-15 NOTE — PROGRESS NOTE ADULT - PROBLEM SELECTOR PLAN 1
WBC 25K, spiked fever to 101.1  Previously treated for ? malaria, possibly 2/2 to malignancy vs malaria flare vs infection of unclear etiology  Apr ID recs, less likely infectious more likely malignancy vs thrombus    Plan  - c/w Zosyn  - f/u MRCP read  - f/u BCx, UCx, Sputum Cx, RVP, Legionella, Strep, Quant WBC 25K, spiked fever to 101.1  Previously treated for ? malaria, possibly 2/2 to malignancy vs malaria flare vs infection of unclear etiology  Apr ID recs, less likely infectious more likely malignancy vs thrombus  May be 2/2 to hepatic abscess    Plan  - c/w Zosyn  - f/u BCx, UCx  - f/u Entamoeba Ab, Stool Ova and Parasites

## 2023-11-15 NOTE — PROGRESS NOTE ADULT - PROBLEM SELECTOR PLAN 6
F: IVF  E: replete PRN  N: Regular  A: OOBAT    DVT ppx: full AC  GI ppx: Protonix 40mg qD    Code status: Full code  Dispo: active Cr 1.46 on admission, b/l 1.2-1.5  Likely chronic    Continue to monitor

## 2023-11-15 NOTE — PROGRESS NOTE ADULT - SUBJECTIVE AND OBJECTIVE BOX
Infectious Diseases Follow Up:    Patient is a 68y old  Male who presents with a chief complaint of Weakness     (13 Nov 2023 11:25)      Interval History/ROS:  No acute changes, feeling okay     Allergies  No Known Allergies        ANTIMICROBIALS:  piperacillin/tazobactam IVPB.. 3.375 every 8 hours      Current Abx:     Previous Abx     OTHER MEDS:  MEDICATIONS  (STANDING):  amLODIPine   Tablet 5 daily  aspirin enteric coated 81 daily  baclofen 5 every 12 hours PRN  enoxaparin Injectable 70 every 12 hours  pantoprazole    Tablet 40 before breakfast  tamsulosin 0.4 at bedtime      Vital Signs Last 24 Hrs  T(C): 37.4 (15 Nov 2023 06:00), Max: 37.4 (15 Nov 2023 06:00)  T(F): 99.3 (15 Nov 2023 06:00), Max: 99.3 (15 Nov 2023 06:00)  HR: 105 (15 Nov 2023 06:00) (88 - 105)  BP: 101/60 (15 Nov 2023 06:00) (100/58 - 104/56)  BP(mean): --  RR: 18 (15 Nov 2023 06:00) (18 - 18)  SpO2: 97% (15 Nov 2023 06:00) (97% - 99%)    Parameters below as of 15 Nov 2023 06:00  Patient On (Oxygen Delivery Method): room air        PHYSICAL EXAM:  GENERAL: NAD, well-developed  HEAD:  Atraumatic, Normocephalic  EYES: EOMI, PERRLA, conjunctiva and sclera clear  NECK: Supple, No JVD  CHEST/LUNG: Clear to auscultation bilaterally; No wheeze  HEART: Regular rate and rhythm; No murmurs, rubs, or gallops  ABDOMEN: Soft, Nontender, Nondistended; Bowel sounds present  EXTREMITIES:  2+ Peripheral Pulses, No clubbing, cyanosis, or edema  PSYCH: AAOx3  NEUROLOGY: non-focal  SKIN: No rashes or lesions                          7.1    20.20 )-----------( 393      ( 15 Nov 2023 06:00 )             22.2       11-15    138  |  102  |  13  ----------------------------<  95  3.5   |  24  |  1.32<H>    Ca    8.3<L>      15 Nov 2023 06:00  Phos  2.9     11-15  Mg     2.00     11-15    TPro  6.7  /  Alb  2.2<L>  /  TBili  2.8<H>  /  DBili  2.6<H>  /  AST  57<H>  /  ALT  45<H>  /  AlkPhos  141<H>  11-15      Urinalysis Basic - ( 15 Nov 2023 06:00 )    Color: x / Appearance: x / SG: x / pH: x  Gluc: 95 mg/dL / Ketone: x  / Bili: x / Urobili: x   Blood: x / Protein: x / Nitrite: x   Leuk Esterase: x / RBC: x / WBC x   Sq Epi: x / Non Sq Epi: x / Bacteria: x        MICROBIOLOGY:  v  Clean Catch Clean Catch (Midstream)  11-14-23   No growth  --  --      .Blood Blood-Peripheral  11-13-23   No growth at 24 hours  --  --      .Blood Blood-Peripheral  11-13-23   No growth at 24 hours  --  --      .Blood  11-11-23   No Blood Parasites observed by giemsa stain  One negative set of blood smears does not rule out  the possibility of a parasitic infection.  A minimum of 3  specimens should be collected, at least 12-24 hours apart,  over a 36 hour time period.  ************************************************************  NEGATIVE for Plasmodium antigens. Microscopy is performed for  confirmation.  The Malaria Rapid antigen test does not detect the  presence of Babesia species. If Babesiosis is suspected  please order test Babesia PCR: Babesia microti PCR Bld  ************************************************************  --  --          Rapid RVP Result: NotDetec (11-14 @ 10:40)        RADIOLOGY:  < from: MR MRCP w/wo IV Cont (11.14.23 @ 18:24) >  Limited by motion artifact.    LOWER CHEST: Within normal limits.    LIVER: 2 centrally T2 hyperintense lesion with peripheral enhancement,   surrounding parenchymal hyperemia, and edema consistent with abscess,   increased in size compared to prior exam. These measure as follows:  *  Segment 7/8 lesion measuring 7.0 x 6.7 x 6.9 cm  *  Segment 2 lesion measuring 5.0 x 4.6 x 5.3 cm.  BILE DUCTS: Mild intrahepatic biliary ductal dilatation.  GALLBLADDER: Cholelithiasis.  SPLEEN: Within normal limits.  PANCREAS: Within normal limits.  ADRENALS: Within normal limits.  KIDNEYS/URETERS: No hydronephrosis. Bilateral renal cysts.    VISUALIZED PORTIONS:  BOWEL: Colonic diverticulosis.  PERITONEUM: No ascites.  VESSELS: Expansile, nonenhancing filling defect again noted in the main,   right, and left portal veins. The portosplenic confluence, splenic vein,   and SMV are patent.  RETROPERITONEUM/LYMPH NODES: No lymphadenopathy.  ABDOMINAL WALL: Within normal limits.  BONES: Degenerative changes.    IMPRESSION:  Left and right hepatic lobe liver abscesses, increased in size since   prior CT scan 11/11/2023.    [place] thrombus involving the right, left, and main portal veins.    < end of copied text >

## 2023-11-15 NOTE — CONSULT NOTE ADULT - SUBJECTIVE AND OBJECTIVE BOX
Interventional Radiology    Evaluate for Procedure: hepatic abscess drainage    HPI: 68y Male with prostate cancer s/p prostatectomy 2012, presenting with 5 weeks of generalized weakness, 25 lb weight loss, and poor PO intake, recently treated for suspected malaria (unclear if confirmed with blood work). CT 11/11 with right hepatic lobe lesion and portal venous thrombosis c/f malignancy. On MRI 11/14 right and left hepatic lobe lesions are more suggestive of hepatic abscess. Pt with leukocytosis and negative blood cultures.    Allergies: No Known Allergies    Medications (Abx/Cardiac/Anticoagulation/Blood Products)    amLODIPine   Tablet: 5 milliGRAM(s) Oral (11-15 @ 14:02)  aspirin enteric coated: 81 milliGRAM(s) Oral (11-15 @ 12:49)  enoxaparin Injectable: 70 milliGRAM(s) SubCutaneous (11-15 @ 06:22)  piperacillin/tazobactam IVPB.: 200 mL/Hr IV Intermittent (11-13 @ 23:22)  piperacillin/tazobactam IVPB.-: 25 mL/Hr IV Intermittent (11-14 @ 07:03)  piperacillin/tazobactam IVPB.-: 25 mL/Hr IV Intermittent (11-14 @ 11:32)  piperacillin/tazobactam IVPB.-: 25 mL/Hr IV Intermittent (11-14 @ 18:53)  piperacillin/tazobactam IVPB..: 25 mL/Hr IV Intermittent (11-15 @ 14:02)  vancomycin  IVPB: 250 mL/Hr IV Intermittent (11-14 @ 05:53)    Data:    T(C): 36.9  HR: 96  BP: 117/75  RR: 18  SpO2: 97%    -WBC 20.20 / HgB 7.1 / Hct 22.2 / Plt 393  -Na 138 / Cl 102 / BUN 13 / Glucose 95  -K 3.5 / CO2 24 / Cr 1.32  -ALT 45 / Alk Phos 141 / T.Bili 2.8  -INR 1.53 / PTT 35.5    Radiology: CT abd/pelvis 11/11, US  abd duplex 11/11,  MR abd 11/14    Assessment/Plan:   -68y Male with prostate cancer s/p prostatectomy 2012, presenting with 5 weeks of generalized weakness, 25 lb weight loss, and poor PO intake, recently treated for suspected malaria (unclear if confirmed with blood work). CT 11/11 with right hepatic lobe lesion and portal venous thrombosis c/f malignancy. On MRI 11/14 right and left hepatic lobe lesions are more suggestive of hepatic abscess. Pt with leukocytosis and negative blood cultures.      - case reviewed and approved for Friday 11/17  - please place IR procedure order under Dr. Bashir  - STAT labs in AM (cbc,coags, bmp, T&S)  - hold heparin drip for 4-5 hours prior to procedure  - NPO on 11/16 at 11pm  - Please complete IR pre-procedure note  - d/w primary team      Iris Hughes M.D.  PGY3/R2 Radiology Resident    -Available on [employer] TEAMS for all non-urgent questions  -Emergent issues: [facility]-[phone]; [facility]-[phone] ([phone])  -Non-emergent consults: Please place a [facility] order "Consult-Interventional Radiology" with an appropriate callback number  -Scheduling questions: [facility]: [phone]; [facility]: [phone]  -Clinic/Outpatient booking: [facility]: [phone]; [facility]: [phone]

## 2023-11-15 NOTE — CONSULT NOTE ADULT - ASSESSMENT
68M PMH HTN, BPH, prostate cancer s/p prostatectomy 2012, diverticulosis presenting with 5 weeks of generalized weakness, 25 lb weight loss, and poor PO intake. Per pt, started having cold sensation as well as shivers 5 weeks ago after which his PMD trailed ibuprofen and abx for possible UTI. Patients symptoms persisted and PMD felt presentation was consistent with malaria (traveled to [facility] [place] 1 year ago, unclear if malaria blood work was performed) after which pt was treated with mefloquine 5 tablets PO 5 days ago. Patient endorsed that his shivers and chills resolved but he continued to have weight loss and poor PO intake. In ED patient was afebrile and hemodynamically stable. Labs were significant for leukocytosis to 25 with elevated Tbili and LFTs. Blood parasite screen was negative. CT imaging was obtained which showed a new indeterminate right hepatic lesion concerning for malignancy as well as an expansile filling defect in the right portal, left portal and main hepatic veins. MRI obtained, now favoring liver abscess with intrahepatic duct dilation. GI called for further evaluation.      #Hepatic abscess   #Intrahepatic dilation  #cholangitis   -segment 7/8 measuring 7 x 7 cm  -segment 2 measuring 5 x 5 cm    #Main PV and Rt/Lt PV thrombosis  -suspect in setting of infection, would obtain hem input for hypercoagulable work up    Recommendations;  -Plan for ERCP, tentatively tomorrow  -Would need to hold Lovenox PM dose today, okay to keep on heparin drip until 3 am  -NPO after midnight, okay to feed today  -Continue abx  -Consider Hem evaluation for hypercoagulable work up  -rest per primary team    Recommendations preliminary until signed by attending.     Tay Kumar MD  Gastroenterology/Hepatology Fellow  1st option: [phone] (text or call), ONLY available from 7:00 am to 5:00 pm.   **Contact on-call GI fellow via answering service ([phone]) from 5pm-7am AND on weekends/holidays**  2nd option: Available via Microsoft Teams  3rd option: Pager: [phone]           68M PMH HTN, BPH, prostate cancer s/p prostatectomy 2012, diverticulosis presenting with 5 weeks of generalized weakness, 25 lb weight loss, and poor PO intake. Per pt, started having cold sensation as well as shivers 5 weeks ago after which his PMD trailed ibuprofen and abx for possible UTI. Patients symptoms persisted and PMD felt presentation was consistent with malaria (traveled to [facility] 1 year ago, unclear if malaria blood work was performed) after which pt was treated with mefloquine 5 tablets PO 5 days ago. Patient endorsed that his shivers and chills resolved but he continued to have weight loss and poor PO intake. In ED patient was afebrile and hemodynamically stable. Labs were significant for leukocytosis to 25 with elevated Tbili and LFTs. Blood parasite screen was negative. CT imaging was obtained which showed a new indeterminate right hepatic lesion concerning for malignancy as well as an expansile filling defect in the right portal, left portal and main hepatic veins. MRI obtained, now favoring liver abscess with intrahepatic duct dilation. GI called for further evaluation.      #Hepatic abscess   #Intrahepatic dilation  #cholangitis   -segment 7/8 measuring 7 x 7 cm  -segment 2 measuring 5 x 5 cm    #Main PV and Rt/Lt PV thrombosis  -suspect in setting of infection, would obtain hem input for hypercoagulable work up    Recommendations;  -May benefit from ERCP, but agree with abscess drainage first.   -Would need to hold Lovenox PM dose today, okay to keep on heparin drip until 3 am  -NPO after midnight, okay to feed today  -Continue abx  -Consider Hem evaluation for hypercoagulable work up  -rest per primary team    Recommendations preliminary until signed by attending.     Tay Kumar MD  Gastroenterology/Hepatology Fellow  1st option: [phone] (text or call), ONLY available from 7:00 am to 5:00 pm.   **Contact on-call GI fellow via answering service ([phone]) from 5pm-7am AND on weekends/holidays**  2nd option: Available via Microsoft Teams  3rd option: Pager: [phone]

## 2023-11-15 NOTE — CONSULT NOTE ADULT - ASSESSMENT
68M hx prostate cancer s/p prostatectomy 2012, BPH, HTN, diverticulosis presenting with FTT for 5 weeks. Hematology consulted for portal and hepatic vein thrombosis.    # Portal and Hepatic Vein Thrombosis  - CT abd/pelvis 11/11/23 and MRCP 11/14/23 note right portal, left portal, and main hepatic vein thromboses and liver abscesses  - CEA and CA 19-9 can be elevated due to liver inflammation  - No need for hypercoagulable workup as the portal and hepatic vein thromboses are likely secondary to the liver abscesses  - c/w full anticoagulation with heparin gtt. Transition to DOAC as per primary team. AC duration will be 6 months  - No outpatient hematology follow up required after discharge    Note is not finalized until signed by attending.     Sharif Salcido MD  Hematology/Oncology Fellow PGY-5  Pager: [facility] [phone] / [phone]  After 5pm and on weekends please page on-call fellow

## 2023-11-16 LAB
ALBUMIN SERPL ELPH-MCNC: 2 G/DL — LOW (ref 3.3–5)
ALBUMIN SERPL ELPH-MCNC: 2 G/DL — LOW (ref 3.3–5)
ALP SERPL-CCNC: 126 U/L — HIGH (ref 40–120)
ALP SERPL-CCNC: 126 U/L — HIGH (ref 40–120)
ALT FLD-CCNC: 34 U/L — SIGNIFICANT CHANGE UP (ref 4–41)
ALT FLD-CCNC: 34 U/L — SIGNIFICANT CHANGE UP (ref 4–41)
ANION GAP SERPL CALC-SCNC: 10 MMOL/L — SIGNIFICANT CHANGE UP (ref 7–14)
ANION GAP SERPL CALC-SCNC: 10 MMOL/L — SIGNIFICANT CHANGE UP (ref 7–14)
APTT BLD: 33.8 SEC — SIGNIFICANT CHANGE UP (ref 24.5–35.6)
APTT BLD: 33.8 SEC — SIGNIFICANT CHANGE UP (ref 24.5–35.6)
APTT BLD: 47.4 SEC — HIGH (ref 24.5–35.6)
APTT BLD: 47.4 SEC — HIGH (ref 24.5–35.6)
AST SERPL-CCNC: 38 U/L — SIGNIFICANT CHANGE UP (ref 4–40)
AST SERPL-CCNC: 38 U/L — SIGNIFICANT CHANGE UP (ref 4–40)
BILIRUB SERPL-MCNC: 1.9 MG/DL — HIGH (ref 0.2–1.2)
BILIRUB SERPL-MCNC: 1.9 MG/DL — HIGH (ref 0.2–1.2)
BLD GP AB SCN SERPL QL: NEGATIVE — SIGNIFICANT CHANGE UP
BLD GP AB SCN SERPL QL: NEGATIVE — SIGNIFICANT CHANGE UP
BUN SERPL-MCNC: 10 MG/DL — SIGNIFICANT CHANGE UP (ref 7–23)
BUN SERPL-MCNC: 10 MG/DL — SIGNIFICANT CHANGE UP (ref 7–23)
CALCIUM SERPL-MCNC: 8.1 MG/DL — LOW (ref 8.4–10.5)
CALCIUM SERPL-MCNC: 8.1 MG/DL — LOW (ref 8.4–10.5)
CHLORIDE SERPL-SCNC: 98 MMOL/L — SIGNIFICANT CHANGE UP (ref 98–107)
CHLORIDE SERPL-SCNC: 98 MMOL/L — SIGNIFICANT CHANGE UP (ref 98–107)
CO2 SERPL-SCNC: 24 MMOL/L — SIGNIFICANT CHANGE UP (ref 22–31)
CO2 SERPL-SCNC: 24 MMOL/L — SIGNIFICANT CHANGE UP (ref 22–31)
CREAT SERPL-MCNC: 1.27 MG/DL — SIGNIFICANT CHANGE UP (ref 0.5–1.3)
CREAT SERPL-MCNC: 1.27 MG/DL — SIGNIFICANT CHANGE UP (ref 0.5–1.3)
EGFR: 61 ML/MIN/1.73M2 — SIGNIFICANT CHANGE UP
EGFR: 61 ML/MIN/1.73M2 — SIGNIFICANT CHANGE UP
GAMMA INTERFERON BACKGROUND BLD IA-ACNC: 0.05 IU/ML — SIGNIFICANT CHANGE UP
GAMMA INTERFERON BACKGROUND BLD IA-ACNC: 0.05 IU/ML — SIGNIFICANT CHANGE UP
GLUCOSE SERPL-MCNC: 107 MG/DL — HIGH (ref 70–99)
GLUCOSE SERPL-MCNC: 107 MG/DL — HIGH (ref 70–99)
HCT VFR BLD CALC: 21.4 % — LOW (ref 39–50)
HCT VFR BLD CALC: 21.4 % — LOW (ref 39–50)
HCT VFR BLD CALC: 23.8 % — LOW (ref 39–50)
HCT VFR BLD CALC: 23.8 % — LOW (ref 39–50)
HCT VFR BLD CALC: 24.4 % — LOW (ref 39–50)
HCT VFR BLD CALC: 24.4 % — LOW (ref 39–50)
HGB BLD-MCNC: 7 G/DL — CRITICAL LOW (ref 13–17)
HGB BLD-MCNC: 7 G/DL — CRITICAL LOW (ref 13–17)
HGB BLD-MCNC: 7.9 G/DL — LOW (ref 13–17)
HGB BLD-MCNC: 7.9 G/DL — LOW (ref 13–17)
HGB BLD-MCNC: 8.3 G/DL — LOW (ref 13–17)
HGB BLD-MCNC: 8.3 G/DL — LOW (ref 13–17)
M TB IFN-G BLD-IMP: ABNORMAL
M TB IFN-G BLD-IMP: ABNORMAL
M TB IFN-G CD4+ BCKGRND COR BLD-ACNC: -0.01 IU/ML — SIGNIFICANT CHANGE UP
M TB IFN-G CD4+ BCKGRND COR BLD-ACNC: -0.01 IU/ML — SIGNIFICANT CHANGE UP
M TB IFN-G CD4+CD8+ BCKGRND COR BLD-ACNC: -0.02 IU/ML — SIGNIFICANT CHANGE UP
M TB IFN-G CD4+CD8+ BCKGRND COR BLD-ACNC: -0.02 IU/ML — SIGNIFICANT CHANGE UP
MAGNESIUM SERPL-MCNC: 2 MG/DL — SIGNIFICANT CHANGE UP (ref 1.6–2.6)
MAGNESIUM SERPL-MCNC: 2 MG/DL — SIGNIFICANT CHANGE UP (ref 1.6–2.6)
MCHC RBC-ENTMCNC: 29.9 PG — SIGNIFICANT CHANGE UP (ref 27–34)
MCHC RBC-ENTMCNC: 32.7 GM/DL — SIGNIFICANT CHANGE UP (ref 32–36)
MCHC RBC-ENTMCNC: 32.7 GM/DL — SIGNIFICANT CHANGE UP (ref 32–36)
MCHC RBC-ENTMCNC: 33.2 GM/DL — SIGNIFICANT CHANGE UP (ref 32–36)
MCHC RBC-ENTMCNC: 33.2 GM/DL — SIGNIFICANT CHANGE UP (ref 32–36)
MCHC RBC-ENTMCNC: 34 GM/DL — SIGNIFICANT CHANGE UP (ref 32–36)
MCHC RBC-ENTMCNC: 34 GM/DL — SIGNIFICANT CHANGE UP (ref 32–36)
MCV RBC AUTO: 87.8 FL — SIGNIFICANT CHANGE UP (ref 80–100)
MCV RBC AUTO: 87.8 FL — SIGNIFICANT CHANGE UP (ref 80–100)
MCV RBC AUTO: 90.2 FL — SIGNIFICANT CHANGE UP (ref 80–100)
MCV RBC AUTO: 90.2 FL — SIGNIFICANT CHANGE UP (ref 80–100)
MCV RBC AUTO: 91.5 FL — SIGNIFICANT CHANGE UP (ref 80–100)
MCV RBC AUTO: 91.5 FL — SIGNIFICANT CHANGE UP (ref 80–100)
NRBC # BLD: 0 /100 WBCS — SIGNIFICANT CHANGE UP (ref 0–0)
NRBC # FLD: 0.06 K/UL — HIGH (ref 0–0)
NRBC # FLD: 0.07 K/UL — HIGH (ref 0–0)
NRBC # FLD: 0.07 K/UL — HIGH (ref 0–0)
PHOSPHATE SERPL-MCNC: 2.9 MG/DL — SIGNIFICANT CHANGE UP (ref 2.5–4.5)
PHOSPHATE SERPL-MCNC: 2.9 MG/DL — SIGNIFICANT CHANGE UP (ref 2.5–4.5)
PLATELET # BLD AUTO: 381 K/UL — SIGNIFICANT CHANGE UP (ref 150–400)
PLATELET # BLD AUTO: 381 K/UL — SIGNIFICANT CHANGE UP (ref 150–400)
PLATELET # BLD AUTO: 382 K/UL — SIGNIFICANT CHANGE UP (ref 150–400)
PLATELET # BLD AUTO: 382 K/UL — SIGNIFICANT CHANGE UP (ref 150–400)
PLATELET # BLD AUTO: 393 K/UL — SIGNIFICANT CHANGE UP (ref 150–400)
PLATELET # BLD AUTO: 393 K/UL — SIGNIFICANT CHANGE UP (ref 150–400)
POTASSIUM SERPL-MCNC: 3.4 MMOL/L — LOW (ref 3.5–5.3)
POTASSIUM SERPL-MCNC: 3.4 MMOL/L — LOW (ref 3.5–5.3)
POTASSIUM SERPL-SCNC: 3.4 MMOL/L — LOW (ref 3.5–5.3)
POTASSIUM SERPL-SCNC: 3.4 MMOL/L — LOW (ref 3.5–5.3)
PROT SERPL-MCNC: 6.5 G/DL — SIGNIFICANT CHANGE UP (ref 6–8.3)
PROT SERPL-MCNC: 6.5 G/DL — SIGNIFICANT CHANGE UP (ref 6–8.3)
QUANT TB PLUS MITOGEN MINUS NIL: 0.16 IU/ML — SIGNIFICANT CHANGE UP
QUANT TB PLUS MITOGEN MINUS NIL: 0.16 IU/ML — SIGNIFICANT CHANGE UP
RBC # BLD: 2.34 M/UL — LOW (ref 4.2–5.8)
RBC # BLD: 2.34 M/UL — LOW (ref 4.2–5.8)
RBC # BLD: 2.64 M/UL — LOW (ref 4.2–5.8)
RBC # BLD: 2.64 M/UL — LOW (ref 4.2–5.8)
RBC # BLD: 2.78 M/UL — LOW (ref 4.2–5.8)
RBC # BLD: 2.78 M/UL — LOW (ref 4.2–5.8)
RBC # FLD: 16.9 % — HIGH (ref 10.3–14.5)
RBC # FLD: 16.9 % — HIGH (ref 10.3–14.5)
RBC # FLD: 17.2 % — HIGH (ref 10.3–14.5)
RBC # FLD: 17.2 % — HIGH (ref 10.3–14.5)
RBC # FLD: 17.4 % — HIGH (ref 10.3–14.5)
RBC # FLD: 17.4 % — HIGH (ref 10.3–14.5)
RH IG SCN BLD-IMP: POSITIVE — SIGNIFICANT CHANGE UP
RH IG SCN BLD-IMP: POSITIVE — SIGNIFICANT CHANGE UP
SODIUM SERPL-SCNC: 132 MMOL/L — LOW (ref 135–145)
SODIUM SERPL-SCNC: 132 MMOL/L — LOW (ref 135–145)
WBC # BLD: 18.04 K/UL — HIGH (ref 3.8–10.5)
WBC # BLD: 18.04 K/UL — HIGH (ref 3.8–10.5)
WBC # BLD: 18.91 K/UL — HIGH (ref 3.8–10.5)
WBC # BLD: 18.91 K/UL — HIGH (ref 3.8–10.5)
WBC # BLD: 20.34 K/UL — HIGH (ref 3.8–10.5)
WBC # BLD: 20.34 K/UL — HIGH (ref 3.8–10.5)
WBC # FLD AUTO: 18.04 K/UL — HIGH (ref 3.8–10.5)
WBC # FLD AUTO: 18.04 K/UL — HIGH (ref 3.8–10.5)
WBC # FLD AUTO: 18.91 K/UL — HIGH (ref 3.8–10.5)
WBC # FLD AUTO: 18.91 K/UL — HIGH (ref 3.8–10.5)
WBC # FLD AUTO: 20.34 K/UL — HIGH (ref 3.8–10.5)
WBC # FLD AUTO: 20.34 K/UL — HIGH (ref 3.8–10.5)

## 2023-11-16 PROCEDURE — 93010 ELECTROCARDIOGRAM REPORT: CPT

## 2023-11-16 PROCEDURE — 99233 SBSQ HOSP IP/OBS HIGH 50: CPT | Mod: GC

## 2023-11-16 PROCEDURE — 99232 SBSQ HOSP IP/OBS MODERATE 35: CPT | Mod: GC

## 2023-11-16 PROCEDURE — 99232 SBSQ HOSP IP/OBS MODERATE 35: CPT

## 2023-11-16 RX ORDER — HEPARIN SODIUM 5000 [USP'U]/ML
5500 INJECTION INTRAVENOUS; SUBCUTANEOUS ONCE
Refills: 0 | Status: DISCONTINUED | OUTPATIENT
Start: 2023-11-16 | End: 2023-11-16

## 2023-11-16 RX ORDER — HEPARIN SODIUM 5000 [USP'U]/ML
2500 INJECTION INTRAVENOUS; SUBCUTANEOUS EVERY 6 HOURS
Refills: 0 | Status: DISCONTINUED | OUTPATIENT
Start: 2023-11-16 | End: 2023-11-17

## 2023-11-16 RX ORDER — POTASSIUM CHLORIDE 20 MEQ
40 PACKET (EA) ORAL ONCE
Refills: 0 | Status: COMPLETED | OUTPATIENT
Start: 2023-11-16 | End: 2023-11-16

## 2023-11-16 RX ORDER — HEPARIN SODIUM 5000 [USP'U]/ML
5500 INJECTION INTRAVENOUS; SUBCUTANEOUS EVERY 6 HOURS
Refills: 0 | Status: DISCONTINUED | OUTPATIENT
Start: 2023-11-16 | End: 2023-11-17

## 2023-11-16 RX ORDER — HEPARIN SODIUM 5000 [USP'U]/ML
INJECTION INTRAVENOUS; SUBCUTANEOUS
Qty: 25000 | Refills: 0 | Status: DISCONTINUED | OUTPATIENT
Start: 2023-11-16 | End: 2023-11-17

## 2023-11-16 RX ADMIN — Medication 40 MILLIEQUIVALENT(S): at 04:32

## 2023-11-16 RX ADMIN — PIPERACILLIN AND TAZOBACTAM 25 GRAM(S): 4; .5 INJECTION, POWDER, LYOPHILIZED, FOR SOLUTION INTRAVENOUS at 21:08

## 2023-11-16 RX ADMIN — Medication 5 MILLIGRAM(S): at 04:31

## 2023-11-16 RX ADMIN — Medication 1 MILLIGRAM(S): at 12:05

## 2023-11-16 RX ADMIN — Medication 81 MILLIGRAM(S): at 12:05

## 2023-11-16 RX ADMIN — PIPERACILLIN AND TAZOBACTAM 25 GRAM(S): 4; .5 INJECTION, POWDER, LYOPHILIZED, FOR SOLUTION INTRAVENOUS at 05:16

## 2023-11-16 RX ADMIN — HEPARIN SODIUM 1200 UNIT(S)/HR: 5000 INJECTION INTRAVENOUS; SUBCUTANEOUS at 17:42

## 2023-11-16 RX ADMIN — HEPARIN SODIUM 1300 UNIT(S)/HR: 5000 INJECTION INTRAVENOUS; SUBCUTANEOUS at 03:10

## 2023-11-16 RX ADMIN — TAMSULOSIN HYDROCHLORIDE 0.4 MILLIGRAM(S): 0.4 CAPSULE ORAL at 21:08

## 2023-11-16 RX ADMIN — AMLODIPINE BESYLATE 5 MILLIGRAM(S): 2.5 TABLET ORAL at 12:05

## 2023-11-16 RX ADMIN — PIPERACILLIN AND TAZOBACTAM 25 GRAM(S): 4; .5 INJECTION, POWDER, LYOPHILIZED, FOR SOLUTION INTRAVENOUS at 15:12

## 2023-11-16 RX ADMIN — HEPARIN SODIUM 1200 UNIT(S)/HR: 5000 INJECTION INTRAVENOUS; SUBCUTANEOUS at 18:55

## 2023-11-16 NOTE — PROGRESS NOTE ADULT - ASSESSMENT
This is a 67 y/o M w/ PMHx of HTN, BPH, prostate CA s/p prostatectomy 2012, diverticulosis who is presenting to [facility] on 11/14/23 for fevers a for 5 weeks, seen by his PMD, started on ibuprofen/antibiotic for possible UTI, however given persistence of fevers, there was a concern for malaria, and patient was given Mefloquine 5 days PTA. In the ED, pt was febrile with leukocytosis to 24. CT A/P with findings of 3.8 x 3.1 cm lobulated hypodense R hepatic lobe lesion, filling defect in R/L portal, main hepatic vein.   Pt was initially started on Vancomycin/Zosyn, ID was consulted for further recommendations.     #Liver abscess   #Fevers  #Leukocytosis   #R hepatic lobe lesion c/f malignancy   #Hepatic vein thrombosis     Overall, 69 yo M PMH HTN, BPH, prostate cancer s/p prostatectomy 2012, diverticulosis presenting for fevers/weight loss, found to have R hepatic lesions and hepatic vein thrombosis, c/f malignancy. ID consulted for SIRS w/o clear source.   MRCP w/ findings of liver abscess, GI consulted, pending ERCP   Pt w/ complicated ID history, was seen by Dr. Huerta in 2018, h/o positive QuantiFeron Gold, declined treatment for latent TB, had eosinophilia, Strongy AB+, treated with Ivermectin. Also history of positive Echinococcus AB, however not seen in our system.   Discussed with Dr. Kendrick of radiology, the liver abscess seen on MRI do not appear cystic, favor pyogenic at this time.     Plan:   1. C/w Zosyn for liver abscess  2. GI planning on ERCP   3. IR consult for liver abscess, please send bacterial Cx,  4. Send stool O&P     Thank you for this consult. Inpatient ID team will follow.    Ubaldo Marr M.D.  Attending Physician  Division of Infectious Diseases  Department of Medicine    Please contact through MS Teams message.  Office: [phone] (after 5 PM or weekend).

## 2023-11-16 NOTE — PROGRESS NOTE ADULT - ATTENDING COMMENTS
67 yo M PMH HTN, BPH, prostate cancer s/p prostatectomy 2012, diverticulosis presenting with 5 weeks of generalized weakness, 25 lb weight loss, and poor PO intake and subjective chills. + travel to malaria in 2021 returned in 2021. asymptomatic until 5 weeks ago. + treated for malaria. + hiccpus + recent travel to [place]. febrile 101 11/13; RVP neg; 11/11 initial UA neg; repeat UA + w/o overt symptoms;  Ucx Neg    Hepatic lesion- wt loss and decreased PO intake concern for malignancy/abscess                        - ID appreciated low suspicion for malaria.                       - Ct R heaptic lesion w/ mild intrahepatic ductal dilatation : CT chest w/o overt PNA or metastatic disease                       - CEA mildly elevated; hep panel neg;  129 elevated; procal elevated; AFP low                         - monitor LFTs TB downtrending and alk phos and AST/ALT dontrending                        -  MRCP with Lt and RT hepatic liver abscess increased since and bland thrombosis                         -  hiccups poss from Diaphragmatic irration- s/p baclofen PRN                        - c/w zosyn                       - check e histolytica and strongyloides in lab and stool O/P                        - GI with concern for poss abscess; ERCP if infectious work up unrevealing   Portal vein thrombus- CT expansile filling defect in the right portal, left portal and main hepatic veins, which may represent tumor thrombus versus bland thrombus. US doppler occlusive thrombus main and Lt portal veins with avascular thrombus. MRI as above RT and Lt and main portal vein thrombosis                          -  on heparin drip   elevated Cr- poss poor PO intake                   - now improved  s/p fluids                    - monitor Cr               Anemia- no overt GIB; fe studies consistent with AOCD; s/p 1 uprbc with appropriate rise

## 2023-11-16 NOTE — PROGRESS NOTE ADULT - PROBLEM SELECTOR PLAN 1
WBC 25K, spiked fever to 101.1  Previously treated for ? malaria, possibly 2/2 to malignancy vs malaria flare vs infection of unclear etiology  Apr ID recs, less likely infectious more likely malignancy vs thrombus  May be 2/2 to hepatic abscess    Plan  - c/w Zosyn  - f/u BCx, UCx  - f/u Entamoeba Ab, Stool Ova and Parasites WBC 25K, spiked fever to 101.1  Previously treated for ? malaria, possibly 2/2 to malignancy vs malaria flare vs infection of unclear etiology  Apr ID recs, less likely infectious more likely malignancy vs thrombus  May be 2/2 to hepatic abscess, no eosinophilia noted    Plan  - c/w Zosyn  - f/u BCx, UCx  - f/u Entamoeba Ab, Stool Ova and Parasites WBC 25K, spiked fever to 101.1  Previously treated for ? malaria, possibly 2/2 to malignancy vs malaria flare vs infection of unclear etiology  Prior hx of eosinophilic leukocytosis  Apr ID recs, less likely infectious more likely malignancy vs thrombus  May be 2/2 to hepatic abscess, no eosinophilia noted    Plan  - c/w Zosyn  - f/u BCx, UCx  - f/u Entamoeba Ab, Stool Ova and Parasites

## 2023-11-16 NOTE — PROGRESS NOTE ADULT - ASSESSMENT
68M PMH HTN, BPH, prostate cancer s/p prostatectomy 2012, diverticulosis presenting with 5 weeks of generalized weakness, 25 lb weight loss, and poor PO intake. Per pt, started having cold sensation as well as shivers 5 weeks ago after which his PMD trailed ibuprofen and abx for possible UTI. Patients symptoms persisted and PMD felt presentation was consistent with malaria (traveled to [facility] 1 year ago, unclear if malaria blood work was performed) after which pt was treated with mefloquine 5 tablets PO 5 days ago. Patient endorsed that his shivers and chills resolved but he continued to have weight loss and poor PO intake. In ED patient was afebrile and hemodynamically stable. Labs were significant for leukocytosis to 25 with elevated Tbili and LFTs. Blood parasite screen was negative. CT imaging was obtained which showed a new indeterminate right hepatic lesion concerning for malignancy as well as an expansile filling defect in the right portal, left portal and main hepatic veins. MRI obtained, now favoring liver abscess with intrahepatic duct dilation. GI called for further evaluation.      #Hepatic abscess   #Intrahepatic dilation  #Possible cholangitis   -segment 7/8 measuring 7 x 7 cm  -segment 2 measuring 5 x 5 cm    #Main PV and Rt/Lt PV thrombosis  -suspect in setting of infection, would obtain hem input for hypercoagulable work up    Recommendations;  -May benefit from ERCP, but agree with abscess drainage first.   -If infectious work up unrevealing may consider role of ERCP at that time, will follow up IR drainage microbiology   -Continue abx  -Consider Hem evaluation for hypercoagulable work up  -rest per primary team    Recommendations preliminary until signed by attending.     Tay Kumar MD  Gastroenterology/Hepatology Fellow  1st option: [phone] (text or call), ONLY available from 7:00 am to 5:00 pm.   **Contact on-call GI fellow via answering service ([phone]) from 5pm-7am AND on weekends/holidays**  2nd option: Available via [employer] Teams  3rd option: Pager: [phone]

## 2023-11-16 NOTE — PROGRESS NOTE ADULT - ASSESSMENT
68M PMH HTN, BPH, prostate cancer s/p prostatectomy 2012, diverticulosis presenting with 5 weeks of generalized weakness, 25 lb weight loss, and poor PO intake, recently treated for suspected malaria (unclear if confirmed with blood work), found to have evidence of new right hepatic lesion with suspected portal venous thrombosis c/f malignancy 68M PMH HTN, BPH, prostate cancer s/p prostatectomy 2012, hepatic abscess, Latent TB, positive echinococcus Ab diverticulosis presenting with 5 weeks of generalized weakness, 25 lb weight loss, and poor PO intake, recently treated for suspected malaria (unclear if confirmed with blood work), found to have evidence of new right hepatic lesion with suspected portal venous thrombosis c/f malignancy

## 2023-11-16 NOTE — PROGRESS NOTE ADULT - SUBJECTIVE AND OBJECTIVE BOX
Interval Events:   wbc improving  bili improving     Hospital Medications:  amLODIPine   Tablet 5 milliGRAM(s) Oral daily  aspirin enteric coated 81 milliGRAM(s) Oral daily  baclofen 5 milliGRAM(s) Oral every 12 hours PRN  folic acid 1 milliGRAM(s) Oral daily  heparin   Injectable 5500 Unit(s) IV Push every 6 hours PRN  heparin   Injectable 2500 Unit(s) IV Push every 6 hours PRN  heparin  Infusion.  Unit(s)/Hr IV Continuous <Continuous>  pantoprazole    Tablet 40 milliGRAM(s) Oral before breakfast  piperacillin/tazobactam IVPB.. 3.375 Gram(s) IV Intermittent every 8 hours  tamsulosin 0.4 milliGRAM(s) Oral at bedtime      ROS: All system reviewed and negative except as mentioned above.    PHYSICAL EXAM:   Vital Signs:  Vital Signs Last 24 Hrs  T(C): 37.1 (15 Nov 2023 22:11), Max: 37.1 (15 Nov 2023 22:11)  T(F): 98.7 (15 Nov 2023 22:11), Max: 98.7 (15 Nov 2023 22:11)  HR: 89 (15 Nov 2023 22:11) (89 - 97)  BP: 127/64 (15 Nov 2023 22:11) (102/58 - 127/64)  BP(mean): --  RR: 18 (15 Nov 2023 22:11) (18 - 18)  SpO2: 99% (15 Nov 2023 22:11) (97% - 99%)    Parameters below as of 15 Nov 2023 22:11  Patient On (Oxygen Delivery Method): room air      Daily     Daily     GENERAL:  NAD, Appears stated age  HEENT:  NC/AT,  conjunctivae clear and pink, sclera -anicteric  CHEST:  Normal Effort, Breath sounds clear  HEART:  RRR, S1 + S2, no murmurs  ABDOMEN:  Soft, non-tender, non-distended, normoactive bowel sounds,  no masses  EXTREMITIES:  no cyanosis or edema  SKIN:  Warm & Dry. No rash or erythema  NEURO:  Alert, oriented, no focal deficit    LABS:                        7.0    18.91 )-----------( 381      ( 16 Nov 2023 02:25 )             21.4     Mean Cell Volume: 91.5 fL (11-16-23 @ 02:25)    11-16    132<L>  |  98  |  10  ----------------------------<  107<H>  3.4<L>   |  24  |  1.27    Ca    8.1<L>      16 Nov 2023 02:25  Phos  2.9     11-16  Mg     2.00     11-16    TPro  6.5  /  Alb  2.0<L>  /  TBili  1.9<H>  /  DBili  x   /  AST  38  /  ALT  34  /  AlkPhos  126<H>  11-16    LIVER FUNCTIONS - ( 16 Nov 2023 02:25 )  Alb: 2.0 g/dL / Pro: 6.5 g/dL / ALK PHOS: 126 U/L / ALT: 34 U/L / AST: 38 U/L / GGT: x           PT/INR - ( 15 Nov 2023 14:17 )   PT: 16.9 sec;   INR: 1.53 ratio         PTT - ( 16 Nov 2023 02:25 )  PTT:47.4 sec  Urinalysis Basic - ( 16 Nov 2023 02:25 )    Color: x / Appearance: x / SG: x / pH: x  Gluc: 107 mg/dL / Ketone: x  / Bili: x / Urobili: x   Blood: x / Protein: x / Nitrite: x   Leuk Esterase: x / RBC: x / WBC x   Sq Epi: x / Non Sq Epi: x / Bacteria: x                              7.0    18.91 )-----------( 381      ( 16 Nov 2023 02:25 )             21.4                         7.1    20.20 )-----------( 393      ( 15 Nov 2023 06:00 )             22.2                         7.2    20.73 )-----------( 374      ( 14 Nov 2023 06:04 )             21.7       Imaging: Images reviewed.

## 2023-11-16 NOTE — PROGRESS NOTE ADULT - PROBLEM SELECTOR PLAN 2
Recent hx weight loss, fatigue and poor PO intake  CT evidence of new R hepatic lesion with possible portal vein thrombus, labs c/f biliary obstruction with increased Tbili and LFTs, leukocytosis  Hx of prostate cancer s/p prostatectomy, does not follow with Urology or Onc  CT chest without evidence of malignancy or metastasis   CEA elevated, CA 19-9 elevated, iron studies suggest anemia of chronic disease, LFTs worsening  MRCP with hepatic abscess 7x6.7x6.9cm and 5x4.6x5.3, mild intrahepatic biliary duct dilation, increasing size  Likely source     Plan  - ERCP planned for tomorrow with GI  - NPO at midnight  - hold lovenox transition to IV heparin with hold at 3am  - will reach out IR for possible liver biopsy on Friday although need to hold Heparin Recent hx weight loss, fatigue and poor PO intake  CT evidence of new R hepatic lesion with possible portal vein thrombus, labs c/f biliary obstruction with increased Tbili and LFTs, leukocytosis  Hx of prostate cancer s/p prostatectomy, does not follow with Urology or Onc  CT chest without evidence of malignancy or metastasis   CEA elevated, CA 19-9 elevated, iron studies suggest anemia of chronic disease, LFTs worsening  MRCP with hepatic abscess 7x6.7x6.9cm and 5x4.6x5.3, mild intrahepatic biliary duct dilation, increasing size  Likely source is infection, patient recently traveled to [facility] and has dog at home, no Eosinophilia noted    Plan  - ERCP after abscess drainage  - NPO at midnight  - hold lovenox transition to IV heparin with hold at 3am  - f.u IR for drainage tomorrow Recent hx weight loss, fatigue and poor PO intake  CT evidence of new R hepatic lesion with possible portal vein thrombus, labs c/f biliary obstruction with increased Tbili and LFTs, leukocytosis  Hx of prostate cancer s/p prostatectomy, does not follow with Urology or Onc  CT chest without evidence of malignancy or metastasis   CEA elevated, CA 19-9 elevated, iron studies suggest anemia of chronic disease, LFTs worsening  MRCP with hepatic abscess 7x6.7x6.9cm and 5x4.6x5.3, mild intrahepatic biliary duct dilation, increasing size  Likely source is infection, patient recently traveled to [facility] and has dog at home, no Eosinophilia noted  Prior hx of eosinophilia with hepatic abscesses in 2018    Plan  - ERCP after abscess drainage with IR  - NPO at midnight, 3AM labs  - hold lovenox transition to IV heparin with hold at 3am  - f.u IR for drainage tomorrow

## 2023-11-16 NOTE — PROGRESS NOTE ADULT - PROBLEM SELECTOR PLAN 3
Developed Hiccups, likely 2/2 to diaphragmatic irritation    Plan  - Baclofen  - EKG with QTc 470s  - hold off on Zofran

## 2023-11-16 NOTE — PROGRESS NOTE ADULT - SUBJECTIVE AND OBJECTIVE BOX
***************************************************************  Antoine Martinez, PGY1  Internal Medicine   TEAMS Preferred  ***************************************************************    ALVARO CEJA  68y Male  MRN: [id number]  11-11-23 (5d)    Patient is a 68y old  Male who presents with a chief complaint of Failure to Thrive (15 Nov 2023 17:16)      SUBJECTIVE / OVERNIGHT EVENTS:   No acute overnight events.   Patient seen and examined at bedside this AM.  Denies any CP, SOB, N/V, constipation, diarrhea, abdominal pain, dysuria, fever, chills, or headaches.     12 Point ROS negative with the exception of the above    MEDICATIONS  (STANDING):  amLODIPine   Tablet 5 milliGRAM(s) Oral daily  aspirin enteric coated 81 milliGRAM(s) Oral daily  folic acid 1 milliGRAM(s) Oral daily  heparin  Infusion.  Unit(s)/Hr (12 mL/Hr) IV Continuous <Continuous>  pantoprazole    Tablet 40 milliGRAM(s) Oral before breakfast  piperacillin/tazobactam IVPB.. 3.375 Gram(s) IV Intermittent every 8 hours  tamsulosin 0.4 milliGRAM(s) Oral at bedtime    MEDICATIONS  (PRN):  baclofen 5 milliGRAM(s) Oral every 12 hours PRN hiccups  heparin   Injectable 5500 Unit(s) IV Push every 6 hours PRN For aPTT less than 40  heparin   Injectable 2500 Unit(s) IV Push every 6 hours PRN For aPTT between 40 - 57      OBJECTIVE:  Vital Signs Last 24 Hrs  T(C): 37.1 (15 Nov 2023 22:11), Max: 37.1 (15 Nov 2023 22:11)  T(F): 98.7 (15 Nov 2023 22:11), Max: 98.7 (15 Nov 2023 22:11)  HR: 89 (15 Nov 2023 22:11) (89 - 97)  BP: 127/64 (15 Nov 2023 22:11) (102/58 - 127/64)  BP(mean): --  RR: 18 (15 Nov 2023 22:11) (18 - 18)  SpO2: 99% (15 Nov 2023 22:11) (97% - 99%)    Parameters below as of 15 Nov 2023 22:11  Patient On (Oxygen Delivery Method): room air        I&O's Summary    14 Nov 2023 07:01  -  15 Nov 2023 07:00  --------------------------------------------------------  IN: 125 mL / OUT: 250 mL / NET: -125 mL    15 Nov 2023 07:01  -  16 Nov 2023 06:56  --------------------------------------------------------  IN: 0 mL / OUT: 400 mL / NET: -400 mL        PHYSICAL EXAM:  GENERAL: Laying comfortably, NAD  HEENT: NCAT, PERRLA, EOMI, no scleral icterus, no LAD  NECK: No JVD, supple  LUNG: CTABL; No wheezes, crackles, or rhonchi  HEART: RRR; normal S1/S2; No murmurs, rubs, or gallops  ABDOMEN: +BS, soft, nontender, nondistended, no HSM; No rebound, guarding, or rigidity  EXTREMITIES:  No LE edema b/l, 2+ Peripheral Pulses, No clubbing or cyanosis  NEUROLOGY: AOx3, non-focal, strength 5/5 in all extremities, sensation intact  PSYCH: calm and cooperative  SKIN: No rashes or lesions    LABS:                        7.0    18.91 )-----------( 381      ( 16 Nov 2023 02:25 )             21.4     Auto Eosinophil # x     / Auto Eosinophil % x     / Auto Neutrophil # x     / Auto Neutrophil % x     / BANDS % x                            7.1    20.20 )-----------( 393      ( 15 Nov 2023 06:00 )             22.2     Auto Eosinophil # 0.14  / Auto Eosinophil % 0.7   / Auto Neutrophil # 16.56 / Auto Neutrophil % 82.0  / BANDS % x        11-16    132<L>  |  98  |  10  ----------------------------<  107<H>  3.4<L>   |  24  |  1.27  11-15    138  |  102  |  13  ----------------------------<  95  3.5   |  24  |  1.32<H>  11-14    139  |  103  |  12  ----------------------------<  116<H>  3.5   |  25  |  1.30    Ca    8.1<L>      16 Nov 2023 02:25  Ca    8.3<L>      15 Nov 2023 06:00  Ca    8.1<L>      14 Nov 2023 06:04  Phos  2.9     11-16  Mg     2.00     11-16    TPro  6.5  /  Alb  2.0<L>  /  TBili  1.9<H>  /  DBili  x   /  AST  38  /  ALT  34  /  AlkPhos  126<H>  11-16  TPro  6.7  /  Alb  2.2<L>  /  TBili  2.8<H>  /  DBili  2.6<H>  /  AST  57<H>  /  ALT  45<H>  /  AlkPhos  141<H>  11-15  TPro  7.0  /  Alb  2.4<L>  /  TBili  4.4<H>  /  DBili  3.9<H>  /  AST  68<H>  /  ALT  46<H>  /  AlkPhos  153<H>  11-14    PT/INR - ( 15 Nov 2023 14:17 )   PT: 16.9 sec;   INR: 1.53 ratio         PTT - ( 16 Nov 2023 02:25 )  PTT:47.4 sec      Urinalysis Basic - ( 16 Nov 2023 02:25 )    Color: x / Appearance: x / SG: x / pH: x  Gluc: 107 mg/dL / Ketone: x  / Bili: x / Urobili: x   Blood: x / Protein: x / Nitrite: x   Leuk Esterase: x / RBC: x / WBC x   Sq Epi: x / Non Sq Epi: x / Bacteria: x          CAPILLARY BLOOD GLUCOSE      POCT Blood Glucose.: 120 mg/dL (15 Nov 2023 17:16)      Culture - Urine (collected 11-14-23 @ 12:40)  Source: Clean Catch Clean Catch (Midstream)  Final Report (11-15-23 @ 10:40):    No growth        RADIOLOGY & ADDITIONAL TESTS:     ***************************************************************  Antoine Martinez, PGY1  Internal Medicine   TEAMS Preferred  ***************************************************************    ALVARO CEJA  68y Male  MRN: [id number]  11-11-23 (5d)    Patient is a 68y old  Male who presents with a chief complaint of Failure to Thrive (15 Nov 2023 17:16)      SUBJECTIVE / OVERNIGHT EVENTS:   Hgb 7 overnight requiring 1U pRBCs.   Patient seen and examined at bedside this AM. Continues to have hiccups. Occasional SOB with hiccups No acute signs of bleeding at this time.   Denies any CP, SOB, N/V, constipation, diarrhea, abdominal pain, dysuria, fever, chills, or headaches.     12 Point ROS negative with the exception of the above    MEDICATIONS  (STANDING):  amLODIPine   Tablet 5 milliGRAM(s) Oral daily  aspirin enteric coated 81 milliGRAM(s) Oral daily  folic acid 1 milliGRAM(s) Oral daily  heparin  Infusion.  Unit(s)/Hr (12 mL/Hr) IV Continuous <Continuous>  pantoprazole    Tablet 40 milliGRAM(s) Oral before breakfast  piperacillin/tazobactam IVPB.. 3.375 Gram(s) IV Intermittent every 8 hours  tamsulosin 0.4 milliGRAM(s) Oral at bedtime    MEDICATIONS  (PRN):  baclofen 5 milliGRAM(s) Oral every 12 hours PRN hiccups  heparin   Injectable 5500 Unit(s) IV Push every 6 hours PRN For aPTT less than 40  heparin   Injectable 2500 Unit(s) IV Push every 6 hours PRN For aPTT between 40 - 57      OBJECTIVE:  Vital Signs Last 24 Hrs  T(C): 37.1 (15 Nov 2023 22:11), Max: 37.1 (15 Nov 2023 22:11)  T(F): 98.7 (15 Nov 2023 22:11), Max: 98.7 (15 Nov 2023 22:11)  HR: 89 (15 Nov 2023 22:11) (89 - 97)  BP: 127/64 (15 Nov 2023 22:11) (102/58 - 127/64)  BP(mean): --  RR: 18 (15 Nov 2023 22:11) (18 - 18)  SpO2: 99% (15 Nov 2023 22:11) (97% - 99%)    Parameters below as of 15 Nov 2023 22:11  Patient On (Oxygen Delivery Method): room air        I&O's Summary    14 Nov 2023 07:01  -  15 Nov 2023 07:00  --------------------------------------------------------  IN: 125 mL / OUT: 250 mL / NET: -125 mL    15 Nov 2023 07:01  -  16 Nov 2023 06:56  --------------------------------------------------------  IN: 0 mL / OUT: 400 mL / NET: -400 mL        PHYSICAL EXAM:  GENERAL: Laying comfortably, NAD  HEENT: NCAT, PERRLA, EOMI, no scleral icterus, no LAD  LUNG: CTABL; No wheezes, crackles, or rhonchi  HEART: RRR; normal S1/S2; No murmurs, rubs, or gallops  ABDOMEN: +BS, soft, nontender, nondistended, no HSM; No rebound, guarding, or rigidity  EXTREMITIES:  No LE edema b/l, 2+ Peripheral Pulses, No clubbing or cyanosis  NEUROLOGY: AOx3, non-focal    LABS:                        7.0    18.91 )-----------( 381      ( 16 Nov 2023 02:25 )             21.4     Auto Eosinophil # x     / Auto Eosinophil % x     / Auto Neutrophil # x     / Auto Neutrophil % x     / BANDS % x                            7.1    20.20 )-----------( 393      ( 15 Nov 2023 06:00 )             22.2     Auto Eosinophil # 0.14  / Auto Eosinophil % 0.7   / Auto Neutrophil # 16.56 / Auto Neutrophil % 82.0  / BANDS % x        11-16    132<L>  |  98  |  10  ----------------------------<  107<H>  3.4<L>   |  24  |  1.27  11-15    138  |  102  |  13  ----------------------------<  95  3.5   |  24  |  1.32<H>  11-14    139  |  103  |  12  ----------------------------<  116<H>  3.5   |  25  |  1.30    Ca    8.1<L>      16 Nov 2023 02:25  Ca    8.3<L>      15 Nov 2023 06:00  Ca    8.1<L>      14 Nov 2023 06:04  Phos  2.9     11-16  Mg     2.00     11-16    TPro  6.5  /  Alb  2.0<L>  /  TBili  1.9<H>  /  DBili  x   /  AST  38  /  ALT  34  /  AlkPhos  126<H>  11-16  TPro  6.7  /  Alb  2.2<L>  /  TBili  2.8<H>  /  DBili  2.6<H>  /  AST  57<H>  /  ALT  45<H>  /  AlkPhos  141<H>  11-15  TPro  7.0  /  Alb  2.4<L>  /  TBili  4.4<H>  /  DBili  3.9<H>  /  AST  68<H>  /  ALT  46<H>  /  AlkPhos  153<H>  11-14    PT/INR - ( 15 Nov 2023 14:17 )   PT: 16.9 sec;   INR: 1.53 ratio         PTT - ( 16 Nov 2023 02:25 )  PTT:47.4 sec      Urinalysis Basic - ( 16 Nov 2023 02:25 )    Color: x / Appearance: x / SG: x / pH: x  Gluc: 107 mg/dL / Ketone: x  / Bili: x / Urobili: x   Blood: x / Protein: x / Nitrite: x   Leuk Esterase: x / RBC: x / WBC x   Sq Epi: x / Non Sq Epi: x / Bacteria: x          CAPILLARY BLOOD GLUCOSE      POCT Blood Glucose.: 120 mg/dL (15 Nov 2023 17:16)      Culture - Urine (collected 11-14-23 @ 12:40)  Source: Clean Catch Clean Catch (Midstream)  Final Report (11-15-23 @ 10:40):    No growth        RADIOLOGY & ADDITIONAL TESTS:

## 2023-11-16 NOTE — PROGRESS NOTE ADULT - SUBJECTIVE AND OBJECTIVE BOX
Infectious Diseases Follow Up:    Patient is a 68y old  Male who presents with a chief complaint of Failure to Thrive (15 Nov 2023 17:16)      Interval History/ROS:  No fevers ON. Pt w/ hiccups still, no other complaints     Allergies  No Known Allergies        ANTIMICROBIALS:  piperacillin/tazobactam IVPB.. 3.375 every 8 hours      Current Abx:     Previous Abx     OTHER MEDS:  MEDICATIONS  (STANDING):  amLODIPine   Tablet 5 daily  aspirin enteric coated 81 daily  baclofen 5 every 12 hours PRN  heparin   Injectable 5500 every 6 hours PRN  heparin   Injectable 2500 every 6 hours PRN  heparin  Infusion.  <Continuous>  pantoprazole    Tablet 40 before breakfast  tamsulosin 0.4 at bedtime      Vital Signs Last 24 Hrs  T(C): 36.7 (16 Nov 2023 11:58), Max: 37.1 (15 Nov 2023 22:11)  T(F): 98.1 (16 Nov 2023 11:58), Max: 98.7 (15 Nov 2023 22:11)  HR: 94 (16 Nov 2023 11:58) (89 - 97)  BP: 131/66 (16 Nov 2023 11:58) (119/79 - 131/66)  BP(mean): 84 (16 Nov 2023 08:36) (84 - 84)  RR: 17 (16 Nov 2023 11:58) (17 - 20)  SpO2: 99% (16 Nov 2023 11:58) (96% - 99%)    Parameters below as of 16 Nov 2023 11:58  Patient On (Oxygen Delivery Method): room air        PHYSICAL EXAM:  GENERAL: NAD, well-developed  HEAD:  Atraumatic, Normocephalic  EYES: +juandice   NECK: Supple, No JVD  CHEST/LUNG: Clear to auscultation bilaterally; No wheeze  HEART: Regular rate and rhythm; No murmurs, rubs, or gallops  ABDOMEN: Soft, Nontender, Nondistended; Bowel sounds present  EXTREMITIES:  2+ Peripheral Pulses, No clubbing, cyanosis, or edema  PSYCH: AAOx3  NEUROLOGY: non-focal  SKIN: No rashes or lesions                          8.3    20.34 )-----------( 393      ( 16 Nov 2023 10:55 )             24.4       11-16    132<L>  |  98  |  10  ----------------------------<  107<H>  3.4<L>   |  24  |  1.27    Ca    8.1<L>      16 Nov 2023 02:25  Phos  2.9     11-16  Mg     2.00     11-16    TPro  6.5  /  Alb  2.0<L>  /  TBili  1.9<H>  /  DBili  x   /  AST  38  /  ALT  34  /  AlkPhos  126<H>  11-16      Urinalysis Basic - ( 16 Nov 2023 02:25 )    Color: x / Appearance: x / SG: x / pH: x  Gluc: 107 mg/dL / Ketone: x  / Bili: x / Urobili: x   Blood: x / Protein: x / Nitrite: x   Leuk Esterase: x / RBC: x / WBC x   Sq Epi: x / Non Sq Epi: x / Bacteria: x        MICROBIOLOGY:  v  Clean Catch Clean Catch (Midstream)  11-14-23   No growth  --  --      .Blood Blood-Peripheral  11-13-23   No growth at 48 Hours  --  --      .Blood Blood-Peripheral  11-13-23   No growth at 48 Hours  --  --      .Blood  11-11-23   No Blood Parasites observed by giemsa stain  One negative set of blood smears does not rule out  the possibility of a parasitic infection.  A minimum of 3  specimens should be collected, at least 12-24 hours apart,  over a 36 hour time period.  ************************************************************  NEGATIVE for Plasmodium antigens. Microscopy is performed for  confirmation.  The Malaria Rapid antigen test does not detect the  presence of Babesia species. If Babesiosis is suspected  please order test Babesia PCR: Babesia microti PCR Bld  ************************************************************  --  --          Rapid RVP Result: NotDetec (11-14 @ 10:40)        RADIOLOGY:  < from: MR MRCP w/wo IV Cont (11.14.23 @ 18:24) >  LIVER: 2 centrally T2 hyperintense lesion with peripheral enhancement,   surrounding parenchymal hyperemia, and edema consistent with abscess,   increased in size compared to prior exam. These measure as follows:  *  Segment 7/8 lesion measuring 7.0 x 6.7 x 6.9 cm  *  Segment 2 lesion measuring 5.0 x 4.6 x 5.3 cm.  BILE DUCTS: Mild intrahepatic biliary ductal dilatation.  GALLBLADDER: Cholelithiasis.  SPLEEN: Within normal limits.  PANCREAS: Within normal limits.  ADRENALS: Within normal limits.  KIDNEYS/URETERS: No hydronephrosis. Bilateral renal cysts.    VISUALIZED PORTIONS:  BOWEL: Colonic diverticulosis.  PERITONEUM: No ascites.  VESSELS: Expansile, nonenhancing filling defect again noted in the main,   right, and left portal veins. The portosplenic confluence, splenic vein,   and SMV are patent.  RETROPERITONEUM/LYMPH NODES: No lymphadenopathy.  ABDOMINAL WALL: Within normal limits.  BONES: Degenerative changes.    IMPRESSION:  Left and right hepatic lobe liver abscesses, increased in size since   prior CT scan 11/11/2023.    Dalhart thrombus involving the right, left, and main portal veins.

## 2023-11-17 LAB
ALBUMIN SERPL ELPH-MCNC: 2.1 G/DL — LOW (ref 3.3–5)
ALBUMIN SERPL ELPH-MCNC: 2.1 G/DL — LOW (ref 3.3–5)
ALP SERPL-CCNC: 131 U/L — HIGH (ref 40–120)
ALP SERPL-CCNC: 131 U/L — HIGH (ref 40–120)
ALT FLD-CCNC: 28 U/L — SIGNIFICANT CHANGE UP (ref 4–41)
ALT FLD-CCNC: 28 U/L — SIGNIFICANT CHANGE UP (ref 4–41)
ANION GAP SERPL CALC-SCNC: 13 MMOL/L — SIGNIFICANT CHANGE UP (ref 7–14)
ANION GAP SERPL CALC-SCNC: 13 MMOL/L — SIGNIFICANT CHANGE UP (ref 7–14)
APTT BLD: 44.2 SEC — HIGH (ref 24.5–35.6)
APTT BLD: 44.2 SEC — HIGH (ref 24.5–35.6)
APTT BLD: 45.3 SEC — HIGH (ref 24.5–35.6)
APTT BLD: 45.3 SEC — HIGH (ref 24.5–35.6)
APTT BLD: 45.6 SEC — HIGH (ref 24.5–35.6)
APTT BLD: 45.6 SEC — HIGH (ref 24.5–35.6)
APTT BLD: 50.2 SEC — HIGH (ref 24.5–35.6)
APTT BLD: 50.2 SEC — HIGH (ref 24.5–35.6)
AST SERPL-CCNC: 33 U/L — SIGNIFICANT CHANGE UP (ref 4–40)
AST SERPL-CCNC: 33 U/L — SIGNIFICANT CHANGE UP (ref 4–40)
BASOPHILS # BLD AUTO: 0 K/UL — SIGNIFICANT CHANGE UP (ref 0–0.2)
BASOPHILS # BLD AUTO: 0 K/UL — SIGNIFICANT CHANGE UP (ref 0–0.2)
BASOPHILS NFR BLD AUTO: 0 % — SIGNIFICANT CHANGE UP (ref 0–2)
BASOPHILS NFR BLD AUTO: 0 % — SIGNIFICANT CHANGE UP (ref 0–2)
BILIRUB SERPL-MCNC: 1.8 MG/DL — HIGH (ref 0.2–1.2)
BILIRUB SERPL-MCNC: 1.8 MG/DL — HIGH (ref 0.2–1.2)
BLD GP AB SCN SERPL QL: NEGATIVE — SIGNIFICANT CHANGE UP
BLD GP AB SCN SERPL QL: NEGATIVE — SIGNIFICANT CHANGE UP
BUN SERPL-MCNC: 10 MG/DL — SIGNIFICANT CHANGE UP (ref 7–23)
BUN SERPL-MCNC: 10 MG/DL — SIGNIFICANT CHANGE UP (ref 7–23)
CALCIUM SERPL-MCNC: 7.9 MG/DL — LOW (ref 8.4–10.5)
CALCIUM SERPL-MCNC: 7.9 MG/DL — LOW (ref 8.4–10.5)
CHLORIDE SERPL-SCNC: 98 MMOL/L — SIGNIFICANT CHANGE UP (ref 98–107)
CHLORIDE SERPL-SCNC: 98 MMOL/L — SIGNIFICANT CHANGE UP (ref 98–107)
CO2 SERPL-SCNC: 24 MMOL/L — SIGNIFICANT CHANGE UP (ref 22–31)
CO2 SERPL-SCNC: 24 MMOL/L — SIGNIFICANT CHANGE UP (ref 22–31)
CREAT SERPL-MCNC: 1.19 MG/DL — SIGNIFICANT CHANGE UP (ref 0.5–1.3)
CREAT SERPL-MCNC: 1.19 MG/DL — SIGNIFICANT CHANGE UP (ref 0.5–1.3)
EGFR: 66 ML/MIN/1.73M2 — SIGNIFICANT CHANGE UP
EGFR: 66 ML/MIN/1.73M2 — SIGNIFICANT CHANGE UP
EOSINOPHIL # BLD AUTO: 0.43 K/UL — SIGNIFICANT CHANGE UP (ref 0–0.5)
EOSINOPHIL # BLD AUTO: 0.43 K/UL — SIGNIFICANT CHANGE UP (ref 0–0.5)
EOSINOPHIL NFR BLD AUTO: 2.6 % — SIGNIFICANT CHANGE UP (ref 0–6)
EOSINOPHIL NFR BLD AUTO: 2.6 % — SIGNIFICANT CHANGE UP (ref 0–6)
GLUCOSE SERPL-MCNC: 111 MG/DL — HIGH (ref 70–99)
GLUCOSE SERPL-MCNC: 111 MG/DL — HIGH (ref 70–99)
HCT VFR BLD CALC: 22.6 % — LOW (ref 39–50)
HCT VFR BLD CALC: 22.6 % — LOW (ref 39–50)
HCT VFR BLD CALC: 24.2 % — LOW (ref 39–50)
HCT VFR BLD CALC: 24.2 % — LOW (ref 39–50)
HCT VFR BLD CALC: 24.5 % — LOW (ref 39–50)
HCT VFR BLD CALC: 24.5 % — LOW (ref 39–50)
HGB BLD-MCNC: 7.6 G/DL — LOW (ref 13–17)
HGB BLD-MCNC: 7.6 G/DL — LOW (ref 13–17)
HGB BLD-MCNC: 8 G/DL — LOW (ref 13–17)
IANC: 13.04 K/UL — HIGH (ref 1.8–7.4)
IANC: 13.04 K/UL — HIGH (ref 1.8–7.4)
INR BLD: 1.28 RATIO — HIGH (ref 0.85–1.18)
INR BLD: 1.28 RATIO — HIGH (ref 0.85–1.18)
LYMPHOCYTES # BLD AUTO: 1.89 K/UL — SIGNIFICANT CHANGE UP (ref 1–3.3)
LYMPHOCYTES # BLD AUTO: 1.89 K/UL — SIGNIFICANT CHANGE UP (ref 1–3.3)
LYMPHOCYTES # BLD AUTO: 11.4 % — LOW (ref 13–44)
LYMPHOCYTES # BLD AUTO: 11.4 % — LOW (ref 13–44)
MAGNESIUM SERPL-MCNC: 2 MG/DL — SIGNIFICANT CHANGE UP (ref 1.6–2.6)
MAGNESIUM SERPL-MCNC: 2 MG/DL — SIGNIFICANT CHANGE UP (ref 1.6–2.6)
MCHC RBC-ENTMCNC: 29.6 PG — SIGNIFICANT CHANGE UP (ref 27–34)
MCHC RBC-ENTMCNC: 29.6 PG — SIGNIFICANT CHANGE UP (ref 27–34)
MCHC RBC-ENTMCNC: 29.7 PG — SIGNIFICANT CHANGE UP (ref 27–34)
MCHC RBC-ENTMCNC: 29.7 PG — SIGNIFICANT CHANGE UP (ref 27–34)
MCHC RBC-ENTMCNC: 30 PG — SIGNIFICANT CHANGE UP (ref 27–34)
MCHC RBC-ENTMCNC: 30 PG — SIGNIFICANT CHANGE UP (ref 27–34)
MCHC RBC-ENTMCNC: 32.7 GM/DL — SIGNIFICANT CHANGE UP (ref 32–36)
MCHC RBC-ENTMCNC: 32.7 GM/DL — SIGNIFICANT CHANGE UP (ref 32–36)
MCHC RBC-ENTMCNC: 33.1 GM/DL — SIGNIFICANT CHANGE UP (ref 32–36)
MCHC RBC-ENTMCNC: 33.1 GM/DL — SIGNIFICANT CHANGE UP (ref 32–36)
MCHC RBC-ENTMCNC: 33.6 GM/DL — SIGNIFICANT CHANGE UP (ref 32–36)
MCHC RBC-ENTMCNC: 33.6 GM/DL — SIGNIFICANT CHANGE UP (ref 32–36)
MCV RBC AUTO: 88.3 FL — SIGNIFICANT CHANGE UP (ref 80–100)
MCV RBC AUTO: 88.3 FL — SIGNIFICANT CHANGE UP (ref 80–100)
MCV RBC AUTO: 90.6 FL — SIGNIFICANT CHANGE UP (ref 80–100)
MCV RBC AUTO: 90.6 FL — SIGNIFICANT CHANGE UP (ref 80–100)
MCV RBC AUTO: 90.7 FL — SIGNIFICANT CHANGE UP (ref 80–100)
MCV RBC AUTO: 90.7 FL — SIGNIFICANT CHANGE UP (ref 80–100)
MONOCYTES # BLD AUTO: 1.31 K/UL — HIGH (ref 0–0.9)
MONOCYTES # BLD AUTO: 1.31 K/UL — HIGH (ref 0–0.9)
MONOCYTES NFR BLD AUTO: 7.9 % — SIGNIFICANT CHANGE UP (ref 2–14)
MONOCYTES NFR BLD AUTO: 7.9 % — SIGNIFICANT CHANGE UP (ref 2–14)
NEUTROPHILS # BLD AUTO: 12.98 K/UL — HIGH (ref 1.8–7.4)
NEUTROPHILS # BLD AUTO: 12.98 K/UL — HIGH (ref 1.8–7.4)
NEUTROPHILS NFR BLD AUTO: 78.1 % — HIGH (ref 43–77)
NEUTROPHILS NFR BLD AUTO: 78.1 % — HIGH (ref 43–77)
NRBC # BLD: 0 /100 WBCS — SIGNIFICANT CHANGE UP (ref 0–0)
NRBC # FLD: 0.05 K/UL — HIGH (ref 0–0)
NRBC # FLD: 0.05 K/UL — HIGH (ref 0–0)
NRBC # FLD: 0.06 K/UL — HIGH (ref 0–0)
NRBC # FLD: 0.06 K/UL — HIGH (ref 0–0)
PHOSPHATE SERPL-MCNC: 2.6 MG/DL — SIGNIFICANT CHANGE UP (ref 2.5–4.5)
PHOSPHATE SERPL-MCNC: 2.6 MG/DL — SIGNIFICANT CHANGE UP (ref 2.5–4.5)
PLATELET # BLD AUTO: 378 K/UL — SIGNIFICANT CHANGE UP (ref 150–400)
PLATELET # BLD AUTO: 378 K/UL — SIGNIFICANT CHANGE UP (ref 150–400)
PLATELET # BLD AUTO: 387 K/UL — SIGNIFICANT CHANGE UP (ref 150–400)
PLATELET # BLD AUTO: 387 K/UL — SIGNIFICANT CHANGE UP (ref 150–400)
PLATELET # BLD AUTO: 393 K/UL — SIGNIFICANT CHANGE UP (ref 150–400)
PLATELET # BLD AUTO: 393 K/UL — SIGNIFICANT CHANGE UP (ref 150–400)
POTASSIUM SERPL-MCNC: 3.7 MMOL/L — SIGNIFICANT CHANGE UP (ref 3.5–5.3)
POTASSIUM SERPL-MCNC: 3.7 MMOL/L — SIGNIFICANT CHANGE UP (ref 3.5–5.3)
POTASSIUM SERPL-SCNC: 3.7 MMOL/L — SIGNIFICANT CHANGE UP (ref 3.5–5.3)
POTASSIUM SERPL-SCNC: 3.7 MMOL/L — SIGNIFICANT CHANGE UP (ref 3.5–5.3)
PROT SERPL-MCNC: 6.5 G/DL — SIGNIFICANT CHANGE UP (ref 6–8.3)
PROT SERPL-MCNC: 6.5 G/DL — SIGNIFICANT CHANGE UP (ref 6–8.3)
PROTHROM AB SERPL-ACNC: 14.2 SEC — HIGH (ref 9.5–13)
PROTHROM AB SERPL-ACNC: 14.2 SEC — HIGH (ref 9.5–13)
RBC # BLD: 2.56 M/UL — LOW (ref 4.2–5.8)
RBC # BLD: 2.56 M/UL — LOW (ref 4.2–5.8)
RBC # BLD: 2.67 M/UL — LOW (ref 4.2–5.8)
RBC # BLD: 2.67 M/UL — LOW (ref 4.2–5.8)
RBC # BLD: 2.7 M/UL — LOW (ref 4.2–5.8)
RBC # BLD: 2.7 M/UL — LOW (ref 4.2–5.8)
RBC # FLD: 17.2 % — HIGH (ref 10.3–14.5)
RBC # FLD: 17.3 % — HIGH (ref 10.3–14.5)
RBC # FLD: 17.3 % — HIGH (ref 10.3–14.5)
RH IG SCN BLD-IMP: POSITIVE — SIGNIFICANT CHANGE UP
RH IG SCN BLD-IMP: POSITIVE — SIGNIFICANT CHANGE UP
SODIUM SERPL-SCNC: 135 MMOL/L — SIGNIFICANT CHANGE UP (ref 135–145)
SODIUM SERPL-SCNC: 135 MMOL/L — SIGNIFICANT CHANGE UP (ref 135–145)
STRONGYLOIDES AB SER-ACNC: NEGATIVE — SIGNIFICANT CHANGE UP
STRONGYLOIDES AB SER-ACNC: NEGATIVE — SIGNIFICANT CHANGE UP
WBC # BLD: 15.5 K/UL — HIGH (ref 3.8–10.5)
WBC # BLD: 15.5 K/UL — HIGH (ref 3.8–10.5)
WBC # BLD: 16.57 K/UL — HIGH (ref 3.8–10.5)
WBC # BLD: 16.57 K/UL — HIGH (ref 3.8–10.5)
WBC # BLD: 16.62 K/UL — HIGH (ref 3.8–10.5)
WBC # BLD: 16.62 K/UL — HIGH (ref 3.8–10.5)
WBC # FLD AUTO: 15.5 K/UL — HIGH (ref 3.8–10.5)
WBC # FLD AUTO: 15.5 K/UL — HIGH (ref 3.8–10.5)
WBC # FLD AUTO: 16.57 K/UL — HIGH (ref 3.8–10.5)
WBC # FLD AUTO: 16.57 K/UL — HIGH (ref 3.8–10.5)
WBC # FLD AUTO: 16.62 K/UL — HIGH (ref 3.8–10.5)
WBC # FLD AUTO: 16.62 K/UL — HIGH (ref 3.8–10.5)

## 2023-11-17 PROCEDURE — 99232 SBSQ HOSP IP/OBS MODERATE 35: CPT

## 2023-11-17 PROCEDURE — 99233 SBSQ HOSP IP/OBS HIGH 50: CPT | Mod: GC

## 2023-11-17 PROCEDURE — 93010 ELECTROCARDIOGRAM REPORT: CPT

## 2023-11-17 RX ORDER — HEPARIN SODIUM 5000 [USP'U]/ML
INJECTION INTRAVENOUS; SUBCUTANEOUS
Qty: 25000 | Refills: 0 | Status: DISCONTINUED | OUTPATIENT
Start: 2023-11-17 | End: 2023-11-21

## 2023-11-17 RX ORDER — HEPARIN SODIUM 5000 [USP'U]/ML
2500 INJECTION INTRAVENOUS; SUBCUTANEOUS EVERY 6 HOURS
Refills: 0 | Status: DISCONTINUED | OUTPATIENT
Start: 2023-11-17 | End: 2023-11-21

## 2023-11-17 RX ORDER — HEPARIN SODIUM 5000 [USP'U]/ML
5500 INJECTION INTRAVENOUS; SUBCUTANEOUS EVERY 6 HOURS
Refills: 0 | Status: DISCONTINUED | OUTPATIENT
Start: 2023-11-17 | End: 2023-11-21

## 2023-11-17 RX ADMIN — Medication 1 MILLIGRAM(S): at 12:06

## 2023-11-17 RX ADMIN — Medication 5 MILLIGRAM(S): at 19:09

## 2023-11-17 RX ADMIN — AMLODIPINE BESYLATE 5 MILLIGRAM(S): 2.5 TABLET ORAL at 06:21

## 2023-11-17 RX ADMIN — PIPERACILLIN AND TAZOBACTAM 25 GRAM(S): 4; .5 INJECTION, POWDER, LYOPHILIZED, FOR SOLUTION INTRAVENOUS at 21:07

## 2023-11-17 RX ADMIN — TAMSULOSIN HYDROCHLORIDE 0.4 MILLIGRAM(S): 0.4 CAPSULE ORAL at 21:07

## 2023-11-17 RX ADMIN — PIPERACILLIN AND TAZOBACTAM 25 GRAM(S): 4; .5 INJECTION, POWDER, LYOPHILIZED, FOR SOLUTION INTRAVENOUS at 06:20

## 2023-11-17 RX ADMIN — HEPARIN SODIUM 1300 UNIT(S)/HR: 5000 INJECTION INTRAVENOUS; SUBCUTANEOUS at 16:35

## 2023-11-17 RX ADMIN — PIPERACILLIN AND TAZOBACTAM 25 GRAM(S): 4; .5 INJECTION, POWDER, LYOPHILIZED, FOR SOLUTION INTRAVENOUS at 12:06

## 2023-11-17 RX ADMIN — HEPARIN SODIUM 1400 UNIT(S)/HR: 5000 INJECTION INTRAVENOUS; SUBCUTANEOUS at 23:03

## 2023-11-17 RX ADMIN — HEPARIN SODIUM 1300 UNIT(S)/HR: 5000 INJECTION INTRAVENOUS; SUBCUTANEOUS at 19:09

## 2023-11-17 RX ADMIN — HEPARIN SODIUM 1300 UNIT(S)/HR: 5000 INJECTION INTRAVENOUS; SUBCUTANEOUS at 00:19

## 2023-11-17 RX ADMIN — PANTOPRAZOLE SODIUM 40 MILLIGRAM(S): 20 TABLET, DELAYED RELEASE ORAL at 06:21

## 2023-11-17 RX ADMIN — HEPARIN SODIUM 1200 UNIT(S)/HR: 5000 INJECTION INTRAVENOUS; SUBCUTANEOUS at 10:19

## 2023-11-17 NOTE — PROGRESS NOTE ADULT - ATTENDING COMMENTS
67 yo M PMH HTN, BPH, prostate cancer s/p prostatectomy 2012, diverticulosis presenting with 5 weeks of generalized weakness, 25 lb weight loss, and poor PO intake and subjective chills. + travel to malaria in 2021 returned in 2021. asymptomatic until 5 weeks ago. + treated for malaria. + hiccpus + recent travel to [place]. febrile 101 11/13; RVP neg; 11/11 initial UA neg; repeat UA + w/o overt symptoms;  Ucx Neg    Hepatic lesion- wt loss and decreased PO intake concern for malignancy/abscess                        - ID appreciated low suspicion for malaria.                       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                         - c/w zosyn                        - check e histolytica and strongyloides and echinococcus in lab and stool O/P                         - GI with concern for poss abscess; ERCP if infectious work up unrevealing                        - IR bx delay till 11/21 given ASA and prior hx echinococcus; prior Hx latent TB quant gold indeterminate     Portal vein thrombus- CT expansile filling defect in the right portal, left portal and main hepatic veins, which may represent tumor thrombus versus bland thrombus. US doppler occlusive thrombus main and Lt portal veins with avascular thrombus. MRI as above RT and Lt and main portal vein thrombosis                          -  on heparin drip     elevated Cr- poss poor PO intake                   - now improved  s/p fluids                    - monitor Cr                 Anemia- no overt GIB; fe studies consistent with AOCD; s/p 1 uprbc with appropriate rise 67 yo M PMH HTN, BPH, prostate cancer s/p prostatectomy 2012, diverticulosis presenting with 5 weeks of generalized weakness, 25 lb weight loss, and poor PO intake and subjective chills. + travel to malaria in 2021 returned in 2021. asymptomatic until 5 weeks ago. + treated for malaria. + hiccpus + recent travel to [place]. febrile 101 11/13; RVP neg; 11/11 initial UA neg; repeat UA + w/o overt symptoms;  Ucx Neg    Hepatic lesion- wt loss and decreased PO intake concern for malignancy/abscess                        - ID appreciated low suspicion for malaria.                       - Ct R hepatic lesion w/ mild intrahepatic ductal dilatation : CT chest w/o overt PNA or metastatic disease                       - CEA mildly elevated; hep panel neg;  129 elevated; procal elevated; AFP low                         - monitor LFTs TB downtrending and alk phos and AST/ALT downtrending                         -  MRCP with Lt and RT hepatic liver abscess increased since CT and bland thrombosis                          - 11/2018 Reviewed outpt ID note liver lesion in 2017 (strongyloides ab+ repeat neg, quant gold+ refused treatment for latent TB and                          positive echinococcus – IgG repeat serology neg, MRI 2.8 cm dominate mass w/ cirrhosis and smaller ring enhancing lesions, PET                          w/o  metabolic active lesion, liver bx :fibrous tissue with macrophage reaction and chronic active inflammation. Fragments of benign                          liver parenchyma. 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                         - c/w zosyn                        - check e histolytica and strongyloides and echinococcus in lab and stool O/P                         - GI with concern for poss abscess; ERCP if infectious work up unrevealing                        - IR bx delay till 11/21 given ASA use and prior hx echinococcus; prior Hx latent TB quant gold indeterminate     Portal vein thrombus- CT expansile filling defect in the right portal, left portal and main hepatic veins, which may represent tumor thrombus versus bland thrombus. US doppler occlusive thrombus main and Lt portal veins with avascular thrombus. MRI as above RT and Lt and main portal vein thrombosis                          -  on heparin drip     elevated Cr- poss poor PO intake                   - now improved  s/p fluids                    - monitor Cr                 Anemia- no overt GIB; fe studies consistent with AOCD; s/p 1 uprbc with appropriate rise 69 yo M PMH HTN, BPH, prostate cancer s/p prostatectomy 2012, diverticulosis presenting with 5 weeks of generalized weakness, 25 lb weight loss, and poor PO intake and subjective chills. + travel to malaria in 2021 returned in 2021. asymptomatic until 5 weeks ago. + treated for malaria. + hiccpus + recent travel to [place]. febrile 101 11/13; RVP neg; 11/11 initial UA neg; repeat UA + w/o overt symptoms;  Ucx Neg    Hepatic lesion- wt loss and decreased PO intake concern for malignancy/abscess                        - ID appreciated low suspicion for malaria.                       - Ct R hepatic lesion w/ mild intrahepatic ductal dilatation : CT chest w/o overt PNA or metastatic disease                       - CEA mildly elevated; hep panel neg;  129 elevated; procal elevated; AFP low                         - monitor LFTs TB downtrending and alk phos and AST/ALT downtrending                         -  MRCP with Lt and RT hepatic liver abscess increased since CT and bland thrombosis                          - 11/2018 Reviewed outpt ID note liver lesion in 2017 (strongyloides ab+ repeat neg, quant gold+ refused treatment for latent TB and positive echinococcus – IgG repeat serology neg, MRI 2.8 cm dominate mass w/ cirrhosis and smaller ring enhancing lesions, PET w/o  metabolic active lesion, liver bx :fibrous tissue with macrophage reaction and chronic active inflammation. Fragments of benign liver parenchyma. 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                         - c/w zosyn                        - check e histolytica and strongyloides and echinococcus in lab and stool O/P                         - GI with concern for poss abscess; ERCP if infectious work up unrevealing                        - IR bx delay till 11/21 given ASA use and prior hx echinococcus; prior Hx latent TB quant gold indeterminate     Portal vein thrombus- CT expansile filling defect in the right portal, left portal and main hepatic veins, which may represent tumor thrombus versus bland thrombus. US doppler occlusive thrombus main and Lt portal veins with avascular thrombus. MRI as above RT and Lt and main portal vein thrombosis                          -  on heparin drip     elevated Cr- poss poor PO intake                   - now improved  s/p fluids                    - monitor Cr                 Anemia- no overt GIB; fe studies consistent with AOCD; s/p 1 uprbc with appropriate rise

## 2023-11-17 NOTE — PROGRESS NOTE ADULT - SUBJECTIVE AND OBJECTIVE BOX
***************************************************************  Antoine Martinez, PGY1  Internal Medicine   TEAMS Preferred  ***************************************************************    ALVARO CEJA  68y Male  MRN: [id number]  11-11-23 (6d)    Patient is a 68y old  Male who presents with a chief complaint of Failure to Thrive (15 Nov 2023 17:16)      SUBJECTIVE / OVERNIGHT EVENTS:   No acute overnight events.   Patient seen and examined at bedside this AM.  Denies any CP, SOB, N/V, constipation, diarrhea, abdominal pain, dysuria, fever, chills, or headaches.     12 Point ROS negative with the exception of the above    MEDICATIONS  (STANDING):  amLODIPine   Tablet 5 milliGRAM(s) Oral daily  aspirin enteric coated 81 milliGRAM(s) Oral daily  folic acid 1 milliGRAM(s) Oral daily  pantoprazole    Tablet 40 milliGRAM(s) Oral before breakfast  piperacillin/tazobactam IVPB.. 3.375 Gram(s) IV Intermittent every 8 hours  tamsulosin 0.4 milliGRAM(s) Oral at bedtime    MEDICATIONS  (PRN):  baclofen 5 milliGRAM(s) Oral every 12 hours PRN hiccups      OBJECTIVE:  Vital Signs Last 24 Hrs  T(C): 36.9 (17 Nov 2023 05:50), Max: 36.9 (17 Nov 2023 05:50)  T(F): 98.4 (17 Nov 2023 05:50), Max: 98.4 (17 Nov 2023 05:50)  HR: 95 (17 Nov 2023 05:50) (89 - 95)  BP: 104/70 (17 Nov 2023 05:50) (104/70 - 131/66)  BP(mean): 84 (16 Nov 2023 08:36) (84 - 84)  RR: 17 (17 Nov 2023 05:50) (17 - 20)  SpO2: 95% (17 Nov 2023 05:50) (95% - 99%)    Parameters below as of 17 Nov 2023 05:50  Patient On (Oxygen Delivery Method): room air        I&O's Summary    15 Nov 2023 07:01  -  16 Nov 2023 07:00  --------------------------------------------------------  IN: 0 mL / OUT: 400 mL / NET: -400 mL        PHYSICAL EXAM:  GENERAL: Laying comfortably, NAD  HEENT: NCAT, PERRLA, EOMI, no scleral icterus, no LAD  NECK: No JVD, supple  LUNG: CTABL; No wheezes, crackles, or rhonchi  HEART: RRR; normal S1/S2; No murmurs, rubs, or gallops  ABDOMEN: +BS, soft, nontender, nondistended, no HSM; No rebound, guarding, or rigidity  EXTREMITIES:  No LE edema b/l, 2+ Peripheral Pulses, No clubbing or cyanosis  NEUROLOGY: AOx3, non-focal, strength 5/5 in all extremities, sensation intact  PSYCH: calm and cooperative  SKIN: No rashes or lesions    LABS:                        8.0    16.62 )-----------( 378      ( 17 Nov 2023 03:04 )             24.2     Auto Eosinophil # 0.43  / Auto Eosinophil % 2.6   / Auto Neutrophil # 12.98 / Auto Neutrophil % 78.1  / BANDS % x                            7.9    18.04 )-----------( 382      ( 16 Nov 2023 23:33 )             23.8     Auto Eosinophil # x     / Auto Eosinophil % x     / Auto Neutrophil # x     / Auto Neutrophil % x     / BANDS % x                            8.3    20.34 )-----------( 393      ( 16 Nov 2023 10:55 )             24.4     Auto Eosinophil # x     / Auto Eosinophil % x     / Auto Neutrophil # x     / Auto Neutrophil % x     / BANDS % x        11-17    135  |  98  |  10  ----------------------------<  111<H>  3.7   |  24  |  1.19  11-16    132<L>  |  98  |  10  ----------------------------<  107<H>  3.4<L>   |  24  |  1.27  11-15    138  |  102  |  13  ----------------------------<  95  3.5   |  24  |  1.32<H>    Ca    7.9<L>      17 Nov 2023 03:04  Ca    8.1<L>      16 Nov 2023 02:25  Ca    8.3<L>      15 Nov 2023 06:00  Phos  2.6     11-17  Mg     2.00     11-17    TPro  6.5  /  Alb  2.1<L>  /  TBili  1.8<H>  /  DBili  x   /  AST  33  /  ALT  28  /  AlkPhos  131<H>  11-17  TPro  6.5  /  Alb  2.0<L>  /  TBili  1.9<H>  /  DBili  x   /  AST  38  /  ALT  34  /  AlkPhos  126<H>  11-16  TPro  6.7  /  Alb  2.2<L>  /  TBili  2.8<H>  /  DBili  2.6<H>  /  AST  57<H>  /  ALT  45<H>  /  AlkPhos  141<H>  11-15    PT/INR - ( 17 Nov 2023 03:04 )   PT: 14.2 sec;   INR: 1.28 ratio         PTT - ( 17 Nov 2023 03:04 )  PTT:44.2 sec      Urinalysis Basic - ( 17 Nov 2023 03:04 )    Color: x / Appearance: x / SG: x / pH: x  Gluc: 111 mg/dL / Ketone: x  / Bili: x / Urobili: x   Blood: x / Protein: x / Nitrite: x   Leuk Esterase: x / RBC: x / WBC x   Sq Epi: x / Non Sq Epi: x / Bacteria: x          CAPILLARY BLOOD GLUCOSE            RADIOLOGY & ADDITIONAL TESTS:     ***************************************************************  Antoine Martinez, PGY1  Internal Medicine   TEAMS Preferred  ***************************************************************    ALVARO CEJA  68y Male  MRN: [id number]  11-11-23 (6d)    Patient is a 68y old  Male who presents with a chief complaint of Failure to Thrive (15 Nov 2023 17:16)      SUBJECTIVE / OVERNIGHT EVENTS:   No acute overnight events.   Patient seen and examined at bedside this AM. Continues to have cough and hiccups. BM yesterday.   Denies any CP, SOB, N/V, constipation, diarrhea, abdominal pain, dysuria, fever, chills, or headaches.     12 Point ROS negative with the exception of the above    MEDICATIONS  (STANDING):  amLODIPine   Tablet 5 milliGRAM(s) Oral daily  aspirin enteric coated 81 milliGRAM(s) Oral daily  folic acid 1 milliGRAM(s) Oral daily  pantoprazole    Tablet 40 milliGRAM(s) Oral before breakfast  piperacillin/tazobactam IVPB.. 3.375 Gram(s) IV Intermittent every 8 hours  tamsulosin 0.4 milliGRAM(s) Oral at bedtime    MEDICATIONS  (PRN):  baclofen 5 milliGRAM(s) Oral every 12 hours PRN hiccups      OBJECTIVE:  Vital Signs Last 24 Hrs  T(C): 36.9 (17 Nov 2023 05:50), Max: 36.9 (17 Nov 2023 05:50)  T(F): 98.4 (17 Nov 2023 05:50), Max: 98.4 (17 Nov 2023 05:50)  HR: 95 (17 Nov 2023 05:50) (89 - 95)  BP: 104/70 (17 Nov 2023 05:50) (104/70 - 131/66)  BP(mean): 84 (16 Nov 2023 08:36) (84 - 84)  RR: 17 (17 Nov 2023 05:50) (17 - 20)  SpO2: 95% (17 Nov 2023 05:50) (95% - 99%)    Parameters below as of 17 Nov 2023 05:50  Patient On (Oxygen Delivery Method): room air        I&O's Summary    15 Nov 2023 07:01  -  16 Nov 2023 07:00  --------------------------------------------------------  IN: 0 mL / OUT: 400 mL / NET: -400 mL        PHYSICAL EXAM:  GENERAL: Laying comfortably, NAD  HEENT: NCAT, PERRLA, EOMI, no scleral icterus  LUNG: Basilar crackles  HEART: RRR; normal S1/S2; No murmurs, rubs, or gallops  ABDOMEN: +BS, soft, nontender, nondistended, no HSM; No rebound, guarding, or rigidity  EXTREMITIES:  No LE edema b/l, 2+ Peripheral Pulses, No clubbing or cyanosis  NEUROLOGY: AOx3, non-focal      LABS:                        8.0    16.62 )-----------( 378      ( 17 Nov 2023 03:04 )             24.2     Auto Eosinophil # 0.43  / Auto Eosinophil % 2.6   / Auto Neutrophil # 12.98 / Auto Neutrophil % 78.1  / BANDS % x                            7.9    18.04 )-----------( 382      ( 16 Nov 2023 23:33 )             23.8     Auto Eosinophil # x     / Auto Eosinophil % x     / Auto Neutrophil # x     / Auto Neutrophil % x     / BANDS % x                            8.3    20.34 )-----------( 393      ( 16 Nov 2023 10:55 )             24.4     Auto Eosinophil # x     / Auto Eosinophil % x     / Auto Neutrophil # x     / Auto Neutrophil % x     / BANDS % x        11-17    135  |  98  |  10  ----------------------------<  111<H>  3.7   |  24  |  1.19  11-16    132<L>  |  98  |  10  ----------------------------<  107<H>  3.4<L>   |  24  |  1.27  11-15    138  |  102  |  13  ----------------------------<  95  3.5   |  24  |  1.32<H>    Ca    7.9<L>      17 Nov 2023 03:04  Ca    8.1<L>      16 Nov 2023 02:25  Ca    8.3<L>      15 Nov 2023 06:00  Phos  2.6     11-17  Mg     2.00     11-17    TPro  6.5  /  Alb  2.1<L>  /  TBili  1.8<H>  /  DBili  x   /  AST  33  /  ALT  28  /  AlkPhos  131<H>  11-17  TPro  6.5  /  Alb  2.0<L>  /  TBili  1.9<H>  /  DBili  x   /  AST  38  /  ALT  34  /  AlkPhos  126<H>  11-16  TPro  6.7  /  Alb  2.2<L>  /  TBili  2.8<H>  /  DBili  2.6<H>  /  AST  57<H>  /  ALT  45<H>  /  AlkPhos  141<H>  11-15    PT/INR - ( 17 Nov 2023 03:04 )   PT: 14.2 sec;   INR: 1.28 ratio         PTT - ( 17 Nov 2023 03:04 )  PTT:44.2 sec      Urinalysis Basic - ( 17 Nov 2023 03:04 )    Color: x / Appearance: x / SG: x / pH: x  Gluc: 111 mg/dL / Ketone: x  / Bili: x / Urobili: x   Blood: x / Protein: x / Nitrite: x   Leuk Esterase: x / RBC: x / WBC x   Sq Epi: x / Non Sq Epi: x / Bacteria: x          CAPILLARY BLOOD GLUCOSE            RADIOLOGY & ADDITIONAL TESTS:

## 2023-11-17 NOTE — PROGRESS NOTE ADULT - PROBLEM SELECTOR PLAN 4
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  Apr Heme, no hypercoag workup needed, likely 2/2 to infection. Transition to DOAC on d/c    Plan  - restart Heparin, hold for IR  - continue to monitor CT c/f portal vein thrombus pending official read  Duplex US Abdomen occlusive thrombosis of the main and left portal veins with avascular thrombus.  Apr Heme, no hypercoag workup needed, likely 2/2 to infection. Transition to DOAC on d/c    Plan  - restart Heparin  - continue to monitor

## 2023-11-17 NOTE — PROGRESS NOTE ADULT - PROBLEM SELECTOR PLAN 1
WBC 25K, spiked fever to 101.1  Previously treated for ? malaria, possibly 2/2 to malignancy vs malaria flare vs infection of unclear etiology  Prior hx of eosinophilic leukocytosis  Apr ID recs, less likely infectious more likely malignancy vs thrombus  May be 2/2 to hepatic abscess, no eosinophilia noted    Plan  - c/w Zosyn  - f/u BCx, UCx  - f/u Entamoeba Ab, Stool Ova and Parasites WBC 25K, spiked fever to 101.1  Previously treated for ? malaria, possibly 2/2 to malignancy vs malaria flare vs infection of unclear etiology  Prior hx of eosinophilic leukocytosis  Apr ID recs, less likely infectious more likely malignancy vs thrombus  May be 2/2 to hepatic abscess, no eosinophilia noted    Plan  - c/w Zosyn  - f/u BCx, UCx  - f/u Entamoeba Ab, Stool Ova and Parasites  - f/u IR drainage bacterial Cx, fungal, AFB Cx WBC 25K, spiked fever to 101.1  Previously treated for ? malaria, possibly 2/2 to malignancy vs malaria flare ( ID low suspicion) vs infection of unclear etiology  Prior hx of eosinophilic leukocytosis  Apr ID recs, less likely infectious more likely malignancy vs thrombus  May be 2/2 to hepatic abscess, no eosinophilia noted    Plan  - c/w Zosyn  - f/u BCx, UCx  - f/u Entamoeba Ab, Stool Ova and Parasites  - f/u IR drainage bacterial Cx, fungal, AFB Cx

## 2023-11-17 NOTE — PROGRESS NOTE ADULT - ASSESSMENT
This is a 69 y/o M w/ PMHx of HTN, BPH, prostate CA s/p prostatectomy 2012, diverticulosis who is presenting to [facility] on 11/14/23 for fevers a for 5 weeks, seen by his PMD, started on ibuprofen/antibiotic for possible UTI, however given persistence of fevers, there was a concern for malaria, and patient was given Mefloquine 5 days PTA. In the ED, pt was febrile with leukocytosis to 24. CT A/P with findings of 3.8 x 3.1 cm lobulated hypodense R hepatic lobe lesion, filling defect in R/L portal, main hepatic vein.   Pt was initially started on Vancomycin/Zosyn, ID was consulted for further recommendations.     #Liver abscess   #Fevers  #Leukocytosis   #R hepatic lobe lesion c/f malignancy   #Hepatic vein thrombosis     Overall, 67 yo M PMH HTN, BPH, prostate cancer s/p prostatectomy 2012, diverticulosis presenting for fevers/weight loss, found to have R hepatic lesions and hepatic vein thrombosis, c/f malignancy. ID consulted for SIRS w/o clear source.   MRCP w/ findings of liver abscess, GI consulted, pending ERCP   Pt w/ complicated ID history, was seen by Dr. Huerta in 2018, h/o positive QuantiFeron Gold, declined treatment for latent TB, had eosinophilia, Strongy AB+, treated with Ivermectin. Also history of positive Echinococcus AB, however not seen in our system.   Discussed with Dr. Kendrick of radiology, the liver abscess seen on MRI do not appear cystic, favor pyogenic at this time.     Plan:   1. C/w Zosyn for liver abscess  2. GI planning on ERCP   3. IR consult for liver abscess, please send bacterial Cx, fungal, AFB Cx    Thank you for this consult. Inpatient ID team will follow.    Ubaldo Marr M.D.  Attending Physician  Division of Infectious Diseases  Department of Medicine    Please contact through MS Teams message.  Office: [phone] (after 5 PM or weekend).

## 2023-11-17 NOTE — PROGRESS NOTE ADULT - SUBJECTIVE AND OBJECTIVE BOX
Infectious Diseases Follow Up:    Patient is a 68y old  Male who presents with a chief complaint of Failure to Thrive (15 Nov 2023 17:16)      Interval History/ROS:  Afebrile ON, still has hiccups, no abdominal pain     Allergies  No Known Allergies        ANTIMICROBIALS:  piperacillin/tazobactam IVPB.. 3.375 every 8 hours      Current Abx:     Previous Abx     OTHER MEDS:  MEDICATIONS  (STANDING):  amLODIPine   Tablet 5 daily  baclofen 5 every 12 hours PRN  heparin   Injectable 2500 every 6 hours PRN  heparin   Injectable 5500 every 6 hours PRN  heparin  Infusion.  <Continuous>  pantoprazole    Tablet 40 before breakfast  tamsulosin 0.4 at bedtime      Vital Signs Last 24 Hrs  T(C): 36.9 (17 Nov 2023 05:50), Max: 36.9 (17 Nov 2023 05:50)  T(F): 98.4 (17 Nov 2023 05:50), Max: 98.4 (17 Nov 2023 05:50)  HR: 95 (17 Nov 2023 05:50) (89 - 95)  BP: 104/70 (17 Nov 2023 05:50) (104/70 - 131/66)  BP(mean): --  RR: 17 (17 Nov 2023 05:50) (17 - 18)  SpO2: 95% (17 Nov 2023 05:50) (95% - 99%)    Parameters below as of 17 Nov 2023 05:50  Patient On (Oxygen Delivery Method): room air        PHYSICAL EXAM:  GENERAL: NAD, well-developed  HEAD:  Atraumatic, Normocephalic  EYES: +jaundice   NECK: Supple, No JVD  CHEST/LUNG: Clear to auscultation bilaterally; No wheeze  HEART: Regular rate and rhythm; No murmurs, rubs, or gallops  ABDOMEN: Soft, Nontender, Nondistended; Bowel sounds present  EXTREMITIES:  2+ Peripheral Pulses, No clubbing, cyanosis, or edema  PSYCH: AAOx3  NEUROLOGY: non-focal  SKIN: No rashes or lesions                          8.0    16.62 )-----------( 378      ( 17 Nov 2023 03:04 )             24.2       11-17    135  |  98  |  10  ----------------------------<  111<H>  3.7   |  24  |  1.19    Ca    7.9<L>      17 Nov 2023 03:04  Phos  2.6     11-17  Mg     2.00     11-17    TPro  6.5  /  Alb  2.1<L>  /  TBili  1.8<H>  /  DBili  x   /  AST  33  /  ALT  28  /  AlkPhos  131<H>  11-17      Urinalysis Basic - ( 17 Nov 2023 03:04 )    Color: x / Appearance: x / SG: x / pH: x  Gluc: 111 mg/dL / Ketone: x  / Bili: x / Urobili: x   Blood: x / Protein: x / Nitrite: x   Leuk Esterase: x / RBC: x / WBC x   Sq Epi: x / Non Sq Epi: x / Bacteria: x        MICROBIOLOGY:  v  Clean Catch Clean Catch (Midstream)  11-14-23   No growth  --  --      .Blood Blood-Peripheral  11-13-23   No growth at 72 Hours  --  --      .Blood Blood-Peripheral  11-13-23   No growth at 72 Hours  --  --      .Blood  11-11-23   No Blood Parasites observed by giemsa stain  One negative set of blood smears does not rule out  the possibility of a parasitic infection.  A minimum of 3  specimens should be collected, at least 12-24 hours apart,  over a 36 hour time period.  ************************************************************  NEGATIVE for Plasmodium antigens. Microscopy is performed for  confirmation.  The Malaria Rapid antigen test does not detect the  presence of Babesia species. If Babesiosis is suspected  please order test Babesia PCR: Babesia microti PCR Bld  ************************************************************  --  --          Rapid RVP Result: NotDetec (11-14 @ 10:40)        RADIOLOGY:

## 2023-11-17 NOTE — PROGRESS NOTE ADULT - PROBLEM SELECTOR PLAN 2
Recent hx weight loss, fatigue and poor PO intake  CT evidence of new R hepatic lesion with possible portal vein thrombus, labs c/f biliary obstruction with increased Tbili and LFTs, leukocytosis  Hx of prostate cancer s/p prostatectomy, does not follow with Urology or Onc  CT chest without evidence of malignancy or metastasis   CEA elevated, CA 19-9 elevated, iron studies suggest anemia of chronic disease, LFTs worsening  MRCP with hepatic abscess 7x6.7x6.9cm and 5x4.6x5.3, mild intrahepatic biliary duct dilation, increasing size  Likely source is infection, patient recently traveled to [facility] and has dog at home, no Eosinophilia noted  Prior hx of eosinophilia with hepatic abscesses in 2018    Plan  - ERCP after abscess drainage with IR  - NPO at midnight, 3AM labs  - IV heparin with hold at 3am Recent hx weight loss, fatigue and poor PO intake  CT evidence of new R hepatic lesion with possible portal vein thrombus, labs c/f biliary obstruction with increased Tbili and LFTs, leukocytosis  Hx of prostate cancer s/p prostatectomy, does not follow with Urology or Onc  CT chest without evidence of malignancy or metastasis   CEA elevated, CA 19-9 elevated, iron studies suggest anemia of chronic disease, LFTs worsening  MRCP with hepatic abscess 7x6.7x6.9cm and 5x4.6x5.3, mild intrahepatic biliary duct dilation, increasing size  Likely source is infection, patient recently traveled to [facility] and has dog at home, no Eosinophilia noted  Prior hx of eosinophilia with hepatic abscesses in 2018    Plan  - ERCP after abscess drainage with IR, likely Tuesday  - restart IV heparin  - hold Aspirin 5 days prior to IR

## 2023-11-17 NOTE — PROGRESS NOTE ADULT - ASSESSMENT
68M PMH HTN, BPH, prostate cancer s/p prostatectomy 2012, hepatic abscess, Latent TB, positive echinococcus Ab diverticulosis presenting with 5 weeks of generalized weakness, 25 lb weight loss, and poor PO intake, recently treated for suspected malaria (unclear if confirmed with blood work), found to have evidence of new right hepatic lesion with suspected portal venous thrombosis c/f malignancy

## 2023-11-18 LAB
ALBUMIN SERPL ELPH-MCNC: 2.4 G/DL — LOW (ref 3.3–5)
ALBUMIN SERPL ELPH-MCNC: 2.4 G/DL — LOW (ref 3.3–5)
ALP SERPL-CCNC: 138 U/L — HIGH (ref 40–120)
ALP SERPL-CCNC: 138 U/L — HIGH (ref 40–120)
ALT FLD-CCNC: 27 U/L — SIGNIFICANT CHANGE UP (ref 4–41)
ALT FLD-CCNC: 27 U/L — SIGNIFICANT CHANGE UP (ref 4–41)
ANION GAP SERPL CALC-SCNC: 8 MMOL/L — SIGNIFICANT CHANGE UP (ref 7–14)
ANION GAP SERPL CALC-SCNC: 8 MMOL/L — SIGNIFICANT CHANGE UP (ref 7–14)
APTT BLD: 68.1 SEC — HIGH (ref 24.5–35.6)
APTT BLD: 68.1 SEC — HIGH (ref 24.5–35.6)
APTT BLD: 73 SEC — HIGH (ref 24.5–35.6)
APTT BLD: 73 SEC — HIGH (ref 24.5–35.6)
AST SERPL-CCNC: 33 U/L — SIGNIFICANT CHANGE UP (ref 4–40)
AST SERPL-CCNC: 33 U/L — SIGNIFICANT CHANGE UP (ref 4–40)
BASOPHILS # BLD AUTO: 0.05 K/UL — SIGNIFICANT CHANGE UP (ref 0–0.2)
BASOPHILS # BLD AUTO: 0.05 K/UL — SIGNIFICANT CHANGE UP (ref 0–0.2)
BASOPHILS NFR BLD AUTO: 0.3 % — SIGNIFICANT CHANGE UP (ref 0–2)
BASOPHILS NFR BLD AUTO: 0.3 % — SIGNIFICANT CHANGE UP (ref 0–2)
BILIRUB SERPL-MCNC: 1.5 MG/DL — HIGH (ref 0.2–1.2)
BILIRUB SERPL-MCNC: 1.5 MG/DL — HIGH (ref 0.2–1.2)
BUN SERPL-MCNC: 9 MG/DL — SIGNIFICANT CHANGE UP (ref 7–23)
BUN SERPL-MCNC: 9 MG/DL — SIGNIFICANT CHANGE UP (ref 7–23)
CALCIUM SERPL-MCNC: 8.2 MG/DL — LOW (ref 8.4–10.5)
CALCIUM SERPL-MCNC: 8.2 MG/DL — LOW (ref 8.4–10.5)
CHLORIDE SERPL-SCNC: 100 MMOL/L — SIGNIFICANT CHANGE UP (ref 98–107)
CHLORIDE SERPL-SCNC: 100 MMOL/L — SIGNIFICANT CHANGE UP (ref 98–107)
CO2 SERPL-SCNC: 25 MMOL/L — SIGNIFICANT CHANGE UP (ref 22–31)
CO2 SERPL-SCNC: 25 MMOL/L — SIGNIFICANT CHANGE UP (ref 22–31)
CREAT SERPL-MCNC: 1.16 MG/DL — SIGNIFICANT CHANGE UP (ref 0.5–1.3)
CREAT SERPL-MCNC: 1.16 MG/DL — SIGNIFICANT CHANGE UP (ref 0.5–1.3)
CULTURE RESULTS: SIGNIFICANT CHANGE UP
EGFR: 68 ML/MIN/1.73M2 — SIGNIFICANT CHANGE UP
EGFR: 68 ML/MIN/1.73M2 — SIGNIFICANT CHANGE UP
EOSINOPHIL # BLD AUTO: 0.18 K/UL — SIGNIFICANT CHANGE UP (ref 0–0.5)
EOSINOPHIL # BLD AUTO: 0.18 K/UL — SIGNIFICANT CHANGE UP (ref 0–0.5)
EOSINOPHIL NFR BLD AUTO: 1 % — SIGNIFICANT CHANGE UP (ref 0–6)
EOSINOPHIL NFR BLD AUTO: 1 % — SIGNIFICANT CHANGE UP (ref 0–6)
GLUCOSE SERPL-MCNC: 102 MG/DL — HIGH (ref 70–99)
GLUCOSE SERPL-MCNC: 102 MG/DL — HIGH (ref 70–99)
HCT VFR BLD CALC: 24.7 % — LOW (ref 39–50)
HCT VFR BLD CALC: 24.7 % — LOW (ref 39–50)
HGB BLD-MCNC: 8 G/DL — LOW (ref 13–17)
HGB BLD-MCNC: 8 G/DL — LOW (ref 13–17)
IANC: 14.17 K/UL — HIGH (ref 1.8–7.4)
IANC: 14.17 K/UL — HIGH (ref 1.8–7.4)
IMM GRANULOCYTES NFR BLD AUTO: 0.9 % — SIGNIFICANT CHANGE UP (ref 0–0.9)
IMM GRANULOCYTES NFR BLD AUTO: 0.9 % — SIGNIFICANT CHANGE UP (ref 0–0.9)
LYMPHOCYTES # BLD AUTO: 13.9 % — SIGNIFICANT CHANGE UP (ref 13–44)
LYMPHOCYTES # BLD AUTO: 13.9 % — SIGNIFICANT CHANGE UP (ref 13–44)
LYMPHOCYTES # BLD AUTO: 2.51 K/UL — SIGNIFICANT CHANGE UP (ref 1–3.3)
LYMPHOCYTES # BLD AUTO: 2.51 K/UL — SIGNIFICANT CHANGE UP (ref 1–3.3)
MAGNESIUM SERPL-MCNC: 2 MG/DL — SIGNIFICANT CHANGE UP (ref 1.6–2.6)
MAGNESIUM SERPL-MCNC: 2 MG/DL — SIGNIFICANT CHANGE UP (ref 1.6–2.6)
MCHC RBC-ENTMCNC: 29.5 PG — SIGNIFICANT CHANGE UP (ref 27–34)
MCHC RBC-ENTMCNC: 29.5 PG — SIGNIFICANT CHANGE UP (ref 27–34)
MCHC RBC-ENTMCNC: 32.4 GM/DL — SIGNIFICANT CHANGE UP (ref 32–36)
MCHC RBC-ENTMCNC: 32.4 GM/DL — SIGNIFICANT CHANGE UP (ref 32–36)
MCV RBC AUTO: 91.1 FL — SIGNIFICANT CHANGE UP (ref 80–100)
MCV RBC AUTO: 91.1 FL — SIGNIFICANT CHANGE UP (ref 80–100)
MONOCYTES # BLD AUTO: 0.98 K/UL — HIGH (ref 0–0.9)
MONOCYTES # BLD AUTO: 0.98 K/UL — HIGH (ref 0–0.9)
MONOCYTES NFR BLD AUTO: 5.4 % — SIGNIFICANT CHANGE UP (ref 2–14)
MONOCYTES NFR BLD AUTO: 5.4 % — SIGNIFICANT CHANGE UP (ref 2–14)
NEUTROPHILS # BLD AUTO: 14.17 K/UL — HIGH (ref 1.8–7.4)
NEUTROPHILS # BLD AUTO: 14.17 K/UL — HIGH (ref 1.8–7.4)
NEUTROPHILS NFR BLD AUTO: 78.5 % — HIGH (ref 43–77)
NEUTROPHILS NFR BLD AUTO: 78.5 % — HIGH (ref 43–77)
NRBC # BLD: 0 /100 WBCS — SIGNIFICANT CHANGE UP (ref 0–0)
NRBC # BLD: 0 /100 WBCS — SIGNIFICANT CHANGE UP (ref 0–0)
NRBC # FLD: 0.02 K/UL — HIGH (ref 0–0)
NRBC # FLD: 0.02 K/UL — HIGH (ref 0–0)
PHOSPHATE SERPL-MCNC: 2.6 MG/DL — SIGNIFICANT CHANGE UP (ref 2.5–4.5)
PHOSPHATE SERPL-MCNC: 2.6 MG/DL — SIGNIFICANT CHANGE UP (ref 2.5–4.5)
PLATELET # BLD AUTO: 418 K/UL — HIGH (ref 150–400)
PLATELET # BLD AUTO: 418 K/UL — HIGH (ref 150–400)
POTASSIUM SERPL-MCNC: 3.7 MMOL/L — SIGNIFICANT CHANGE UP (ref 3.5–5.3)
POTASSIUM SERPL-MCNC: 3.7 MMOL/L — SIGNIFICANT CHANGE UP (ref 3.5–5.3)
POTASSIUM SERPL-SCNC: 3.7 MMOL/L — SIGNIFICANT CHANGE UP (ref 3.5–5.3)
POTASSIUM SERPL-SCNC: 3.7 MMOL/L — SIGNIFICANT CHANGE UP (ref 3.5–5.3)
PROT SERPL-MCNC: 6.8 G/DL — SIGNIFICANT CHANGE UP (ref 6–8.3)
PROT SERPL-MCNC: 6.8 G/DL — SIGNIFICANT CHANGE UP (ref 6–8.3)
RBC # BLD: 2.71 M/UL — LOW (ref 4.2–5.8)
RBC # BLD: 2.71 M/UL — LOW (ref 4.2–5.8)
RBC # FLD: 17.5 % — HIGH (ref 10.3–14.5)
RBC # FLD: 17.5 % — HIGH (ref 10.3–14.5)
SODIUM SERPL-SCNC: 133 MMOL/L — LOW (ref 135–145)
SODIUM SERPL-SCNC: 133 MMOL/L — LOW (ref 135–145)
SPECIMEN SOURCE: SIGNIFICANT CHANGE UP
WBC # BLD: 18.05 K/UL — HIGH (ref 3.8–10.5)
WBC # BLD: 18.05 K/UL — HIGH (ref 3.8–10.5)
WBC # FLD AUTO: 18.05 K/UL — HIGH (ref 3.8–10.5)
WBC # FLD AUTO: 18.05 K/UL — HIGH (ref 3.8–10.5)

## 2023-11-18 PROCEDURE — 99233 SBSQ HOSP IP/OBS HIGH 50: CPT | Mod: GC

## 2023-11-18 RX ORDER — GABAPENTIN 400 MG/1
100 CAPSULE ORAL THREE TIMES A DAY
Refills: 0 | Status: DISCONTINUED | OUTPATIENT
Start: 2023-11-18 | End: 2023-11-20

## 2023-11-18 RX ADMIN — PIPERACILLIN AND TAZOBACTAM 25 GRAM(S): 4; .5 INJECTION, POWDER, LYOPHILIZED, FOR SOLUTION INTRAVENOUS at 12:22

## 2023-11-18 RX ADMIN — HEPARIN SODIUM 1400 UNIT(S)/HR: 5000 INJECTION INTRAVENOUS; SUBCUTANEOUS at 06:11

## 2023-11-18 RX ADMIN — GABAPENTIN 100 MILLIGRAM(S): 400 CAPSULE ORAL at 21:28

## 2023-11-18 RX ADMIN — GABAPENTIN 100 MILLIGRAM(S): 400 CAPSULE ORAL at 13:25

## 2023-11-18 RX ADMIN — Medication 5 MILLIGRAM(S): at 20:33

## 2023-11-18 RX ADMIN — HEPARIN SODIUM 1400 UNIT(S)/HR: 5000 INJECTION INTRAVENOUS; SUBCUTANEOUS at 19:14

## 2023-11-18 RX ADMIN — Medication 1 MILLIGRAM(S): at 12:22

## 2023-11-18 RX ADMIN — TAMSULOSIN HYDROCHLORIDE 0.4 MILLIGRAM(S): 0.4 CAPSULE ORAL at 21:27

## 2023-11-18 RX ADMIN — PIPERACILLIN AND TAZOBACTAM 25 GRAM(S): 4; .5 INJECTION, POWDER, LYOPHILIZED, FOR SOLUTION INTRAVENOUS at 21:27

## 2023-11-18 RX ADMIN — HEPARIN SODIUM 1400 UNIT(S)/HR: 5000 INJECTION INTRAVENOUS; SUBCUTANEOUS at 11:48

## 2023-11-18 RX ADMIN — HEPARIN SODIUM 1400 UNIT(S)/HR: 5000 INJECTION INTRAVENOUS; SUBCUTANEOUS at 05:28

## 2023-11-18 RX ADMIN — AMLODIPINE BESYLATE 5 MILLIGRAM(S): 2.5 TABLET ORAL at 05:28

## 2023-11-18 RX ADMIN — PIPERACILLIN AND TAZOBACTAM 25 GRAM(S): 4; .5 INJECTION, POWDER, LYOPHILIZED, FOR SOLUTION INTRAVENOUS at 05:28

## 2023-11-18 RX ADMIN — PANTOPRAZOLE SODIUM 40 MILLIGRAM(S): 20 TABLET, DELAYED RELEASE ORAL at 05:28

## 2023-11-18 RX ADMIN — HEPARIN SODIUM 1400 UNIT(S)/HR: 5000 INJECTION INTRAVENOUS; SUBCUTANEOUS at 07:21

## 2023-11-18 NOTE — PROGRESS NOTE ADULT - PROBLEM SELECTOR PLAN 2
Recent hx weight loss, fatigue and poor PO intake  CT evidence of new R hepatic lesion with possible portal vein thrombus, labs c/f biliary obstruction with increased Tbili and LFTs, leukocytosis  Hx of prostate cancer s/p prostatectomy, does not follow with Urology or Onc  CT chest without evidence of malignancy or metastasis   CEA elevated, CA 19-9 elevated, iron studies suggest anemia of chronic disease, LFTs worsening  MRCP with hepatic abscess 7x6.7x6.9cm and 5x4.6x5.3, mild intrahepatic biliary duct dilation, increasing size  Likely source is infection, patient recently traveled to [facility] and has dog at home, no Eosinophilia noted  Prior hx of eosinophilia with hepatic abscesses in 2018    Plan  - ERCP after abscess drainage with IR, likely Tuesday  - restart IV heparin  - hold Aspirin 5 days prior to IR

## 2023-11-18 NOTE — PROGRESS NOTE ADULT - ATTENDING COMMENTS
67 yo M PMH HTN, BPH, prostate cancer s/p prostatectomy 2012, diverticulosis presenting with 5 weeks of generalized weakness, 25 lb weight loss, and poor PO intake and subjective chills. + travel to [place] in 2021 returned in 2021. asymptomatic until 5 weeks ago. + treated for malaria. + hiccups + recent travel to [place]. febrile 101 11/13; RVP neg; 11/11 initial UA neg; repeat UA + w/o overt symptoms;  Ucx Neg    Hepatic lesion- wt loss and decreased PO intake concern for malignancy/abscess                        - ID appreciated low suspicion for malaria.                       - Ct R hepatic lesion w/ mild intrahepatic ductal dilatation : CT chest w/o overt PNA or metastatic disease                       - CEA mildly elevated; hep panel neg;  129 elevated; procal elevated; AFP low                         - monitor LFTs TB downtrending and alk phos and AST/ALT downtrending                         -  MRCP with Lt and RT hepatic liver abscess increased since CT and bland thrombosis                          - 11/2018 Reviewed outpt ID note liver lesion in 2017 (strongyloides ab+ repeat neg, quant gold+ refused treatment for latent TB and positive echinococcus – IgG repeat serology neg, MRI 2.8 cm dominate mass w/ cirrhosis and smaller ring enhancing lesions, PET w/o  metabolic active lesion, liver bx :fibrous tissue with macrophage reaction and chronic active inflammation. Fragments of benign liver parenchyma. 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                         - c/w zosyn                        - check e histolytica and strongyloides and echinococcus in lab and stool O/P                         - GI with concern for poss abscess; ERCP if infectious work up unrevealing                        - IR bx delay till 11/21 given ASA use and prior hx echinococcus; prior Hx latent TB quant gold indeterminate     Portal vein thrombus- CT expansile filling defect in the right portal, left portal and main hepatic veins, which may represent tumor thrombus versus bland thrombus. US doppler occlusive thrombus main and Lt portal veins with avascular thrombus. MRI as above RT and Lt and main portal vein thrombosis                          -  on heparin drip     elevated Cr- poss poor PO intake                   - now improved  s/p fluids                    - monitor Cr                 Anemia- no overt GIB; fe studies consistent with AOCD; s/p 1 uprbc (11/16) with appropriate rise. 67 yo M PMH HTN, BPH, prostate cancer s/p prostatectomy 2012, diverticulosis presenting with 5 weeks of generalized weakness, 25 lb weight loss, and poor PO intake and subjective chills. + travel to malaria in 2021 returned in 2021. asymptomatic until 5 weeks ago. + treated for malaria. + hiccups + recent travel to [place]. febrile 101 11/13; RVP neg; 11/11 initial UA neg; repeat UA + w/o overt symptoms;  Ucx Neg    Hepatic lesion- wt loss and decreased PO intake concern for malignancy/abscess                        - ID appreciated low suspicion for malaria.                       - Ct R hepatic lesion w/ mild intrahepatic ductal dilatation : CT chest w/o overt PNA or metastatic disease                       - CEA mildly elevated; hep panel neg;  129 elevated; procal elevated; AFP low                         - monitor LFTs TB downtrending and alk phos and AST/ALT downtrending                         -  MRCP with Lt and RT hepatic liver abscess increased since CT and bland thrombosis                          - 11/2018 Reviewed outpt ID note liver lesion in 2017 (strongyloides ab+ repeat neg, quant gold+ refused treatment for latent TB and positive echinococcus – IgG repeat serology neg, MRI 2.8 cm dominate mass w/ cirrhosis and smaller ring enhancing lesions, PET w/o  metabolic active lesion, liver bx :fibrous tissue with macrophage reaction and chronic active inflammation. Fragments of benign liver parenchyma. No fungal or mycobacteria neg for malignancy; 6/15/18 s/p Ivermectin x 2 days,                        -  hiccups poss from Diaphragmatic irration- s/p baclofen PRN; still complaining of hiccups. Starting gabapentin 100TID. Can consider Thorazine if refractory however would need to monitor QTC, QTC ranging 460-480.                         - c/w zosyn                        - check e histolytica and strongyloides and echinococcus in lab and stool O/P                         - GI with concern for poss abscess; ERCP if infectious work up unrevealing                        - IR bx delay till 11/21 given ASA use and prior hx echinococcus; prior Hx latent TB quant gold indeterminate     Portal vein thrombus- CT expansile filling defect in the right portal, left portal and main hepatic veins, which may represent tumor thrombus versus bland thrombus. US doppler occlusive thrombus main and Lt portal veins with avascular thrombus. MRI as above RT and Lt and main portal vein thrombosis                          -  on heparin drip     elevated Cr- poss poor PO intake                   - now improved  s/p fluids                    - monitor Cr                 Anemia- no overt GIB; fe studies consistent with AOCD; s/p 1 uprbc (11/16) with appropriate rise.

## 2023-11-18 NOTE — PROGRESS NOTE ADULT - PROBLEM SELECTOR PLAN 3
Developed Hiccups, likely 2/2 to diaphragmatic irritation    Plan  - Baclofen  - EKG with QTc 470s  - hold off on Zofran Developed Hiccups, likely 2/2 to diaphragmatic irritation    Plan  - Baclofen  - EKG with QTc 470s  - will give low dose gabapentin 100 TID

## 2023-11-18 NOTE — PROGRESS NOTE ADULT - PROBLEM SELECTOR PLAN 4
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  Apr Heme, no hypercoag workup needed, likely 2/2 to infection. Transition to DOAC on d/c    Plan  - restart Heparin  - continue to monitor

## 2023-11-18 NOTE — PROGRESS NOTE ADULT - SUBJECTIVE AND OBJECTIVE BOX
[facility]  Internal Medicine PGY-3    PROGRESS NOTE:     Patient is a 68y old  Male who presents with a chief complaint of Failure to Thrive (15 Nov 2023 17:16)      SUBJECTIVE / OVERNIGHT EVENTS:    No acute events overnight. Patient examined at bedside with no acute complaints.     Pain:  Bowel Movements:  Urination:  OOB:  PT:    REVIEW OF SYSTEMS:    CONSTITUTIONAL: No weakness, fevers or chills  EYES/ENT: No visual changes;  No vertigo or throat pain   NECK: No pain or stiffness  RESPIRATORY: No cough, wheezing, hemoptysis; No shortness of breath  CARDIOVASCULAR: No chest pain or palpitations  GASTROINTESTINAL: No abdominal or epigastric pain. No nausea, vomiting, or hematemesis; No diarrhea or constipation. No melena or hematochezia.  GENITOURINARY: No dysuria, frequency or hematuria  NEUROLOGICAL: No numbness or weakness  SKIN: No itching, rashes      MEDICATIONS  (STANDING):  amLODIPine   Tablet 5 milliGRAM(s) Oral daily  folic acid 1 milliGRAM(s) Oral daily  heparin  Infusion.  Unit(s)/Hr (12 mL/Hr) IV Continuous <Continuous>  pantoprazole    Tablet 40 milliGRAM(s) Oral before breakfast  piperacillin/tazobactam IVPB.. 3.375 Gram(s) IV Intermittent every 8 hours  tamsulosin 0.4 milliGRAM(s) Oral at bedtime    MEDICATIONS  (PRN):  baclofen 5 milliGRAM(s) Oral every 12 hours PRN hiccups  heparin   Injectable 5500 Unit(s) IV Push every 6 hours PRN For aPTT less than 40  heparin   Injectable 2500 Unit(s) IV Push every 6 hours PRN For aPTT between 40 - 57      CAPILLARY BLOOD GLUCOSE        I&O's Summary      VITALS:   T(C): 36.5 (11-18-23 @ 05:25), Max: 37 (11-17-23 @ 21:50)  HR: 67 (11-18-23 @ 05:25) (67 - 92)  BP: 146/90 (11-18-23 @ 05:25) (102/60 - 146/90)  RR: 18 (11-18-23 @ 05:25) (18 - 18)  SpO2: 98% (11-18-23 @ 05:25) (96% - 98%)    GENERAL: NAD, lying in bed comfortably  HEAD:  Atraumatic, normocephalic  EYES: EOMI, PERRLA, conjunctiva and sclera clear  ENT: Moist mucous membranes  NECK: Supple, no JVD  HEART: Regular rate and rhythm, no murmurs, rubs, or gallops  LUNGS: Unlabored respirations.  Clear to auscultation bilaterally, no crackles, wheezing, or rhonchi  ABDOMEN: Soft, nontender, nondistended, +BS  EXTREMITIES: 2+ peripheral pulses bilaterally. No clubbing, cyanosis, or edema  NERVOUS SYSTEM:  A&Ox3, no focal deficits   SKIN: No rashes or lesions    LABS:                        8.0    18.05 )-----------( 418      ( 18 Nov 2023 04:56 )             24.7     11-18    133<L>  |  100  |  9   ----------------------------<  102<H>  3.7   |  25  |  1.16    Ca    8.2<L>      18 Nov 2023 04:56  Phos  2.6     11-18  Mg     2.00     11-18    TPro  6.8  /  Alb  2.4<L>  /  TBili  1.5<H>  /  DBili  x   /  AST  33  /  ALT  27  /  AlkPhos  138<H>  11-18    PT/INR - ( 17 Nov 2023 03:04 )   PT: 14.2 sec;   INR: 1.28 ratio         PTT - ( 18 Nov 2023 04:56 )  PTT:68.1 sec      Urinalysis Basic - ( 18 Nov 2023 04:56 )    Color: x / Appearance: x / SG: x / pH: x  Gluc: 102 mg/dL / Ketone: x  / Bili: x / Urobili: x   Blood: x / Protein: x / Nitrite: x   Leuk Esterase: x / RBC: x / WBC x   Sq Epi: x / Non Sq Epi: x / Bacteria: x          RADIOLOGY & ADDITIONAL TESTS:  Results Reviewed:   Imaging Personally Reviewed:  Electrocardiogram Personally Reviewed:    COORDINATION OF CARE:  Care Discussed with Consultants/Other Providers [Y/N]:  Prior or Outpatient Records Reviewed [Y/N]:   [facility]  [facility] PGY-3    PROGRESS NOTE:     Patient is a 68y old  Male who presents with a chief complaint of Failure to Thrive (15 Nov 2023 17:16)      SUBJECTIVE / OVERNIGHT EVENTS:    No acute events overnight. Patient examined at bedside complaining of chronic hiccups, no other complaints on ROS    REVIEW OF SYSTEMS:    CONSTITUTIONAL: No weakness, fevers or chills  EYES/ENT: No visual changes;  No vertigo or throat pain   NECK: No pain or stiffness  RESPIRATORY: No cough, wheezing, hemoptysis; No shortness of breath  CARDIOVASCULAR: No chest pain or palpitations  GASTROINTESTINAL: No abdominal or epigastric pain. No nausea, vomiting, or hematemesis; No diarrhea or constipation. No melena or hematochezia.  GENITOURINARY: No dysuria, frequency or hematuria  NEUROLOGICAL: No numbness or weakness  SKIN: No itching, rashes      MEDICATIONS  (STANDING):  amLODIPine   Tablet 5 milliGRAM(s) Oral daily  folic acid 1 milliGRAM(s) Oral daily  heparin  Infusion.  Unit(s)/Hr (12 mL/Hr) IV Continuous <Continuous>  pantoprazole    Tablet 40 milliGRAM(s) Oral before breakfast  piperacillin/tazobactam IVPB.. 3.375 Gram(s) IV Intermittent every 8 hours  tamsulosin 0.4 milliGRAM(s) Oral at bedtime    MEDICATIONS  (PRN):  baclofen 5 milliGRAM(s) Oral every 12 hours PRN hiccups  heparin   Injectable 5500 Unit(s) IV Push every 6 hours PRN For aPTT less than 40  heparin   Injectable 2500 Unit(s) IV Push every 6 hours PRN For aPTT between 40 - 57      CAPILLARY BLOOD GLUCOSE        I&O's Summary      VITALS:   T(C): 36.5 (11-18-23 @ 05:25), Max: 37 (11-17-23 @ 21:50)  HR: 67 (11-18-23 @ 05:25) (67 - 92)  BP: 146/90 (11-18-23 @ 05:25) (102/60 - 146/90)  RR: 18 (11-18-23 @ 05:25) (18 - 18)  SpO2: 98% (11-18-23 @ 05:25) (96% - 98%)    GENERAL: NAD, lying in bed comfortably  HEAD:  Atraumatic, normocephalic  EYES: EOMI, PERRLA, conjunctiva and sclera clear  ENT: Moist mucous membranes  NECK: Supple, no JVD  HEART: Regular rate and rhythm, no murmurs, rubs, or gallops  LUNGS: Unlabored respirations.  Clear to auscultation bilaterally, no crackles, wheezing, or rhonchi  ABDOMEN: Soft, nontender, nondistended, +BS  EXTREMITIES: 2+ peripheral pulses bilaterally. No clubbing, cyanosis, or edema  NERVOUS SYSTEM:  A&Ox3, no focal deficits   SKIN: No rashes or lesions    LABS:                        8.0    18.05 )-----------( 418      ( 18 Nov 2023 04:56 )             24.7     11-18    133<L>  |  100  |  9   ----------------------------<  102<H>  3.7   |  25  |  1.16    Ca    8.2<L>      18 Nov 2023 04:56  Phos  2.6     11-18  Mg     2.00     11-18    TPro  6.8  /  Alb  2.4<L>  /  TBili  1.5<H>  /  DBili  x   /  AST  33  /  ALT  27  /  AlkPhos  138<H>  11-18    PT/INR - ( 17 Nov 2023 03:04 )   PT: 14.2 sec;   INR: 1.28 ratio         PTT - ( 18 Nov 2023 04:56 )  PTT:68.1 sec      Urinalysis Basic - ( 18 Nov 2023 04:56 )    Color: x / Appearance: x / SG: x / pH: x  Gluc: 102 mg/dL / Ketone: x  / Bili: x / Urobili: x   Blood: x / Protein: x / Nitrite: x   Leuk Esterase: x / RBC: x / WBC x   Sq Epi: x / Non Sq Epi: x / Bacteria: x          RADIOLOGY & ADDITIONAL TESTS:  Results Reviewed:   Imaging Personally Reviewed:  Electrocardiogram Personally Reviewed:    COORDINATION OF CARE:  Care Discussed with Consultants/Other Providers [Y/N]:  Prior or Outpatient Records Reviewed [Y/N]:

## 2023-11-18 NOTE — PROGRESS NOTE ADULT - PROBLEM SELECTOR PLAN 1
WBC 25K, spiked fever to 101.1  Previously treated for ? malaria, possibly 2/2 to malignancy vs malaria flare ( ID low suspicion) vs infection of unclear etiology  Prior hx of eosinophilic leukocytosis  Apr ID recs, less likely infectious more likely malignancy vs thrombus  May be 2/2 to hepatic abscess, no eosinophilia noted    Plan  - c/w Zosyn  - f/u BCx, UCx  - f/u Entamoeba Ab, Stool Ova and Parasites  - f/u IR drainage bacterial Cx, fungal, AFB Cx

## 2023-11-19 DIAGNOSIS — D64.9 ANEMIA, UNSPECIFIED: ICD-10-CM

## 2023-11-19 LAB
ALBUMIN SERPL ELPH-MCNC: 2 G/DL — LOW (ref 3.3–5)
ALBUMIN SERPL ELPH-MCNC: 2 G/DL — LOW (ref 3.3–5)
ALP SERPL-CCNC: 140 U/L — HIGH (ref 40–120)
ALP SERPL-CCNC: 140 U/L — HIGH (ref 40–120)
ALT FLD-CCNC: 31 U/L — SIGNIFICANT CHANGE UP (ref 4–41)
ALT FLD-CCNC: 31 U/L — SIGNIFICANT CHANGE UP (ref 4–41)
ANION GAP SERPL CALC-SCNC: 10 MMOL/L — SIGNIFICANT CHANGE UP (ref 7–14)
ANION GAP SERPL CALC-SCNC: 10 MMOL/L — SIGNIFICANT CHANGE UP (ref 7–14)
APTT BLD: 43 SEC — HIGH (ref 24.5–35.6)
APTT BLD: 43 SEC — HIGH (ref 24.5–35.6)
APTT BLD: 72.1 SEC — HIGH (ref 24.5–35.6)
APTT BLD: 72.1 SEC — HIGH (ref 24.5–35.6)
APTT BLD: 93 SEC — HIGH (ref 24.5–35.6)
APTT BLD: 93 SEC — HIGH (ref 24.5–35.6)
AST SERPL-CCNC: 50 U/L — HIGH (ref 4–40)
AST SERPL-CCNC: 50 U/L — HIGH (ref 4–40)
B PERT DNA SPEC QL NAA+PROBE: SIGNIFICANT CHANGE UP
B PERT DNA SPEC QL NAA+PROBE: SIGNIFICANT CHANGE UP
B PERT+PARAPERT DNA PNL SPEC NAA+PROBE: SIGNIFICANT CHANGE UP
B PERT+PARAPERT DNA PNL SPEC NAA+PROBE: SIGNIFICANT CHANGE UP
BASOPHILS # BLD AUTO: 0.07 K/UL — SIGNIFICANT CHANGE UP (ref 0–0.2)
BASOPHILS # BLD AUTO: 0.07 K/UL — SIGNIFICANT CHANGE UP (ref 0–0.2)
BASOPHILS NFR BLD AUTO: 0.5 % — SIGNIFICANT CHANGE UP (ref 0–2)
BASOPHILS NFR BLD AUTO: 0.5 % — SIGNIFICANT CHANGE UP (ref 0–2)
BILIRUB SERPL-MCNC: 1.2 MG/DL — SIGNIFICANT CHANGE UP (ref 0.2–1.2)
BILIRUB SERPL-MCNC: 1.2 MG/DL — SIGNIFICANT CHANGE UP (ref 0.2–1.2)
BORDETELLA PARAPERTUSSIS (RAPRVP): SIGNIFICANT CHANGE UP
BORDETELLA PARAPERTUSSIS (RAPRVP): SIGNIFICANT CHANGE UP
BUN SERPL-MCNC: 8 MG/DL — SIGNIFICANT CHANGE UP (ref 7–23)
BUN SERPL-MCNC: 8 MG/DL — SIGNIFICANT CHANGE UP (ref 7–23)
C PNEUM DNA SPEC QL NAA+PROBE: SIGNIFICANT CHANGE UP
C PNEUM DNA SPEC QL NAA+PROBE: SIGNIFICANT CHANGE UP
CALCIUM SERPL-MCNC: 8.4 MG/DL — SIGNIFICANT CHANGE UP (ref 8.4–10.5)
CALCIUM SERPL-MCNC: 8.4 MG/DL — SIGNIFICANT CHANGE UP (ref 8.4–10.5)
CHLORIDE SERPL-SCNC: 98 MMOL/L — SIGNIFICANT CHANGE UP (ref 98–107)
CHLORIDE SERPL-SCNC: 98 MMOL/L — SIGNIFICANT CHANGE UP (ref 98–107)
CO2 SERPL-SCNC: 23 MMOL/L — SIGNIFICANT CHANGE UP (ref 22–31)
CO2 SERPL-SCNC: 23 MMOL/L — SIGNIFICANT CHANGE UP (ref 22–31)
CREAT SERPL-MCNC: 1.16 MG/DL — SIGNIFICANT CHANGE UP (ref 0.5–1.3)
CREAT SERPL-MCNC: 1.16 MG/DL — SIGNIFICANT CHANGE UP (ref 0.5–1.3)
EGFR: 68 ML/MIN/1.73M2 — SIGNIFICANT CHANGE UP
EGFR: 68 ML/MIN/1.73M2 — SIGNIFICANT CHANGE UP
EOSINOPHIL # BLD AUTO: 0.17 K/UL — SIGNIFICANT CHANGE UP (ref 0–0.5)
EOSINOPHIL # BLD AUTO: 0.17 K/UL — SIGNIFICANT CHANGE UP (ref 0–0.5)
EOSINOPHIL NFR BLD AUTO: 1.2 % — SIGNIFICANT CHANGE UP (ref 0–6)
EOSINOPHIL NFR BLD AUTO: 1.2 % — SIGNIFICANT CHANGE UP (ref 0–6)
FLUAV SUBTYP SPEC NAA+PROBE: SIGNIFICANT CHANGE UP
FLUAV SUBTYP SPEC NAA+PROBE: SIGNIFICANT CHANGE UP
FLUBV RNA SPEC QL NAA+PROBE: SIGNIFICANT CHANGE UP
FLUBV RNA SPEC QL NAA+PROBE: SIGNIFICANT CHANGE UP
GLUCOSE SERPL-MCNC: 98 MG/DL — SIGNIFICANT CHANGE UP (ref 70–99)
GLUCOSE SERPL-MCNC: 98 MG/DL — SIGNIFICANT CHANGE UP (ref 70–99)
HADV DNA SPEC QL NAA+PROBE: SIGNIFICANT CHANGE UP
HADV DNA SPEC QL NAA+PROBE: SIGNIFICANT CHANGE UP
HCOV 229E RNA SPEC QL NAA+PROBE: SIGNIFICANT CHANGE UP
HCOV 229E RNA SPEC QL NAA+PROBE: SIGNIFICANT CHANGE UP
HCOV HKU1 RNA SPEC QL NAA+PROBE: SIGNIFICANT CHANGE UP
HCOV HKU1 RNA SPEC QL NAA+PROBE: SIGNIFICANT CHANGE UP
HCOV NL63 RNA SPEC QL NAA+PROBE: SIGNIFICANT CHANGE UP
HCOV NL63 RNA SPEC QL NAA+PROBE: SIGNIFICANT CHANGE UP
HCOV OC43 RNA SPEC QL NAA+PROBE: SIGNIFICANT CHANGE UP
HCOV OC43 RNA SPEC QL NAA+PROBE: SIGNIFICANT CHANGE UP
HCT VFR BLD CALC: 24.6 % — LOW (ref 39–50)
HCT VFR BLD CALC: 24.6 % — LOW (ref 39–50)
HGB BLD-MCNC: 8 G/DL — LOW (ref 13–17)
HGB BLD-MCNC: 8 G/DL — LOW (ref 13–17)
HMPV RNA SPEC QL NAA+PROBE: SIGNIFICANT CHANGE UP
HMPV RNA SPEC QL NAA+PROBE: SIGNIFICANT CHANGE UP
HPIV1 RNA SPEC QL NAA+PROBE: SIGNIFICANT CHANGE UP
HPIV1 RNA SPEC QL NAA+PROBE: SIGNIFICANT CHANGE UP
HPIV2 RNA SPEC QL NAA+PROBE: SIGNIFICANT CHANGE UP
HPIV2 RNA SPEC QL NAA+PROBE: SIGNIFICANT CHANGE UP
HPIV3 RNA SPEC QL NAA+PROBE: SIGNIFICANT CHANGE UP
HPIV3 RNA SPEC QL NAA+PROBE: SIGNIFICANT CHANGE UP
HPIV4 RNA SPEC QL NAA+PROBE: SIGNIFICANT CHANGE UP
HPIV4 RNA SPEC QL NAA+PROBE: SIGNIFICANT CHANGE UP
IANC: 10.84 K/UL — HIGH (ref 1.8–7.4)
IANC: 10.84 K/UL — HIGH (ref 1.8–7.4)
IMM GRANULOCYTES NFR BLD AUTO: 0.8 % — SIGNIFICANT CHANGE UP (ref 0–0.9)
IMM GRANULOCYTES NFR BLD AUTO: 0.8 % — SIGNIFICANT CHANGE UP (ref 0–0.9)
INR BLD: 1.18 RATIO — SIGNIFICANT CHANGE UP (ref 0.85–1.18)
INR BLD: 1.18 RATIO — SIGNIFICANT CHANGE UP (ref 0.85–1.18)
LYMPHOCYTES # BLD AUTO: 15.2 % — SIGNIFICANT CHANGE UP (ref 13–44)
LYMPHOCYTES # BLD AUTO: 15.2 % — SIGNIFICANT CHANGE UP (ref 13–44)
LYMPHOCYTES # BLD AUTO: 2.18 K/UL — SIGNIFICANT CHANGE UP (ref 1–3.3)
LYMPHOCYTES # BLD AUTO: 2.18 K/UL — SIGNIFICANT CHANGE UP (ref 1–3.3)
M PNEUMO DNA SPEC QL NAA+PROBE: SIGNIFICANT CHANGE UP
M PNEUMO DNA SPEC QL NAA+PROBE: SIGNIFICANT CHANGE UP
MAGNESIUM SERPL-MCNC: 2 MG/DL — SIGNIFICANT CHANGE UP (ref 1.6–2.6)
MAGNESIUM SERPL-MCNC: 2 MG/DL — SIGNIFICANT CHANGE UP (ref 1.6–2.6)
MCHC RBC-ENTMCNC: 30.2 PG — SIGNIFICANT CHANGE UP (ref 27–34)
MCHC RBC-ENTMCNC: 30.2 PG — SIGNIFICANT CHANGE UP (ref 27–34)
MCHC RBC-ENTMCNC: 32.5 GM/DL — SIGNIFICANT CHANGE UP (ref 32–36)
MCHC RBC-ENTMCNC: 32.5 GM/DL — SIGNIFICANT CHANGE UP (ref 32–36)
MCV RBC AUTO: 92.8 FL — SIGNIFICANT CHANGE UP (ref 80–100)
MCV RBC AUTO: 92.8 FL — SIGNIFICANT CHANGE UP (ref 80–100)
MONOCYTES # BLD AUTO: 0.92 K/UL — HIGH (ref 0–0.9)
MONOCYTES # BLD AUTO: 0.92 K/UL — HIGH (ref 0–0.9)
MONOCYTES NFR BLD AUTO: 6.4 % — SIGNIFICANT CHANGE UP (ref 2–14)
MONOCYTES NFR BLD AUTO: 6.4 % — SIGNIFICANT CHANGE UP (ref 2–14)
NEUTROPHILS # BLD AUTO: 10.84 K/UL — HIGH (ref 1.8–7.4)
NEUTROPHILS # BLD AUTO: 10.84 K/UL — HIGH (ref 1.8–7.4)
NEUTROPHILS NFR BLD AUTO: 75.9 % — SIGNIFICANT CHANGE UP (ref 43–77)
NEUTROPHILS NFR BLD AUTO: 75.9 % — SIGNIFICANT CHANGE UP (ref 43–77)
NRBC # BLD: 0 /100 WBCS — SIGNIFICANT CHANGE UP (ref 0–0)
NRBC # BLD: 0 /100 WBCS — SIGNIFICANT CHANGE UP (ref 0–0)
NRBC # FLD: 0 K/UL — SIGNIFICANT CHANGE UP (ref 0–0)
NRBC # FLD: 0 K/UL — SIGNIFICANT CHANGE UP (ref 0–0)
PHOSPHATE SERPL-MCNC: 2.6 MG/DL — SIGNIFICANT CHANGE UP (ref 2.5–4.5)
PHOSPHATE SERPL-MCNC: 2.6 MG/DL — SIGNIFICANT CHANGE UP (ref 2.5–4.5)
PLATELET # BLD AUTO: 414 K/UL — HIGH (ref 150–400)
PLATELET # BLD AUTO: 414 K/UL — HIGH (ref 150–400)
POTASSIUM SERPL-MCNC: 4.2 MMOL/L — SIGNIFICANT CHANGE UP (ref 3.5–5.3)
POTASSIUM SERPL-MCNC: 4.2 MMOL/L — SIGNIFICANT CHANGE UP (ref 3.5–5.3)
POTASSIUM SERPL-SCNC: 4.2 MMOL/L — SIGNIFICANT CHANGE UP (ref 3.5–5.3)
POTASSIUM SERPL-SCNC: 4.2 MMOL/L — SIGNIFICANT CHANGE UP (ref 3.5–5.3)
PROT SERPL-MCNC: 6.6 G/DL — SIGNIFICANT CHANGE UP (ref 6–8.3)
PROT SERPL-MCNC: 6.6 G/DL — SIGNIFICANT CHANGE UP (ref 6–8.3)
PROTHROM AB SERPL-ACNC: 13.2 SEC — HIGH (ref 9.5–13)
PROTHROM AB SERPL-ACNC: 13.2 SEC — HIGH (ref 9.5–13)
RAPID RVP RESULT: SIGNIFICANT CHANGE UP
RAPID RVP RESULT: SIGNIFICANT CHANGE UP
RBC # BLD: 2.65 M/UL — LOW (ref 4.2–5.8)
RBC # BLD: 2.65 M/UL — LOW (ref 4.2–5.8)
RBC # FLD: 18.1 % — HIGH (ref 10.3–14.5)
RBC # FLD: 18.1 % — HIGH (ref 10.3–14.5)
RSV RNA SPEC QL NAA+PROBE: SIGNIFICANT CHANGE UP
RSV RNA SPEC QL NAA+PROBE: SIGNIFICANT CHANGE UP
RV+EV RNA SPEC QL NAA+PROBE: SIGNIFICANT CHANGE UP
RV+EV RNA SPEC QL NAA+PROBE: SIGNIFICANT CHANGE UP
SARS-COV-2 RNA SPEC QL NAA+PROBE: SIGNIFICANT CHANGE UP
SARS-COV-2 RNA SPEC QL NAA+PROBE: SIGNIFICANT CHANGE UP
SODIUM SERPL-SCNC: 131 MMOL/L — LOW (ref 135–145)
SODIUM SERPL-SCNC: 131 MMOL/L — LOW (ref 135–145)
WBC # BLD: 14.3 K/UL — HIGH (ref 3.8–10.5)
WBC # BLD: 14.3 K/UL — HIGH (ref 3.8–10.5)
WBC # FLD AUTO: 14.3 K/UL — HIGH (ref 3.8–10.5)
WBC # FLD AUTO: 14.3 K/UL — HIGH (ref 3.8–10.5)

## 2023-11-19 PROCEDURE — 99233 SBSQ HOSP IP/OBS HIGH 50: CPT

## 2023-11-19 PROCEDURE — 93010 ELECTROCARDIOGRAM REPORT: CPT

## 2023-11-19 RX ORDER — CHLORPROMAZINE HCL 10 MG
25 TABLET ORAL ONCE
Refills: 0 | Status: DISCONTINUED | OUTPATIENT
Start: 2023-11-19 | End: 2023-11-26

## 2023-11-19 RX ADMIN — HEPARIN SODIUM 1500 UNIT(S)/HR: 5000 INJECTION INTRAVENOUS; SUBCUTANEOUS at 20:06

## 2023-11-19 RX ADMIN — HEPARIN SODIUM 1500 UNIT(S)/HR: 5000 INJECTION INTRAVENOUS; SUBCUTANEOUS at 07:43

## 2023-11-19 RX ADMIN — PANTOPRAZOLE SODIUM 40 MILLIGRAM(S): 20 TABLET, DELAYED RELEASE ORAL at 06:13

## 2023-11-19 RX ADMIN — HEPARIN SODIUM 1500 UNIT(S)/HR: 5000 INJECTION INTRAVENOUS; SUBCUTANEOUS at 15:19

## 2023-11-19 RX ADMIN — PIPERACILLIN AND TAZOBACTAM 25 GRAM(S): 4; .5 INJECTION, POWDER, LYOPHILIZED, FOR SOLUTION INTRAVENOUS at 06:13

## 2023-11-19 RX ADMIN — AMLODIPINE BESYLATE 5 MILLIGRAM(S): 2.5 TABLET ORAL at 06:13

## 2023-11-19 RX ADMIN — PIPERACILLIN AND TAZOBACTAM 25 GRAM(S): 4; .5 INJECTION, POWDER, LYOPHILIZED, FOR SOLUTION INTRAVENOUS at 20:06

## 2023-11-19 RX ADMIN — Medication 5 MILLIGRAM(S): at 08:43

## 2023-11-19 RX ADMIN — HEPARIN SODIUM 1400 UNIT(S)/HR: 5000 INJECTION INTRAVENOUS; SUBCUTANEOUS at 02:44

## 2023-11-19 RX ADMIN — GABAPENTIN 100 MILLIGRAM(S): 400 CAPSULE ORAL at 20:06

## 2023-11-19 RX ADMIN — Medication 1 MILLIGRAM(S): at 11:53

## 2023-11-19 RX ADMIN — HEPARIN SODIUM 1500 UNIT(S)/HR: 5000 INJECTION INTRAVENOUS; SUBCUTANEOUS at 18:57

## 2023-11-19 RX ADMIN — TAMSULOSIN HYDROCHLORIDE 0.4 MILLIGRAM(S): 0.4 CAPSULE ORAL at 20:06

## 2023-11-19 RX ADMIN — GABAPENTIN 100 MILLIGRAM(S): 400 CAPSULE ORAL at 06:13

## 2023-11-19 RX ADMIN — HEPARIN SODIUM 1500 UNIT(S)/HR: 5000 INJECTION INTRAVENOUS; SUBCUTANEOUS at 23:27

## 2023-11-19 RX ADMIN — PIPERACILLIN AND TAZOBACTAM 25 GRAM(S): 4; .5 INJECTION, POWDER, LYOPHILIZED, FOR SOLUTION INTRAVENOUS at 14:48

## 2023-11-19 RX ADMIN — HEPARIN SODIUM 1400 UNIT(S)/HR: 5000 INJECTION INTRAVENOUS; SUBCUTANEOUS at 07:16

## 2023-11-19 RX ADMIN — GABAPENTIN 100 MILLIGRAM(S): 400 CAPSULE ORAL at 14:48

## 2023-11-19 NOTE — PROGRESS NOTE ADULT - PROBLEM SELECTOR PLAN 7
F: IVF  E: replete PRN  N: Regular  A: OOBAT    DVT ppx: full AC  GI ppx: Protonix 40mg qD    Code status: Full code  Dispo: active Anemia noted on admission, suggestive of AOCD  s/p 1u PRBCs, stable Hgb    Plan  - ctm

## 2023-11-19 NOTE — PROGRESS NOTE ADULT - PROBLEM SELECTOR PLAN 3
Hematology/Oncology Inpatient Consultation    Patient name: Chelsea Travis  : 1963  MRN: [id number]  Referring Provider: Mallory Lay 3/24/2015   • Thrombocytopenia, unspecified (10/17/2012   • Urinary tract infection 6/10/[id number]:  [id number]       CBC & Differential [[id number]] [id number]:  [id number]       [place] Draw [[id number]] [id number]]                                   Final result               Green Top (Gel)[[id number]]                                  Final result               Lavender Top[[id number]]                                     Final result               Gold Top - SST[[id number]]                                   Developed Hiccups, likely 2/2 to diaphragmatic irritation    Plan  - Baclofen  - EKG with QTc 470s  - will give low dose gabapentin 100 TID [id number]:  [id number]              Developed Hiccups, likely 2/2 to diaphragmatic irritation    Plan  - Baclofen  - EKG with QTc 470s  - c/w gabapentin 100 TID  - Thorazine 25mg x1

## 2023-11-19 NOTE — PROGRESS NOTE ADULT - SUBJECTIVE AND OBJECTIVE BOX
***************************************************************  Antoine Martinez, PGY1  Internal Medicine   TEAMS Preferred  ***************************************************************    ALVARO CEJA  68y Male  MRN: [id number]  11-11-23 (8d)    Patient is a 68y old  Male who presents with a chief complaint of Failure to Thrive (15 Nov 2023 17:16)      SUBJECTIVE / OVERNIGHT EVENTS:   No acute overnight events.   Patient seen and examined at bedside this AM.  Denies any CP, SOB, N/V, constipation, diarrhea, abdominal pain, dysuria, fever, chills, or headaches.     12 Point ROS negative with the exception of the above    MEDICATIONS  (STANDING):  amLODIPine   Tablet 5 milliGRAM(s) Oral daily  folic acid 1 milliGRAM(s) Oral daily  gabapentin 100 milliGRAM(s) Oral three times a day  heparin  Infusion.  Unit(s)/Hr (12 mL/Hr) IV Continuous <Continuous>  pantoprazole    Tablet 40 milliGRAM(s) Oral before breakfast  piperacillin/tazobactam IVPB.. 3.375 Gram(s) IV Intermittent every 8 hours  tamsulosin 0.4 milliGRAM(s) Oral at bedtime    MEDICATIONS  (PRN):  baclofen 5 milliGRAM(s) Oral every 12 hours PRN hiccups  heparin   Injectable 5500 Unit(s) IV Push every 6 hours PRN For aPTT less than 40  heparin   Injectable 2500 Unit(s) IV Push every 6 hours PRN For aPTT between 40 - 57      OBJECTIVE:  Vital Signs Last 24 Hrs  T(C): 36.4 (19 Nov 2023 05:40), Max: 37.1 (18 Nov 2023 22:23)  T(F): 97.5 (19 Nov 2023 05:40), Max: 98.8 (18 Nov 2023 22:23)  HR: 98 (19 Nov 2023 05:40) (95 - 98)  BP: 122/79 (19 Nov 2023 05:40) (116/63 - 122/79)  BP(mean): --  RR: 17 (19 Nov 2023 05:40) (16 - 17)  SpO2: 97% (19 Nov 2023 05:40) (96% - 97%)    Parameters below as of 19 Nov 2023 05:40  Patient On (Oxygen Delivery Method): room air        I&O's Summary      PHYSICAL EXAM:  GENERAL: Laying comfortably, NAD  HEENT: NCAT, PERRLA, EOMI, no scleral icterus, no LAD  NECK: No JVD, supple  LUNG: CTABL; No wheezes, crackles, or rhonchi  HEART: RRR; normal S1/S2; No murmurs, rubs, or gallops  ABDOMEN: +BS, soft, nontender, nondistended, no HSM; No rebound, guarding, or rigidity  EXTREMITIES:  No LE edema b/l, 2+ Peripheral Pulses, No clubbing or cyanosis  NEUROLOGY: AOx3, non-focal, strength 5/5 in all extremities, sensation intact  PSYCH: calm and cooperative  SKIN: No rashes or lesions    LABS:                        8.0    18.05 )-----------( 418      ( 18 Nov 2023 04:56 )             24.7     Auto Eosinophil # 0.18  / Auto Eosinophil % 1.0   / Auto Neutrophil # 14.17 / Auto Neutrophil % 78.5  / BANDS % x                            7.6    15.50 )-----------( 393      ( 17 Nov 2023 16:09 )             22.6     Auto Eosinophil # x     / Auto Eosinophil % x     / Auto Neutrophil # x     / Auto Neutrophil % x     / BANDS % x        11-18    133<L>  |  100  |  9   ----------------------------<  102<H>  3.7   |  25  |  1.16  11-17    135  |  98  |  10  ----------------------------<  111<H>  3.7   |  24  |  1.19    Ca    8.2<L>      18 Nov 2023 04:56  Ca    7.9<L>      17 Nov 2023 03:04  Phos  2.6     11-18  Mg     2.00     11-18    TPro  6.8  /  Alb  2.4<L>  /  TBili  1.5<H>  /  DBili  x   /  AST  33  /  ALT  27  /  AlkPhos  138<H>  11-18  TPro  6.5  /  Alb  2.1<L>  /  TBili  1.8<H>  /  DBili  x   /  AST  33  /  ALT  28  /  AlkPhos  131<H>  11-17    PTT - ( 18 Nov 2023 11:02 )  PTT:73.0 sec      Urinalysis Basic - ( 18 Nov 2023 04:56 )    Color: x / Appearance: x / SG: x / pH: x  Gluc: 102 mg/dL / Ketone: x  / Bili: x / Urobili: x   Blood: x / Protein: x / Nitrite: x   Leuk Esterase: x / RBC: x / WBC x   Sq Epi: x / Non Sq Epi: x / Bacteria: x          CAPILLARY BLOOD GLUCOSE            RADIOLOGY & ADDITIONAL TESTS:     ***************************************************************  Antoine Martinez, PGY1  Internal Medicine   TEAMS Preferred  ***************************************************************    ALVARO CEJA  68y Male  MRN: [id number]  11-11-23 (8d)    Patient is a 68y old  Male who presents with a chief complaint of Failure to Thrive (15 Nov 2023 17:16)      SUBJECTIVE / OVERNIGHT EVENTS:   No acute overnight events.   Patient seen and examined at bedside this AM. Continues to endorse hiccups. Mild SOB but otherwise stable, no cough. No chest pain  Denies any CP, SOB, N/V, constipation, diarrhea, abdominal pain, dysuria, fever, chills, or headaches.     12 Point ROS negative with the exception of the above    MEDICATIONS  (STANDING):  amLODIPine   Tablet 5 milliGRAM(s) Oral daily  folic acid 1 milliGRAM(s) Oral daily  gabapentin 100 milliGRAM(s) Oral three times a day  heparin  Infusion.  Unit(s)/Hr (12 mL/Hr) IV Continuous <Continuous>  pantoprazole    Tablet 40 milliGRAM(s) Oral before breakfast  piperacillin/tazobactam IVPB.. 3.375 Gram(s) IV Intermittent every 8 hours  tamsulosin 0.4 milliGRAM(s) Oral at bedtime    MEDICATIONS  (PRN):  baclofen 5 milliGRAM(s) Oral every 12 hours PRN hiccups  heparin   Injectable 5500 Unit(s) IV Push every 6 hours PRN For aPTT less than 40  heparin   Injectable 2500 Unit(s) IV Push every 6 hours PRN For aPTT between 40 - 57      OBJECTIVE:  Vital Signs Last 24 Hrs  T(C): 36.4 (19 Nov 2023 05:40), Max: 37.1 (18 Nov 2023 22:23)  T(F): 97.5 (19 Nov 2023 05:40), Max: 98.8 (18 Nov 2023 22:23)  HR: 98 (19 Nov 2023 05:40) (95 - 98)  BP: 122/79 (19 Nov 2023 05:40) (116/63 - 122/79)  BP(mean): --  RR: 17 (19 Nov 2023 05:40) (16 - 17)  SpO2: 97% (19 Nov 2023 05:40) (96% - 97%)    Parameters below as of 19 Nov 2023 05:40  Patient On (Oxygen Delivery Method): room air        I&O's Summary      PHYSICAL EXAM:  GENERAL: Laying comfortably, NAD, Hiccuping   HEENT: NCAT, PERRLA, EOMI, no scleral icterus, no LAD  LUNG: CTABL; No wheezes, crackles, or rhonchi  HEART: RRR; normal S1/S2; No murmurs, rubs, or gallops  ABDOMEN: +BS, soft, nontender, nondistended, no HSM; No rebound, guarding, or rigidity  EXTREMITIES:  No LE edema b/l, 2+ Peripheral Pulses, No clubbing or cyanosis  NEUROLOGY: AOx3, non-focal      LABS:                        8.0    18.05 )-----------( 418      ( 18 Nov 2023 04:56 )             24.7     Auto Eosinophil # 0.18  / Auto Eosinophil % 1.0   / Auto Neutrophil # 14.17 / Auto Neutrophil % 78.5  / BANDS % x                            7.6    15.50 )-----------( 393      ( 17 Nov 2023 16:09 )             22.6     Auto Eosinophil # x     / Auto Eosinophil % x     / Auto Neutrophil # x     / Auto Neutrophil % x     / BANDS % x        11-18    133<L>  |  100  |  9   ----------------------------<  102<H>  3.7   |  25  |  1.16  11-17    135  |  98  |  10  ----------------------------<  111<H>  3.7   |  24  |  1.19    Ca    8.2<L>      18 Nov 2023 04:56  Ca    7.9<L>      17 Nov 2023 03:04  Phos  2.6     11-18  Mg     2.00     11-18    TPro  6.8  /  Alb  2.4<L>  /  TBili  1.5<H>  /  DBili  x   /  AST  33  /  ALT  27  /  AlkPhos  138<H>  11-18  TPro  6.5  /  Alb  2.1<L>  /  TBili  1.8<H>  /  DBili  x   /  AST  33  /  ALT  28  /  AlkPhos  131<H>  11-17    PTT - ( 18 Nov 2023 11:02 )  PTT:73.0 sec      Urinalysis Basic - ( 18 Nov 2023 04:56 )    Color: x / Appearance: x / SG: x / pH: x  Gluc: 102 mg/dL / Ketone: x  / Bili: x / Urobili: x   Blood: x / Protein: x / Nitrite: x   Leuk Esterase: x / RBC: x / WBC x   Sq Epi: x / Non Sq Epi: x / Bacteria: x          CAPILLARY BLOOD GLUCOSE            RADIOLOGY & ADDITIONAL TESTS:

## 2023-11-19 NOTE — PROGRESS NOTE ADULT - PROBLEM SELECTOR PLAN 2
Recent hx weight loss, fatigue and poor PO intake  CT evidence of new R hepatic lesion with possible portal vein thrombus, labs c/f biliary obstruction with increased Tbili and LFTs, leukocytosis  Hx of prostate cancer s/p prostatectomy, does not follow with Urology or Onc  CT chest without evidence of malignancy or metastasis   CEA elevated, CA 19-9 elevated, iron studies suggest anemia of chronic disease, LFTs worsening  MRCP with hepatic abscess 7x6.7x6.9cm and 5x4.6x5.3, mild intrahepatic biliary duct dilation, increasing size  Likely source is infection, patient recently traveled to [facility] and has dog at home, no Eosinophilia noted  Prior hx of eosinophilia with hepatic abscesses in 2018, pos strongloides treated with Ivermectin, pos echinococcus ab, Latent TB hx untreated    Plan  - ERCP after abscess drainage with IR, likely Tuesday  - restart IV heparin  - hold Aspirin 5 days prior to IR WBC 25K, spiked fever to 101.1  Previously treated for ? malaria, possibly 2/2 to malignancy vs malaria flare ( ID low suspicion) vs infection of unclear etiology  Prior hx of eosinophilic leukocytosis  Apr ID recs, less likely infectious more likely malignancy vs thrombus  May be 2/2 to hepatic abscess, no eosinophilia noted    Plan  - c/w Zosyn  - f/u BCx, UCx  - f/u Entamoeba Ab, Stool Ova and Parasites  - f/u IR drainage bacterial Cx, fungal, AFB Cx

## 2023-11-19 NOTE — PROGRESS NOTE ADULT - PROBLEM SELECTOR PLAN 1
WBC 25K, spiked fever to 101.1  Previously treated for ? malaria, possibly 2/2 to malignancy vs malaria flare ( ID low suspicion) vs infection of unclear etiology  Prior hx of eosinophilic leukocytosis  Apr ID recs, less likely infectious more likely malignancy vs thrombus  May be 2/2 to hepatic abscess, no eosinophilia noted    Plan  - c/w Zosyn  - f/u BCx, UCx  - f/u Entamoeba Ab, Stool Ova and Parasites  - f/u IR drainage bacterial Cx, fungal, AFB Cx MRCP with hepatic abscess 7x6.7x6.9cm and 5x4.6x5.3, mild intrahepatic biliary duct dilation, increasing size  CEA elevated, CA 19-9 elevated, iron studies suggest anemia of chronic disease, LFTs worsening  Likely source is infection, patient recently traveled to [facility] and has dog at home, no Eosinophilia noted  Prior hx of eosinophilia with hepatic abscesses in 2018, pos strongloides treated with Ivermectin, pos echinococcus ab, Latent TB hx untreated    Plan  - ERCP after abscess drainage with IR, likely Tuesday  - restart IV heparin  - hold Aspirin 5 days prior to IR

## 2023-11-19 NOTE — PROGRESS NOTE ADULT - ATTENDING COMMENTS
67 yo M PMH HTN, BPH, prostate cancer s/p prostatectomy 2012, diverticulosis presenting with 5 weeks of generalized weakness, 25 lb weight loss, and poor PO intake and subjective chills. + travel [place] returned in 2021. asymptomatic until 5 weeks ago. + treated for malaria. + hiccups + recent travel to [place]. febrile 101 11/13; RVP neg; 11/11 initial UA neg; repeat UA + w/o overt symptoms;  Ucx Neg    Hepatic lesion- wt loss and decreased PO intake concern for malignancy/abscess                        - ID appreciated low suspicion for malaria.                       - Ct R hepatic lesion w/ mild intrahepatic ductal dilatation : CT chest w/o overt PNA or metastatic disease                       - CEA mildly elevated; hep panel neg;  129 elevated; procal elevated; AFP low                         - monitor LFTs TB downtrending and alk phos and AST/ALT downtrending                         -  MRCP with Lt and RT hepatic liver abscess increased since CT and bland thrombosis                          - 11/2018 Reviewed outpt ID note liver lesion in 2017 (strongyloides ab+ repeat neg, quant gold+ refused treatment for latent TB and positive echinococcus – IgG repeat serology neg, MRI 2.8 cm dominate mass w/ cirrhosis and smaller ring enhancing lesions, PET w/o  metabolic active lesion, liver bx :fibrous tissue with macrophage reaction and chronic active inflammation. Fragments of benign liver parenchyma. No fungal or mycobacteria neg for malignancy; 6/15/18 s/p Ivermectin x 2 days,                        -  hiccups poss from Diaphragmatic irration- s/p baclofen PRN; still complaining of hiccups. Starting gabapentin 100TID. Can consider Thorazine if refractory however would need to monitor QTC, QTC ranging 460-480.                         - c/w zosyn                        - GI with concern for poss abscess; ERCP if infectious work up unrevealing                        - IR bx delay till 11/21 given ASA use and prior hx echinococcus; prior Hx latent TB quant gold indeterminate     Portal vein thrombus- CT expansile filling defect in the right portal, left portal and main hepatic veins, which may represent tumor thrombus versus bland thrombus. US doppler occlusive thrombus main and Lt portal veins with avascular thrombus. MRI as above RT and Lt and main portal vein thrombosis                          -  on heparin drip     elevated Cr- poss poor PO intake                   - now improved  s/p fluids                    - monitor Cr                 Anemia- no overt GIB; fe studies consistent with AOCD; s/p 1 uprbc (11/16) with appropriate rise.

## 2023-11-20 LAB
ALBUMIN SERPL ELPH-MCNC: 2.4 G/DL — LOW (ref 3.3–5)
ALBUMIN SERPL ELPH-MCNC: 2.4 G/DL — LOW (ref 3.3–5)
ALP SERPL-CCNC: 131 U/L — HIGH (ref 40–120)
ALP SERPL-CCNC: 131 U/L — HIGH (ref 40–120)
ALT FLD-CCNC: 29 U/L — SIGNIFICANT CHANGE UP (ref 4–41)
ALT FLD-CCNC: 29 U/L — SIGNIFICANT CHANGE UP (ref 4–41)
ANION GAP SERPL CALC-SCNC: 12 MMOL/L — SIGNIFICANT CHANGE UP (ref 7–14)
ANION GAP SERPL CALC-SCNC: 12 MMOL/L — SIGNIFICANT CHANGE UP (ref 7–14)
APTT BLD: 88.7 SEC — HIGH (ref 24.5–35.6)
APTT BLD: 88.7 SEC — HIGH (ref 24.5–35.6)
AST SERPL-CCNC: 29 U/L — SIGNIFICANT CHANGE UP (ref 4–40)
AST SERPL-CCNC: 29 U/L — SIGNIFICANT CHANGE UP (ref 4–40)
BASOPHILS # BLD AUTO: 0.05 K/UL — SIGNIFICANT CHANGE UP (ref 0–0.2)
BASOPHILS # BLD AUTO: 0.05 K/UL — SIGNIFICANT CHANGE UP (ref 0–0.2)
BASOPHILS NFR BLD AUTO: 0.4 % — SIGNIFICANT CHANGE UP (ref 0–2)
BASOPHILS NFR BLD AUTO: 0.4 % — SIGNIFICANT CHANGE UP (ref 0–2)
BILIRUB SERPL-MCNC: 1 MG/DL — SIGNIFICANT CHANGE UP (ref 0.2–1.2)
BILIRUB SERPL-MCNC: 1 MG/DL — SIGNIFICANT CHANGE UP (ref 0.2–1.2)
BLD GP AB SCN SERPL QL: NEGATIVE — SIGNIFICANT CHANGE UP
BLD GP AB SCN SERPL QL: NEGATIVE — SIGNIFICANT CHANGE UP
BUN SERPL-MCNC: 9 MG/DL — SIGNIFICANT CHANGE UP (ref 7–23)
BUN SERPL-MCNC: 9 MG/DL — SIGNIFICANT CHANGE UP (ref 7–23)
CALCIUM SERPL-MCNC: 8.3 MG/DL — LOW (ref 8.4–10.5)
CALCIUM SERPL-MCNC: 8.3 MG/DL — LOW (ref 8.4–10.5)
CHLORIDE SERPL-SCNC: 99 MMOL/L — SIGNIFICANT CHANGE UP (ref 98–107)
CHLORIDE SERPL-SCNC: 99 MMOL/L — SIGNIFICANT CHANGE UP (ref 98–107)
CO2 SERPL-SCNC: 22 MMOL/L — SIGNIFICANT CHANGE UP (ref 22–31)
CO2 SERPL-SCNC: 22 MMOL/L — SIGNIFICANT CHANGE UP (ref 22–31)
CREAT SERPL-MCNC: 1.34 MG/DL — HIGH (ref 0.5–1.3)
CREAT SERPL-MCNC: 1.34 MG/DL — HIGH (ref 0.5–1.3)
E HISTOLYT AB SER-ACNC: NEGATIVE — SIGNIFICANT CHANGE UP
E HISTOLYT AB SER-ACNC: NEGATIVE — SIGNIFICANT CHANGE UP
EGFR: 57 ML/MIN/1.73M2 — LOW
EGFR: 57 ML/MIN/1.73M2 — LOW
EOSINOPHIL # BLD AUTO: 0.2 K/UL — SIGNIFICANT CHANGE UP (ref 0–0.5)
EOSINOPHIL # BLD AUTO: 0.2 K/UL — SIGNIFICANT CHANGE UP (ref 0–0.5)
EOSINOPHIL NFR BLD AUTO: 1.5 % — SIGNIFICANT CHANGE UP (ref 0–6)
EOSINOPHIL NFR BLD AUTO: 1.5 % — SIGNIFICANT CHANGE UP (ref 0–6)
GLUCOSE SERPL-MCNC: 102 MG/DL — HIGH (ref 70–99)
GLUCOSE SERPL-MCNC: 102 MG/DL — HIGH (ref 70–99)
HCT VFR BLD CALC: 23.5 % — LOW (ref 39–50)
HCT VFR BLD CALC: 23.5 % — LOW (ref 39–50)
HGB BLD-MCNC: 7.4 G/DL — LOW (ref 13–17)
HGB BLD-MCNC: 7.4 G/DL — LOW (ref 13–17)
IANC: 9.33 K/UL — HIGH (ref 1.8–7.4)
IANC: 9.33 K/UL — HIGH (ref 1.8–7.4)
IMM GRANULOCYTES NFR BLD AUTO: 0.8 % — SIGNIFICANT CHANGE UP (ref 0–0.9)
IMM GRANULOCYTES NFR BLD AUTO: 0.8 % — SIGNIFICANT CHANGE UP (ref 0–0.9)
LYMPHOCYTES # BLD AUTO: 19.6 % — SIGNIFICANT CHANGE UP (ref 13–44)
LYMPHOCYTES # BLD AUTO: 19.6 % — SIGNIFICANT CHANGE UP (ref 13–44)
LYMPHOCYTES # BLD AUTO: 2.58 K/UL — SIGNIFICANT CHANGE UP (ref 1–3.3)
LYMPHOCYTES # BLD AUTO: 2.58 K/UL — SIGNIFICANT CHANGE UP (ref 1–3.3)
MAGNESIUM SERPL-MCNC: 2.1 MG/DL — SIGNIFICANT CHANGE UP (ref 1.6–2.6)
MAGNESIUM SERPL-MCNC: 2.1 MG/DL — SIGNIFICANT CHANGE UP (ref 1.6–2.6)
MCHC RBC-ENTMCNC: 30.2 PG — SIGNIFICANT CHANGE UP (ref 27–34)
MCHC RBC-ENTMCNC: 30.2 PG — SIGNIFICANT CHANGE UP (ref 27–34)
MCHC RBC-ENTMCNC: 31.5 GM/DL — LOW (ref 32–36)
MCHC RBC-ENTMCNC: 31.5 GM/DL — LOW (ref 32–36)
MCV RBC AUTO: 95.9 FL — SIGNIFICANT CHANGE UP (ref 80–100)
MCV RBC AUTO: 95.9 FL — SIGNIFICANT CHANGE UP (ref 80–100)
MONOCYTES # BLD AUTO: 0.89 K/UL — SIGNIFICANT CHANGE UP (ref 0–0.9)
MONOCYTES # BLD AUTO: 0.89 K/UL — SIGNIFICANT CHANGE UP (ref 0–0.9)
MONOCYTES NFR BLD AUTO: 6.8 % — SIGNIFICANT CHANGE UP (ref 2–14)
MONOCYTES NFR BLD AUTO: 6.8 % — SIGNIFICANT CHANGE UP (ref 2–14)
NEUTROPHILS # BLD AUTO: 9.33 K/UL — HIGH (ref 1.8–7.4)
NEUTROPHILS # BLD AUTO: 9.33 K/UL — HIGH (ref 1.8–7.4)
NEUTROPHILS NFR BLD AUTO: 70.9 % — SIGNIFICANT CHANGE UP (ref 43–77)
NEUTROPHILS NFR BLD AUTO: 70.9 % — SIGNIFICANT CHANGE UP (ref 43–77)
NRBC # BLD: 0 /100 WBCS — SIGNIFICANT CHANGE UP (ref 0–0)
NRBC # BLD: 0 /100 WBCS — SIGNIFICANT CHANGE UP (ref 0–0)
NRBC # FLD: 0 K/UL — SIGNIFICANT CHANGE UP (ref 0–0)
NRBC # FLD: 0 K/UL — SIGNIFICANT CHANGE UP (ref 0–0)
PHOSPHATE SERPL-MCNC: 2.7 MG/DL — SIGNIFICANT CHANGE UP (ref 2.5–4.5)
PHOSPHATE SERPL-MCNC: 2.7 MG/DL — SIGNIFICANT CHANGE UP (ref 2.5–4.5)
PLATELET # BLD AUTO: 421 K/UL — HIGH (ref 150–400)
PLATELET # BLD AUTO: 421 K/UL — HIGH (ref 150–400)
POTASSIUM SERPL-MCNC: 3.7 MMOL/L — SIGNIFICANT CHANGE UP (ref 3.5–5.3)
POTASSIUM SERPL-MCNC: 3.7 MMOL/L — SIGNIFICANT CHANGE UP (ref 3.5–5.3)
POTASSIUM SERPL-SCNC: 3.7 MMOL/L — SIGNIFICANT CHANGE UP (ref 3.5–5.3)
POTASSIUM SERPL-SCNC: 3.7 MMOL/L — SIGNIFICANT CHANGE UP (ref 3.5–5.3)
PROT SERPL-MCNC: 6.6 G/DL — SIGNIFICANT CHANGE UP (ref 6–8.3)
PROT SERPL-MCNC: 6.6 G/DL — SIGNIFICANT CHANGE UP (ref 6–8.3)
RBC # BLD: 2.45 M/UL — LOW (ref 4.2–5.8)
RBC # BLD: 2.45 M/UL — LOW (ref 4.2–5.8)
RBC # FLD: 18.1 % — HIGH (ref 10.3–14.5)
RBC # FLD: 18.1 % — HIGH (ref 10.3–14.5)
RH IG SCN BLD-IMP: POSITIVE — SIGNIFICANT CHANGE UP
RH IG SCN BLD-IMP: POSITIVE — SIGNIFICANT CHANGE UP
SODIUM SERPL-SCNC: 133 MMOL/L — LOW (ref 135–145)
SODIUM SERPL-SCNC: 133 MMOL/L — LOW (ref 135–145)
WBC # BLD: 13.16 K/UL — HIGH (ref 3.8–10.5)
WBC # BLD: 13.16 K/UL — HIGH (ref 3.8–10.5)
WBC # FLD AUTO: 13.16 K/UL — HIGH (ref 3.8–10.5)
WBC # FLD AUTO: 13.16 K/UL — HIGH (ref 3.8–10.5)

## 2023-11-20 PROCEDURE — 99232 SBSQ HOSP IP/OBS MODERATE 35: CPT

## 2023-11-20 PROCEDURE — 99233 SBSQ HOSP IP/OBS HIGH 50: CPT | Mod: GC

## 2023-11-20 RX ORDER — GABAPENTIN 400 MG/1
200 CAPSULE ORAL THREE TIMES A DAY
Refills: 0 | Status: DISCONTINUED | OUTPATIENT
Start: 2023-11-20 | End: 2023-11-21

## 2023-11-20 RX ORDER — SODIUM CHLORIDE 9 MG/ML
1000 INJECTION INTRAMUSCULAR; INTRAVENOUS; SUBCUTANEOUS
Refills: 0 | Status: DISCONTINUED | OUTPATIENT
Start: 2023-11-20 | End: 2023-11-25

## 2023-11-20 RX ORDER — SODIUM CHLORIDE 9 MG/ML
1000 INJECTION, SOLUTION INTRAVENOUS
Refills: 0 | Status: DISCONTINUED | OUTPATIENT
Start: 2023-11-20 | End: 2023-11-20

## 2023-11-20 RX ADMIN — GABAPENTIN 200 MILLIGRAM(S): 400 CAPSULE ORAL at 21:38

## 2023-11-20 RX ADMIN — PIPERACILLIN AND TAZOBACTAM 25 GRAM(S): 4; .5 INJECTION, POWDER, LYOPHILIZED, FOR SOLUTION INTRAVENOUS at 21:39

## 2023-11-20 RX ADMIN — HEPARIN SODIUM 1500 UNIT(S)/HR: 5000 INJECTION INTRAVENOUS; SUBCUTANEOUS at 07:23

## 2023-11-20 RX ADMIN — AMLODIPINE BESYLATE 5 MILLIGRAM(S): 2.5 TABLET ORAL at 05:23

## 2023-11-20 RX ADMIN — SODIUM CHLORIDE 50 MILLILITER(S): 9 INJECTION INTRAMUSCULAR; INTRAVENOUS; SUBCUTANEOUS at 11:42

## 2023-11-20 RX ADMIN — Medication 1 MILLIGRAM(S): at 11:41

## 2023-11-20 RX ADMIN — HEPARIN SODIUM 1500 UNIT(S)/HR: 5000 INJECTION INTRAVENOUS; SUBCUTANEOUS at 19:28

## 2023-11-20 RX ADMIN — GABAPENTIN 100 MILLIGRAM(S): 400 CAPSULE ORAL at 05:23

## 2023-11-20 RX ADMIN — GABAPENTIN 200 MILLIGRAM(S): 400 CAPSULE ORAL at 14:31

## 2023-11-20 RX ADMIN — PIPERACILLIN AND TAZOBACTAM 25 GRAM(S): 4; .5 INJECTION, POWDER, LYOPHILIZED, FOR SOLUTION INTRAVENOUS at 05:24

## 2023-11-20 RX ADMIN — TAMSULOSIN HYDROCHLORIDE 0.4 MILLIGRAM(S): 0.4 CAPSULE ORAL at 21:38

## 2023-11-20 RX ADMIN — HEPARIN SODIUM 1500 UNIT(S)/HR: 5000 INJECTION INTRAVENOUS; SUBCUTANEOUS at 07:54

## 2023-11-20 RX ADMIN — PANTOPRAZOLE SODIUM 40 MILLIGRAM(S): 20 TABLET, DELAYED RELEASE ORAL at 07:34

## 2023-11-20 RX ADMIN — PIPERACILLIN AND TAZOBACTAM 25 GRAM(S): 4; .5 INJECTION, POWDER, LYOPHILIZED, FOR SOLUTION INTRAVENOUS at 14:30

## 2023-11-20 RX ADMIN — HEPARIN SODIUM 1500 UNIT(S)/HR: 5000 INJECTION INTRAVENOUS; SUBCUTANEOUS at 15:04

## 2023-11-20 NOTE — PROGRESS NOTE ADULT - PROBLEM SELECTOR PLAN 1
MRCP with hepatic abscess 7x6.7x6.9cm and 5x4.6x5.3, mild intrahepatic biliary duct dilation, increasing size  CEA elevated, CA 19-9 elevated, iron studies suggest anemia of chronic disease, LFTs worsening  Likely source is infection, patient recently traveled to [facility] and has dog at home, no Eosinophilia noted  Prior hx of eosinophilia with hepatic abscesses in 2018, pos strongloides treated with Ivermectin, pos echinococcus ab, Latent TB hx untreated    Plan  - ERCP after abscess drainage with IR, likely tomorrow  - NPO midnight, 4am labs  - restart IV heparin  - hold Aspirin 5 days prior to IR MRCP with hepatic abscess 7x6.7x6.9cm and 5x4.6x5.3, mild intrahepatic biliary duct dilation, increasing size  CEA elevated, CA 19-9 elevated, iron studies suggest anemia of chronic disease, LFTs worsening  Likely source is infection, patient recently traveled to [facility] and has dog at home, no Eosinophilia noted  Prior hx of eosinophilia with hepatic abscesses in 2018, pos strongloides treated with Ivermectin, pos echinococcus ab, Latent TB hx untreated    Plan  - f/u IR, drain possibly tomorrow  - ERCP after abscess drainage with IR  - hold Aspirin 5 days prior to IR

## 2023-11-20 NOTE — PROGRESS NOTE ADULT - PROBLEM SELECTOR PLAN 2
WBC 25K, spiked fever to 101.1  Previously treated for ? malaria, possibly 2/2 to malignancy vs malaria flare ( ID low suspicion) vs infection of unclear etiology  Prior hx of eosinophilic leukocytosis  Apr ID recs, less likely infectious more likely malignancy vs thrombus  May be 2/2 to hepatic abscess, no eosinophilia noted    Plan  - c/w Zosyn  - f/u BCx, UCx  - f/u Entamoeba Ab, Stool Ova and Parasites  - f/u IR drainage bacterial Cx, fungal, AFB Cx WBC 25K, spiked fever to 101.1  Previously treated for ? malaria, possibly 2/2 to malignancy vs malaria flare ( ID low suspicion) vs infection of unclear etiology  Prior hx of eosinophilic leukocytosis  Apr ID recs, less likely infectious more likely malignancy vs thrombus  May be 2/2 to hepatic abscess, no eosinophilia noted, improving, entamoeba Ab neg    Plan  - c/w Zosyn  - f/u BCx, UCx  - f/u Entamoeba Ab, Stool Ova and Parasites  - f/u IR drainage bacterial Cx, fungal, AFB Cx

## 2023-11-20 NOTE — PROGRESS NOTE ADULT - PROBLEM SELECTOR PLAN 3
Developed Hiccups, likely 2/2 to diaphragmatic irritation    Plan  - Baclofen  - EKG with QTc 470s  - c/w gabapentin 100 TID  - Thorazine 25mg x1 Developed Hiccups, likely 2/2 to diaphragmatic irritation    Plan  - Baclofen  - EKG with QTc 470s  - c/w gabapentin 200 TID  - s/p Thorazine 25mg x1

## 2023-11-20 NOTE — PROGRESS NOTE ADULT - ASSESSMENT
This is a 69 y/o M w/ PMHx of HTN, BPH, prostate CA s/p prostatectomy 2012, diverticulosis who is presenting to [facility] on 11/14/23 for fevers a for 5 weeks, seen by his PMD, started on ibuprofen/antibiotic for possible UTI, however given persistence of fevers, there was a concern for malaria, and patient was given Mefloquine 5 days PTA. In the ED, pt was febrile with leukocytosis to 24. CT A/P with findings of 3.8 x 3.1 cm lobulated hypodense R hepatic lobe lesion, filling defect in R/L portal, main hepatic vein.   Pt was initially started on Vancomycin/Zosyn, ID was consulted for further recommendations.     #Liver abscess   #Fevers  #Leukocytosis   #R hepatic lobe lesion c/f malignancy   #Hepatic vein thrombosis     Overall, 69 yo M PMH HTN, BPH, prostate cancer s/p prostatectomy 2012, diverticulosis presenting for fevers/weight loss, found to have R hepatic lesions and hepatic vein thrombosis, c/f malignancy. ID consulted for SIRS w/o clear source.   MRCP w/ findings of liver abscess, GI consulted, pending ERCP   Pt w/ complicated ID history, was seen by Dr. Huerta in 2018, h/o positive QuantiFeron Gold, declined treatment for latent TB, had eosinophilia, Strongy AB+, treated with Ivermectin. Also history of positive Echinococcus AB, however not seen in our system. Testing in our system from 2018 for echinococcus IgG was negative   Discussed with Dr. Kendrick of radiology, the liver abscess seen on MRI do not appear cystic, favor pyogenic at this time.     Plan:   1. C/w Zosyn for liver abscess  2. GI planning on ERCP   3. IR consult for liver abscess, please send bacterial Cx, fungal, AFB Cx    Thank you for this consult. Inpatient ID team will follow.    Ubaldo Marr M.D.  Attending Physician  Division of Infectious Diseases  Department of Medicine    Please contact through MS Teams message.  Office: [phone] (after 5 PM or weekend).

## 2023-11-20 NOTE — PROGRESS NOTE ADULT - SUBJECTIVE AND OBJECTIVE BOX
Infectious Diseases Follow Up:    Patient is a 68y old  Male who presents with a chief complaint of Failure to Thrive (15 Nov 2023 17:16)      Interval History/ROS:  Pt still with hiccups, no other complaints     Allergies  No Known Allergies        ANTIMICROBIALS:  piperacillin/tazobactam IVPB.. 3.375 every 8 hours      Current Abx:     Previous Abx     OTHER MEDS:  MEDICATIONS  (STANDING):  amLODIPine   Tablet 5 daily  baclofen 5 every 12 hours PRN  chlorproMAZINE    Injectable 25 once  gabapentin 200 three times a day  heparin   Injectable 5500 every 6 hours PRN  heparin   Injectable 2500 every 6 hours PRN  heparin  Infusion.  <Continuous>  pantoprazole    Tablet 40 before breakfast  tamsulosin 0.4 at bedtime      Vital Signs Last 24 Hrs  T(C): 36.6 (20 Nov 2023 05:20), Max: 36.8 (19 Nov 2023 21:21)  T(F): 97.8 (20 Nov 2023 05:20), Max: 98.3 (19 Nov 2023 21:21)  HR: 80 (20 Nov 2023 05:20) (80 - 100)  BP: 117/73 (20 Nov 2023 05:20) (96/62 - 117/73)  BP(mean): --  RR: 16 (20 Nov 2023 05:20) (16 - 18)  SpO2: 100% (20 Nov 2023 05:20) (96% - 100%)    Parameters below as of 20 Nov 2023 05:20  Patient On (Oxygen Delivery Method): room air        PHYSICAL EXAM:  GENERAL: NAD, well-developed  HEAD:  Atraumatic, Normocephalic  EYES: EOMI, PERRLA, conjunctiva and sclera clear  NECK: Supple, No JVD  CHEST/LUNG: Clear to auscultation bilaterally; No wheeze  HEART: Regular rate and rhythm; No murmurs, rubs, or gallops  ABDOMEN: Soft, Nontender, Nondistended; Bowel sounds present  EXTREMITIES:  2+ Peripheral Pulses, No clubbing, cyanosis, or edema  PSYCH: AAOx3  NEUROLOGY: non-focal  SKIN: No rashes or lesions                          7.4    13.16 )-----------( 421      ( 20 Nov 2023 05:10 )             23.5       11-20    133<L>  |  99  |  9   ----------------------------<  102<H>  3.7   |  22  |  1.34<H>    Ca    8.3<L>      20 Nov 2023 05:10  Phos  2.7     11-20  Mg     2.10     11-20    TPro  6.6  /  Alb  2.4<L>  /  TBili  1.0  /  DBili  x   /  AST  29  /  ALT  29  /  AlkPhos  131<H>  11-20      Urinalysis Basic - ( 20 Nov 2023 05:10 )    Color: x / Appearance: x / SG: x / pH: x  Gluc: 102 mg/dL / Ketone: x  / Bili: x / Urobili: x   Blood: x / Protein: x / Nitrite: x   Leuk Esterase: x / RBC: x / WBC x   Sq Epi: x / Non Sq Epi: x / Bacteria: x        MICROBIOLOGY:  v  Clean Catch Clean Catch (Midstream)  11-14-23   No growth  --  --      .Blood Blood-Peripheral  11-13-23   No growth at 5 days  --  --      .Blood Blood-Peripheral  11-13-23   No growth at 5 days  --  --      .Blood  11-11-23   No Blood Parasites observed by giemsa stain  One negative set of blood smears does not rule out  the possibility of a parasitic infection.  A minimum of 3  specimens should be collected, at least 12-24 hours apart,  over a 36 hour time period.  ************************************************************  NEGATIVE for Plasmodium antigens. Microscopy is performed for  confirmation.  The Malaria Rapid antigen test does not detect the  presence of Babesia species. If Babesiosis is suspected  please order test Babesia PCR: Babesia microti PCR Bld  ************************************************************  --  --          Rapid RVP Result: NotDetec (11-19 @ 12:04)  Rapid RVP Result: NotDetec (11-14 @ 10:40)        RADIOLOGY:

## 2023-11-20 NOTE — PROGRESS NOTE ADULT - PROBLEM SELECTOR PLAN 4
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  Apr Heme, no hypercoag workup needed, likely 2/2 to infection. Transition to DOAC on d/c    Plan  - restart Heparin  - continue to monitor CT c/f portal vein thrombus pending official read  Duplex US Abdomen occlusive thrombosis of the main and left portal veins with avascular thrombus.  Apr Heme, no hypercoag workup needed, likely 2/2 to infection. Transition to DOAC on d/c    Plan  - c/w Heparin  - continue to monitor

## 2023-11-20 NOTE — PROGRESS NOTE ADULT - SUBJECTIVE AND OBJECTIVE BOX
***************************************************************  Antoine Martinez, PGY1  Internal Medicine   TEAMS Preferred  ***************************************************************    ALVARO CEJA  68y Male  MRN: [id number]  11-11-23 (9d)    Patient is a 68y old  Male who presents with a chief complaint of Failure to Thrive (15 Nov 2023 17:16)      SUBJECTIVE / OVERNIGHT EVENTS:   No acute overnight events.   Patient seen and examined at bedside this AM.  Denies any CP, SOB, N/V, constipation, diarrhea, abdominal pain, dysuria, fever, chills, or headaches.     12 Point ROS negative with the exception of the above    MEDICATIONS  (STANDING):  amLODIPine   Tablet 5 milliGRAM(s) Oral daily  chlorproMAZINE    Injectable 25 milliGRAM(s) IntraMuscular once  folic acid 1 milliGRAM(s) Oral daily  gabapentin 100 milliGRAM(s) Oral three times a day  heparin  Infusion.  Unit(s)/Hr (12 mL/Hr) IV Continuous <Continuous>  pantoprazole    Tablet 40 milliGRAM(s) Oral before breakfast  piperacillin/tazobactam IVPB.. 3.375 Gram(s) IV Intermittent every 8 hours  tamsulosin 0.4 milliGRAM(s) Oral at bedtime    MEDICATIONS  (PRN):  baclofen 5 milliGRAM(s) Oral every 12 hours PRN hiccups  heparin   Injectable 5500 Unit(s) IV Push every 6 hours PRN For aPTT less than 40  heparin   Injectable 2500 Unit(s) IV Push every 6 hours PRN For aPTT between 40 - 57      OBJECTIVE:  Vital Signs Last 24 Hrs  T(C): 36.8 (19 Nov 2023 21:21), Max: 36.8 (19 Nov 2023 21:21)  T(F): 98.3 (19 Nov 2023 21:21), Max: 98.3 (19 Nov 2023 21:21)  HR: 94 (19 Nov 2023 21:21) (94 - 100)  BP: 96/62 (19 Nov 2023 21:21) (96/62 - 109/67)  BP(mean): --  RR: 17 (19 Nov 2023 21:21) (17 - 18)  SpO2: 96% (19 Nov 2023 21:21) (96% - 99%)    Parameters below as of 19 Nov 2023 21:21  Patient On (Oxygen Delivery Method): room air        I&O's Summary      PHYSICAL EXAM:  GENERAL: Laying comfortably, NAD  HEENT: NCAT, PERRLA, EOMI, no scleral icterus, no LAD  NECK: No JVD, supple  LUNG: CTABL; No wheezes, crackles, or rhonchi  HEART: RRR; normal S1/S2; No murmurs, rubs, or gallops  ABDOMEN: +BS, soft, nontender, nondistended, no HSM; No rebound, guarding, or rigidity  EXTREMITIES:  No LE edema b/l, 2+ Peripheral Pulses, No clubbing or cyanosis  NEUROLOGY: AOx3, non-focal, strength 5/5 in all extremities, sensation intact  PSYCH: calm and cooperative  SKIN: No rashes or lesions    LABS:                        8.0    14.30 )-----------( 414      ( 19 Nov 2023 06:30 )             24.6     Auto Eosinophil # 0.17  / Auto Eosinophil % 1.2   / Auto Neutrophil # 10.84 / Auto Neutrophil % 75.9  / BANDS % x        11-19    131<L>  |  98  |  8   ----------------------------<  98  4.2   |  23  |  1.16  11-18    133<L>  |  100  |  9   ----------------------------<  102<H>  3.7   |  25  |  1.16    Ca    8.4      19 Nov 2023 06:30  Ca    8.2<L>      18 Nov 2023 04:56  Phos  2.6     11-19  Mg     2.00     11-19    TPro  6.6  /  Alb  2.0<L>  /  TBili  1.2  /  DBili  x   /  AST  50<H>  /  ALT  31  /  AlkPhos  140<H>  11-19  TPro  6.8  /  Alb  2.4<L>  /  TBili  1.5<H>  /  DBili  x   /  AST  33  /  ALT  27  /  AlkPhos  138<H>  11-18    PT/INR - ( 19 Nov 2023 06:30 )   PT: 13.2 sec;   INR: 1.18 ratio         PTT - ( 19 Nov 2023 22:00 )  PTT:72.1 sec      Urinalysis Basic - ( 19 Nov 2023 06:30 )    Color: x / Appearance: x / SG: x / pH: x  Gluc: 98 mg/dL / Ketone: x  / Bili: x / Urobili: x   Blood: x / Protein: x / Nitrite: x   Leuk Esterase: x / RBC: x / WBC x   Sq Epi: x / Non Sq Epi: x / Bacteria: x          CAPILLARY BLOOD GLUCOSE            RADIOLOGY & ADDITIONAL TESTS:     ***************************************************************  Antoine Martinez, PGY1  Internal Medicine   TEAMS Preferred  ***************************************************************    ALVARO CEJA  68y Male  MRN: [id number]  11-11-23 (9d)    Patient is a 68y old  Male who presents with a chief complaint of Failure to Thrive (15 Nov 2023 17:16)      SUBJECTIVE / OVERNIGHT EVENTS:   No acute overnight events.   Patient seen and examined at bedside this AM. Continues to have hiccups unresolved, some SOB  Denies any CP, SOB, N/V, constipation, diarrhea, abdominal pain, dysuria, fever, chills, or headaches.     12 Point ROS negative with the exception of the above    MEDICATIONS  (STANDING):  amLODIPine   Tablet 5 milliGRAM(s) Oral daily  chlorproMAZINE    Injectable 25 milliGRAM(s) IntraMuscular once  folic acid 1 milliGRAM(s) Oral daily  gabapentin 100 milliGRAM(s) Oral three times a day  heparin  Infusion.  Unit(s)/Hr (12 mL/Hr) IV Continuous <Continuous>  pantoprazole    Tablet 40 milliGRAM(s) Oral before breakfast  piperacillin/tazobactam IVPB.. 3.375 Gram(s) IV Intermittent every 8 hours  tamsulosin 0.4 milliGRAM(s) Oral at bedtime    MEDICATIONS  (PRN):  baclofen 5 milliGRAM(s) Oral every 12 hours PRN hiccups  heparin   Injectable 5500 Unit(s) IV Push every 6 hours PRN For aPTT less than 40  heparin   Injectable 2500 Unit(s) IV Push every 6 hours PRN For aPTT between 40 - 57      OBJECTIVE:  Vital Signs Last 24 Hrs  T(C): 36.8 (19 Nov 2023 21:21), Max: 36.8 (19 Nov 2023 21:21)  T(F): 98.3 (19 Nov 2023 21:21), Max: 98.3 (19 Nov 2023 21:21)  HR: 94 (19 Nov 2023 21:21) (94 - 100)  BP: 96/62 (19 Nov 2023 21:21) (96/62 - 109/67)  BP(mean): --  RR: 17 (19 Nov 2023 21:21) (17 - 18)  SpO2: 96% (19 Nov 2023 21:21) (96% - 99%)    Parameters below as of 19 Nov 2023 21:21  Patient On (Oxygen Delivery Method): room air        I&O's Summary      PHYSICAL EXAM:  GENERAL: Laying comfortably, NAD  HEENT: NCAT, PERRLA, EOMI, no scleral icterus, no LAD  LUNG: CTABL; No wheezes, crackles, or rhonchi  HEART: RRR; normal S1/S2; No murmurs, rubs, or gallops  ABDOMEN: +BS, soft, nontender, nondistended, no HSM; No rebound, guarding, or rigidity  EXTREMITIES:  No LE edema b/l, 2+ Peripheral Pulses, No clubbing or cyanosis  NEUROLOGY: AOx3, non-focal, hiccups      LABS:                        8.0    14.30 )-----------( 414      ( 19 Nov 2023 06:30 )             24.6     Auto Eosinophil # 0.17  / Auto Eosinophil % 1.2   / Auto Neutrophil # 10.84 / Auto Neutrophil % 75.9  / BANDS % x        11-19    131<L>  |  98  |  8   ----------------------------<  98  4.2   |  23  |  1.16  11-18    133<L>  |  100  |  9   ----------------------------<  102<H>  3.7   |  25  |  1.16    Ca    8.4      19 Nov 2023 06:30  Ca    8.2<L>      18 Nov 2023 04:56  Phos  2.6     11-19  Mg     2.00     11-19    TPro  6.6  /  Alb  2.0<L>  /  TBili  1.2  /  DBili  x   /  AST  50<H>  /  ALT  31  /  AlkPhos  140<H>  11-19  TPro  6.8  /  Alb  2.4<L>  /  TBili  1.5<H>  /  DBili  x   /  AST  33  /  ALT  27  /  AlkPhos  138<H>  11-18    PT/INR - ( 19 Nov 2023 06:30 )   PT: 13.2 sec;   INR: 1.18 ratio         PTT - ( 19 Nov 2023 22:00 )  PTT:72.1 sec      Urinalysis Basic - ( 19 Nov 2023 06:30 )    Color: x / Appearance: x / SG: x / pH: x  Gluc: 98 mg/dL / Ketone: x  / Bili: x / Urobili: x   Blood: x / Protein: x / Nitrite: x   Leuk Esterase: x / RBC: x / WBC x   Sq Epi: x / Non Sq Epi: x / Bacteria: x          CAPILLARY BLOOD GLUCOSE            RADIOLOGY & ADDITIONAL TESTS:

## 2023-11-21 DIAGNOSIS — K92.2 GASTROINTESTINAL HEMORRHAGE, UNSPECIFIED: ICD-10-CM

## 2023-11-21 LAB
ALBUMIN SERPL ELPH-MCNC: 2.1 G/DL — LOW (ref 3.3–5)
ALBUMIN SERPL ELPH-MCNC: 2.1 G/DL — LOW (ref 3.3–5)
ALP SERPL-CCNC: 104 U/L — SIGNIFICANT CHANGE UP (ref 40–120)
ALP SERPL-CCNC: 104 U/L — SIGNIFICANT CHANGE UP (ref 40–120)
ALT FLD-CCNC: 19 U/L — SIGNIFICANT CHANGE UP (ref 4–41)
ALT FLD-CCNC: 19 U/L — SIGNIFICANT CHANGE UP (ref 4–41)
ANION GAP SERPL CALC-SCNC: 8 MMOL/L — SIGNIFICANT CHANGE UP (ref 7–14)
ANION GAP SERPL CALC-SCNC: 8 MMOL/L — SIGNIFICANT CHANGE UP (ref 7–14)
APTT BLD: 87.8 SEC — HIGH (ref 24.5–35.6)
APTT BLD: 87.8 SEC — HIGH (ref 24.5–35.6)
AST SERPL-CCNC: 19 U/L — SIGNIFICANT CHANGE UP (ref 4–40)
AST SERPL-CCNC: 19 U/L — SIGNIFICANT CHANGE UP (ref 4–40)
BASOPHILS # BLD AUTO: 0.14 K/UL — SIGNIFICANT CHANGE UP (ref 0–0.2)
BASOPHILS # BLD AUTO: 0.14 K/UL — SIGNIFICANT CHANGE UP (ref 0–0.2)
BASOPHILS NFR BLD AUTO: 0.9 % — SIGNIFICANT CHANGE UP (ref 0–2)
BASOPHILS NFR BLD AUTO: 0.9 % — SIGNIFICANT CHANGE UP (ref 0–2)
BILIRUB SERPL-MCNC: 0.8 MG/DL — SIGNIFICANT CHANGE UP (ref 0.2–1.2)
BILIRUB SERPL-MCNC: 0.8 MG/DL — SIGNIFICANT CHANGE UP (ref 0.2–1.2)
BLD GP AB SCN SERPL QL: NEGATIVE — SIGNIFICANT CHANGE UP
BLD GP AB SCN SERPL QL: NEGATIVE — SIGNIFICANT CHANGE UP
BUN SERPL-MCNC: 13 MG/DL — SIGNIFICANT CHANGE UP (ref 7–23)
BUN SERPL-MCNC: 13 MG/DL — SIGNIFICANT CHANGE UP (ref 7–23)
CALCIUM SERPL-MCNC: 7.4 MG/DL — LOW (ref 8.4–10.5)
CALCIUM SERPL-MCNC: 7.4 MG/DL — LOW (ref 8.4–10.5)
CHLORIDE SERPL-SCNC: 106 MMOL/L — SIGNIFICANT CHANGE UP (ref 98–107)
CHLORIDE SERPL-SCNC: 106 MMOL/L — SIGNIFICANT CHANGE UP (ref 98–107)
CO2 SERPL-SCNC: 22 MMOL/L — SIGNIFICANT CHANGE UP (ref 22–31)
CO2 SERPL-SCNC: 22 MMOL/L — SIGNIFICANT CHANGE UP (ref 22–31)
CREAT SERPL-MCNC: 1.44 MG/DL — HIGH (ref 0.5–1.3)
CREAT SERPL-MCNC: 1.44 MG/DL — HIGH (ref 0.5–1.3)
EGFR: 53 ML/MIN/1.73M2 — LOW
EGFR: 53 ML/MIN/1.73M2 — LOW
EOSINOPHIL # BLD AUTO: 0 K/UL — SIGNIFICANT CHANGE UP (ref 0–0.5)
EOSINOPHIL # BLD AUTO: 0 K/UL — SIGNIFICANT CHANGE UP (ref 0–0.5)
EOSINOPHIL NFR BLD AUTO: 0 % — SIGNIFICANT CHANGE UP (ref 0–6)
EOSINOPHIL NFR BLD AUTO: 0 % — SIGNIFICANT CHANGE UP (ref 0–6)
GLUCOSE SERPL-MCNC: 134 MG/DL — HIGH (ref 70–99)
GLUCOSE SERPL-MCNC: 134 MG/DL — HIGH (ref 70–99)
HCT VFR BLD CALC: 18.3 % — CRITICAL LOW (ref 39–50)
HCT VFR BLD CALC: 18.3 % — CRITICAL LOW (ref 39–50)
HCT VFR BLD CALC: 20.7 % — CRITICAL LOW (ref 39–50)
HCT VFR BLD CALC: 20.7 % — CRITICAL LOW (ref 39–50)
HCT VFR BLD CALC: 22.2 % — LOW (ref 39–50)
HCT VFR BLD CALC: 22.2 % — LOW (ref 39–50)
HCT VFR BLD CALC: 22.8 % — LOW (ref 39–50)
HCT VFR BLD CALC: 22.8 % — LOW (ref 39–50)
HGB BLD-MCNC: 5.7 G/DL — CRITICAL LOW (ref 13–17)
HGB BLD-MCNC: 5.7 G/DL — CRITICAL LOW (ref 13–17)
HGB BLD-MCNC: 6.8 G/DL — CRITICAL LOW (ref 13–17)
HGB BLD-MCNC: 6.8 G/DL — CRITICAL LOW (ref 13–17)
HGB BLD-MCNC: 7.5 G/DL — LOW (ref 13–17)
IANC: 12.41 K/UL — HIGH (ref 1.8–7.4)
IANC: 12.41 K/UL — HIGH (ref 1.8–7.4)
INR BLD: 1.2 RATIO — HIGH (ref 0.85–1.18)
INR BLD: 1.2 RATIO — HIGH (ref 0.85–1.18)
LYMPHOCYTES # BLD AUTO: 14.9 % — SIGNIFICANT CHANGE UP (ref 13–44)
LYMPHOCYTES # BLD AUTO: 14.9 % — SIGNIFICANT CHANGE UP (ref 13–44)
LYMPHOCYTES # BLD AUTO: 2.4 K/UL — SIGNIFICANT CHANGE UP (ref 1–3.3)
LYMPHOCYTES # BLD AUTO: 2.4 K/UL — SIGNIFICANT CHANGE UP (ref 1–3.3)
MAGNESIUM SERPL-MCNC: 1.9 MG/DL — SIGNIFICANT CHANGE UP (ref 1.6–2.6)
MAGNESIUM SERPL-MCNC: 1.9 MG/DL — SIGNIFICANT CHANGE UP (ref 1.6–2.6)
MCHC RBC-ENTMCNC: 29.9 PG — SIGNIFICANT CHANGE UP (ref 27–34)
MCHC RBC-ENTMCNC: 29.9 PG — SIGNIFICANT CHANGE UP (ref 27–34)
MCHC RBC-ENTMCNC: 30 PG — SIGNIFICANT CHANGE UP (ref 27–34)
MCHC RBC-ENTMCNC: 30 PG — SIGNIFICANT CHANGE UP (ref 27–34)
MCHC RBC-ENTMCNC: 30.2 PG — SIGNIFICANT CHANGE UP (ref 27–34)
MCHC RBC-ENTMCNC: 31.1 GM/DL — LOW (ref 32–36)
MCHC RBC-ENTMCNC: 31.1 GM/DL — LOW (ref 32–36)
MCHC RBC-ENTMCNC: 32.9 GM/DL — SIGNIFICANT CHANGE UP (ref 32–36)
MCHC RBC-ENTMCNC: 33.8 GM/DL — SIGNIFICANT CHANGE UP (ref 32–36)
MCHC RBC-ENTMCNC: 33.8 GM/DL — SIGNIFICANT CHANGE UP (ref 32–36)
MCV RBC AUTO: 88.4 FL — SIGNIFICANT CHANGE UP (ref 80–100)
MCV RBC AUTO: 88.4 FL — SIGNIFICANT CHANGE UP (ref 80–100)
MCV RBC AUTO: 91.2 FL — SIGNIFICANT CHANGE UP (ref 80–100)
MCV RBC AUTO: 91.2 FL — SIGNIFICANT CHANGE UP (ref 80–100)
MCV RBC AUTO: 92 FL — SIGNIFICANT CHANGE UP (ref 80–100)
MCV RBC AUTO: 92 FL — SIGNIFICANT CHANGE UP (ref 80–100)
MCV RBC AUTO: 96.8 FL — SIGNIFICANT CHANGE UP (ref 80–100)
MCV RBC AUTO: 96.8 FL — SIGNIFICANT CHANGE UP (ref 80–100)
MONOCYTES # BLD AUTO: 0.56 K/UL — SIGNIFICANT CHANGE UP (ref 0–0.9)
MONOCYTES # BLD AUTO: 0.56 K/UL — SIGNIFICANT CHANGE UP (ref 0–0.9)
MONOCYTES NFR BLD AUTO: 3.5 % — SIGNIFICANT CHANGE UP (ref 2–14)
MONOCYTES NFR BLD AUTO: 3.5 % — SIGNIFICANT CHANGE UP (ref 2–14)
NEUTROPHILS # BLD AUTO: 12.85 K/UL — HIGH (ref 1.8–7.4)
NEUTROPHILS # BLD AUTO: 12.85 K/UL — HIGH (ref 1.8–7.4)
NEUTROPHILS NFR BLD AUTO: 79.8 % — HIGH (ref 43–77)
NEUTROPHILS NFR BLD AUTO: 79.8 % — HIGH (ref 43–77)
NRBC # BLD: 0 /100 WBCS — SIGNIFICANT CHANGE UP (ref 0–0)
NRBC # FLD: 0.02 K/UL — HIGH (ref 0–0)
NRBC # FLD: 0.03 K/UL — HIGH (ref 0–0)
NRBC # FLD: 0.03 K/UL — HIGH (ref 0–0)
PHOSPHATE SERPL-MCNC: 2.2 MG/DL — LOW (ref 2.5–4.5)
PHOSPHATE SERPL-MCNC: 2.2 MG/DL — LOW (ref 2.5–4.5)
PLATELET # BLD AUTO: 312 K/UL — SIGNIFICANT CHANGE UP (ref 150–400)
PLATELET # BLD AUTO: 312 K/UL — SIGNIFICANT CHANGE UP (ref 150–400)
PLATELET # BLD AUTO: 316 K/UL — SIGNIFICANT CHANGE UP (ref 150–400)
PLATELET # BLD AUTO: 316 K/UL — SIGNIFICANT CHANGE UP (ref 150–400)
PLATELET # BLD AUTO: 332 K/UL — SIGNIFICANT CHANGE UP (ref 150–400)
PLATELET # BLD AUTO: 332 K/UL — SIGNIFICANT CHANGE UP (ref 150–400)
PLATELET # BLD AUTO: 383 K/UL — SIGNIFICANT CHANGE UP (ref 150–400)
PLATELET # BLD AUTO: 383 K/UL — SIGNIFICANT CHANGE UP (ref 150–400)
POTASSIUM SERPL-MCNC: 4.3 MMOL/L — SIGNIFICANT CHANGE UP (ref 3.5–5.3)
POTASSIUM SERPL-MCNC: 4.3 MMOL/L — SIGNIFICANT CHANGE UP (ref 3.5–5.3)
POTASSIUM SERPL-SCNC: 4.3 MMOL/L — SIGNIFICANT CHANGE UP (ref 3.5–5.3)
POTASSIUM SERPL-SCNC: 4.3 MMOL/L — SIGNIFICANT CHANGE UP (ref 3.5–5.3)
PROT SERPL-MCNC: 5.5 G/DL — LOW (ref 6–8.3)
PROT SERPL-MCNC: 5.5 G/DL — LOW (ref 6–8.3)
PROTHROM AB SERPL-ACNC: 13.4 SEC — HIGH (ref 9.5–13)
PROTHROM AB SERPL-ACNC: 13.4 SEC — HIGH (ref 9.5–13)
RBC # BLD: 1.89 M/UL — LOW (ref 4.2–5.8)
RBC # BLD: 1.89 M/UL — LOW (ref 4.2–5.8)
RBC # BLD: 2.25 M/UL — LOW (ref 4.2–5.8)
RBC # BLD: 2.25 M/UL — LOW (ref 4.2–5.8)
RBC # BLD: 2.5 M/UL — LOW (ref 4.2–5.8)
RBC # BLD: 2.5 M/UL — LOW (ref 4.2–5.8)
RBC # BLD: 2.51 M/UL — LOW (ref 4.2–5.8)
RBC # BLD: 2.51 M/UL — LOW (ref 4.2–5.8)
RBC # FLD: 17.8 % — HIGH (ref 10.3–14.5)
RBC # FLD: 17.8 % — HIGH (ref 10.3–14.5)
RBC # FLD: 18.3 % — HIGH (ref 10.3–14.5)
RBC # FLD: 18.3 % — HIGH (ref 10.3–14.5)
RBC # FLD: 18.6 % — HIGH (ref 10.3–14.5)
RBC # FLD: 18.6 % — HIGH (ref 10.3–14.5)
RBC # FLD: 19.3 % — HIGH (ref 10.3–14.5)
RBC # FLD: 19.3 % — HIGH (ref 10.3–14.5)
RH IG SCN BLD-IMP: POSITIVE — SIGNIFICANT CHANGE UP
RH IG SCN BLD-IMP: POSITIVE — SIGNIFICANT CHANGE UP
SODIUM SERPL-SCNC: 136 MMOL/L — SIGNIFICANT CHANGE UP (ref 135–145)
SODIUM SERPL-SCNC: 136 MMOL/L — SIGNIFICANT CHANGE UP (ref 135–145)
WBC # BLD: 11.87 K/UL — HIGH (ref 3.8–10.5)
WBC # BLD: 11.87 K/UL — HIGH (ref 3.8–10.5)
WBC # BLD: 13.23 K/UL — HIGH (ref 3.8–10.5)
WBC # BLD: 13.23 K/UL — HIGH (ref 3.8–10.5)
WBC # BLD: 13.96 K/UL — HIGH (ref 3.8–10.5)
WBC # BLD: 13.96 K/UL — HIGH (ref 3.8–10.5)
WBC # BLD: 16.1 K/UL — HIGH (ref 3.8–10.5)
WBC # BLD: 16.1 K/UL — HIGH (ref 3.8–10.5)
WBC # FLD AUTO: 11.87 K/UL — HIGH (ref 3.8–10.5)
WBC # FLD AUTO: 11.87 K/UL — HIGH (ref 3.8–10.5)
WBC # FLD AUTO: 13.23 K/UL — HIGH (ref 3.8–10.5)
WBC # FLD AUTO: 13.23 K/UL — HIGH (ref 3.8–10.5)
WBC # FLD AUTO: 13.96 K/UL — HIGH (ref 3.8–10.5)
WBC # FLD AUTO: 13.96 K/UL — HIGH (ref 3.8–10.5)
WBC # FLD AUTO: 16.1 K/UL — HIGH (ref 3.8–10.5)
WBC # FLD AUTO: 16.1 K/UL — HIGH (ref 3.8–10.5)

## 2023-11-21 PROCEDURE — 99223 1ST HOSP IP/OBS HIGH 75: CPT | Mod: GC

## 2023-11-21 PROCEDURE — 99233 SBSQ HOSP IP/OBS HIGH 50: CPT | Mod: GC

## 2023-11-21 PROCEDURE — 93010 ELECTROCARDIOGRAM REPORT: CPT

## 2023-11-21 RX ORDER — SOD SULF/SODIUM/NAHCO3/KCL/PEG
4000 SOLUTION, RECONSTITUTED, ORAL ORAL ONCE
Refills: 0 | Status: COMPLETED | OUTPATIENT
Start: 2023-11-21 | End: 2023-11-21

## 2023-11-21 RX ORDER — CALCIUM GLUCONATE 100 MG/ML
1 VIAL (ML) INTRAVENOUS ONCE
Refills: 0 | Status: COMPLETED | OUTPATIENT
Start: 2023-11-21 | End: 2023-11-21

## 2023-11-21 RX ORDER — GABAPENTIN 400 MG/1
300 CAPSULE ORAL THREE TIMES A DAY
Refills: 0 | Status: DISCONTINUED | OUTPATIENT
Start: 2023-11-21 | End: 2023-11-28

## 2023-11-21 RX ORDER — PANTOPRAZOLE SODIUM 20 MG/1
40 TABLET, DELAYED RELEASE ORAL
Refills: 0 | Status: DISCONTINUED | OUTPATIENT
Start: 2023-11-21 | End: 2023-11-27

## 2023-11-21 RX ORDER — CALCIUM GLUCONATE 100 MG/ML
1 VIAL (ML) INTRAVENOUS ONCE
Refills: 0 | Status: DISCONTINUED | OUTPATIENT
Start: 2023-11-21 | End: 2023-11-21

## 2023-11-21 RX ADMIN — GABAPENTIN 300 MILLIGRAM(S): 400 CAPSULE ORAL at 22:00

## 2023-11-21 RX ADMIN — TAMSULOSIN HYDROCHLORIDE 0.4 MILLIGRAM(S): 0.4 CAPSULE ORAL at 22:00

## 2023-11-21 RX ADMIN — PIPERACILLIN AND TAZOBACTAM 25 GRAM(S): 4; .5 INJECTION, POWDER, LYOPHILIZED, FOR SOLUTION INTRAVENOUS at 06:17

## 2023-11-21 RX ADMIN — SODIUM CHLORIDE 50 MILLILITER(S): 9 INJECTION INTRAMUSCULAR; INTRAVENOUS; SUBCUTANEOUS at 04:01

## 2023-11-21 RX ADMIN — PANTOPRAZOLE SODIUM 40 MILLIGRAM(S): 20 TABLET, DELAYED RELEASE ORAL at 06:17

## 2023-11-21 RX ADMIN — PANTOPRAZOLE SODIUM 40 MILLIGRAM(S): 20 TABLET, DELAYED RELEASE ORAL at 18:29

## 2023-11-21 RX ADMIN — PIPERACILLIN AND TAZOBACTAM 25 GRAM(S): 4; .5 INJECTION, POWDER, LYOPHILIZED, FOR SOLUTION INTRAVENOUS at 13:43

## 2023-11-21 RX ADMIN — GABAPENTIN 200 MILLIGRAM(S): 400 CAPSULE ORAL at 06:17

## 2023-11-21 RX ADMIN — Medication 5 MILLIGRAM(S): at 08:29

## 2023-11-21 RX ADMIN — Medication 1 MILLIGRAM(S): at 12:29

## 2023-11-21 RX ADMIN — Medication 63.75 MILLIMOLE(S): at 12:26

## 2023-11-21 RX ADMIN — Medication 50 GRAM(S): at 13:39

## 2023-11-21 RX ADMIN — Medication 20 MILLIGRAM(S): at 20:39

## 2023-11-21 RX ADMIN — GABAPENTIN 300 MILLIGRAM(S): 400 CAPSULE ORAL at 13:46

## 2023-11-21 RX ADMIN — Medication 4000 MILLILITER(S): at 18:29

## 2023-11-21 NOTE — PROGRESS NOTE ADULT - PROBLEM SELECTOR PLAN 5
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  Apr Heme, no hypercoag workup needed, likely 2/2 to infection. Transition to DOAC on d/c    Plan  - hold Heparin iso bleed  - continue to monitor

## 2023-11-21 NOTE — PROVIDER CONTACT NOTE (CRITICAL VALUE NOTIFICATION) - ASSESSMENT
patient A&Ox4. denies dizziness, lightheadness, chest pain, or shortness of breath
Lab reported patient to have Hemoglobin 6.8 hematocrit 20.7. Patient resting in bed comfortably
Hemoglobin 5.7 hematocrit 18.3

## 2023-11-21 NOTE — PROGRESS NOTE ADULT - PROBLEM SELECTOR PLAN 1
MRCP with hepatic abscess 7x6.7x6.9cm and 5x4.6x5.3, mild intrahepatic biliary duct dilation, increasing size  CEA elevated, CA 19-9 elevated, iron studies suggest anemia of chronic disease, LFTs worsening  Likely source is infection, patient recently traveled to [facility] and has dog at home, no Eosinophilia noted  Prior hx of eosinophilia with hepatic abscesses in 2018, pos strongloides treated with Ivermectin, pos echinococcus ab, Latent TB hx untreated    Plan  - f/u IR, drain possibly tomorrow  - ERCP after abscess drainage with IR  - hold Aspirin 5 days prior to IR Hgb dropped to 5.7 overnight with 1 episode bloody BM bright red  Hx of diverticulosis in the past with GIB   On IV heparin for thrombus, now discontinued    Plan  >consult: GI recs  - f/u CBC q8h  - NPO for now  - maintain 2 large bore IVs  - active T&S  - Protonix 40 BID  - transfuse goal >7

## 2023-11-21 NOTE — PROGRESS NOTE ADULT - PROBLEM SELECTOR PLAN 3
Developed Hiccups, likely 2/2 to diaphragmatic irritation    Plan  - Baclofen  - EKG with QTc 470s  - c/w gabapentin 200 TID  - s/p Thorazine 25mg x1 WBC 25K, spiked fever to 101.1  Previously treated for ? malaria, possibly 2/2 to malignancy vs malaria flare ( ID low suspicion) vs infection of unclear etiology  Prior hx of eosinophilic leukocytosis  Apr ID recs, less likely infectious more likely malignancy vs thrombus  May be 2/2 to hepatic abscess, no eosinophilia noted, improving, entamoeba Ab neg    Plan  - c/w Zosyn  - f/u BCx, UCx  - f/u Entamoeba Ab, Stool Ova and Parasites  - f/u IR drainage bacterial Cx, fungal, AFB Cx

## 2023-11-21 NOTE — PROGRESS NOTE ADULT - SUBJECTIVE AND OBJECTIVE BOX
***************************************************************  Antoine Martinez, PGY1  Internal Medicine   TEAMS Preferred  ***************************************************************    ALVARO CEJA  68y Male  MRN: [id number]  11-11-23 (10d)    Patient is a 68y old  Male who presents with a chief complaint of Failure to Thrive (15 Nov 2023 17:16)      SUBJECTIVE / OVERNIGHT EVENTS:   No acute overnight events.   Patient seen and examined at bedside this AM.  Denies any CP, SOB, N/V, constipation, diarrhea, abdominal pain, dysuria, fever, chills, or headaches.     12 Point ROS negative with the exception of the above    MEDICATIONS  (STANDING):  amLODIPine   Tablet 5 milliGRAM(s) Oral daily  chlorproMAZINE    Injectable 25 milliGRAM(s) IntraMuscular once  folic acid 1 milliGRAM(s) Oral daily  gabapentin 200 milliGRAM(s) Oral three times a day  pantoprazole    Tablet 40 milliGRAM(s) Oral before breakfast  piperacillin/tazobactam IVPB.. 3.375 Gram(s) IV Intermittent every 8 hours  sodium chloride 0.9%. 1000 milliLiter(s) (50 mL/Hr) IV Continuous <Continuous>  tamsulosin 0.4 milliGRAM(s) Oral at bedtime    MEDICATIONS  (PRN):  baclofen 5 milliGRAM(s) Oral every 12 hours PRN hiccups      OBJECTIVE:  Vital Signs Last 24 Hrs  T(C): 36.7 (21 Nov 2023 05:35), Max: 37 (20 Nov 2023 21:19)  T(F): 98.1 (21 Nov 2023 05:35), Max: 98.6 (20 Nov 2023 21:19)  HR: 100 (21 Nov 2023 05:35) (100 - 118)  BP: 100/64 (21 Nov 2023 05:35) (85/51 - 109/61)  BP(mean): --  RR: 17 (21 Nov 2023 05:35) (17 - 20)  SpO2: 98% (21 Nov 2023 05:35) (95% - 98%)    Parameters below as of 21 Nov 2023 05:35  Patient On (Oxygen Delivery Method): room air        I&O's Summary      PHYSICAL EXAM:  GENERAL: Laying comfortably, NAD  HEENT: NCAT, PERRLA, EOMI, no scleral icterus, no LAD  NECK: No JVD, supple  LUNG: CTABL; No wheezes, crackles, or rhonchi  HEART: RRR; normal S1/S2; No murmurs, rubs, or gallops  ABDOMEN: +BS, soft, nontender, nondistended, no HSM; No rebound, guarding, or rigidity  EXTREMITIES:  No LE edema b/l, 2+ Peripheral Pulses, No clubbing or cyanosis  NEUROLOGY: AOx3, non-focal, strength 5/5 in all extremities, sensation intact  PSYCH: calm and cooperative  SKIN: No rashes or lesions    LABS:                        5.7    16.10 )-----------( 383      ( 21 Nov 2023 03:00 )             18.3     Auto Eosinophil # 0.00  / Auto Eosinophil % 0.0   / Auto Neutrophil # 12.85 / Auto Neutrophil % 79.8  / BANDS % x                            7.4    13.16 )-----------( 421      ( 20 Nov 2023 05:10 )             23.5     Auto Eosinophil # 0.20  / Auto Eosinophil % 1.5   / Auto Neutrophil # 9.33  / Auto Neutrophil % 70.9  / BANDS % x        11-21    136  |  106  |  13  ----------------------------<  134<H>  4.3   |  22  |  1.44<H>  11-20    133<L>  |  99  |  9   ----------------------------<  102<H>  3.7   |  22  |  1.34<H>  11-19    131<L>  |  98  |  8   ----------------------------<  98  4.2   |  23  |  1.16    Ca    7.4<L>      21 Nov 2023 03:00  Ca    8.3<L>      20 Nov 2023 05:10  Ca    8.4      19 Nov 2023 06:30  Phos  2.2     11-21  Mg     1.90     11-21    TPro  5.5<L>  /  Alb  2.1<L>  /  TBili  0.8  /  DBili  x   /  AST  19  /  ALT  19  /  AlkPhos  104  11-21  TPro  6.6  /  Alb  2.4<L>  /  TBili  1.0  /  DBili  x   /  AST  29  /  ALT  29  /  AlkPhos  131<H>  11-20  TPro  6.6  /  Alb  2.0<L>  /  TBili  1.2  /  DBili  x   /  AST  50<H>  /  ALT  31  /  AlkPhos  140<H>  11-19    PT/INR - ( 21 Nov 2023 03:00 )   PT: 13.4 sec;   INR: 1.20 ratio         PTT - ( 21 Nov 2023 03:00 )  PTT:87.8 sec      Urinalysis Basic - ( 21 Nov 2023 03:00 )    Color: x / Appearance: x / SG: x / pH: x  Gluc: 134 mg/dL / Ketone: x  / Bili: x / Urobili: x   Blood: x / Protein: x / Nitrite: x   Leuk Esterase: x / RBC: x / WBC x   Sq Epi: x / Non Sq Epi: x / Bacteria: x          CAPILLARY BLOOD GLUCOSE      POCT Blood Glucose.: 179 mg/dL (21 Nov 2023 04:13)        RADIOLOGY & ADDITIONAL TESTS:     ***************************************************************  Antoine Martinez, PGY1  Internal Medicine   TEAMS Preferred  ***************************************************************    ALVARO CEJA  68y Male  MRN: [id number]  11-11-23 (10d)    Patient is a 68y old  Male who presents with a chief complaint of Failure to Thrive (15 Nov 2023 17:16)      SUBJECTIVE / OVERNIGHT EVENTS:   Rapid response overnight, Hgb 5.7 with 1 episode of bright red BM. Also hypotensive to SBP 80s. Given 1u PRBC and started on 2nd unit.   Patient seen and examined at bedside this AM. Repeat bedside /68 . Patient denies lightheadedness or dizziness. No new bloody bowel movements since last night. Continues to have hiccups which have decreased in severity.   Denies any CP, SOB, N/V, abdominal pain, dysuria, fever, chills, or headaches.     12 Point ROS negative with the exception of the above    MEDICATIONS  (STANDING):  amLODIPine   Tablet 5 milliGRAM(s) Oral daily  chlorproMAZINE    Injectable 25 milliGRAM(s) IntraMuscular once  folic acid 1 milliGRAM(s) Oral daily  gabapentin 200 milliGRAM(s) Oral three times a day  pantoprazole    Tablet 40 milliGRAM(s) Oral before breakfast  piperacillin/tazobactam IVPB.. 3.375 Gram(s) IV Intermittent every 8 hours  sodium chloride 0.9%. 1000 milliLiter(s) (50 mL/Hr) IV Continuous <Continuous>  tamsulosin 0.4 milliGRAM(s) Oral at bedtime    MEDICATIONS  (PRN):  baclofen 5 milliGRAM(s) Oral every 12 hours PRN hiccups      OBJECTIVE:  Vital Signs Last 24 Hrs  T(C): 36.7 (21 Nov 2023 05:35), Max: 37 (20 Nov 2023 21:19)  T(F): 98.1 (21 Nov 2023 05:35), Max: 98.6 (20 Nov 2023 21:19)  HR: 100 (21 Nov 2023 05:35) (100 - 118)  BP: 100/64 (21 Nov 2023 05:35) (85/51 - 109/61)  BP(mean): --  RR: 17 (21 Nov 2023 05:35) (17 - 20)  SpO2: 98% (21 Nov 2023 05:35) (95% - 98%)    Parameters below as of 21 Nov 2023 05:35  Patient On (Oxygen Delivery Method): room air        I&O's Summary      PHYSICAL EXAM:  GENERAL: Laying comfortably, NAD, No signs of bleeding  HEENT: NCAT, PERRLA, EOMI, no scleral icterus, no LAD  LUNG: CTABL; No wheezes, crackles, or rhonchi, hiccups  HEART: Tachycardic   ABDOMEN: +BS, soft, nontender, nondistended, no HSM; No rebound, guarding, or rigidity  EXTREMITIES:  No LE edema b/l, 2+ Peripheral Pulses, No clubbing or cyanosis  NEUROLOGY: AOx3, non-focal      LABS:                        5.7    16.10 )-----------( 383      ( 21 Nov 2023 03:00 )             18.3     Auto Eosinophil # 0.00  / Auto Eosinophil % 0.0   / Auto Neutrophil # 12.85 / Auto Neutrophil % 79.8  / BANDS % x                            7.4    13.16 )-----------( 421      ( 20 Nov 2023 05:10 )             23.5     Auto Eosinophil # 0.20  / Auto Eosinophil % 1.5   / Auto Neutrophil # 9.33  / Auto Neutrophil % 70.9  / BANDS % x        11-21    136  |  106  |  13  ----------------------------<  134<H>  4.3   |  22  |  1.44<H>  11-20    133<L>  |  99  |  9   ----------------------------<  102<H>  3.7   |  22  |  1.34<H>  11-19    131<L>  |  98  |  8   ----------------------------<  98  4.2   |  23  |  1.16    Ca    7.4<L>      21 Nov 2023 03:00  Ca    8.3<L>      20 Nov 2023 05:10  Ca    8.4      19 Nov 2023 06:30  Phos  2.2     11-21  Mg     1.90     11-21    TPro  5.5<L>  /  Alb  2.1<L>  /  TBili  0.8  /  DBili  x   /  AST  19  /  ALT  19  /  AlkPhos  104  11-21  TPro  6.6  /  Alb  2.4<L>  /  TBili  1.0  /  DBili  x   /  AST  29  /  ALT  29  /  AlkPhos  131<H>  11-20  TPro  6.6  /  Alb  2.0<L>  /  TBili  1.2  /  DBili  x   /  AST  50<H>  /  ALT  31  /  AlkPhos  140<H>  11-19    PT/INR - ( 21 Nov 2023 03:00 )   PT: 13.4 sec;   INR: 1.20 ratio         PTT - ( 21 Nov 2023 03:00 )  PTT:87.8 sec      Urinalysis Basic - ( 21 Nov 2023 03:00 )    Color: x / Appearance: x / SG: x / pH: x  Gluc: 134 mg/dL / Ketone: x  / Bili: x / Urobili: x   Blood: x / Protein: x / Nitrite: x   Leuk Esterase: x / RBC: x / WBC x   Sq Epi: x / Non Sq Epi: x / Bacteria: x          CAPILLARY BLOOD GLUCOSE      POCT Blood Glucose.: 179 mg/dL (21 Nov 2023 04:13)        RADIOLOGY & ADDITIONAL TESTS:

## 2023-11-21 NOTE — CONSULT NOTE ADULT - ASSESSMENT
68M PMH HTN, BPH, prostate cancer s/p prostatectomy 2012, latent TB, strongyloides s/p treatment w/ ivermectin, diverticulosis presenting with 5 weeks of generalized weakness, 25 lb weight loss, and ?recent diagnosis of malaria, admitted w/ leukocytosis and elevated liver enzymes, found to have liver abscess as well as PVT and hepatic vein filling defect and biliary ductal dilatation, course c/b hematochezia w/ drop in H/H and hypotension overnight for which GI consulted.    #Hematochezia  Episode of hematochezia overnight w/ drop in Hb 7.4 --> 5.7 and hypotension to 80/50s, improved w/ IVF and 1u pRBCs. Colonoscopy 2021 w/ diverticulosis. Most likely 2/2 diverticular bleed given likely arterial nature, known hx of diverticulosis and painless though ddx also includes AVM, mass (poor prep 2021) vs. less likely ischemic colitis (hypotension in conjuction w/ bleeding). Remains HD stable on 2nd unit prBCs. Not yet optimized for endoscopic procedure given Hb.  Recommendations:  -Would keep NPO for now  -Transfuse for Hb <7; may need additional unit   -Would hold AC if possible  -Active T&S  -2 large bore IVs    #Biliary ductal dilatation   #?malaria  #PVT and hepatic vein thrombosis (AC being held)  #Liver abscess, pending drainage w/ IR   Admitted w/ leukocyotosis, found to have liver abscess and pvt as well as hepatic vein thrombosis. On MR also w/ biliary ductal dilatation. PLan was for IR drainage of abscess (was pending today, however w/ GIB now on hold). Etiology of biliary ductal dilatation unclear (?intrinsic vs. extrinsic).   Recommendations:  -Management of PVT/hepatic vein thrombosis per primary team   -Pending IR drainage of abscess  -Abx per primary team     Note incomplete until finalized by attending signature/attestation.    Natalie Nazario  GI/Hepatology Fellow PGY5    NON-URGENT CONSULTS:  Please email [email or web address]  AT NIGHT AND ON WEEKENDS:  Available on [employer] Teams  [phone] (Long Range Pager)    After 5pm, please contact the on-call GI fellow. [phone] 68M PMH HTN, BPH, prostate cancer s/p prostatectomy 2012, latent TB, strongyloides s/p treatment w/ ivermectin, diverticulosis presenting with 5 weeks of generalized weakness, 25 lb weight loss, and ?recent diagnosis of malaria, admitted w/ leukocytosis and elevated liver enzymes, found to have liver abscess as well as PVT and hepatic vein filling defect and biliary ductal dilatation, course c/b hematochezia w/ drop in H/H and hypotension overnight for which GI consulted.    #Hematochezia  Episode of hematochezia overnight w/ drop in Hb 7.4 --> 5.7 and hypotension to 80/50s, improved w/ IVF and 1u pRBCs. Colonoscopy 2021 w/ diverticulosis. Most likely 2/2 diverticular bleed given likely arterial nature, known hx of diverticulosis and painless though ddx also includes AVM, mass (poor prep 2021) vs. less likely ischemic colitis (hypotension in conjuction w/ bleeding). Less likely UGIB given BUN/Cr though has been on heparin ggt. Remains HD stable on 2nd unit prBCs and bleed appears to have stopped. Not yet optimized for endoscopic procedure given Hb.  Recommendations:  -Would keep NPO for now  -IV PPI BID  -Transfuse for Hb <7; may need additional unit   -Would hold AC if possible  -Active T&S  -2 large bore IVs  -If recurrent hematochezia w/ hypotension would obtain CTA     #Biliary ductal dilatation   #?malaria  #PVT and hepatic vein thrombosis (AC being held)  #Liver abscess, pending drainage w/ IR   Admitted w/ leukocyotosis, found to have liver abscess and pvt as well as hepatic vein thrombosis. On MR also w/ biliary ductal dilatation. PLan was for IR drainage of abscess (was pending today, however w/ GIB now on hold). Etiology of biliary ductal dilatation unclear (?intrinsic vs. extrinsic).   Recommendations:  -Management of PVT/hepatic vein thrombosis per primary team   -Pending IR drainage of abscess  -Abx per primary team     Note incomplete until finalized by attending signature/attestation.    Natalie Nazario  GI/Hepatology Fellow PGY5    NON-URGENT CONSULTS:  Please email [email or web address]  AT NIGHT AND ON WEEKENDS:  Available on [employer] Teams  [phone] (Long Range Pager)    After 5pm, please contact the on-call GI fellow. [phone] 68M PMH HTN, BPH, prostate cancer s/p prostatectomy 2012, latent TB, strongyloides s/p treatment w/ ivermectin, diverticulosis presenting with 5 weeks of generalized weakness, 25 lb weight loss, and ?recent diagnosis of malaria, admitted w/ leukocytosis and elevated liver enzymes, found to have liver abscess as well as PVT and hepatic vein filling defect and biliary ductal dilatation, course c/b hematochezia w/ drop in H/H and hypotension overnight for which GI consulted.    #Hematochezia  Episode of hematochezia overnight w/ drop in Hb 7.4 --> 5.7 and hypotension to 80/50s, improved w/ IVF and 1u pRBCs. Colonoscopy 2021 w/ diverticulosis. Most likely 2/2 diverticular bleed given likely arterial nature, known hx of diverticulosis and painless though ddx also includes AVM, mass (poor prep 2021) vs. less likely ischemic colitis (hypotension in conjuction w/ bleeding). Less likely UGIB given BUN/Cr though has been on heparin ggt. Remains HD stable on 2nd unit prBCs and bleed appears to have stopped. Not yet optimized for endoscopic procedure given Hb.  Recommendations:  -CLD  - 4L of golytely at 6PM on the day prior to colonoscopy. Check BM at 5am, if stool is not watery/ clear, please give additional 1-2L of golytely to be finished by 7am  - 20 mg of bisacodyl at 8pm night before endoscopy  - NPO after midnight; hold any TFs  - 3AM preop labs night prior to procedure: CBC, BMP, T&S, Coags  -IV PPI BID  -Transfuse for Hb <7; may need additional unit   -Would hold AC if possible  -Active T&S  -2 large bore IVs  -If recurrent hematochezia w/ hypotension would obtain CTA     #Biliary ductal dilatation   #?malaria  #PVT and hepatic vein thrombosis (AC being held)  #Liver abscess, pending drainage w/ IR   Admitted w/ leukocyotosis, found to have liver abscess and pvt as well as hepatic vein thrombosis. On MR also w/ biliary ductal dilatation. PLan was for IR drainage of abscess (was pending today, however w/ GIB now on hold). Etiology of biliary ductal dilatation unclear (?intrinsic vs. extrinsic).   Recommendations:  -Management of PVT/hepatic vein thrombosis per primary team   -Pending IR drainage of abscess  -Abx per primary team     Note incomplete until finalized by attending signature/attestation.    Natalie Nazario  GI/Hepatology Fellow PGY5    NON-URGENT CONSULTS:  Please email [email or web address]  AT NIGHT AND ON WEEKENDS:  Available on [employer] Teams  [phone] (Long Range Pager)    After 5pm, please contact the on-call GI fellow. [phone]

## 2023-11-21 NOTE — PROGRESS NOTE ADULT - PROBLEM SELECTOR PLAN 7
Anemia noted on admission, suggestive of AOCD  s/p 1u PRBCs, stable Hgb    Plan  - ctm Cr 1.46 on admission, b/l 1.2-1.5  Likely chronic    Continue to monitor

## 2023-11-21 NOTE — PROGRESS NOTE ADULT - PROBLEM SELECTOR PLAN 6
Cr 1.46 on admission, b/l 1.2-1.5  Likely chronic    Continue to monitor c/w home amlodipine  - HOLD home ASA 81

## 2023-11-21 NOTE — PROGRESS NOTE ADULT - ATTENDING COMMENTS
67 yo M PMH HTN, BPH, prostate cancer s/p prostatectomy 2012, diverticulosis presenting with 5 weeks of generalized weakness, 25 lb weight loss, and poor PO intake and subjective chills. + travel to [place] in 2021 returned in 2021. asymptomatic until 5 weeks ago. + treated for malaria by PCP. + hiccups + recent travel to [place]. febrile 101 11/13; RVP neg; 11/11 initial UA neg; repeat UA + w/o overt symptoms;  Ucx Neg. + portal vein thrombus. RRT 11/21 for BRBPR and hypotension.     BRBPR- 2 uprbc             - repeat H/H             - goal H/H >7/21            - no further GIB             - endocsopy 2021 normal; colonoscopy 2021 polyps (tubular and infectious polyp), internal hemm and diverticulosis            - if recurrent GIB will check CTA            - GI c/s     Hepatic lesion- wt loss and decreased PO intake concern for malignancy/abscess                        - ID appreciated low suspicion for malaria.                       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 Fragments of benign liver parenchyma. No fungal or mycobacteria neg for malignancy; 6/15/18 s/p Ivermectin x 2 days,                        - prior Hx latent TB quant gold indeterminate refused treatment                         - entameoba/stronglyiodies neg;                         - monitor LFTs TB downtrending and alk phos and AST/ALT downtrending on Abx                         -  hiccups poss from Diaphragmatic irration- s/p baclofen PRN; still complaining of hiccups. increase gabapentin 300TID. monitor Qtc                        - c/w zosyn                        - GI with concern for poss abscess; ERCP if infectious work up unrevealing                        - echinococcus in lab                         - IR bx delayed initially due given ASA use       Portal vein thrombus- CT expansile filling defect in the right portal, left portal and main hepatic veins, which may represent tumor thrombus versus bland thrombus. US doppler occlusive thrombus main and Lt portal veins with avascular thrombus. MRI as above RT and Lt and main portal vein thrombosis                          -  off heparin drip due to GIB     CKD - poss poor PO intake baseline Cr 1.2-1.5                  - Cr rising in setting of GIB                  - IVF     Anemia- no overt GIB; fe studies consistent with AOCD; s/p 1 uprbc (11/16) with appropriate rise. now with BRBPR as above 2 uprbc 11/21

## 2023-11-21 NOTE — PROGRESS NOTE ADULT - PROBLEM SELECTOR PLAN 4
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  Apr Heme, no hypercoag workup needed, likely 2/2 to infection. Transition to DOAC on d/c    Plan  - c/w Heparin  - continue to monitor Developed Hiccups, likely 2/2 to diaphragmatic irritation  - s/p Thorazine 25mg x1  Improving    Plan  - Baclofen  - EKG with QTc 470s  - c/w gabapentin 200 TID

## 2023-11-21 NOTE — PROVIDER CONTACT NOTE (CRITICAL VALUE NOTIFICATION) - SITUATION
Hemoglobin 6.8 hematocrit 20.7
Hemoglobin 5.7 hematocrit 18.3
patient lab results came back as low hemoglobin of 7.0

## 2023-11-21 NOTE — CONSULT NOTE ADULT - ATTENDING COMMENTS
67 yo Male h/o HTN, BPH, prostate cancer s/p prostatectomy 2012, latent TB, strongyloides s/p treatment w/ ivermectin, diverticulosis admitted for liver abscess as well as PVT and hepatic vein filling defect and biliary ductal dilatation, course c/b hematochezia, acute blood loss anemia.  Monitor Hb and transfuse to Hb >7.  Continue PPI BID.  Clear liquid diet.  Will plan for colonoscopy +/- EGD tomorrow if medically optimized.

## 2023-11-21 NOTE — RAPID RESPONSE TEAM SUMMARY - NSADDTLFINDINGSRRT_GEN_ALL_CORE
On arrival, patient tachycardic 120-130, BP 90/50, RR 20, 100% O2 RA, 179 POCT Glucose. Patient denies chest pain, palpitations or dyspnea but states he feels dizziness. 1 unit pRBC given with vitals improved to 110/60 and HR improving to 103. Previous imaging and colonoscopy notable for polyps as well as diverticulosis. Heparin Gtt stopped given his acute hematochezia with hemodynamic compromise. The decision was made to not use protamine due to uncertain benefit, concern for side effects and stabilization of stables after 1 prbc (roughly 350 cc).  - Will continue 1 more prbc after rrt and repeat CBC  - Follow up hepatology/GI in the AM  - If hemodynamically unstable bleed, can consider CTA with IR consult. On arrival, patient tachycardic 120-130, BP 90/50, RR 20, 100% O2 RA, 179 POCT Glucose. Patient denies chest pain, palpitations or dyspnea but states he feels dizziness. 1 unit pRBC given with vitals improved to 110/60 and HR improving to 103. Previous imaging and colonoscopy notable for polyps as well as diverticulosis. Heparin Gtt stopped given his acute hematochezia with hemodynamic compromise. The decision was made to not use protamine due to uncertain benefit, concern for side effects and stabilization of vitals after 1 prbc (roughly 350 cc). There is high suspicion for a lower GI bleed either from diverticulosis, polyp, AVM in the setting of therapeutic AC for this patient's portal vein thrombosis  - Will continue 1 more prbc after rrt and repeat CBC  - Follow up hepatology/GI in the AM  - If hemodynamically unstable bleed, can consider CTA with IR consult.

## 2023-11-21 NOTE — PROGRESS NOTE ADULT - PROBLEM SELECTOR PLAN 2
WBC 25K, spiked fever to 101.1  Previously treated for ? malaria, possibly 2/2 to malignancy vs malaria flare ( ID low suspicion) vs infection of unclear etiology  Prior hx of eosinophilic leukocytosis  Apr ID recs, less likely infectious more likely malignancy vs thrombus  May be 2/2 to hepatic abscess, no eosinophilia noted, improving, entamoeba Ab neg    Plan  - c/w Zosyn  - f/u BCx, UCx  - f/u Entamoeba Ab, Stool Ova and Parasites  - f/u IR drainage bacterial Cx, fungal, AFB Cx MRCP with hepatic abscess 7x6.7x6.9cm and 5x4.6x5.3, mild intrahepatic biliary duct dilation, increasing size  CEA elevated, CA 19-9 elevated, iron studies suggest anemia of chronic disease, LFTs worsening  Likely source is infection, patient recently traveled to [place] and has dog at home, no Eosinophilia noted  Prior hx of eosinophilia with hepatic abscesses in 2018, pos strongloides treated with Ivermectin, pos echinococcus ab, Latent TB hx untreated    Plan  - f/u IR, drain possibly today, unlikely due to GIB  - ERCP after abscess drainage with IR  - hold Aspirin 5 days prior to IR

## 2023-11-21 NOTE — RAPID RESPONSE TEAM SUMMARY - NSSITUATIONBACKGROUNDRRT_GEN_ALL_CORE
68M PMH HTN, BPH, prostate cancer s/p prostatectomy 2012, hepatic abscess, Latent TB, positive echinococcus Ab diverticulosis presenting with 5 weeks of generalized weakness, 25 lb weight loss, and poor PO intake, recently treated for suspected malaria (unclear if confirmed with blood work), found to have evidence of new right hepatic lesion with suspected portal venous thrombosis c/f malignancy. RRT called for hypotension to 80/50 in the setting of hematochezia

## 2023-11-21 NOTE — PROVIDER CONTACT NOTE (CRITICAL VALUE NOTIFICATION) - BACKGROUND
Patient diagnosed with  weakness
Patient diagnosed with weakness
patient admited to hospital weakness and decreased PO intake. Found to have possible portal thrombosis

## 2023-11-21 NOTE — PROVIDER CONTACT NOTE (CRITICAL VALUE NOTIFICATION) - ACTION/TREATMENT ORDERED:
as per ACP 1 unit of packed red blood cells over 3 hours and redraw cbc to evaluate
MD Curry made aware. To order 1 unit PRBC. Nursing care to continue
MD Curry made aware, to come see patient and order 2 units of PRBCs. Nursing care to continue

## 2023-11-21 NOTE — CONSULT NOTE ADULT - SUBJECTIVE AND OBJECTIVE BOX
INIITIAL GI CONSULTATION  HPI:  68M PMH HTN, BPH, prostate cancer s/p prostatectomy , latent TB, strongyloides s/p treatment w/ ivermectin, diverticulosis presenting with 5 weeks of generalized weakness, 25 lb weight loss, and ?recent diagnosis of malaria, admitted w/ leukocytosis and elevated liver enzymes, found to have liver abscess as well as PVT and hepatic vein filling defect. GI initially consulted for possible ERCP in setting intrahepatic ductal dilatatation.     Overnight, pt had RRT called for hypotension to 50/50s w/ hematochezia. Was initially tacy to 120-130, Given 1u pRBCs w/ improvement in HR to 103. Heparin ggt was d/c'd. Hb 7.4 --> 5.7 after episode w/ increase in leukocytosis 13 --> 16. BUN/Cr 13/1.44 (unchanged from yesterday). Ordered for additional unit.    < from: Upper Endoscopy (21 @ 11:35) >  Findings:   Tortuous distal esophageal lumen noted but no luminal dilation or        impedence to passage of scope through GEJ.       The exam of the esophagus was otherwise normal.       The entire exam of the stomach was normal.       There is no endoscopic evidence of bleeding, erythema or erythema or        inflammatory changes suggestive of gastritis in the stomach.       The exam of the duodenum was normal.       There is no endoscopic evidence of bleeding, inflammation or ulceration        in the entire examined duodenum.                                                                                   Impression:          - Normal EGD.                       - No specimens collected.                       - No evidence of bleeding noted.  Recommendation:      - Return patient to hospital mc for ongoing care.                       - Advance diet as tolerated.                                                                                   Attending Participation:       I was present and participated during the entire procedure, including        non-key portions.    < end of copied text >  < from: Colonoscopy (21 @ 09:01) >  Impression:          - Preparation of the colon was inadequate.                       - Marked diverticulosis in the entire examined colon.                        There was no evidence of diverticular bleeding.                       - One 2 mm polyp in the transverse colon, removed with a                        cold snare. Resected and retrieved. Biopsied.                       - One 2 mm polyp in the descending colon, removed with a                        cold snare. Resected and retrieved. Biopsied.                       - The examined portion of the ileum was normal.                       - Non-bleeding internal hemorrhoids.                       - S/P GI bleed- hematocheiza. Probable LGI bleed from                        diverticulosis.  Recommendation:      - Return patient to hospital mc for ongoing care.                       - Advance diet as tolerated.                       -Monitor serial H/H and observe for recurrent bleeding.                       -No contraindication to resumption of ASA if warranted.    < end of copied text >        ASA/NSAIDs/anticoagulation:     Labs/Imaging reviewed.                        5.7    16.10 )-----------( 383      ( 2023 03:00 )             18.3     Last Hb:Hemoglobin: 5.7 g/dL (23 @ 03:00)  Hemoglobin: 7.4 g/dL (23 @ 05:10)  Hemoglobin: 8.0 g/dL (23 @ 06:30)               136   |  106   |  13                 Ca: 7.4    BMP:   ----------------------------< 134    M.90  (23 @ 03:00)             4.3    |  22    | 1.44               Ph: 2.2      LFT:     TPro: 5.5 / Alb: 2.1 / TBili: 0.8 / DBili: x / AST: 19 / ALT: 19 / AlkPhos: 104   (23 @ 03:00)    Creatinine: 1.44 mg/dL  Creatinine: 1.34 mg/dL  Creatinine: 1.16 mg/dL      BUN/Cr: Blood Urea Nitrogen: 13 mg/dL (23 @ 03:00)  /Creatinine: 1.44 mg/dL (23 @ 03:00)    AST/ALTAspartate Aminotransferase (AST/SGOT): 19 U/L (23 @ 03:00)  /Alanine Aminotransferase (ALT/SGPT): 19 U/L (23 @ 03:00)    ALP Alkaline Phosphatase: 104 U/L (23 @ 03:00)    T/Dbili /  INR: INR: 1.20 ratio (23 @ 03:00)      EGD/Tendoy:  Upper Endoscopy:  (21 @ 11:35)  Colonoscopy:  (21 @ 09:01)      Imaging:  US Abdomen Doppler:   ACC: [id number] EXAM:  US DPLX ABDOMEN   ORDERED BY: ELENA FIERRO     PROCEDURE DATE:  2023          INTERPRETATION:  INDICATION: Hepatic tumor with portal venous thrombosis.    TECHNIQUE: Abdominal sonogram with Doppler assessment of the hepatic   vasculature.    COMPARISON: Same-day CT abdomen pelvis    FINDINGS:  LIVER: The liver is enlarged with a heterogeneous mass in the right lobe   measuring 4.9 x 4.0 x 5.2 cm.  BILE DUCTS/CBD: No intra nor extrahepatic biliary dilatation. The common   bile duct measures 5 mm.  GALLBLADDER: Cholelithiasis. Trace pericholecystic fluid.  PANCREAS: Visualized pancreas is within normal limits.  RIGHT KIDNEY: Multiple anechoic renal cysts measuring up to 4.4 cm. No   hydronephrosis.  ASCITES: Small volume perihepatic ascites.    DOPPLER:    Main portal vein: Prominent thrombus within the main portal vein and   absence of vascular flow.  Left portal vein: Prominent thrombus within the left portal vein and   absence of vascular flow.  Right portal vein: Antegrade flow is appreciated within the anterior   division right portal vein with monophasic waveform.  Hepatic artery: Normal hepatic arterial waveforms.    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    Heterogeneous mass in the right lobe of the liver better demonstrated on   same-day CT abdomen pelvis.    --- End of Report ---          STACEY WALTER MD; Resident Radiologist  This document has been electronically signed.  RONDA AGUIRRE MD; Attending Radiologist  This document has been electronically signed. 2023  6:26PM (23 @ 17:23)  CT Abdomen and Pelvis w/ IV Cont:   ACC: [id number] EXAM:  CT ABDOMEN AND PELVIS IC   ORDERED BY: ELENA FIERRO     PROCEDURE DATE:  2023          INTERPRETATION:  CLINICAL INFORMATION: Early satiety, weight loss.   Evaluate for malignancy.    COMPARISON: CT abdomen and pelvis from 2021. MR abdomen from   2019.    CONTRAST/COMPLICATIONS:  IV Contrast: Omnipaque 350  90 cc administered   10 cc discarded  Oral Contrast: NONE  Complications: None reported at time of study completion    PROCEDURE:  CT of the Abdomen and Pelvis was performed.  Sagittal and coronal reformats were performed.    FINDINGS:  LOWER CHEST: Bibasilar subsegmental atelectasis.    LIVER: Heterogeneous appearance of the liver, likely altered perfusion.   New lobulated hypodense right hepatic lobe lesion measures 3.8 x 3.1 cm   (2:27). Additional subcentimeter hypodense foci too small to characterize.  BILE DUCTS: Mild intrahepatic biliary dilation.  GALLBLADDER: Cholelithiasis.  SPLEEN: Within normal limits.  PANCREAS: Within normal limits.  ADRENALS: Within normal limits.  KIDNEYS/URETERS: No hydronephrosis. Right cysts and bilateral   subcentimeter hypodense foci too small to characterize.    BLADDER: Within normal limits.  REPRODUCTIVE ORGANS: Prostatectomy.    BOWEL: No bowel obstruction. Appendix is normal. Extensive colonic   diverticulosis, without diverticulitis.  PERITONEUM: No ascites.  VESSELS: Expansile filling defect of the right portal, left portal and   main portal veins. Hepatic veins are patent. The IVC and splenic veins   are patent.  RETROPERITONEUM/LYMPH NODES: No lymphadenopathy.  ABDOMINAL WALL: Tiny fat-containing umbilical hernia.  BONES: Degenerative changes. A peripherally sclerotic focus in the left   posterior iliac measures 1.1 x 0.8 cm, unchanged from 2021.    IMPRESSION:  New indeterminate right hepatic lesion. Differential considerations   include cholangiocarcinoma, HCC or metastases. There is associated   expansile filling defect in the right portal, left portal and main   hepatic veins, which may represent tumor thrombus versus bland thrombus.   Recommend MRI abdomen with IV contrast for further characterization.    Discussed with Dr. Andrea by Dr. Mclain on 2023 10:51 AM with   readback confirmation.    --- End of Report ---          DIOR MCLAIN MD; Resident Radiologist  This document has been electronically signed.  CARROLL EVANS MD; Attending Radiologist  This document has been electronically signed. 2023 12:16PM (23 @ 10:23)      FamHx: ***no h/o GI malignancies known  PMH/PSH:  PAST MEDICAL & SURGICAL HISTORY:  HTN (hypertension)  on med x 3 yrs      BPH (benign prostatic hypertrophy)      Prostate cancer      H/O endoscopy      H/O colonoscopy  rectal bleed  no issues found      H/O prostate biopsy          MEDS:  MEDICATIONS  (STANDING):  amLODIPine   Tablet 5 milliGRAM(s) Oral daily  chlorproMAZINE    Injectable 25 milliGRAM(s) IntraMuscular once  folic acid 1 milliGRAM(s) Oral daily  gabapentin 200 milliGRAM(s) Oral three times a day  pantoprazole  Injectable 40 milliGRAM(s) IV Push two times a day  piperacillin/tazobactam IVPB.. 3.375 Gram(s) IV Intermittent every 8 hours  sodium chloride 0.9%. 1000 milliLiter(s) (50 mL/Hr) IV Continuous <Continuous>  tamsulosin 0.4 milliGRAM(s) Oral at bedtime    MEDICATIONS  (PRN):  baclofen 5 milliGRAM(s) Oral every 12 hours PRN hiccups    Allergies    No Known Allergies    Intolerances        ROS neg except as listed above  Neg for F/C, weight loss, vision changes, SOB, TAMEZ, CP, LE edema. GI ROS as listed in HPI    ______________________________________________________________________  PHYSICAL EXAM:  T(C): 36.7 (23 @ 05:35), Max: 37 (23 @ 21:19)  HR: 100 (23 @ 05:35)  BP: 100/64 (23 @ 05:35)  RR: 17 (23 @ 05:35)  SpO2: 98% (23 @ 05:35)  Wt(kg): --    GEN: NAD, normocephalic  CVS: Normal rate, HD stable  CHEST: No signs of respiratory distress, breathing comfortably, no accessory muscle usage  ABD: soft , nontender, nondistended, bowel sounds present  EXTR: no cyanosis, no clubbing, no edema  NEURO: Awake and alert, conversant  SKIN:  warm;  non icteric     INIITIAL GI CONSULTATION  HPI:  68M PMH HTN, BPH, prostate cancer s/p prostatectomy , latent TB, strongyloides s/p treatment w/ ivermectin, diverticulosis presenting with 5 weeks of generalized weakness, 25 lb weight loss, and ?recent diagnosis of malaria, admitted w/ leukocytosis and elevated liver enzymes, found to have liver abscess as well as PVT and hepatic vein filling defect. GI initially consulted for possible ERCP in setting intrahepatic ductal dilatation.    Was admitted for weakness and 25lb weight loss and had been seen as outpt and thought to possibly have malaria given recent travel so started on treatment. On arrival here w/ leukocytosis and imaging showing liver lesion c/f abscess and portal vein as well as hepatic vein thrombosis. Started on zosyn w/ plans for possible IR drainage of liver abscess however, overnight, pt had RRT called for hypotension to 50/50s w/ hematochezia. Was initially tacy to 120-130, Given 1u pRBCs w/ improvement in HR to 103. Heparin ggt was d/c'd. Hb 7.4 --> 5.7 after episode w/ increase in leukocytosis 13 --> 16. BUN/Cr 13/1.44 (unchanged from yesterday). Ordered for additional unit.    Similar episode in  for which patient underwent Colonoscopy (2021 w/ Dr. Gadlamez) showing poor prep, diverticulosis, one 2mm polyp in trasnverseand one 2mm polyp in descending colon as well as internal hemorrhoids. Path showed tubular adenoma in transverse, inflammatory produce in descending. Also had EGD at that time showing tortuous distal esophagus but otherwise normal. Patient denies any abdominal pain. Feeling better this morning w/o nausea/vomiting.        FamHx: ***no h/o GI malignancies known  PMH/PSH:  PAST MEDICAL & SURGICAL HISTORY:  HTN (hypertension)  on med x 3 yrs      BPH (benign prostatic hypertrophy)      Prostate cancer      H/O endoscopy      H/O colonoscopy  rectal bleed  no issues found      H/O prostate biopsy          MEDS:  MEDICATIONS  (STANDING):  amLODIPine   Tablet 5 milliGRAM(s) Oral daily  chlorproMAZINE    Injectable 25 milliGRAM(s) IntraMuscular once  folic acid 1 milliGRAM(s) Oral daily  gabapentin 200 milliGRAM(s) Oral three times a day  pantoprazole  Injectable 40 milliGRAM(s) IV Push two times a day  piperacillin/tazobactam IVPB.. 3.375 Gram(s) IV Intermittent every 8 hours  sodium chloride 0.9%. 1000 milliLiter(s) (50 mL/Hr) IV Continuous <Continuous>  tamsulosin 0.4 milliGRAM(s) Oral at bedtime    MEDICATIONS  (PRN):  baclofen 5 milliGRAM(s) Oral every 12 hours PRN hiccups    Allergies    No Known Allergies    Intolerances        ROS neg except as listed above  Neg for F/C, weight loss, vision changes, SOB, TAMEZ, CP, LE edema. GI ROS as listed in HPI    ______________________________________________________________________  PHYSICAL EXAM:  T(C): 36.7 (23 @ 05:35), Max: 37 (23 @ 21:19)  HR: 100 (23 @ 05:35)  BP: 100/64 (23 @ 05:35)  RR: 17 (23 @ 05:35)  SpO2: 98% (23 @ 05:35)  Wt(kg): --    GEN: NAD, normocephalic  CVS: Normal rate, HD stable  CHEST: No signs of respiratory distress, breathing comfortably, no accessory muscle usage  ABD: soft , nontender, nondistended, empty vault on rectal  EXTR: no cyanosis, no clubbing, no edema  NEURO: Awake and alert, conversant  SKIN:  warm;  non icteric        Labs/Imaging reviewed.                        5.7    16.10 )-----------( 383      ( 2023 03:00 )             18.3     Last Hb:Hemoglobin: 5.7 g/dL (23 @ 03:00)  Hemoglobin: 7.4 g/dL (23 @ 05:10)  Hemoglobin: 8.0 g/dL (23 @ 06:30)               136   |  106   |  13                 Ca: 7.4    BMP:   ----------------------------< 134    M.90  (23 @ 03:00)             4.3    |  22    | 1.44               Ph: 2.2      LFT:     TPro: 5.5 / Alb: 2.1 / TBili: 0.8 / DBili: x / AST: 19 / ALT: 19 / AlkPhos: 104   (23 @ 03:00)    Creatinine: 1.44 mg/dL  Creatinine: 1.34 mg/dL  Creatinine: 1.16 mg/dL      BUN/Cr: Blood Urea Nitrogen: 13 mg/dL (23 @ 03:00)  /Creatinine: 1.44 mg/dL (23 @ 03:00)    AST/ALTAspartate Aminotransferase (AST/SGOT): 19 U/L (23 @ 03:00)  /Alanine Aminotransferase (ALT/SGPT): 19 U/L (23 @ 03:00)    ALP Alkaline Phosphatase: 104 U/L (23 @ 03:00)    T/Dbili /  INR: INR: 1.20 ratio (23 @ 03:00)      EGD/Barney:  Upper Endoscopy:  (21 @ 11:35)  Colonoscopy:  (21 @ 09:01)      Imaging:  US Abdomen Doppler:   ACC: [id number] EXAM:  US DPLX ABDOMEN   ORDERED BY: ELENA FIERRO     PROCEDURE DATE:  2023          INTERPRETATION:  INDICATION: Hepatic tumor with portal venous thrombosis.    TECHNIQUE: Abdominal sonogram with Doppler assessment of the hepatic   vasculature.    COMPARISON: Same-day CT abdomen pelvis    FINDINGS:  LIVER: The liver is enlarged with a heterogeneous mass in the right lobe   measuring 4.9 x 4.0 x 5.2 cm.  BILE DUCTS/CBD: No intra nor extrahepatic biliary dilatation. The common   bile duct measures 5 mm.  GALLBLADDER: Cholelithiasis. Trace pericholecystic fluid.  PANCREAS: Visualized pancreas is within normal limits.  RIGHT KIDNEY: Multiple anechoic renal cysts measuring up to 4.4 cm. No   hydronephrosis.  ASCITES: Small volume perihepatic ascites.    DOPPLER:    Main portal vein: Prominent thrombus within the main portal vein and   absence of vascular flow.  Left portal vein: Prominent thrombus within the left portal vein and   absence of vascular flow.  Right portal vein: Antegrade flow is appreciated within the anterior   division right portal vein with monophasic waveform.  Hepatic artery: Normal hepatic arterial waveforms.    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    Heterogeneous mass in the right lobe of the liver better demonstrated on   same-day CT abdomen pelvis.    --- End of Report ---          STACEY WALTER MD; Resident Radiologist  This document has been electronically signed.  RONDA AGUIRRE MD; Attending Radiologist  This document has been electronically signed. 2023  6:26PM (23 @ 17:23)  CT Abdomen and Pelvis w/ IV Cont:   ACC: [id number] EXAM:  CT ABDOMEN AND PELVIS IC   ORDERED BY: ELENA FIERRO     PROCEDURE DATE:  2023          INTERPRETATION:  CLINICAL INFORMATION: Early satiety, weight loss.   Evaluate for malignancy.    COMPARISON: CT abdomen and pelvis from 2021. MR abdomen from   2019.    CONTRAST/COMPLICATIONS:  IV Contrast: Omnipaque 350  90 cc administered   10 cc discarded  Oral Contrast: NONE  Complications: None reported at time of study completion    PROCEDURE:  CT of the Abdomen and Pelvis was performed.  Sagittal and coronal reformats were performed.    FINDINGS:  LOWER CHEST: Bibasilar subsegmental atelectasis.    LIVER: Heterogeneous appearance of the liver, likely altered perfusion.   New lobulated hypodense right hepatic lobe lesion measures 3.8 x 3.1 cm   (2:27). Additional subcentimeter hypodense foci too small to characterize.  BILE DUCTS: Mild intrahepatic biliary dilation.  GALLBLADDER: Cholelithiasis.  SPLEEN: Within normal limits.  PANCREAS: Within normal limits.  ADRENALS: Within normal limits.  KIDNEYS/URETERS: No hydronephrosis. Right cysts and bilateral   subcentimeter hypodense foci too small to characterize.    BLADDER: Within normal limits.  REPRODUCTIVE ORGANS: Prostatectomy.    BOWEL: No bowel obstruction. Appendix is normal. Extensive colonic   diverticulosis, without diverticulitis.  PERITONEUM: No ascites.  VESSELS: Expansile filling defect of the right portal, left portal and   main portal veins. Hepatic veins are patent. The IVC and splenic veins   are patent.  RETROPERITONEUM/LYMPH NODES: No lymphadenopathy.  ABDOMINAL WALL: Tiny fat-containing umbilical hernia.  BONES: Degenerative changes. A peripherally sclerotic focus in the left   posterior iliac measures 1.1 x 0.8 cm, unchanged from 2021.    IMPRESSION:  New indeterminate right hepatic lesion. Differential considerations   include cholangiocarcinoma, HCC or metastases. There is associated   expansile filling defect in the right portal, left portal and main   hepatic veins, which may represent tumor thrombus versus bland thrombus.   Recommend MRI abdomen with IV contrast for further characterization.    Discussed with Dr. Andrea by Dr. Mohan on 2023 10:51 AM with   readback confirmation.    --- End of Report ---                < from: Upper Endoscopy (21 @ 11:35) >  Findings:   Tortuous distal esophageal lumen noted but no luminal dilation or        impedence to passage of scope through GEJ.       The exam of the esophagus was otherwise normal.       The entire exam of the stomach was normal.       There is no endoscopic evidence of bleeding, erythema or erythema or        inflammatory changes suggestive of gastritis in the stomach.       The exam of the duodenum was normal.       There is no endoscopic evidence of bleeding, inflammation or ulceration        in the entire examined duodenum.                                                                                   Impression:          - Normal EGD.                       - No specimens collected.                       - No evidence of bleeding noted.  Recommendation:      - Return patient to hospital mc for ongoing care.                       - Advance diet as tolerated.                                                                                   Attending Participation:       I was present and participated during the entire procedure, including        non-key portions.    < end of copied text >  < from: Colonoscopy (21 @ 09:01) >  Impression:          - Preparation of the colon was inadequate.                       - Marked diverticulosis in the entire examined colon.                        There was no evidence of diverticular bleeding.                       - One 2 mm polyp in the transverse colon, removed with a                        cold snare. Resected and retrieved. Biopsied.                       - One 2 mm polyp in the descending colon, removed with a                        cold snare. Resected and retrieved. Biopsied.                       - The examined portion of the ileum was normal.                       - Non-bleeding internal hemorrhoids.                       - S/P GI bleed- hematocheiza. Probable LGI bleed from                        diverticulosis.  Recommendation:      - Return patient to hospital mc for ongoing care.                       - Advance diet as tolerated.                       -Monitor serial H/H and observe for recurrent bleeding.                       -No contraindication to resumption of ASA if warranted.    < end of copied text >       INIITIAL GI CONSULTATION  HPI:  68M PMH HTN, BPH, prostate cancer s/p prostatectomy , latent TB, strongyloides s/p treatment w/ ivermectin, diverticulosis presenting with 5 weeks of generalized weakness, 25 lb weight loss, and ?recent diagnosis of malaria, admitted w/ leukocytosis and elevated liver enzymes, found to have liver abscess as well as PVT and hepatic vein filling defect. GI initially consulted for possible ERCP in setting intrahepatic ductal dilatation.    Was admitted for weakness and 25lb weight loss and had been seen as outpt and thought to possibly have malaria given recent travel so started on treatment. On arrival here w/ leukocytosis and imaging showing liver lesion c/f abscess and portal vein as well as hepatic vein thrombosis. Started on zosyn w/ plans for possible IR drainage of liver abscess however, overnight, pt had RRT called for hypotension to 50/50s w/ hematochezia. Was initially tacy to 120-130, Given 1u pRBCs w/ improvement in HR to 103. Heparin ggt was d/c'd. Hb 7.4 --> 5.7 after episode w/ increase in leukocytosis 13 --> 16. BUN/Cr 13/1.44 (unchanged from yesterday). Ordered for additional unit.    Similar episode in  for which patient underwent Colonoscopy (2021 w/ Dr. Galdamez) showing poor prep, diverticulosis, one 2mm polyp in trasnverseand one 2mm polyp in descending colon as well as internal hemorrhoids. Path showed tubular adenoma in transverse, inflammatory produce in descending. Also had EGD at that time showing tortuous distal esophagus but otherwise normal. Patient denies any abdominal pain. Feeling better this morning w/o nausea/vomiting.      PAST MEDICAL & SURGICAL HISTORY:  HTN (hypertension) on med x 3 yrs  BPH (benign prostatic hypertrophy)  Prostate cancer  H/O endoscopy  H/O colonoscopy  rectal bleed  no issues found  H/O prostate biopsy      FamHx: ***no h/o GI malignancies known    Social History:  Lives with daughter, no sick contacts  No alcohol use  No smoking  No DME at home  PCP Dr. Lopez (2023 16:44)      MEDS:  MEDICATIONS  (STANDING):  amLODIPine   Tablet 5 milliGRAM(s) Oral daily  chlorproMAZINE    Injectable 25 milliGRAM(s) IntraMuscular once  folic acid 1 milliGRAM(s) Oral daily  gabapentin 200 milliGRAM(s) Oral three times a day  pantoprazole  Injectable 40 milliGRAM(s) IV Push two times a day  piperacillin/tazobactam IVPB.. 3.375 Gram(s) IV Intermittent every 8 hours  sodium chloride 0.9%. 1000 milliLiter(s) (50 mL/Hr) IV Continuous <Continuous>  tamsulosin 0.4 milliGRAM(s) Oral at bedtime    MEDICATIONS  (PRN):  baclofen 5 milliGRAM(s) Oral every 12 hours PRN hiccups    Allergies    No Known Allergies    Intolerances        ROS neg except as listed above  Neg for F/C, weight loss, vision changes, SOB, TAMEZ, CP, LE edema. GI ROS as listed in HPI    ______________________________________________________________________  PHYSICAL EXAM:  T(C): 36.7 (23 @ 05:35), Max: 37 (23 @ 21:19)  HR: 100 (23 @ 05:35)  BP: 100/64 (23 @ 05:35)  RR: 17 (23 @ 05:35)  SpO2: 98% (23 @ 05:35)  Wt(kg): --    GEN: NAD, normocephalic  HEENT: anicteric  NECK: supple  CVS: Normal rate, HD stable  CHEST: No signs of respiratory distress, breathing comfortably, no accessory muscle usage  ABD: soft , nontender, nondistended, empty vault on rectal  EXTR: no cyanosis, no clubbing, no edema  NEURO: Awake and alert, conversant  PSYCH: normal affect  SKIN:  warm;  non icteric        Labs/Imaging reviewed.                        5.7    16.10 )-----------( 383      ( 2023 03:00 )             18.3     Last Hb:Hemoglobin: 5.7 g/dL (23 @ 03:00)  Hemoglobin: 7.4 g/dL (23 @ 05:10)  Hemoglobin: 8.0 g/dL (23 @ 06:30)               136   |  106   |  13                 Ca: 7.4    BMP:   ----------------------------< 134    M.90  (23 @ 03:00)             4.3    |  22    | 1.44               Ph: 2.2      LFT:     TPro: 5.5 / Alb: 2.1 / TBili: 0.8 / DBili: x / AST: 19 / ALT: 19 / AlkPhos: 104   (23 @ 03:00)    Creatinine: 1.44 mg/dL  Creatinine: 1.34 mg/dL  Creatinine: 1.16 mg/dL      BUN/Cr: Blood Urea Nitrogen: 13 mg/dL (23 @ 03:00)  /Creatinine: 1.44 mg/dL (23 @ 03:00)    AST/ALTAspartate Aminotransferase (AST/SGOT): 19 U/L (23 @ 03:00)  /Alanine Aminotransferase (ALT/SGPT): 19 U/L (23 @ 03:00)    ALP Alkaline Phosphatase: 104 U/L (23 @ 03:00)    T/Dbili /  INR: INR: 1.20 ratio (23 @ 03:00)      EGD/[place]:  Upper Endoscopy:  (21 @ 11:35)  Colonoscopy:  (21 @ 09:01)      Imaging:  US Abdomen Doppler:   ACC: [id number] EXAM:  US DPLX ABDOMEN   ORDERED BY: ELENA FIERRO     PROCEDURE DATE:  2023          INTERPRETATION:  INDICATION: Hepatic tumor with portal venous thrombosis.    TECHNIQUE: Abdominal sonogram with Doppler assessment of the hepatic   vasculature.    COMPARISON: Same-day CT abdomen pelvis    FINDINGS:  LIVER: The liver is enlarged with a heterogeneous mass in the right lobe   measuring 4.9 x 4.0 x 5.2 cm.  BILE DUCTS/CBD: No intra nor extrahepatic biliary dilatation. The common   bile duct measures 5 mm.  GALLBLADDER: Cholelithiasis. Trace pericholecystic fluid.  PANCREAS: Visualized pancreas is within normal limits.  RIGHT KIDNEY: Multiple anechoic renal cysts measuring up to 4.4 cm. No   hydronephrosis.  ASCITES: Small volume perihepatic ascites.    DOPPLER:    Main portal vein: Prominent thrombus within the main portal vein and   absence of vascular flow.  Left portal vein: Prominent thrombus within the left portal vein and   absence of vascular flow.  Right portal vein: Antegrade flow is appreciated within the anterior   division right portal vein with monophasic waveform.  Hepatic artery: Normal hepatic arterial waveforms.    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    Heterogeneous mass in the right lobe of the liver better demonstrated on   same-day CT abdomen pelvis.    --- End of Report ---          STACEY WALTER MD; Resident Radiologist  This document has been electronically signed.  RONDA AGUIRRE MD; Attending Radiologist  This document has been electronically signed. 2023  6:26PM (23 @ 17:23)  CT Abdomen and Pelvis w/ IV Cont:   ACC: [id number] EXAM:  CT ABDOMEN AND PELVIS IC   ORDERED BY: ELENA FIERRO     PROCEDURE DATE:  2023          INTERPRETATION:  CLINICAL INFORMATION: Early satiety, weight loss.   Evaluate for malignancy.    COMPARISON: CT abdomen and pelvis from 2021. MR abdomen from   2019.    CONTRAST/COMPLICATIONS:  IV Contrast: Omnipaque 350  90 cc administered   10 cc discarded  Oral Contrast: NONE  Complications: None reported at time of study completion    PROCEDURE:  CT of the Abdomen and Pelvis was performed.  Sagittal and coronal reformats were performed.    FINDINGS:  LOWER CHEST: Bibasilar subsegmental atelectasis.    LIVER: Heterogeneous appearance of the liver, likely altered perfusion.   New lobulated hypodense right hepatic lobe lesion measures 3.8 x 3.1 cm   (2:27). Additional subcentimeter hypodense foci too small to characterize.  BILE DUCTS: Mild intrahepatic biliary dilation.  GALLBLADDER: Cholelithiasis.  SPLEEN: Within normal limits.  PANCREAS: Within normal limits.  ADRENALS: Within normal limits.  KIDNEYS/URETERS: No hydronephrosis. Right cysts and bilateral   subcentimeter hypodense foci too small to characterize.    BLADDER: Within normal limits.  REPRODUCTIVE ORGANS: Prostatectomy.    BOWEL: No bowel obstruction. Appendix is normal. Extensive colonic   diverticulosis, without diverticulitis.  PERITONEUM: No ascites.  VESSELS: Expansile filling defect of the right portal, left portal and   main portal veins. Hepatic veins are patent. The IVC and splenic veins   are patent.  RETROPERITONEUM/LYMPH NODES: No lymphadenopathy.  ABDOMINAL WALL: Tiny fat-containing umbilical hernia.  BONES: Degenerative changes. A peripherally sclerotic focus in the left   posterior iliac measures 1.1 x 0.8 cm, unchanged from 2021.    IMPRESSION:  New indeterminate right hepatic lesion. Differential considerations   include cholangiocarcinoma, HCC or metastases. There is associated   expansile filling defect in the right portal, left portal and main   hepatic veins, which may represent tumor thrombus versus bland thrombus.   Recommend MRI abdomen with IV contrast for further characterization.    Discussed with Dr. Andrea by Dr. Mohan on 2023 10:51 AM with   readback confirmation.    --- End of Report ---                < from: Upper Endoscopy (21 @ 11:35) >  Findings:   Tortuous distal esophageal lumen noted but no luminal dilation or        impedence to passage of scope through GEJ.       The exam of the esophagus was otherwise normal.       The entire exam of the stomach was normal.       There is no endoscopic evidence of bleeding, erythema or erythema or        inflammatory changes suggestive of gastritis in the stomach.       The exam of the duodenum was normal.       There is no endoscopic evidence of bleeding, inflammation or ulceration        in the entire examined duodenum.                                                                                   Impression:          - Normal EGD.                       - No specimens collected.                       - No evidence of bleeding noted.  Recommendation:      - Return patient to hospital mc for ongoing care.                       - Advance diet as tolerated.                                                                                   Attending Participation:       I was present and participated during the entire procedure, including        non-key portions.    < end of copied text >  < from: Colonoscopy (21 @ 09:01) >  Impression:          - Preparation of the colon was inadequate.                       - Marked diverticulosis in the entire examined colon.                        There was no evidence of diverticular bleeding.                       - One 2 mm polyp in the transverse colon, removed with a                        cold snare. Resected and retrieved. Biopsied.                       - One 2 mm polyp in the descending colon, removed with a                        cold snare. Resected and retrieved. Biopsied.                       - The examined portion of the ileum was normal.                       - Non-bleeding internal hemorrhoids.                       - S/P GI bleed- hematocheiza. Probable LGI bleed from                        diverticulosis.  Recommendation:      - Return patient to hospital mc for ongoing care.                       - Advance diet as tolerated.                       -Monitor serial H/H and observe for recurrent bleeding.                       -No contraindication to resumption of ASA if warranted.    < end of copied text >

## 2023-11-22 LAB
ANION GAP SERPL CALC-SCNC: 8 MMOL/L — SIGNIFICANT CHANGE UP (ref 7–14)
ANION GAP SERPL CALC-SCNC: 8 MMOL/L — SIGNIFICANT CHANGE UP (ref 7–14)
APTT BLD: 29.8 SEC — SIGNIFICANT CHANGE UP (ref 24.5–35.6)
APTT BLD: 29.8 SEC — SIGNIFICANT CHANGE UP (ref 24.5–35.6)
BLD GP AB SCN SERPL QL: NEGATIVE — SIGNIFICANT CHANGE UP
BLD GP AB SCN SERPL QL: NEGATIVE — SIGNIFICANT CHANGE UP
BUN SERPL-MCNC: 10 MG/DL — SIGNIFICANT CHANGE UP (ref 7–23)
BUN SERPL-MCNC: 10 MG/DL — SIGNIFICANT CHANGE UP (ref 7–23)
CALCIUM SERPL-MCNC: 8 MG/DL — LOW (ref 8.4–10.5)
CALCIUM SERPL-MCNC: 8 MG/DL — LOW (ref 8.4–10.5)
CHLORIDE SERPL-SCNC: 107 MMOL/L — SIGNIFICANT CHANGE UP (ref 98–107)
CHLORIDE SERPL-SCNC: 107 MMOL/L — SIGNIFICANT CHANGE UP (ref 98–107)
CO2 SERPL-SCNC: 23 MMOL/L — SIGNIFICANT CHANGE UP (ref 22–31)
CO2 SERPL-SCNC: 23 MMOL/L — SIGNIFICANT CHANGE UP (ref 22–31)
CREAT SERPL-MCNC: 1.12 MG/DL — SIGNIFICANT CHANGE UP (ref 0.5–1.3)
CREAT SERPL-MCNC: 1.12 MG/DL — SIGNIFICANT CHANGE UP (ref 0.5–1.3)
ECHINOCOCCUS AB TITR SER: NEGATIVE — SIGNIFICANT CHANGE UP
ECHINOCOCCUS AB TITR SER: NEGATIVE — SIGNIFICANT CHANGE UP
EGFR: 71 ML/MIN/1.73M2 — SIGNIFICANT CHANGE UP
EGFR: 71 ML/MIN/1.73M2 — SIGNIFICANT CHANGE UP
GLUCOSE SERPL-MCNC: 94 MG/DL — SIGNIFICANT CHANGE UP (ref 70–99)
GLUCOSE SERPL-MCNC: 94 MG/DL — SIGNIFICANT CHANGE UP (ref 70–99)
HCT VFR BLD CALC: 23.5 % — LOW (ref 39–50)
HCT VFR BLD CALC: 23.5 % — LOW (ref 39–50)
HGB BLD-MCNC: 8 G/DL — LOW (ref 13–17)
HGB BLD-MCNC: 8 G/DL — LOW (ref 13–17)
INR BLD: 1.11 RATIO — SIGNIFICANT CHANGE UP (ref 0.85–1.18)
INR BLD: 1.11 RATIO — SIGNIFICANT CHANGE UP (ref 0.85–1.18)
MAGNESIUM SERPL-MCNC: 1.8 MG/DL — SIGNIFICANT CHANGE UP (ref 1.6–2.6)
MAGNESIUM SERPL-MCNC: 1.8 MG/DL — SIGNIFICANT CHANGE UP (ref 1.6–2.6)
MCHC RBC-ENTMCNC: 29.7 PG — SIGNIFICANT CHANGE UP (ref 27–34)
MCHC RBC-ENTMCNC: 29.7 PG — SIGNIFICANT CHANGE UP (ref 27–34)
MCHC RBC-ENTMCNC: 34 GM/DL — SIGNIFICANT CHANGE UP (ref 32–36)
MCHC RBC-ENTMCNC: 34 GM/DL — SIGNIFICANT CHANGE UP (ref 32–36)
MCV RBC AUTO: 87.4 FL — SIGNIFICANT CHANGE UP (ref 80–100)
MCV RBC AUTO: 87.4 FL — SIGNIFICANT CHANGE UP (ref 80–100)
NRBC # BLD: 0 /100 WBCS — SIGNIFICANT CHANGE UP (ref 0–0)
NRBC # BLD: 0 /100 WBCS — SIGNIFICANT CHANGE UP (ref 0–0)
NRBC # FLD: 0.02 K/UL — HIGH (ref 0–0)
NRBC # FLD: 0.02 K/UL — HIGH (ref 0–0)
PHOSPHATE SERPL-MCNC: 3 MG/DL — SIGNIFICANT CHANGE UP (ref 2.5–4.5)
PHOSPHATE SERPL-MCNC: 3 MG/DL — SIGNIFICANT CHANGE UP (ref 2.5–4.5)
PLATELET # BLD AUTO: 311 K/UL — SIGNIFICANT CHANGE UP (ref 150–400)
PLATELET # BLD AUTO: 311 K/UL — SIGNIFICANT CHANGE UP (ref 150–400)
POTASSIUM SERPL-MCNC: 4.2 MMOL/L — SIGNIFICANT CHANGE UP (ref 3.5–5.3)
POTASSIUM SERPL-MCNC: 4.2 MMOL/L — SIGNIFICANT CHANGE UP (ref 3.5–5.3)
POTASSIUM SERPL-SCNC: 4.2 MMOL/L — SIGNIFICANT CHANGE UP (ref 3.5–5.3)
POTASSIUM SERPL-SCNC: 4.2 MMOL/L — SIGNIFICANT CHANGE UP (ref 3.5–5.3)
PROTHROM AB SERPL-ACNC: 12.5 SEC — SIGNIFICANT CHANGE UP (ref 9.5–13)
PROTHROM AB SERPL-ACNC: 12.5 SEC — SIGNIFICANT CHANGE UP (ref 9.5–13)
RBC # BLD: 2.69 M/UL — LOW (ref 4.2–5.8)
RBC # BLD: 2.69 M/UL — LOW (ref 4.2–5.8)
RBC # FLD: 17.7 % — HIGH (ref 10.3–14.5)
RBC # FLD: 17.7 % — HIGH (ref 10.3–14.5)
RH IG SCN BLD-IMP: POSITIVE — SIGNIFICANT CHANGE UP
RH IG SCN BLD-IMP: POSITIVE — SIGNIFICANT CHANGE UP
SODIUM SERPL-SCNC: 138 MMOL/L — SIGNIFICANT CHANGE UP (ref 135–145)
SODIUM SERPL-SCNC: 138 MMOL/L — SIGNIFICANT CHANGE UP (ref 135–145)
WBC # BLD: 11.18 K/UL — HIGH (ref 3.8–10.5)
WBC # BLD: 11.18 K/UL — HIGH (ref 3.8–10.5)
WBC # FLD AUTO: 11.18 K/UL — HIGH (ref 3.8–10.5)
WBC # FLD AUTO: 11.18 K/UL — HIGH (ref 3.8–10.5)

## 2023-11-22 PROCEDURE — 99233 SBSQ HOSP IP/OBS HIGH 50: CPT | Mod: GC

## 2023-11-22 PROCEDURE — 45378 DIAGNOSTIC COLONOSCOPY: CPT | Mod: GC

## 2023-11-22 PROCEDURE — 43235 EGD DIAGNOSTIC BRUSH WASH: CPT | Mod: GC,59

## 2023-11-22 RX ORDER — HEPARIN SODIUM 5000 [USP'U]/ML
2500 INJECTION INTRAVENOUS; SUBCUTANEOUS EVERY 6 HOURS
Refills: 0 | Status: DISCONTINUED | OUTPATIENT
Start: 2023-11-22 | End: 2023-11-24

## 2023-11-22 RX ORDER — PIPERACILLIN AND TAZOBACTAM 4; .5 G/20ML; G/20ML
3.38 INJECTION, POWDER, LYOPHILIZED, FOR SOLUTION INTRAVENOUS EVERY 8 HOURS
Refills: 0 | Status: DISCONTINUED | OUTPATIENT
Start: 2023-11-22 | End: 2023-11-28

## 2023-11-22 RX ORDER — HEPARIN SODIUM 5000 [USP'U]/ML
INJECTION INTRAVENOUS; SUBCUTANEOUS
Qty: 25000 | Refills: 0 | Status: DISCONTINUED | OUTPATIENT
Start: 2023-11-22 | End: 2023-11-24

## 2023-11-22 RX ORDER — HEPARIN SODIUM 5000 [USP'U]/ML
5500 INJECTION INTRAVENOUS; SUBCUTANEOUS EVERY 6 HOURS
Refills: 0 | Status: DISCONTINUED | OUTPATIENT
Start: 2023-11-22 | End: 2023-11-24

## 2023-11-22 RX ADMIN — PANTOPRAZOLE SODIUM 40 MILLIGRAM(S): 20 TABLET, DELAYED RELEASE ORAL at 17:30

## 2023-11-22 RX ADMIN — Medication 1 MILLIGRAM(S): at 15:12

## 2023-11-22 RX ADMIN — PANTOPRAZOLE SODIUM 40 MILLIGRAM(S): 20 TABLET, DELAYED RELEASE ORAL at 06:34

## 2023-11-22 RX ADMIN — HEPARIN SODIUM 1200 UNIT(S)/HR: 5000 INJECTION INTRAVENOUS; SUBCUTANEOUS at 17:29

## 2023-11-22 RX ADMIN — GABAPENTIN 300 MILLIGRAM(S): 400 CAPSULE ORAL at 06:34

## 2023-11-22 RX ADMIN — HEPARIN SODIUM 1200 UNIT(S)/HR: 5000 INJECTION INTRAVENOUS; SUBCUTANEOUS at 19:47

## 2023-11-22 RX ADMIN — GABAPENTIN 300 MILLIGRAM(S): 400 CAPSULE ORAL at 15:12

## 2023-11-22 RX ADMIN — GABAPENTIN 300 MILLIGRAM(S): 400 CAPSULE ORAL at 22:52

## 2023-11-22 RX ADMIN — PIPERACILLIN AND TAZOBACTAM 25 GRAM(S): 4; .5 INJECTION, POWDER, LYOPHILIZED, FOR SOLUTION INTRAVENOUS at 22:53

## 2023-11-22 RX ADMIN — TAMSULOSIN HYDROCHLORIDE 0.4 MILLIGRAM(S): 0.4 CAPSULE ORAL at 22:52

## 2023-11-22 RX ADMIN — PIPERACILLIN AND TAZOBACTAM 25 GRAM(S): 4; .5 INJECTION, POWDER, LYOPHILIZED, FOR SOLUTION INTRAVENOUS at 15:13

## 2023-11-22 NOTE — PROGRESS NOTE ADULT - SUBJECTIVE AND OBJECTIVE BOX
***************************************************************  Antoine Martinez, PGY1  Internal Medicine   TEAMS Preferred  ***************************************************************    ALVARO CEJA  68y  MRN: [id number]  11-11-23 (11d)    Patient is a 68y old  Male who presents with a chief complaint of weakness, weight loss, liver abscess (21 Nov 2023 07:47)      SUBJECTIVE / OVERNIGHT EVENTS:   No acute overnight events. Pt seen and examined at bedside. Denies fevers, chills, CP, SOB, Abdominal pain, N/V, Constipation, Diarrhea. Last BM     12 Point ROS negative with the exception of the above    MEDICATIONS  (STANDING):  amLODIPine   Tablet 5 milliGRAM(s) Oral daily  chlorproMAZINE    Injectable 25 milliGRAM(s) IntraMuscular once  folic acid 1 milliGRAM(s) Oral daily  gabapentin 300 milliGRAM(s) Oral three times a day  pantoprazole  Injectable 40 milliGRAM(s) IV Push two times a day  sodium chloride 0.9%. 1000 milliLiter(s) (50 mL/Hr) IV Continuous <Continuous>  tamsulosin 0.4 milliGRAM(s) Oral at bedtime    MEDICATIONS  (PRN):  baclofen 5 milliGRAM(s) Oral every 12 hours PRN hiccups      OBJECTIVE:  Vital Signs Last 24 Hrs  T(C): 37 (22 Nov 2023 05:49), Max: 37.1 (21 Nov 2023 08:45)  T(F): 98.6 (22 Nov 2023 05:49), Max: 98.7 (21 Nov 2023 08:45)  HR: 98 (22 Nov 2023 06:27) (91 - 103)  BP: 108/66 (22 Nov 2023 06:27) (102/57 - 150/62)  BP(mean): --  RR: 18 (22 Nov 2023 06:27) (18 - 19)  SpO2: 98% (22 Nov 2023 06:27) (95% - 100%)    Parameters below as of 22 Nov 2023 06:27  Patient On (Oxygen Delivery Method): room air        I&O's Summary    21 Nov 2023 07:01  -  22 Nov 2023 07:00  --------------------------------------------------------  IN: 400 mL / OUT: 0 mL / NET: 400 mL        PHYSICAL EXAM:  GENERAL: Laying comfortably, NAD  HEENT: NCAT, PERRLA, EOMI, no scleral icterus, no LAD  NECK: No JVD, supple  LUNG: CTABL; No wheezes, crackles, or rhonchi  HEART: RRR; normal S1/S2; No murmurs, rubs, or gallops  ABDOMEN: +BS, soft, nontender, nondistended, no HSM; No rebound, guarding, or rigidity  EXTREMITIES:  No LE edema b/l, 2+ Peripheral Pulses, No clubbing or cyanosis  NEUROLOGY: AOx3, non-focal, strength 5/5 in all extremities, sensation intact  PSYCH: calm and cooperative  SKIN: No rashes or lesions    LABS:                        8.0    11.18 )-----------( 311      ( 22 Nov 2023 02:58 )             23.5     Auto Eosinophil # x     / Auto Eosinophil % x     / Auto Neutrophil # x     / Auto Neutrophil % x     / BANDS % x                            6.8    11.87 )-----------( 332      ( 21 Nov 2023 22:20 )             20.7     Auto Eosinophil # x     / Auto Eosinophil % x     / Auto Neutrophil # x     / Auto Neutrophil % x     / BANDS % x                            7.5    13.96 )-----------( 316      ( 21 Nov 2023 14:26 )             22.2     Auto Eosinophil # x     / Auto Eosinophil % x     / Auto Neutrophil # x     / Auto Neutrophil % x     / BANDS % x        11-22    138  |  107  |  10  ----------------------------<  94  4.2   |  23  |  1.12  11-21    136  |  106  |  13  ----------------------------<  134<H>  4.3   |  22  |  1.44<H>  11-20    133<L>  |  99  |  9   ----------------------------<  102<H>  3.7   |  22  |  1.34<H>    Ca    8.0<L>      22 Nov 2023 02:58  Ca    7.4<L>      21 Nov 2023 03:00  Ca    8.3<L>      20 Nov 2023 05:10  Phos  3.0     11-22  Mg     1.80     11-22    TPro  5.5<L>  /  Alb  2.1<L>  /  TBili  0.8  /  DBili  x   /  AST  19  /  ALT  19  /  AlkPhos  104  11-21  TPro  6.6  /  Alb  2.4<L>  /  TBili  1.0  /  DBili  x   /  AST  29  /  ALT  29  /  AlkPhos  131<H>  11-20    PT/INR - ( 22 Nov 2023 02:58 )   PT: 12.5 sec;   INR: 1.11 ratio         PTT - ( 22 Nov 2023 02:58 )  PTT:29.8 sec      Urinalysis Basic - ( 22 Nov 2023 02:58 )    Color: x / Appearance: x / SG: x / pH: x  Gluc: 94 mg/dL / Ketone: x  / Bili: x / Urobili: x   Blood: x / Protein: x / Nitrite: x   Leuk Esterase: x / RBC: x / WBC x   Sq Epi: x / Non Sq Epi: x / Bacteria: x          CAPILLARY BLOOD GLUCOSE            RADIOLOGY & ADDITIONAL TESTS:     ***************************************************************  Antoine Martinez, PGY1  Internal Medicine   TEAMS Preferred  ***************************************************************    ALVARO CEJA  68y  MRN: [id number]  11-11-23 (11d)    Patient is a 68y old  Male who presents with a chief complaint of weakness, weight loss, liver abscess (21 Nov 2023 07:47)      SUBJECTIVE / OVERNIGHT EVENTS:   Overnight required additional 1U pRBC for Hgb 6.8, improved to 8.0. Completed bowel prep overnight with clear stools per patient. Had no more episodes of BRBPR per patient.   Pt seen and examined at bedside. Hiccups significantly improved. No abdominal pain, dizziness, or lightheadedness. No bleeding  Denies fevers, chills, CP, SOB, Abdominal pain, N/V, Constipation, Diarrhea. Last BM     12 Point ROS negative with the exception of the above    MEDICATIONS  (STANDING):  amLODIPine   Tablet 5 milliGRAM(s) Oral daily  chlorproMAZINE    Injectable 25 milliGRAM(s) IntraMuscular once  folic acid 1 milliGRAM(s) Oral daily  gabapentin 300 milliGRAM(s) Oral three times a day  pantoprazole  Injectable 40 milliGRAM(s) IV Push two times a day  sodium chloride 0.9%. 1000 milliLiter(s) (50 mL/Hr) IV Continuous <Continuous>  tamsulosin 0.4 milliGRAM(s) Oral at bedtime    MEDICATIONS  (PRN):  baclofen 5 milliGRAM(s) Oral every 12 hours PRN hiccups      OBJECTIVE:  Vital Signs Last 24 Hrs  T(C): 37 (22 Nov 2023 05:49), Max: 37.1 (21 Nov 2023 08:45)  T(F): 98.6 (22 Nov 2023 05:49), Max: 98.7 (21 Nov 2023 08:45)  HR: 98 (22 Nov 2023 06:27) (91 - 103)  BP: 108/66 (22 Nov 2023 06:27) (102/57 - 150/62)  BP(mean): --  RR: 18 (22 Nov 2023 06:27) (18 - 19)  SpO2: 98% (22 Nov 2023 06:27) (95% - 100%)    Parameters below as of 22 Nov 2023 06:27  Patient On (Oxygen Delivery Method): room air        I&O's Summary    21 Nov 2023 07:01  -  22 Nov 2023 07:00  --------------------------------------------------------  IN: 400 mL / OUT: 0 mL / NET: 400 mL        PHYSICAL EXAM:  GENERAL: Laying comfortably, NAD  HEENT: NCAT, PERRLA, EOMI, no scleral icterus, no LAD  LUNG: CTABL; No wheezes, crackles, or rhonchi  HEART: RRR; normal S1/S2; No murmurs, rubs, or gallops  ABDOMEN: +BS, soft, nontender, nondistended, no HSM; No rebound, guarding, or rigidity  EXTREMITIES:  No LE edema b/l, 2+ Peripheral Pulses, No clubbing or cyanosis  NEUROLOGY: AOx3, non-focal    LABS:                        8.0    11.18 )-----------( 311      ( 22 Nov 2023 02:58 )             23.5     Auto Eosinophil # x     / Auto Eosinophil % x     / Auto Neutrophil # x     / Auto Neutrophil % x     / BANDS % x                            6.8    11.87 )-----------( 332      ( 21 Nov 2023 22:20 )             20.7     Auto Eosinophil # x     / Auto Eosinophil % x     / Auto Neutrophil # x     / Auto Neutrophil % x     / BANDS % x                            7.5    13.96 )-----------( 316      ( 21 Nov 2023 14:26 )             22.2     Auto Eosinophil # x     / Auto Eosinophil % x     / Auto Neutrophil # x     / Auto Neutrophil % x     / BANDS % x        11-22    138  |  107  |  10  ----------------------------<  94  4.2   |  23  |  1.12  11-21    136  |  106  |  13  ----------------------------<  134<H>  4.3   |  22  |  1.44<H>  11-20    133<L>  |  99  |  9   ----------------------------<  102<H>  3.7   |  22  |  1.34<H>    Ca    8.0<L>      22 Nov 2023 02:58  Ca    7.4<L>      21 Nov 2023 03:00  Ca    8.3<L>      20 Nov 2023 05:10  Phos  3.0     11-22  Mg     1.80     11-22    TPro  5.5<L>  /  Alb  2.1<L>  /  TBili  0.8  /  DBili  x   /  AST  19  /  ALT  19  /  AlkPhos  104  11-21  TPro  6.6  /  Alb  2.4<L>  /  TBili  1.0  /  DBili  x   /  AST  29  /  ALT  29  /  AlkPhos  131<H>  11-20    PT/INR - ( 22 Nov 2023 02:58 )   PT: 12.5 sec;   INR: 1.11 ratio         PTT - ( 22 Nov 2023 02:58 )  PTT:29.8 sec      Urinalysis Basic - ( 22 Nov 2023 02:58 )    Color: x / Appearance: x / SG: x / pH: x  Gluc: 94 mg/dL / Ketone: x  / Bili: x / Urobili: x   Blood: x / Protein: x / Nitrite: x   Leuk Esterase: x / RBC: x / WBC x   Sq Epi: x / Non Sq Epi: x / Bacteria: x          CAPILLARY BLOOD GLUCOSE            RADIOLOGY & ADDITIONAL TESTS:

## 2023-11-22 NOTE — MEDICAL STUDENT PROGRESS NOTE(EDUCATION) - PLAN 1
- Hbg to 5.7 with bright red blood with bowel movement; given 1u pRBC  - overnight: received given 1u pRBC for Hgb of 6.8  - hx diverticulosios w/ prior GIB  - hold heparin  - GI consult  - colonoscopy today  - NPO  - completed prep  - protonix 40 BID

## 2023-11-22 NOTE — PROGRESS NOTE ADULT - ATTENDING COMMENTS
67 yo M PMH HTN, BPH, prostate cancer s/p prostatectomy 2012, diverticulosis presenting with 5 weeks of generalized weakness, 25 lb weight loss, and poor PO intake and subjective chills. + travel to [place] in 2021 returned in 2021. asymptomatic until 5 weeks ago. + treated for malaria by PCP. + hiccups + recent travel to [place]. febrile 101 11/13; RVP neg; 11/11 initial UA neg; repeat UA + w/o overt symptoms;  Ucx Neg. + portal vein thrombus. RRT 11/21 for BRBPR and hypotension.     BRBPR- repeat H/H lower without evidence of GIB             - s/p 3 uprbc             - goal H/H >7/21            - endocsopy 2021 normal; colonoscopy 2021 polyps (tubular and infectious polyp), internal hemm and diverticulosis            - if recurrent GIB will check CTA            - colonoscopy today     Hepatic lesion- wt loss and decreased PO intake concern for malignancy/abscess                        - ID appreciated low suspicion for malaria.                       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 Fragments of benign liver parenchyma. No fungal or mycobacteria neg for malignancy; 6/15/18 s/p Ivermectin x 2 days,                        - prior Hx latent TB quant gold indeterminate refused treatment                         - entameoba/stronglyiodies neg;                         - monitor LFTs TB downtrending and alk phos and AST/ALT downtrending on Abx                         -  hiccups poss from Diaphragmatic irration- s/p baclofen PRN; still complaining of hiccups. increase gabapentin 300TID; hiccups improving; monitor Qtc                        - c/w zosyn                        - GI with concern for poss abscess; ERCP if infectious work up unrevealing                        - echinococcus in lab                         - IR bx delayed initially due given ASA use. plan Bx tentatively Friday     Portal vein thrombus- CT expansile filling defect in the right portal, left portal and main hepatic veins, which may represent tumor thrombus versus bland thrombus. US doppler occlusive thrombus main and Lt portal veins with avascular thrombus. MRI as above RT and Lt and main portal vein thrombosis                          -  off heparin drip due to GIB     CKD - poss poor PO intake baseline Cr 1.2-1.5                  - Cr rising in setting of GIB                  - improved with IVF    Anemia- no overt GIB; fe studies consistent with AOCD; s/p 1 uprbc (11/16) with appropriate rise.  3 uprbc 11/21 without overt evidence of GIB

## 2023-11-22 NOTE — PROGRESS NOTE ADULT - PROBLEM SELECTOR PLAN 2
MRCP with hepatic abscess 7x6.7x6.9cm and 5x4.6x5.3, mild intrahepatic biliary duct dilation, increasing size  CEA elevated, CA 19-9 elevated, iron studies suggest anemia of chronic disease, LFTs worsening  Likely source is infection, patient recently traveled to [facility] and has dog at home, no Eosinophilia noted  Prior hx of eosinophilia with hepatic abscesses in 2018, pos strongloides treated with Ivermectin, pos echinococcus ab, Latent TB hx untreated    Plan  - f/u IR, drain possibly today, unlikely due to GIB  - ERCP after abscess drainage with IR  - hold Aspirin 5 days prior to IR

## 2023-11-22 NOTE — MEDICAL STUDENT PROGRESS NOTE(EDUCATION) - PLAN 3
- leukocytosis max 25, now 11.18  - F max 101,1; now 98.6  - ID: more likely due to malignancy/thrombus  - continue zosyn  - pending cultures

## 2023-11-22 NOTE — PROGRESS NOTE ADULT - PROBLEM SELECTOR PLAN 3
WBC 25K, spiked fever to 101.1  Previously treated for ? malaria, possibly 2/2 to malignancy vs malaria flare ( ID low suspicion) vs infection of unclear etiology  Prior hx of eosinophilic leukocytosis  Apr ID recs, less likely infectious more likely malignancy vs thrombus  May be 2/2 to hepatic abscess, no eosinophilia noted, improving, entamoeba Ab neg    Plan  - c/w Zosyn  - f/u BCx, UCx  - f/u Entamoeba Ab, Stool Ova and Parasites  - f/u IR drainage bacterial Cx, fungal, AFB Cx

## 2023-11-22 NOTE — PROGRESS NOTE ADULT - PROBLEM SELECTOR PLAN 5
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  Apr Heme, no hypercoag workup needed, likely 2/2 to infection. Transition to DOAC on d/c    Plan  - hold Heparin iso bleed  - continue to monitor

## 2023-11-22 NOTE — PROGRESS NOTE ADULT - PROBLEM SELECTOR PLAN 1
Hgb dropped to 5.7 overnight with 1 episode bloody BM bright red  Hx of diverticulosis in the past with GIB   On IV heparin for thrombus, now discontinued    Plan  >consult: GI recs  - f/u CBC q8h  - NPO for now  - maintain 2 large bore IVs  - active T&S  - Protonix 40 BID  - transfuse goal >7 Hgb dropped to 5.7 overnight with 1 episode bloody BM bright red  Hx of diverticulosis in the past with GIB   On IV heparin for thrombus, now discontinued    Plan  >consult: GI recs  - Colonoscopy today  - f/u CBC q8h  - NPO for now  - maintain 2 large bore IVs  - active T&S  - Protonix 40 BID  - transfuse goal >7

## 2023-11-22 NOTE — MEDICAL STUDENT PROGRESS NOTE(EDUCATION) - PLAN 2
- MRCP with hepatic abscess 7x6.7x6.9cm and 5x4.6x5.3, mild intrahepatic biliary duct dilation, increasing size  - elevated CEA, Ca 19-9, LFTs  - infectious cause most likely  - plan for IR drain, then ERCP  - hold ASA 5 days prior ot intervention

## 2023-11-22 NOTE — PROGRESS NOTE ADULT - PROBLEM SELECTOR PLAN 4
Developed Hiccups, likely 2/2 to diaphragmatic irritation  - s/p Thorazine 25mg x1  Improving    Plan  - Baclofen  - EKG with QTc 470s  - c/w gabapentin 200 TID Developed Hiccups, likely 2/2 to diaphragmatic irritation  - s/p Thorazine 25mg x1  Improving    Plan  - Baclofen  - EKG with QTc 470s  - c/w gabapentin 300 TID

## 2023-11-22 NOTE — PROGRESS NOTE ADULT - ASSESSMENT
68M PMH HTN, BPH, prostate cancer s/p prostatectomy 2012, hepatic abscess, Latent TB, positive echinococcus Ab diverticulosis presenting with 5 weeks of generalized weakness, 25 lb weight loss, and poor PO intake, recently treated for suspected malaria (unclear if confirmed with blood work), found to have evidence of new right hepatic lesion with suspected portal venous thrombosis c/f malignancy 68M PMH HTN, BPH, prostate cancer s/p prostatectomy 2012, hepatic abscess, Latent TB, positive echinococcus Ab diverticulosis presenting with 5 weeks of generalized weakness, 25 lb weight loss, and poor PO intake, recently treated for suspected malaria (unclear if confirmed with blood work), found to have evidence of new right hepatic lesion with suspected portal venous thrombosis c/f pygenic abscess

## 2023-11-22 NOTE — MEDICAL STUDENT PROGRESS NOTE(EDUCATION) - SUBJECTIVE AND OBJECTIVE BOX
Overnight: team contacted for bp of 102/57. Overnight team contacted for Hbg 6.8, given 1unite pRBCs. Hgb repeat was 8.     The patient notes his most recent bowel movement way today. Denies any blood with the bowel movement, in the bowl, or on the paper. He reports his hiccups have significantly improved. Endorses drinking prep. Denies any lightheadedness, dizziness, chest pain, or abdominal pain.    MEDICATIONS  (STANDING):  amLODIPine   Tablet 5 milliGRAM(s) Oral daily  chlorproMAZINE    Injectable 25 milliGRAM(s) IntraMuscular once  folic acid 1 milliGRAM(s) Oral daily  gabapentin 300 milliGRAM(s) Oral three times a day  pantoprazole  Injectable 40 milliGRAM(s) IV Push two times a day  sodium chloride 0.9%. 1000 milliLiter(s) (50 mL/Hr) IV Continuous <Continuous>  tamsulosin 0.4 milliGRAM(s) Oral at bedtime    MEDICATIONS  (PRN):  baclofen 5 milliGRAM(s) Oral every 12 hours PRN hiccups    T(C): 37 (11-22-23 @ 05:49), Max: 37 (11-22-23 @ 05:49)  HR: 98 (11-22-23 @ 06:27) (91 - 103)  BP: 108/66 (11-22-23 @ 06:27) (102/57 - 150/62)  RR: 18 (11-22-23 @ 06:27) (18 - 19)  SpO2: 98% (11-22-23 @ 06:27) (95% - 100%)    CONSTITUTIONAL: Well groomed, no apparent distress  EYES: EOMI, No conjunctival or scleral injection, non-icteric  RESP: No respiratory distress, no use of accessory muscles; CTA b/l, no WRR  CV: RRR, +S1S2, no MRG; no JVD; no peripheral edema  GI: Soft, NT, ND, no rebound, no guarding; no palpable masses; no hepatosplenomegaly; no hernia palpated  SKIN: No rashes or ulcers noted; no subcutaneous nodules or induration palpable  PSYCH: Appropriate insight/judgment; A+O x 3, mood and affect appropriate, recent/remote memory intact    CBC Full  -  ( 22 Nov 2023 02:58 )  WBC Count : 11.18 K/uL  RBC Count : 2.69 M/uL  Hemoglobin : 8.0 g/dL  Hematocrit : 23.5 %  Platelet Count - Automated : 311 K/uL  Mean Cell Volume : 87.4 fL  Mean Cell Hemoglobin : 29.7 pg  Mean Cell Hemoglobin Concentration : 34.0 gm/dL  Auto Neutrophil # : x  Auto Lymphocyte # : x  Auto Monocyte # : x  Auto Eosinophil # : x  Auto Basophil # : x  Auto Neutrophil % : x  Auto Lymphocyte % : x  Auto Monocyte % : x  Auto Eosinophil % : x  Auto Basophil % : x    11-22    138  |  107  |  10  ----------------------------<  94  4.2   |  23  |  1.12    Ca    8.0<L>      22 Nov 2023 02:58  Phos  3.0     11-22  Mg     1.80     11-22    TPro  5.5<L>  /  Alb  2.1<L>  /  TBili  0.8  /  DBili  x   /  AST  19  /  ALT  19  /  AlkPhos  104  11-21

## 2023-11-23 LAB
ALBUMIN SERPL ELPH-MCNC: 2.2 G/DL — LOW (ref 3.3–5)
ALBUMIN SERPL ELPH-MCNC: 2.2 G/DL — LOW (ref 3.3–5)
ALP SERPL-CCNC: 115 U/L — SIGNIFICANT CHANGE UP (ref 40–120)
ALP SERPL-CCNC: 115 U/L — SIGNIFICANT CHANGE UP (ref 40–120)
ALT FLD-CCNC: 20 U/L — SIGNIFICANT CHANGE UP (ref 4–41)
ALT FLD-CCNC: 20 U/L — SIGNIFICANT CHANGE UP (ref 4–41)
ANION GAP SERPL CALC-SCNC: 10 MMOL/L — SIGNIFICANT CHANGE UP (ref 7–14)
ANION GAP SERPL CALC-SCNC: 10 MMOL/L — SIGNIFICANT CHANGE UP (ref 7–14)
APTT BLD: 59.3 SEC — HIGH (ref 24.5–35.6)
APTT BLD: 59.3 SEC — HIGH (ref 24.5–35.6)
APTT BLD: 74.9 SEC — HIGH (ref 24.5–35.6)
APTT BLD: 74.9 SEC — HIGH (ref 24.5–35.6)
AST SERPL-CCNC: 24 U/L — SIGNIFICANT CHANGE UP (ref 4–40)
AST SERPL-CCNC: 24 U/L — SIGNIFICANT CHANGE UP (ref 4–40)
BASOPHILS # BLD AUTO: 0.06 K/UL — SIGNIFICANT CHANGE UP (ref 0–0.2)
BASOPHILS # BLD AUTO: 0.06 K/UL — SIGNIFICANT CHANGE UP (ref 0–0.2)
BASOPHILS # BLD AUTO: 0.07 K/UL — SIGNIFICANT CHANGE UP (ref 0–0.2)
BASOPHILS # BLD AUTO: 0.07 K/UL — SIGNIFICANT CHANGE UP (ref 0–0.2)
BASOPHILS NFR BLD AUTO: 0.5 % — SIGNIFICANT CHANGE UP (ref 0–2)
BASOPHILS NFR BLD AUTO: 0.5 % — SIGNIFICANT CHANGE UP (ref 0–2)
BASOPHILS NFR BLD AUTO: 0.7 % — SIGNIFICANT CHANGE UP (ref 0–2)
BASOPHILS NFR BLD AUTO: 0.7 % — SIGNIFICANT CHANGE UP (ref 0–2)
BILIRUB SERPL-MCNC: 0.9 MG/DL — SIGNIFICANT CHANGE UP (ref 0.2–1.2)
BILIRUB SERPL-MCNC: 0.9 MG/DL — SIGNIFICANT CHANGE UP (ref 0.2–1.2)
BUN SERPL-MCNC: 8 MG/DL — SIGNIFICANT CHANGE UP (ref 7–23)
BUN SERPL-MCNC: 8 MG/DL — SIGNIFICANT CHANGE UP (ref 7–23)
CALCIUM SERPL-MCNC: 8.5 MG/DL — SIGNIFICANT CHANGE UP (ref 8.4–10.5)
CALCIUM SERPL-MCNC: 8.5 MG/DL — SIGNIFICANT CHANGE UP (ref 8.4–10.5)
CHLORIDE SERPL-SCNC: 105 MMOL/L — SIGNIFICANT CHANGE UP (ref 98–107)
CHLORIDE SERPL-SCNC: 105 MMOL/L — SIGNIFICANT CHANGE UP (ref 98–107)
CO2 SERPL-SCNC: 21 MMOL/L — LOW (ref 22–31)
CO2 SERPL-SCNC: 21 MMOL/L — LOW (ref 22–31)
CREAT SERPL-MCNC: 1.17 MG/DL — SIGNIFICANT CHANGE UP (ref 0.5–1.3)
CREAT SERPL-MCNC: 1.17 MG/DL — SIGNIFICANT CHANGE UP (ref 0.5–1.3)
EGFR: 67 ML/MIN/1.73M2 — SIGNIFICANT CHANGE UP
EGFR: 67 ML/MIN/1.73M2 — SIGNIFICANT CHANGE UP
EOSINOPHIL # BLD AUTO: 0.22 K/UL — SIGNIFICANT CHANGE UP (ref 0–0.5)
EOSINOPHIL # BLD AUTO: 0.22 K/UL — SIGNIFICANT CHANGE UP (ref 0–0.5)
EOSINOPHIL # BLD AUTO: 0.24 K/UL — SIGNIFICANT CHANGE UP (ref 0–0.5)
EOSINOPHIL # BLD AUTO: 0.24 K/UL — SIGNIFICANT CHANGE UP (ref 0–0.5)
EOSINOPHIL NFR BLD AUTO: 2 % — SIGNIFICANT CHANGE UP (ref 0–6)
EOSINOPHIL NFR BLD AUTO: 2 % — SIGNIFICANT CHANGE UP (ref 0–6)
EOSINOPHIL NFR BLD AUTO: 2.3 % — SIGNIFICANT CHANGE UP (ref 0–6)
EOSINOPHIL NFR BLD AUTO: 2.3 % — SIGNIFICANT CHANGE UP (ref 0–6)
GLUCOSE SERPL-MCNC: 86 MG/DL — SIGNIFICANT CHANGE UP (ref 70–99)
GLUCOSE SERPL-MCNC: 86 MG/DL — SIGNIFICANT CHANGE UP (ref 70–99)
HCT VFR BLD CALC: 25.5 % — LOW (ref 39–50)
HCT VFR BLD CALC: 25.5 % — LOW (ref 39–50)
HCT VFR BLD CALC: 27.5 % — LOW (ref 39–50)
HCT VFR BLD CALC: 27.5 % — LOW (ref 39–50)
HGB BLD-MCNC: 8.5 G/DL — LOW (ref 13–17)
HGB BLD-MCNC: 8.5 G/DL — LOW (ref 13–17)
HGB BLD-MCNC: 9.2 G/DL — LOW (ref 13–17)
HGB BLD-MCNC: 9.2 G/DL — LOW (ref 13–17)
IANC: 7.9 K/UL — HIGH (ref 1.8–7.4)
IANC: 7.9 K/UL — HIGH (ref 1.8–7.4)
IANC: 7.93 K/UL — HIGH (ref 1.8–7.4)
IANC: 7.93 K/UL — HIGH (ref 1.8–7.4)
IMM GRANULOCYTES NFR BLD AUTO: 0.5 % — SIGNIFICANT CHANGE UP (ref 0–0.9)
IMM GRANULOCYTES NFR BLD AUTO: 0.5 % — SIGNIFICANT CHANGE UP (ref 0–0.9)
IMM GRANULOCYTES NFR BLD AUTO: 0.6 % — SIGNIFICANT CHANGE UP (ref 0–0.9)
IMM GRANULOCYTES NFR BLD AUTO: 0.6 % — SIGNIFICANT CHANGE UP (ref 0–0.9)
INR BLD: 1.13 RATIO — SIGNIFICANT CHANGE UP (ref 0.85–1.18)
INR BLD: 1.13 RATIO — SIGNIFICANT CHANGE UP (ref 0.85–1.18)
LYMPHOCYTES # BLD AUTO: 1.54 K/UL — SIGNIFICANT CHANGE UP (ref 1–3.3)
LYMPHOCYTES # BLD AUTO: 1.54 K/UL — SIGNIFICANT CHANGE UP (ref 1–3.3)
LYMPHOCYTES # BLD AUTO: 14.7 % — SIGNIFICANT CHANGE UP (ref 13–44)
LYMPHOCYTES # BLD AUTO: 14.7 % — SIGNIFICANT CHANGE UP (ref 13–44)
LYMPHOCYTES # BLD AUTO: 19 % — SIGNIFICANT CHANGE UP (ref 13–44)
LYMPHOCYTES # BLD AUTO: 19 % — SIGNIFICANT CHANGE UP (ref 13–44)
LYMPHOCYTES # BLD AUTO: 2.11 K/UL — SIGNIFICANT CHANGE UP (ref 1–3.3)
LYMPHOCYTES # BLD AUTO: 2.11 K/UL — SIGNIFICANT CHANGE UP (ref 1–3.3)
MAGNESIUM SERPL-MCNC: 1.9 MG/DL — SIGNIFICANT CHANGE UP (ref 1.6–2.6)
MAGNESIUM SERPL-MCNC: 1.9 MG/DL — SIGNIFICANT CHANGE UP (ref 1.6–2.6)
MCHC RBC-ENTMCNC: 29.8 PG — SIGNIFICANT CHANGE UP (ref 27–34)
MCHC RBC-ENTMCNC: 29.8 PG — SIGNIFICANT CHANGE UP (ref 27–34)
MCHC RBC-ENTMCNC: 30.4 PG — SIGNIFICANT CHANGE UP (ref 27–34)
MCHC RBC-ENTMCNC: 30.4 PG — SIGNIFICANT CHANGE UP (ref 27–34)
MCHC RBC-ENTMCNC: 33.3 GM/DL — SIGNIFICANT CHANGE UP (ref 32–36)
MCHC RBC-ENTMCNC: 33.3 GM/DL — SIGNIFICANT CHANGE UP (ref 32–36)
MCHC RBC-ENTMCNC: 33.5 GM/DL — SIGNIFICANT CHANGE UP (ref 32–36)
MCHC RBC-ENTMCNC: 33.5 GM/DL — SIGNIFICANT CHANGE UP (ref 32–36)
MCV RBC AUTO: 89.5 FL — SIGNIFICANT CHANGE UP (ref 80–100)
MCV RBC AUTO: 89.5 FL — SIGNIFICANT CHANGE UP (ref 80–100)
MCV RBC AUTO: 90.8 FL — SIGNIFICANT CHANGE UP (ref 80–100)
MCV RBC AUTO: 90.8 FL — SIGNIFICANT CHANGE UP (ref 80–100)
MONOCYTES # BLD AUTO: 0.65 K/UL — SIGNIFICANT CHANGE UP (ref 0–0.9)
MONOCYTES # BLD AUTO: 0.65 K/UL — SIGNIFICANT CHANGE UP (ref 0–0.9)
MONOCYTES # BLD AUTO: 0.71 K/UL — SIGNIFICANT CHANGE UP (ref 0–0.9)
MONOCYTES # BLD AUTO: 0.71 K/UL — SIGNIFICANT CHANGE UP (ref 0–0.9)
MONOCYTES NFR BLD AUTO: 6.2 % — SIGNIFICANT CHANGE UP (ref 2–14)
MONOCYTES NFR BLD AUTO: 6.2 % — SIGNIFICANT CHANGE UP (ref 2–14)
MONOCYTES NFR BLD AUTO: 6.4 % — SIGNIFICANT CHANGE UP (ref 2–14)
MONOCYTES NFR BLD AUTO: 6.4 % — SIGNIFICANT CHANGE UP (ref 2–14)
NEUTROPHILS # BLD AUTO: 7.9 K/UL — HIGH (ref 1.8–7.4)
NEUTROPHILS # BLD AUTO: 7.9 K/UL — HIGH (ref 1.8–7.4)
NEUTROPHILS # BLD AUTO: 7.93 K/UL — HIGH (ref 1.8–7.4)
NEUTROPHILS # BLD AUTO: 7.93 K/UL — HIGH (ref 1.8–7.4)
NEUTROPHILS NFR BLD AUTO: 71.6 % — SIGNIFICANT CHANGE UP (ref 43–77)
NEUTROPHILS NFR BLD AUTO: 71.6 % — SIGNIFICANT CHANGE UP (ref 43–77)
NEUTROPHILS NFR BLD AUTO: 75.5 % — SIGNIFICANT CHANGE UP (ref 43–77)
NEUTROPHILS NFR BLD AUTO: 75.5 % — SIGNIFICANT CHANGE UP (ref 43–77)
NRBC # BLD: 0 /100 WBCS — SIGNIFICANT CHANGE UP (ref 0–0)
NRBC # FLD: 0 K/UL — SIGNIFICANT CHANGE UP (ref 0–0)
PHOSPHATE SERPL-MCNC: 3.4 MG/DL — SIGNIFICANT CHANGE UP (ref 2.5–4.5)
PHOSPHATE SERPL-MCNC: 3.4 MG/DL — SIGNIFICANT CHANGE UP (ref 2.5–4.5)
PLATELET # BLD AUTO: 327 K/UL — SIGNIFICANT CHANGE UP (ref 150–400)
PLATELET # BLD AUTO: 327 K/UL — SIGNIFICANT CHANGE UP (ref 150–400)
PLATELET # BLD AUTO: 349 K/UL — SIGNIFICANT CHANGE UP (ref 150–400)
PLATELET # BLD AUTO: 349 K/UL — SIGNIFICANT CHANGE UP (ref 150–400)
POTASSIUM SERPL-MCNC: 4 MMOL/L — SIGNIFICANT CHANGE UP (ref 3.5–5.3)
POTASSIUM SERPL-MCNC: 4 MMOL/L — SIGNIFICANT CHANGE UP (ref 3.5–5.3)
POTASSIUM SERPL-SCNC: 4 MMOL/L — SIGNIFICANT CHANGE UP (ref 3.5–5.3)
POTASSIUM SERPL-SCNC: 4 MMOL/L — SIGNIFICANT CHANGE UP (ref 3.5–5.3)
PROT SERPL-MCNC: 6.1 G/DL — SIGNIFICANT CHANGE UP (ref 6–8.3)
PROT SERPL-MCNC: 6.1 G/DL — SIGNIFICANT CHANGE UP (ref 6–8.3)
PROTHROM AB SERPL-ACNC: 12.7 SEC — SIGNIFICANT CHANGE UP (ref 9.5–13)
PROTHROM AB SERPL-ACNC: 12.7 SEC — SIGNIFICANT CHANGE UP (ref 9.5–13)
RBC # BLD: 2.85 M/UL — LOW (ref 4.2–5.8)
RBC # BLD: 2.85 M/UL — LOW (ref 4.2–5.8)
RBC # BLD: 3.03 M/UL — LOW (ref 4.2–5.8)
RBC # BLD: 3.03 M/UL — LOW (ref 4.2–5.8)
RBC # FLD: 18.7 % — HIGH (ref 10.3–14.5)
RBC # FLD: 18.7 % — HIGH (ref 10.3–14.5)
RBC # FLD: 19 % — HIGH (ref 10.3–14.5)
RBC # FLD: 19 % — HIGH (ref 10.3–14.5)
SODIUM SERPL-SCNC: 136 MMOL/L — SIGNIFICANT CHANGE UP (ref 135–145)
SODIUM SERPL-SCNC: 136 MMOL/L — SIGNIFICANT CHANGE UP (ref 135–145)
WBC # BLD: 10.46 K/UL — SIGNIFICANT CHANGE UP (ref 3.8–10.5)
WBC # BLD: 10.46 K/UL — SIGNIFICANT CHANGE UP (ref 3.8–10.5)
WBC # BLD: 11.09 K/UL — HIGH (ref 3.8–10.5)
WBC # BLD: 11.09 K/UL — HIGH (ref 3.8–10.5)
WBC # FLD AUTO: 10.46 K/UL — SIGNIFICANT CHANGE UP (ref 3.8–10.5)
WBC # FLD AUTO: 10.46 K/UL — SIGNIFICANT CHANGE UP (ref 3.8–10.5)
WBC # FLD AUTO: 11.09 K/UL — HIGH (ref 3.8–10.5)
WBC # FLD AUTO: 11.09 K/UL — HIGH (ref 3.8–10.5)

## 2023-11-23 PROCEDURE — 99233 SBSQ HOSP IP/OBS HIGH 50: CPT | Mod: GC

## 2023-11-23 RX ADMIN — GABAPENTIN 300 MILLIGRAM(S): 400 CAPSULE ORAL at 06:21

## 2023-11-23 RX ADMIN — HEPARIN SODIUM 1200 UNIT(S)/HR: 5000 INJECTION INTRAVENOUS; SUBCUTANEOUS at 19:13

## 2023-11-23 RX ADMIN — HEPARIN SODIUM 1200 UNIT(S)/HR: 5000 INJECTION INTRAVENOUS; SUBCUTANEOUS at 09:33

## 2023-11-23 RX ADMIN — HEPARIN SODIUM 1200 UNIT(S)/HR: 5000 INJECTION INTRAVENOUS; SUBCUTANEOUS at 13:04

## 2023-11-23 RX ADMIN — PANTOPRAZOLE SODIUM 40 MILLIGRAM(S): 20 TABLET, DELAYED RELEASE ORAL at 18:24

## 2023-11-23 RX ADMIN — PIPERACILLIN AND TAZOBACTAM 25 GRAM(S): 4; .5 INJECTION, POWDER, LYOPHILIZED, FOR SOLUTION INTRAVENOUS at 06:21

## 2023-11-23 RX ADMIN — HEPARIN SODIUM 1200 UNIT(S)/HR: 5000 INJECTION INTRAVENOUS; SUBCUTANEOUS at 07:19

## 2023-11-23 RX ADMIN — PANTOPRAZOLE SODIUM 40 MILLIGRAM(S): 20 TABLET, DELAYED RELEASE ORAL at 06:22

## 2023-11-23 RX ADMIN — AMLODIPINE BESYLATE 5 MILLIGRAM(S): 2.5 TABLET ORAL at 06:21

## 2023-11-23 RX ADMIN — PIPERACILLIN AND TAZOBACTAM 25 GRAM(S): 4; .5 INJECTION, POWDER, LYOPHILIZED, FOR SOLUTION INTRAVENOUS at 21:13

## 2023-11-23 RX ADMIN — GABAPENTIN 300 MILLIGRAM(S): 400 CAPSULE ORAL at 21:15

## 2023-11-23 RX ADMIN — TAMSULOSIN HYDROCHLORIDE 0.4 MILLIGRAM(S): 0.4 CAPSULE ORAL at 21:15

## 2023-11-23 RX ADMIN — PIPERACILLIN AND TAZOBACTAM 25 GRAM(S): 4; .5 INJECTION, POWDER, LYOPHILIZED, FOR SOLUTION INTRAVENOUS at 14:47

## 2023-11-23 RX ADMIN — GABAPENTIN 300 MILLIGRAM(S): 400 CAPSULE ORAL at 14:48

## 2023-11-23 RX ADMIN — HEPARIN SODIUM 1200 UNIT(S)/HR: 5000 INJECTION INTRAVENOUS; SUBCUTANEOUS at 00:48

## 2023-11-23 RX ADMIN — Medication 1 MILLIGRAM(S): at 14:48

## 2023-11-23 NOTE — PROGRESS NOTE ADULT - PROBLEM SELECTOR PLAN 5
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  Apr Heme, no hypercoag workup needed, likely 2/2 to infection. Transition to DOAC on d/c    Plan  - restarted heparin   - continue to monitor

## 2023-11-23 NOTE — PROGRESS NOTE ADULT - SUBJECTIVE AND OBJECTIVE BOX
***************************************************************  Antoine Martinez, PGY1  Internal Medicine   TEAMS Preferred  ***************************************************************    ALVARO CEJA  68y Male  MRN: [id number]  11-11-23 (12d)    Patient is a 68y old  Male who presents with a chief complaint of weakness, weight loss, liver abscess (21 Nov 2023 07:47)      SUBJECTIVE / OVERNIGHT EVENTS:   No acute overnight events.   Patient seen and examined at bedside this AM.  Denies any CP, SOB, N/V, constipation, diarrhea, abdominal pain, dysuria, fever, chills, or headaches.     12 Point ROS negative with the exception of the above    MEDICATIONS  (STANDING):  amLODIPine   Tablet 5 milliGRAM(s) Oral daily  chlorproMAZINE    Injectable 25 milliGRAM(s) IntraMuscular once  folic acid 1 milliGRAM(s) Oral daily  gabapentin 300 milliGRAM(s) Oral three times a day  heparin  Infusion.  Unit(s)/Hr (12 mL/Hr) IV Continuous <Continuous>  pantoprazole  Injectable 40 milliGRAM(s) IV Push two times a day  piperacillin/tazobactam IVPB.. 3.375 Gram(s) IV Intermittent every 8 hours  sodium chloride 0.9%. 1000 milliLiter(s) (50 mL/Hr) IV Continuous <Continuous>  tamsulosin 0.4 milliGRAM(s) Oral at bedtime    MEDICATIONS  (PRN):  baclofen 5 milliGRAM(s) Oral every 12 hours PRN hiccups  heparin   Injectable 2500 Unit(s) IV Push every 6 hours PRN For aPTT between 40 - 57  heparin   Injectable 5500 Unit(s) IV Push every 6 hours PRN For aPTT less than 40      OBJECTIVE:  Vital Signs Last 24 Hrs  T(C): 36.7 (23 Nov 2023 05:58), Max: 37 (22 Nov 2023 14:50)  T(F): 98.1 (23 Nov 2023 05:58), Max: 98.6 (22 Nov 2023 14:50)  HR: 85 (23 Nov 2023 05:58) (85 - 102)  BP: 117/74 (23 Nov 2023 05:58) (95/53 - 132/70)  BP(mean): 68 (22 Nov 2023 10:42) (68 - 68)  RR: 17 (23 Nov 2023 05:58) (16 - 24)  SpO2: 98% (23 Nov 2023 05:58) (94% - 100%)    Parameters below as of 23 Nov 2023 05:58  Patient On (Oxygen Delivery Method): room air        I&O's Summary    21 Nov 2023 07:01  -  22 Nov 2023 07:00  --------------------------------------------------------  IN: 400 mL / OUT: 0 mL / NET: 400 mL    22 Nov 2023 07:01  -  23 Nov 2023 06:37  --------------------------------------------------------  IN: 500 mL / OUT: 1090 mL / NET: -590 mL        PHYSICAL EXAM:  GENERAL: Laying comfortably, NAD  HEENT: NCAT, PERRLA, EOMI, no scleral icterus, no LAD  NECK: No JVD, supple  LUNG: CTABL; No wheezes, crackles, or rhonchi  HEART: RRR; normal S1/S2; No murmurs, rubs, or gallops  ABDOMEN: +BS, soft, nontender, nondistended, no HSM; No rebound, guarding, or rigidity  EXTREMITIES:  No LE edema b/l, 2+ Peripheral Pulses, No clubbing or cyanosis  NEUROLOGY: AOx3, non-focal, strength 5/5 in all extremities, sensation intact  PSYCH: calm and cooperative  SKIN: No rashes or lesions    LABS:                        8.5    10.46 )-----------( 327      ( 22 Nov 2023 23:46 )             25.5     Auto Eosinophil # 0.24  / Auto Eosinophil % 2.3   / Auto Neutrophil # 7.90  / Auto Neutrophil % 75.5  / BANDS % x                            8.0    11.18 )-----------( 311      ( 22 Nov 2023 02:58 )             23.5     Auto Eosinophil # x     / Auto Eosinophil % x     / Auto Neutrophil # x     / Auto Neutrophil % x     / BANDS % x                            6.8    11.87 )-----------( 332      ( 21 Nov 2023 22:20 )             20.7     Auto Eosinophil # x     / Auto Eosinophil % x     / Auto Neutrophil # x     / Auto Neutrophil % x     / BANDS % x        11-22    138  |  107  |  10  ----------------------------<  94  4.2   |  23  |  1.12  11-21    136  |  106  |  13  ----------------------------<  134<H>  4.3   |  22  |  1.44<H>    Ca    8.0<L>      22 Nov 2023 02:58  Ca    7.4<L>      21 Nov 2023 03:00  Phos  3.0     11-22  Mg     1.80     11-22    TPro  5.5<L>  /  Alb  2.1<L>  /  TBili  0.8  /  DBili  x   /  AST  19  /  ALT  19  /  AlkPhos  104  11-21    PT/INR - ( 22 Nov 2023 23:46 )   PT: 12.7 sec;   INR: 1.13 ratio         PTT - ( 22 Nov 2023 23:46 )  PTT:59.3 sec      Urinalysis Basic - ( 22 Nov 2023 02:58 )    Color: x / Appearance: x / SG: x / pH: x  Gluc: 94 mg/dL / Ketone: x  / Bili: x / Urobili: x   Blood: x / Protein: x / Nitrite: x   Leuk Esterase: x / RBC: x / WBC x   Sq Epi: x / Non Sq Epi: x / Bacteria: x          CAPILLARY BLOOD GLUCOSE            RADIOLOGY & ADDITIONAL TESTS:  Upper Endoscopy (11.22.23 @ 12:34) >  Findings:       The examined esophagus was normal.       The Z-line was regular.       No gross lesions were noted in the entire examined stomach.       The examined duodenum was normal.            Impression:          - No evidence of bleeding in the upper GI tract.                       - Unremarkable upper GI tract.  Recommendation:      - Return patient to hospital mc for ongoing care.                       - Advance diet as tolerated.      Colonoscopy (11.22.23 @ 12:36) >  Findings:       A small amount of light brown stool was found in the entire colon. No        bleeding was seen in the entire colon.       Multiple small and medium-mouthed diverticula were found in the entire        colon. There was no evidence of diverticular bleeding.       Non-bleeding internal hemorrhoids were found during retroflexion. The        hemorrhoids were large.                                 Impression:          - Diverticulosis in the entire examined colon. Suspect                        resolved diverticular bleed.                       - Preparation of the colon was fair. Light brown stool           in the colon. No active bleeding seen.                       - Non-bleeding internal hemorrhoids.                       - No specimens collected.  Recommendation:      - Proceed to endoscopy as previously scheduled.                       - High fiber diet.       ***************************************************************  Antoine Martinez, PGY1  Internal Medicine   TEAMS Preferred  ***************************************************************    ALVARO CEJA  68y Male  MRN: [id number]  11-11-23 (12d)    Patient is a 68y old  Male who presents with a chief complaint of weakness, weight loss, liver abscess (21 Nov 2023 07:47)      SUBJECTIVE / OVERNIGHT EVENTS:   No acute overnight events.   Patient seen and examined at bedside this AM. Feeling well, hiccups nearly resolved. No bloody BM. Tolerating Heparin well  Denies any CP, SOB, N/V, constipation, diarrhea, abdominal pain, dysuria, fever, chills, or headaches.     12 Point ROS negative with the exception of the above    MEDICATIONS  (STANDING):  amLODIPine   Tablet 5 milliGRAM(s) Oral daily  chlorproMAZINE    Injectable 25 milliGRAM(s) IntraMuscular once  folic acid 1 milliGRAM(s) Oral daily  gabapentin 300 milliGRAM(s) Oral three times a day  heparin  Infusion.  Unit(s)/Hr (12 mL/Hr) IV Continuous <Continuous>  pantoprazole  Injectable 40 milliGRAM(s) IV Push two times a day  piperacillin/tazobactam IVPB.. 3.375 Gram(s) IV Intermittent every 8 hours  sodium chloride 0.9%. 1000 milliLiter(s) (50 mL/Hr) IV Continuous <Continuous>  tamsulosin 0.4 milliGRAM(s) Oral at bedtime    MEDICATIONS  (PRN):  baclofen 5 milliGRAM(s) Oral every 12 hours PRN hiccups  heparin   Injectable 2500 Unit(s) IV Push every 6 hours PRN For aPTT between 40 - 57  heparin   Injectable 5500 Unit(s) IV Push every 6 hours PRN For aPTT less than 40      OBJECTIVE:  Vital Signs Last 24 Hrs  T(C): 36.7 (23 Nov 2023 05:58), Max: 37 (22 Nov 2023 14:50)  T(F): 98.1 (23 Nov 2023 05:58), Max: 98.6 (22 Nov 2023 14:50)  HR: 85 (23 Nov 2023 05:58) (85 - 102)  BP: 117/74 (23 Nov 2023 05:58) (95/53 - 132/70)  BP(mean): 68 (22 Nov 2023 10:42) (68 - 68)  RR: 17 (23 Nov 2023 05:58) (16 - 24)  SpO2: 98% (23 Nov 2023 05:58) (94% - 100%)    Parameters below as of 23 Nov 2023 05:58  Patient On (Oxygen Delivery Method): room air        I&O's Summary    21 Nov 2023 07:01  -  22 Nov 2023 07:00  --------------------------------------------------------  IN: 400 mL / OUT: 0 mL / NET: 400 mL    22 Nov 2023 07:01  -  23 Nov 2023 06:37  --------------------------------------------------------  IN: 500 mL / OUT: 1090 mL / NET: -590 mL        PHYSICAL EXAM:  GENERAL: Laying comfortably, NAD  HEENT: NCAT, PERRLA, EOMI, no scleral icterus, no LAD  LUNG: Mild basilar crackles   HEART: RRR; normal S1/S2; No murmurs, rubs, or gallops  ABDOMEN: +BS, soft, nontender, nondistended, no HSM; No rebound, guarding, or rigidity  EXTREMITIES:  No LE edema b/l, 2+ Peripheral Pulses, No clubbing or cyanosis  NEUROLOGY: AOx3, non-focal, strength 5/5 in all extremities, sensation intact      LABS:                        8.5    10.46 )-----------( 327      ( 22 Nov 2023 23:46 )             25.5     Auto Eosinophil # 0.24  / Auto Eosinophil % 2.3   / Auto Neutrophil # 7.90  / Auto Neutrophil % 75.5  / BANDS % x                            8.0    11.18 )-----------( 311      ( 22 Nov 2023 02:58 )             23.5     Auto Eosinophil # x     / Auto Eosinophil % x     / Auto Neutrophil # x     / Auto Neutrophil % x     / BANDS % x                            6.8    11.87 )-----------( 332      ( 21 Nov 2023 22:20 )             20.7     Auto Eosinophil # x     / Auto Eosinophil % x     / Auto Neutrophil # x     / Auto Neutrophil % x     / BANDS % x        11-22    138  |  107  |  10  ----------------------------<  94  4.2   |  23  |  1.12  11-21    136  |  106  |  13  ----------------------------<  134<H>  4.3   |  22  |  1.44<H>    Ca    8.0<L>      22 Nov 2023 02:58  Ca    7.4<L>      21 Nov 2023 03:00  Phos  3.0     11-22  Mg     1.80     11-22    TPro  5.5<L>  /  Alb  2.1<L>  /  TBili  0.8  /  DBili  x   /  AST  19  /  ALT  19  /  AlkPhos  104  11-21    PT/INR - ( 22 Nov 2023 23:46 )   PT: 12.7 sec;   INR: 1.13 ratio         PTT - ( 22 Nov 2023 23:46 )  PTT:59.3 sec      Urinalysis Basic - ( 22 Nov 2023 02:58 )    Color: x / Appearance: x / SG: x / pH: x  Gluc: 94 mg/dL / Ketone: x  / Bili: x / Urobili: x   Blood: x / Protein: x / Nitrite: x   Leuk Esterase: x / RBC: x / WBC x   Sq Epi: x / Non Sq Epi: x / Bacteria: x          CAPILLARY BLOOD GLUCOSE            RADIOLOGY & ADDITIONAL TESTS:  Upper Endoscopy (11.22.23 @ 12:34) >  Findings:       The examined esophagus was normal.       The Z-line was regular.       No gross lesions were noted in the entire examined stomach.       The examined duodenum was normal.            Impression:          - No evidence of bleeding in the upper GI tract.                       - Unremarkable upper GI tract.  Recommendation:      - Return patient to hospital mc for ongoing care.                       - Advance diet as tolerated.      Colonoscopy (11.22.23 @ 12:36) >  Findings:       A small amount of light brown stool was found in the entire colon. No        bleeding was seen in the entire colon.       Multiple small and medium-mouthed diverticula were found in the entire        colon. There was no evidence of diverticular bleeding.       Non-bleeding internal hemorrhoids were found during retroflexion. The        hemorrhoids were large.                                 Impression:          - Diverticulosis in the entire examined colon. Suspect                        resolved diverticular bleed.                       - Preparation of the colon was fair. Light brown stool           in the colon. No active bleeding seen.                       - Non-bleeding internal hemorrhoids.                       - No specimens collected.  Recommendation:      - Proceed to endoscopy as previously scheduled.                       - High fiber diet.

## 2023-11-23 NOTE — PROGRESS NOTE ADULT - PROBLEM SELECTOR PLAN 1
Hgb dropped to 5.7 overnight with 1 episode bloody BM bright red  Hx of diverticulosis in the past with GIB   On IV heparin for thrombus, now discontinued  EGD unremarkable, Colonoscopy with pandiverticulosis c/f diverticular bleed as source, noted non-bleeding large internal hemorrhoids    Plan  >consult: GI recs  - Protonix 40 BID  - FLD, advance as tolerated  - restarted heparin per discussion w/ GI  - f/u CBC q8h  - maintain 2 large bore IVs  - active T&S    - transfuse goal >7

## 2023-11-23 NOTE — PROGRESS NOTE ADULT - ATTENDING COMMENTS
69 yo M PMH HTN, BPH, prostate cancer s/p prostatectomy 2012, diverticulosis presenting with 5 weeks of generalized weakness, 25 lb weight loss, and poor PO intake and subjective chills. + travel to [place] in 2021 returned in 2021. asymptomatic until 5 weeks ago. + treated for malaria by PCP. + hiccups + recent travel to [place]. febrile 101 11/13; RVP neg; 11/11 initial UA neg; repeat UA + w/o overt symptoms;  Ucx Neg. + portal vein thrombus. RRT 11/21 for BRBPR and hypotension.     BRBPR-              - s/p 3 uprbc             - endocsopy 2021 normal; colonoscopy 2021 polyps (tubular and infectious polyp), internal hemm and diverticulosis            - EGD/colonscopy 11/22 no evidence of GIB on EGD; colonoscopy notable for non bleeding hemmorhoid and diverticula             - likely diverticular bleed            - goal H/H >7/21                  Hepatic lesion- wt loss and decreased PO intake concern for malignancy/abscess                        - ID appreciated low suspicion for malaria.                       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 Fragments of benign liver parenchyma. 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- s/p baclofen PRN; c/w gabapentin 300TID; hiccups improving; monitor Qtc                        - c/w zosyn                        - GI with concern for poss abscess                        - IR bx delayed initially due given ASA use. plan Bx tentatively Friday     Portal vein thrombus- CT expansile filling defect in the right portal, left portal and main hepatic veins, which may represent tumor thrombus versus bland thrombus. US doppler occlusive thrombus main and Lt portal veins with avascular thrombus. MRI as above RT and Lt and main portal vein thrombosis                          -  heparin drip restarted.     CKD - poss poor PO intake baseline Cr 1.2-1.5                  - Cr rising in setting of GIB                  - improved with IVF    Anemia- no overt GIB; fe studies consistent with AOCD; s/p 1 uprbc (11/16) with appropriate rise.  3 uprbc 11/21 without overt evidence of GIB

## 2023-11-23 NOTE — PROGRESS NOTE ADULT - ASSESSMENT
68M PMH HTN, BPH, prostate cancer s/p prostatectomy 2012, hepatic abscess, Latent TB, positive echinococcus Ab diverticulosis presenting with 5 weeks of generalized weakness, 25 lb weight loss, and poor PO intake, recently treated for suspected malaria (unclear if confirmed with blood work), found to have evidence of new right hepatic lesion with suspected portal venous thrombosis c/f pyogenic abscess course c/b LGIB while on Heparin

## 2023-11-23 NOTE — PROGRESS NOTE ADULT - PROBLEM SELECTOR PLAN 4
Developed Hiccups, likely 2/2 to diaphragmatic irritation  - s/p Thorazine 25mg x1  Improving    Plan  - Baclofen  - EKG with QTc 470s  - c/w gabapentin 300 TID

## 2023-11-23 NOTE — PROGRESS NOTE ADULT - PROBLEM SELECTOR PLAN 2
MRCP with hepatic abscess 7x6.7x6.9cm and 5x4.6x5.3, mild intrahepatic biliary duct dilation, increasing size  CEA elevated, CA 19-9 elevated, iron studies suggest anemia of chronic disease, LFTs worsening  Likely source is infection, patient recently traveled to [facility] and has dog at home, no Eosinophilia noted  Prior hx of eosinophilia with hepatic abscesses in 2018, pos strongyloides treated with Ivermectin, pos echinococcus ab, Latent TB hx untreated    Plan  - f/u IR drain Friday   - ERCP after abscess drainage with IR  - hold Aspirin 5 days prior to IR

## 2023-11-24 LAB
ALBUMIN SERPL ELPH-MCNC: 2.4 G/DL — LOW (ref 3.3–5)
ALBUMIN SERPL ELPH-MCNC: 2.4 G/DL — LOW (ref 3.3–5)
ALP SERPL-CCNC: 112 U/L — SIGNIFICANT CHANGE UP (ref 40–120)
ALP SERPL-CCNC: 112 U/L — SIGNIFICANT CHANGE UP (ref 40–120)
ALT FLD-CCNC: 18 U/L — SIGNIFICANT CHANGE UP (ref 4–41)
ALT FLD-CCNC: 18 U/L — SIGNIFICANT CHANGE UP (ref 4–41)
ANION GAP SERPL CALC-SCNC: 12 MMOL/L — SIGNIFICANT CHANGE UP (ref 7–14)
ANION GAP SERPL CALC-SCNC: 12 MMOL/L — SIGNIFICANT CHANGE UP (ref 7–14)
APTT BLD: 56.6 SEC — HIGH (ref 24.5–35.6)
APTT BLD: 56.6 SEC — HIGH (ref 24.5–35.6)
AST SERPL-CCNC: 17 U/L — SIGNIFICANT CHANGE UP (ref 4–40)
AST SERPL-CCNC: 17 U/L — SIGNIFICANT CHANGE UP (ref 4–40)
BASOPHILS # BLD AUTO: 0.05 K/UL — SIGNIFICANT CHANGE UP (ref 0–0.2)
BASOPHILS # BLD AUTO: 0.05 K/UL — SIGNIFICANT CHANGE UP (ref 0–0.2)
BASOPHILS NFR BLD AUTO: 0.4 % — SIGNIFICANT CHANGE UP (ref 0–2)
BASOPHILS NFR BLD AUTO: 0.4 % — SIGNIFICANT CHANGE UP (ref 0–2)
BILIRUB DIRECT SERPL-MCNC: 0.5 MG/DL — HIGH (ref 0–0.3)
BILIRUB DIRECT SERPL-MCNC: 0.5 MG/DL — HIGH (ref 0–0.3)
BILIRUB INDIRECT FLD-MCNC: 0.3 MG/DL — SIGNIFICANT CHANGE UP (ref 0–1)
BILIRUB INDIRECT FLD-MCNC: 0.3 MG/DL — SIGNIFICANT CHANGE UP (ref 0–1)
BILIRUB SERPL-MCNC: 0.8 MG/DL — SIGNIFICANT CHANGE UP (ref 0.2–1.2)
BILIRUB SERPL-MCNC: 0.8 MG/DL — SIGNIFICANT CHANGE UP (ref 0.2–1.2)
BLD GP AB SCN SERPL QL: NEGATIVE — SIGNIFICANT CHANGE UP
BLD GP AB SCN SERPL QL: NEGATIVE — SIGNIFICANT CHANGE UP
BUN SERPL-MCNC: 10 MG/DL — SIGNIFICANT CHANGE UP (ref 7–23)
BUN SERPL-MCNC: 10 MG/DL — SIGNIFICANT CHANGE UP (ref 7–23)
CALCIUM SERPL-MCNC: 8.1 MG/DL — LOW (ref 8.4–10.5)
CALCIUM SERPL-MCNC: 8.1 MG/DL — LOW (ref 8.4–10.5)
CHLORIDE SERPL-SCNC: 102 MMOL/L — SIGNIFICANT CHANGE UP (ref 98–107)
CHLORIDE SERPL-SCNC: 102 MMOL/L — SIGNIFICANT CHANGE UP (ref 98–107)
CO2 SERPL-SCNC: 22 MMOL/L — SIGNIFICANT CHANGE UP (ref 22–31)
CO2 SERPL-SCNC: 22 MMOL/L — SIGNIFICANT CHANGE UP (ref 22–31)
CREAT SERPL-MCNC: 1.36 MG/DL — HIGH (ref 0.5–1.3)
CREAT SERPL-MCNC: 1.36 MG/DL — HIGH (ref 0.5–1.3)
EGFR: 56 ML/MIN/1.73M2 — LOW
EGFR: 56 ML/MIN/1.73M2 — LOW
EOSINOPHIL # BLD AUTO: 0.19 K/UL — SIGNIFICANT CHANGE UP (ref 0–0.5)
EOSINOPHIL # BLD AUTO: 0.19 K/UL — SIGNIFICANT CHANGE UP (ref 0–0.5)
EOSINOPHIL NFR BLD AUTO: 1.7 % — SIGNIFICANT CHANGE UP (ref 0–6)
EOSINOPHIL NFR BLD AUTO: 1.7 % — SIGNIFICANT CHANGE UP (ref 0–6)
GLUCOSE SERPL-MCNC: 117 MG/DL — HIGH (ref 70–99)
GLUCOSE SERPL-MCNC: 117 MG/DL — HIGH (ref 70–99)
HCT VFR BLD CALC: 27.8 % — LOW (ref 39–50)
HCT VFR BLD CALC: 27.8 % — LOW (ref 39–50)
HGB BLD-MCNC: 8.9 G/DL — LOW (ref 13–17)
HGB BLD-MCNC: 8.9 G/DL — LOW (ref 13–17)
IANC: 8.35 K/UL — HIGH (ref 1.8–7.4)
IANC: 8.35 K/UL — HIGH (ref 1.8–7.4)
IMM GRANULOCYTES NFR BLD AUTO: 1 % — HIGH (ref 0–0.9)
IMM GRANULOCYTES NFR BLD AUTO: 1 % — HIGH (ref 0–0.9)
INR BLD: 1.12 RATIO — SIGNIFICANT CHANGE UP (ref 0.85–1.18)
INR BLD: 1.12 RATIO — SIGNIFICANT CHANGE UP (ref 0.85–1.18)
LYMPHOCYTES # BLD AUTO: 18.8 % — SIGNIFICANT CHANGE UP (ref 13–44)
LYMPHOCYTES # BLD AUTO: 18.8 % — SIGNIFICANT CHANGE UP (ref 13–44)
LYMPHOCYTES # BLD AUTO: 2.14 K/UL — SIGNIFICANT CHANGE UP (ref 1–3.3)
LYMPHOCYTES # BLD AUTO: 2.14 K/UL — SIGNIFICANT CHANGE UP (ref 1–3.3)
MAGNESIUM SERPL-MCNC: 1.9 MG/DL — SIGNIFICANT CHANGE UP (ref 1.6–2.6)
MAGNESIUM SERPL-MCNC: 1.9 MG/DL — SIGNIFICANT CHANGE UP (ref 1.6–2.6)
MCHC RBC-ENTMCNC: 30 PG — SIGNIFICANT CHANGE UP (ref 27–34)
MCHC RBC-ENTMCNC: 30 PG — SIGNIFICANT CHANGE UP (ref 27–34)
MCHC RBC-ENTMCNC: 32 GM/DL — SIGNIFICANT CHANGE UP (ref 32–36)
MCHC RBC-ENTMCNC: 32 GM/DL — SIGNIFICANT CHANGE UP (ref 32–36)
MCV RBC AUTO: 93.6 FL — SIGNIFICANT CHANGE UP (ref 80–100)
MCV RBC AUTO: 93.6 FL — SIGNIFICANT CHANGE UP (ref 80–100)
MONOCYTES # BLD AUTO: 0.56 K/UL — SIGNIFICANT CHANGE UP (ref 0–0.9)
MONOCYTES # BLD AUTO: 0.56 K/UL — SIGNIFICANT CHANGE UP (ref 0–0.9)
MONOCYTES NFR BLD AUTO: 4.9 % — SIGNIFICANT CHANGE UP (ref 2–14)
MONOCYTES NFR BLD AUTO: 4.9 % — SIGNIFICANT CHANGE UP (ref 2–14)
NEUTROPHILS # BLD AUTO: 8.35 K/UL — HIGH (ref 1.8–7.4)
NEUTROPHILS # BLD AUTO: 8.35 K/UL — HIGH (ref 1.8–7.4)
NEUTROPHILS NFR BLD AUTO: 73.2 % — SIGNIFICANT CHANGE UP (ref 43–77)
NEUTROPHILS NFR BLD AUTO: 73.2 % — SIGNIFICANT CHANGE UP (ref 43–77)
NRBC # BLD: 0 /100 WBCS — SIGNIFICANT CHANGE UP (ref 0–0)
NRBC # BLD: 0 /100 WBCS — SIGNIFICANT CHANGE UP (ref 0–0)
NRBC # FLD: 0 K/UL — SIGNIFICANT CHANGE UP (ref 0–0)
NRBC # FLD: 0 K/UL — SIGNIFICANT CHANGE UP (ref 0–0)
PHOSPHATE SERPL-MCNC: 3 MG/DL — SIGNIFICANT CHANGE UP (ref 2.5–4.5)
PHOSPHATE SERPL-MCNC: 3 MG/DL — SIGNIFICANT CHANGE UP (ref 2.5–4.5)
PLATELET # BLD AUTO: 365 K/UL — SIGNIFICANT CHANGE UP (ref 150–400)
PLATELET # BLD AUTO: 365 K/UL — SIGNIFICANT CHANGE UP (ref 150–400)
POTASSIUM SERPL-MCNC: 4.1 MMOL/L — SIGNIFICANT CHANGE UP (ref 3.5–5.3)
POTASSIUM SERPL-MCNC: 4.1 MMOL/L — SIGNIFICANT CHANGE UP (ref 3.5–5.3)
POTASSIUM SERPL-SCNC: 4.1 MMOL/L — SIGNIFICANT CHANGE UP (ref 3.5–5.3)
POTASSIUM SERPL-SCNC: 4.1 MMOL/L — SIGNIFICANT CHANGE UP (ref 3.5–5.3)
PROT SERPL-MCNC: 6 G/DL — SIGNIFICANT CHANGE UP (ref 6–8.3)
PROT SERPL-MCNC: 6 G/DL — SIGNIFICANT CHANGE UP (ref 6–8.3)
PROTHROM AB SERPL-ACNC: 12.6 SEC — SIGNIFICANT CHANGE UP (ref 9.5–13)
PROTHROM AB SERPL-ACNC: 12.6 SEC — SIGNIFICANT CHANGE UP (ref 9.5–13)
RBC # BLD: 2.97 M/UL — LOW (ref 4.2–5.8)
RBC # BLD: 2.97 M/UL — LOW (ref 4.2–5.8)
RBC # FLD: 18.7 % — HIGH (ref 10.3–14.5)
RBC # FLD: 18.7 % — HIGH (ref 10.3–14.5)
RH IG SCN BLD-IMP: POSITIVE — SIGNIFICANT CHANGE UP
RH IG SCN BLD-IMP: POSITIVE — SIGNIFICANT CHANGE UP
SODIUM SERPL-SCNC: 136 MMOL/L — SIGNIFICANT CHANGE UP (ref 135–145)
SODIUM SERPL-SCNC: 136 MMOL/L — SIGNIFICANT CHANGE UP (ref 135–145)
WBC # BLD: 11.4 K/UL — HIGH (ref 3.8–10.5)
WBC # BLD: 11.4 K/UL — HIGH (ref 3.8–10.5)
WBC # FLD AUTO: 11.4 K/UL — HIGH (ref 3.8–10.5)
WBC # FLD AUTO: 11.4 K/UL — HIGH (ref 3.8–10.5)

## 2023-11-24 PROCEDURE — 74177 CT ABD & PELVIS W/CONTRAST: CPT | Mod: 26

## 2023-11-24 PROCEDURE — 99233 SBSQ HOSP IP/OBS HIGH 50: CPT | Mod: GC

## 2023-11-24 PROCEDURE — 49405 IMAGE CATH FLUID COLXN VISC: CPT

## 2023-11-24 RX ORDER — ONDANSETRON 8 MG/1
4 TABLET, FILM COATED ORAL ONCE
Refills: 0 | Status: DISCONTINUED | OUTPATIENT
Start: 2023-11-24 | End: 2023-11-24

## 2023-11-24 RX ORDER — HEPARIN SODIUM 5000 [USP'U]/ML
2500 INJECTION INTRAVENOUS; SUBCUTANEOUS EVERY 6 HOURS
Refills: 0 | Status: DISCONTINUED | OUTPATIENT
Start: 2023-11-25 | End: 2023-11-27

## 2023-11-24 RX ORDER — SODIUM CHLORIDE 9 MG/ML
1000 INJECTION, SOLUTION INTRAVENOUS
Refills: 0 | Status: DISCONTINUED | OUTPATIENT
Start: 2023-11-24 | End: 2023-11-24

## 2023-11-24 RX ORDER — HEPARIN SODIUM 5000 [USP'U]/ML
5500 INJECTION INTRAVENOUS; SUBCUTANEOUS EVERY 6 HOURS
Refills: 0 | Status: DISCONTINUED | OUTPATIENT
Start: 2023-11-25 | End: 2023-11-27

## 2023-11-24 RX ORDER — OXYCODONE HYDROCHLORIDE 5 MG/1
5 TABLET ORAL ONCE
Refills: 0 | Status: DISCONTINUED | OUTPATIENT
Start: 2023-11-24 | End: 2023-11-24

## 2023-11-24 RX ORDER — HYDROMORPHONE HYDROCHLORIDE 2 MG/ML
0.5 INJECTION INTRAMUSCULAR; INTRAVENOUS; SUBCUTANEOUS
Refills: 0 | Status: DISCONTINUED | OUTPATIENT
Start: 2023-11-24 | End: 2023-11-24

## 2023-11-24 RX ORDER — HEPARIN SODIUM 5000 [USP'U]/ML
INJECTION INTRAVENOUS; SUBCUTANEOUS
Qty: 25000 | Refills: 0 | Status: DISCONTINUED | OUTPATIENT
Start: 2023-11-25 | End: 2023-11-27

## 2023-11-24 RX ADMIN — GABAPENTIN 300 MILLIGRAM(S): 400 CAPSULE ORAL at 05:46

## 2023-11-24 RX ADMIN — PANTOPRAZOLE SODIUM 40 MILLIGRAM(S): 20 TABLET, DELAYED RELEASE ORAL at 05:46

## 2023-11-24 RX ADMIN — PIPERACILLIN AND TAZOBACTAM 25 GRAM(S): 4; .5 INJECTION, POWDER, LYOPHILIZED, FOR SOLUTION INTRAVENOUS at 14:27

## 2023-11-24 RX ADMIN — GABAPENTIN 300 MILLIGRAM(S): 400 CAPSULE ORAL at 22:07

## 2023-11-24 RX ADMIN — HEPARIN SODIUM 1300 UNIT(S)/HR: 5000 INJECTION INTRAVENOUS; SUBCUTANEOUS at 03:00

## 2023-11-24 RX ADMIN — TAMSULOSIN HYDROCHLORIDE 0.4 MILLIGRAM(S): 0.4 CAPSULE ORAL at 22:07

## 2023-11-24 RX ADMIN — Medication 1 MILLIGRAM(S): at 14:28

## 2023-11-24 RX ADMIN — OXYCODONE HYDROCHLORIDE 5 MILLIGRAM(S): 5 TABLET ORAL at 23:23

## 2023-11-24 RX ADMIN — AMLODIPINE BESYLATE 5 MILLIGRAM(S): 2.5 TABLET ORAL at 05:46

## 2023-11-24 RX ADMIN — PIPERACILLIN AND TAZOBACTAM 25 GRAM(S): 4; .5 INJECTION, POWDER, LYOPHILIZED, FOR SOLUTION INTRAVENOUS at 22:08

## 2023-11-24 RX ADMIN — PIPERACILLIN AND TAZOBACTAM 25 GRAM(S): 4; .5 INJECTION, POWDER, LYOPHILIZED, FOR SOLUTION INTRAVENOUS at 05:45

## 2023-11-24 RX ADMIN — GABAPENTIN 300 MILLIGRAM(S): 400 CAPSULE ORAL at 14:28

## 2023-11-24 NOTE — PRE PROCEDURE NOTE - PRE PROCEDURE EVALUATION
Interventional Radiology Pre-Procedure Note    Diagnosis/Indication: Patient is a 68y old  Male who presents with a chief complaint of weakness, weight loss, liver abscess. No improvement on prolonged antibiotics. Plan for image guided drainage.       PAST MEDICAL & SURGICAL HISTORY:  HTN (hypertension)  on med x 3 yrs      BPH (benign prostatic hypertrophy)      Prostate cancer      H/O endoscopy      H/O colonoscopy  rectal bleed  no issues found      H/O prostate biopsy           Allergies: No Known Allergies      LABS:                        8.9    11.40 )-----------( 365      ( 24 Nov 2023 03:10 )             27.8     11-24    136  |  102  |  10  ----------------------------<  117<H>  4.1   |  22  |  1.36<H>    Ca    8.1<L>      24 Nov 2023 03:10  Phos  3.0     11-24  Mg     1.90     11-24    TPro  6.0  /  Alb  2.4<L>  /  TBili  0.8  /  DBili  0.5<H>  /  AST  17  /  ALT  18  /  AlkPhos  112  11-24    PT/INR - ( 24 Nov 2023 03:10 )   PT: 12.6 sec;   INR: 1.12 ratio         PTT - ( 24 Nov 2023 03:10 )  PTT:56.6 sec    Procedure/ risks/ benefits were explained, informed consent obtained from patient, verbalizes understanding. Risks including but not limited to bleeding, introducing infection, and pleural transgression discussed.

## 2023-11-24 NOTE — PROGRESS NOTE ADULT - PROBLEM SELECTOR PLAN 2
MRCP with hepatic abscess 7x6.7x6.9cm and 5x4.6x5.3, mild intrahepatic biliary duct dilation, increasing size  CEA elevated, CA 19-9 elevated, iron studies suggest anemia of chronic disease, LFTs worsening  Likely source is infection, patient recently traveled to [facility] and has dog at home, no Eosinophilia noted  Prior hx of eosinophilia with hepatic abscesses in 2018, pos strongyloides treated with Ivermectin, pos echinococcus ab, Latent TB hx untreated    Plan  - f/u IR drain Friday   - ERCP after abscess drainage with IR  - hold Aspirin 5 days prior to IR MRCP with hepatic abscess 7x6.7x6.9cm and 5x4.6x5.3, mild intrahepatic biliary duct dilation, increasing size  CEA elevated, CA 19-9 elevated, iron studies suggest anemia of chronic disease, LFTs worsening  Likely source is infection, patient recently traveled to [facility] and has dog at home, no Eosinophilia noted  Prior hx of eosinophilia with hepatic abscesses in 2018, pos strongyloides treated with Ivermectin, pos echinococcus ab, Latent TB hx untreated    Plan  - f/u IR drain today with IR  - hold heparin   - ERCP after abscess drainage with IR  - hold Aspirin 5 days prior to IR

## 2023-11-24 NOTE — PROGRESS NOTE ADULT - SUBJECTIVE AND OBJECTIVE BOX
***************************************************************  Antoine Martinez, PGY1  Internal Medicine   TEAMS Preferred  ***************************************************************    ALVARO CEJA  68y Male  MRN: [id number]  11-11-23 (13d)    Patient is a 68y old  Male who presents with a chief complaint of weakness, weight loss, liver abscess (21 Nov 2023 07:47)      SUBJECTIVE / OVERNIGHT EVENTS:   No acute overnight events.   Patient seen and examined at bedside this AM.  Denies any CP, SOB, N/V, constipation, diarrhea, abdominal pain, dysuria, fever, chills, or headaches.     12 Point ROS negative with the exception of the above    MEDICATIONS  (STANDING):  amLODIPine   Tablet 5 milliGRAM(s) Oral daily  chlorproMAZINE    Injectable 25 milliGRAM(s) IntraMuscular once  folic acid 1 milliGRAM(s) Oral daily  gabapentin 300 milliGRAM(s) Oral three times a day  heparin  Infusion.  Unit(s)/Hr (12 mL/Hr) IV Continuous <Continuous>  pantoprazole  Injectable 40 milliGRAM(s) IV Push two times a day  piperacillin/tazobactam IVPB.. 3.375 Gram(s) IV Intermittent every 8 hours  sodium chloride 0.9%. 1000 milliLiter(s) (50 mL/Hr) IV Continuous <Continuous>  tamsulosin 0.4 milliGRAM(s) Oral at bedtime    MEDICATIONS  (PRN):  baclofen 5 milliGRAM(s) Oral every 12 hours PRN hiccups  heparin   Injectable 5500 Unit(s) IV Push every 6 hours PRN For aPTT less than 40  heparin   Injectable 2500 Unit(s) IV Push every 6 hours PRN For aPTT between 40 - 57      OBJECTIVE:  Vital Signs Last 24 Hrs  T(C): 36.7 (24 Nov 2023 05:38), Max: 36.9 (23 Nov 2023 10:00)  T(F): 98.1 (24 Nov 2023 05:38), Max: 98.4 (23 Nov 2023 10:00)  HR: 95 (24 Nov 2023 05:38) (88 - 100)  BP: 124/75 (24 Nov 2023 05:38) (101/67 - 124/82)  BP(mean): --  RR: 17 (24 Nov 2023 05:38) (16 - 18)  SpO2: 97% (24 Nov 2023 05:38) (97% - 100%)    Parameters below as of 24 Nov 2023 05:38  Patient On (Oxygen Delivery Method): room air        I&O's Summary    22 Nov 2023 07:01  -  23 Nov 2023 07:00  --------------------------------------------------------  IN: 500 mL / OUT: 1090 mL / NET: -590 mL        PHYSICAL EXAM:  GENERAL: Laying comfortably, NAD  HEENT: NCAT, PERRLA, EOMI, no scleral icterus, no LAD  NECK: No JVD, supple  LUNG: CTABL; No wheezes, crackles, or rhonchi  HEART: RRR; normal S1/S2; No murmurs, rubs, or gallops  ABDOMEN: +BS, soft, nontender, nondistended, no HSM; No rebound, guarding, or rigidity  EXTREMITIES:  No LE edema b/l, 2+ Peripheral Pulses, No clubbing or cyanosis  NEUROLOGY: AOx3, non-focal, strength 5/5 in all extremities, sensation intact  PSYCH: calm and cooperative  SKIN: No rashes or lesions    LABS:                        8.9    11.40 )-----------( 365      ( 24 Nov 2023 03:10 )             27.8     Auto Eosinophil # 0.19  / Auto Eosinophil % 1.7   / Auto Neutrophil # 8.35  / Auto Neutrophil % 73.2  / BANDS % x                            9.2    11.09 )-----------( 349      ( 23 Nov 2023 07:42 )             27.5     Auto Eosinophil # 0.22  / Auto Eosinophil % 2.0   / Auto Neutrophil # 7.93  / Auto Neutrophil % 71.6  / BANDS % x                            8.5    10.46 )-----------( 327      ( 22 Nov 2023 23:46 )             25.5     Auto Eosinophil # 0.24  / Auto Eosinophil % 2.3   / Auto Neutrophil # 7.90  / Auto Neutrophil % 75.5  / BANDS % x        11-24    136  |  102  |  10  ----------------------------<  117<H>  4.1   |  22  |  1.36<H>  11-23    136  |  105  |  8   ----------------------------<  86  4.0   |  21<L>  |  1.17  11-22    138  |  107  |  10  ----------------------------<  94  4.2   |  23  |  1.12    Ca    8.1<L>      24 Nov 2023 03:10  Ca    8.5      23 Nov 2023 07:42  Ca    8.0<L>      22 Nov 2023 02:58  Phos  3.0     11-24  Mg     1.90     11-24    TPro  6.0  /  Alb  2.4<L>  /  TBili  0.8  /  DBili  0.5<H>  /  AST  17  /  ALT  18  /  AlkPhos  112  11-24  TPro  6.1  /  Alb  2.2<L>  /  TBili  0.9  /  DBili  x   /  AST  24  /  ALT  20  /  AlkPhos  115  11-23    PT/INR - ( 24 Nov 2023 03:10 )   PT: 12.6 sec;   INR: 1.12 ratio         PTT - ( 24 Nov 2023 03:10 )  PTT:56.6 sec      Urinalysis Basic - ( 24 Nov 2023 03:10 )    Color: x / Appearance: x / SG: x / pH: x  Gluc: 117 mg/dL / Ketone: x  / Bili: x / Urobili: x   Blood: x / Protein: x / Nitrite: x   Leuk Esterase: x / RBC: x / WBC x   Sq Epi: x / Non Sq Epi: x / Bacteria: x          CAPILLARY BLOOD GLUCOSE            RADIOLOGY & ADDITIONAL TESTS:     ***************************************************************  Antoine Martinez, PGY1  Internal Medicine   TEAMS Preferred  ***************************************************************    ALVARO CEJA  68y Male  MRN: [id number]  11-11-23 (13d)    Patient is a 68y old  Male who presents with a chief complaint of weakness, weight loss, liver abscess (21 Nov 2023 07:47)      SUBJECTIVE / OVERNIGHT EVENTS:   No acute overnight events.   Patient seen and examined at bedside this AM. Feels better. Hiccups resolved. Minor blood staining stools yesterday.  Denies any CP, SOB, N/V, constipation, diarrhea, abdominal pain, dysuria, fever, chills, or headaches.     12 Point ROS negative with the exception of the above    MEDICATIONS  (STANDING):  amLODIPine   Tablet 5 milliGRAM(s) Oral daily  chlorproMAZINE    Injectable 25 milliGRAM(s) IntraMuscular once  folic acid 1 milliGRAM(s) Oral daily  gabapentin 300 milliGRAM(s) Oral three times a day  heparin  Infusion.  Unit(s)/Hr (12 mL/Hr) IV Continuous <Continuous>  pantoprazole  Injectable 40 milliGRAM(s) IV Push two times a day  piperacillin/tazobactam IVPB.. 3.375 Gram(s) IV Intermittent every 8 hours  sodium chloride 0.9%. 1000 milliLiter(s) (50 mL/Hr) IV Continuous <Continuous>  tamsulosin 0.4 milliGRAM(s) Oral at bedtime    MEDICATIONS  (PRN):  baclofen 5 milliGRAM(s) Oral every 12 hours PRN hiccups  heparin   Injectable 5500 Unit(s) IV Push every 6 hours PRN For aPTT less than 40  heparin   Injectable 2500 Unit(s) IV Push every 6 hours PRN For aPTT between 40 - 57      OBJECTIVE:  Vital Signs Last 24 Hrs  T(C): 36.7 (24 Nov 2023 05:38), Max: 36.9 (23 Nov 2023 10:00)  T(F): 98.1 (24 Nov 2023 05:38), Max: 98.4 (23 Nov 2023 10:00)  HR: 95 (24 Nov 2023 05:38) (88 - 100)  BP: 124/75 (24 Nov 2023 05:38) (101/67 - 124/82)  BP(mean): --  RR: 17 (24 Nov 2023 05:38) (16 - 18)  SpO2: 97% (24 Nov 2023 05:38) (97% - 100%)    Parameters below as of 24 Nov 2023 05:38  Patient On (Oxygen Delivery Method): room air        I&O's Summary    22 Nov 2023 07:01  -  23 Nov 2023 07:00  --------------------------------------------------------  IN: 500 mL / OUT: 1090 mL / NET: -590 mL        PHYSICAL EXAM:  GENERAL: Laying comfortably, NAD  HEENT: NCAT, PERRLA, EOMI, no scleral icterus, no LAD  LUNG: CTABL; No wheezes, crackles, or rhonchi  HEART: RRR; normal S1/S2; No murmurs, rubs, or gallops  ABDOMEN: +BS, soft, nontender, nondistended, no HSM; No rebound, guarding, or rigidity  EXTREMITIES:  No LE edema b/l, 2+ Peripheral Pulses, No clubbing or cyanosis  NEUROLOGY: AOx3, non-focal, strength 5/5 in all extremities, sensation intact    LABS:                        8.9    11.40 )-----------( 365      ( 24 Nov 2023 03:10 )             27.8     Auto Eosinophil # 0.19  / Auto Eosinophil % 1.7   / Auto Neutrophil # 8.35  / Auto Neutrophil % 73.2  / BANDS % x                            9.2    11.09 )-----------( 349      ( 23 Nov 2023 07:42 )             27.5     Auto Eosinophil # 0.22  / Auto Eosinophil % 2.0   / Auto Neutrophil # 7.93  / Auto Neutrophil % 71.6  / BANDS % x                            8.5    10.46 )-----------( 327      ( 22 Nov 2023 23:46 )             25.5     Auto Eosinophil # 0.24  / Auto Eosinophil % 2.3   / Auto Neutrophil # 7.90  / Auto Neutrophil % 75.5  / BANDS % x        11-24    136  |  102  |  10  ----------------------------<  117<H>  4.1   |  22  |  1.36<H>  11-23    136  |  105  |  8   ----------------------------<  86  4.0   |  21<L>  |  1.17  11-22    138  |  107  |  10  ----------------------------<  94  4.2   |  23  |  1.12    Ca    8.1<L>      24 Nov 2023 03:10  Ca    8.5      23 Nov 2023 07:42  Ca    8.0<L>      22 Nov 2023 02:58  Phos  3.0     11-24  Mg     1.90     11-24    TPro  6.0  /  Alb  2.4<L>  /  TBili  0.8  /  DBili  0.5<H>  /  AST  17  /  ALT  18  /  AlkPhos  112  11-24  TPro  6.1  /  Alb  2.2<L>  /  TBili  0.9  /  DBili  x   /  AST  24  /  ALT  20  /  AlkPhos  115  11-23    PT/INR - ( 24 Nov 2023 03:10 )   PT: 12.6 sec;   INR: 1.12 ratio         PTT - ( 24 Nov 2023 03:10 )  PTT:56.6 sec      Urinalysis Basic - ( 24 Nov 2023 03:10 )    Color: x / Appearance: x / SG: x / pH: x  Gluc: 117 mg/dL / Ketone: x  / Bili: x / Urobili: x   Blood: x / Protein: x / Nitrite: x   Leuk Esterase: x / RBC: x / WBC x   Sq Epi: x / Non Sq Epi: x / Bacteria: x          CAPILLARY BLOOD GLUCOSE            RADIOLOGY & ADDITIONAL TESTS:     ***************************************************************  Antoine Martinez, PGY1  Internal Medicine   TEAMS Preferred  ***************************************************************    ALVARO CEJA  68y Male  MRN: [id number]  11-11-23 (13d)    Patient is a 68y old  Male who presents with a chief complaint of weakness, weight loss, liver abscess (21 Nov 2023 07:47)      SUBJECTIVE / OVERNIGHT EVENTS:   No acute overnight events.   Patient seen and examined at bedside this AM. Feels better. Hiccups resolved. Minor blood staining stools yesterday.  Denies any CP, SOB, N/V, constipation, diarrhea, abdominal pain, dysuria, fever, chills, or headaches.     12 Point ROS negative with the exception of the above    MEDICATIONS  (STANDING):  amLODIPine   Tablet 5 milliGRAM(s) Oral daily  chlorproMAZINE    Injectable 25 milliGRAM(s) IntraMuscular once  folic acid 1 milliGRAM(s) Oral daily  gabapentin 300 milliGRAM(s) Oral three times a day  heparin  Infusion.  Unit(s)/Hr (12 mL/Hr) IV Continuous <Continuous>  pantoprazole  Injectable 40 milliGRAM(s) IV Push two times a day  piperacillin/tazobactam IVPB.. 3.375 Gram(s) IV Intermittent every 8 hours  sodium chloride 0.9%. 1000 milliLiter(s) (50 mL/Hr) IV Continuous <Continuous>  tamsulosin 0.4 milliGRAM(s) Oral at bedtime    MEDICATIONS  (PRN):  baclofen 5 milliGRAM(s) Oral every 12 hours PRN hiccups  heparin   Injectable 5500 Unit(s) IV Push every 6 hours PRN For aPTT less than 40  heparin   Injectable 2500 Unit(s) IV Push every 6 hours PRN For aPTT between 40 - 57      OBJECTIVE:  Vital Signs Last 24 Hrs  T(C): 36.7 (24 Nov 2023 05:38), Max: 36.9 (23 Nov 2023 10:00)  T(F): 98.1 (24 Nov 2023 05:38), Max: 98.4 (23 Nov 2023 10:00)  HR: 95 (24 Nov 2023 05:38) (88 - 100)  BP: 124/75 (24 Nov 2023 05:38) (101/67 - 124/82)  BP(mean): --  RR: 17 (24 Nov 2023 05:38) (16 - 18)  SpO2: 97% (24 Nov 2023 05:38) (97% - 100%)    Parameters below as of 24 Nov 2023 05:38  Patient On (Oxygen Delivery Method): room air        I&O's Summary    22 Nov 2023 07:01  -  23 Nov 2023 07:00  --------------------------------------------------------  IN: 500 mL / OUT: 1090 mL / NET: -590 mL        PHYSICAL EXAM:  GENERAL: Laying comfortably, NAD  HEENT: NCAT, PERRLA, EOMI, no scleral icterus, no LAD  LUNG: CTABL; No wheezes, crackles, or rhonchi  HEART: RRR; normal S1/S2; No murmurs, rubs, or gallops  ABDOMEN: +BS, soft, nontender, nondistended, no HSM; No rebound, guarding, or rigidity  EXTREMITIES:  No LE edema b/l, 2+ Peripheral Pulses, No clubbing or cyanosis  NEUROLOGY: AOx3, non-focal, strength 5/5 in all extremities, sensation intact    LABS:                        8.9    11.40 )-----------( 365      ( 24 Nov 2023 03:10 )             27.8     Auto Eosinophil # 0.19  / Auto Eosinophil % 1.7   / Auto Neutrophil # 8.35  / Auto Neutrophil % 73.2  / BANDS % x                            9.2    11.09 )-----------( 349      ( 23 Nov 2023 07:42 )             27.5     Auto Eosinophil # 0.22  / Auto Eosinophil % 2.0   / Auto Neutrophil # 7.93  / Auto Neutrophil % 71.6  / BANDS % x                            8.5    10.46 )-----------( 327      ( 22 Nov 2023 23:46 )             25.5     Auto Eosinophil # 0.24  / Auto Eosinophil % 2.3   / Auto Neutrophil # 7.90  / Auto Neutrophil % 75.5  / BANDS % x        11-24    136  |  102  |  10  ----------------------------<  117<H>  4.1   |  22  |  1.36<H>  11-23    136  |  105  |  8   ----------------------------<  86  4.0   |  21<L>  |  1.17  11-22    138  |  107  |  10  ----------------------------<  94  4.2   |  23  |  1.12    Ca    8.1<L>      24 Nov 2023 03:10  Ca    8.5      23 Nov 2023 07:42  Ca    8.0<L>      22 Nov 2023 02:58  Phos  3.0     11-24  Mg     1.90     11-24    TPro  6.0  /  Alb  2.4<L>  /  TBili  0.8  /  DBili  0.5<H>  /  AST  17  /  ALT  18  /  AlkPhos  112  11-24  TPro  6.1  /  Alb  2.2<L>  /  TBili  0.9  /  DBili  x   /  AST  24  /  ALT  20  /  AlkPhos  115  11-23    PT/INR - ( 24 Nov 2023 03:10 )   PT: 12.6 sec;   INR: 1.12 ratio         PTT - ( 24 Nov 2023 03:10 )  PTT:56.6 sec      Urinalysis Basic - ( 24 Nov 2023 03:10 )    Color: x / Appearance: x / SG: x / pH: x  Gluc: 117 mg/dL / Ketone: x  / Bili: x / Urobili: x   Blood: x / Protein: x / Nitrite: x   Leuk Esterase: x / RBC: x / WBC x   Sq Epi: x / Non Sq Epi: x / Bacteria: x          CAPILLARY BLOOD GLUCOSE            RADIOLOGY & ADDITIONAL TESTS:  < from: CT Abdomen and Pelvis w/ IV Cont (11.24.23 @ 12:25) >  IMPRESSION:  Right and left hepatic abscesses, grossly similarin size compared to   prior 11/14/2023.    Redemonstrated expansile filling defects of the main, right and left   portal veins.    Trace bilateral pleural effusions, ascites, and diffuse subcutaneous   edema.        < end of copied text >

## 2023-11-24 NOTE — PROGRESS NOTE ADULT - ATTENDING COMMENTS
69 yo M PMH HTN, BPH, prostate cancer s/p prostatectomy 2012, diverticulosis presenting with 5 weeks of generalized weakness, 25 lb weight loss, and poor PO intake and subjective chills. + travel to [place] in 2021 returned in 2021. asymptomatic until 5 weeks ago. + treated for malaria by PCP. + hiccups + recent travel to [place]. febrile 101 11/13; RVP neg; 11/11 initial UA neg; repeat UA + w/o overt symptoms;  Ucx Neg. + portal vein thrombus. RRT 11/21 for BRBPR and hypotension. colonoscopy and EGD 11/22 with diverticulosis likely etiology of bleed      BRBPR-  - likely diverticular bleed            - s/p 3 uprbc             - endocsopy 2021 normal; colonoscopy 2021 polyps (tubular and infectious polyp), internal hemm and diverticulosis            - EGD/colonscopy 11/22 no evidence of GIB on EGD; colonoscopy notable for non bleeding hemmorhoid and diverticula             - goal H/H >7/21                Hepatic lesion- wt loss and decreased PO intake concern for malignancy/abscess                        - ID appreciated low suspicion for malaria.                       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 Fragments of benign liver parenchyma. 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 US doppler occlusive thrombus main and Lt portal veins with avascular thrombus. MRI as above RT and Lt and main portal vein thrombosis                          -  heparin drip on hold for procedure    CKD - poss poor PO intake baseline Cr 1.2-1.5                  - Cr rising in setting of GIB                  - currently at baseline     Anemia- no overt GIB; fe studies consistent with AOCD; s/p 1 uprbc (11/16) with appropriate rise.  3 uprbc 11/21 without overt evidence of GIB .

## 2023-11-24 NOTE — PROCEDURE NOTE - PLAN
- 2 hour recovery  - monitor for post procedure sepsis  - monitor for worsening right pleural fluid.  - pleural transgression of drain discussed with patient.

## 2023-11-24 NOTE — PROGRESS NOTE ADULT - PROBLEM SELECTOR PLAN 5
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  Apr Heme, no hypercoag workup needed, likely 2/2 to infection. Transition to DOAC on d/c    Plan  - restarted heparin   - continue to monitor CT c/f portal vein thrombus pending official read  Duplex US Abdomen occlusive thrombosis of the main and left portal veins with avascular thrombus.  Apr Heme, no hypercoag workup needed, likely 2/2 to infection. Transition to DOAC on d/c    Plan  - held heparin   - continue to monitor

## 2023-11-24 NOTE — PROGRESS NOTE ADULT - ASSESSMENT
68M PMH HTN, BPH, prostate cancer s/p prostatectomy 2012, hepatic abscess, Latent TB, positive echinococcus Ab diverticulosis presenting with 5 weeks of generalized weakness, 25 lb weight loss, and poor PO intake, recently treated for suspected malaria (unclear if confirmed with blood work), found to have evidence of new right hepatic lesion with suspected portal venous thrombosis c/f pyogenic abscess course c/b LGIB while on Heparin 68M PMH HTN, BPH, prostate cancer s/p prostatectomy 2012, hepatic abscess, Latent TB, diverticulosis presenting with 5 weeks of generalized weakness, 25 lb weight loss, and poor PO intake, recently treated for suspected malaria (unclear if confirmed with blood work), found to have evidence of new right hepatic lesion with suspected portal venous thrombosis c/f pyogenic abscess course c/b LGIB while on Heparin

## 2023-11-24 NOTE — PROGRESS NOTE ADULT - PROBLEM SELECTOR PLAN 1
Hgb dropped to 5.7 overnight with 1 episode bloody BM bright red  Hx of diverticulosis in the past with GIB   On IV heparin for thrombus, now discontinued  EGD unremarkable, Colonoscopy with pandiverticulosis c/f diverticular bleed as source, noted non-bleeding large internal hemorrhoids    Plan  >consult: GI recs  - Protonix 40 BID  - FLD, advance as tolerated  - restarted heparin per discussion w/ GI  - f/u CBC q8h  - maintain 2 large bore IVs  - active T&S    - transfuse goal >7 Hgb dropped to 5.7 overnight with 1 episode bloody BM bright red  Hx of diverticulosis in the past with GIB   On IV heparin for thrombus, now discontinued  EGD unremarkable, Colonoscopy with pandiverticulosis c/f diverticular bleed as source, noted non-bleeding large internal hemorrhoids    Plan  >consult: GI recs  - Protonix 40 BID  - FLD, advance as tolerated  - restarted heparin per discussion w/ GI  - f/u CBC q8h  - maintain 2 large bore IVs  - active T&S  - transfuse goal >7 Hgb dropped to 5.7 overnight with 1 episode bloody BM bright red  Hx of diverticulosis in the past with GIB   On IV heparin for thrombus, now discontinued  EGD unremarkable, Colonoscopy with pandiverticulosis c/f diverticular bleed as source, noted non-bleeding large internal hemorrhoids    Plan  >consult: GI recs  - Protonix 40 BID  - restarted heparin per discussion w/ GI  - f/u CBC q8h  - maintain 2 large bore IVs  - active T&S  - transfuse goal >7

## 2023-11-24 NOTE — PROCEDURE NOTE - PROCEDURE FINDINGS AND DETAILS
Hepatic abscess identified on CT. 10F drain placed. 60 cc of purulent fluid obtained. Sample sent for culture.

## 2023-11-25 LAB
ALBUMIN SERPL ELPH-MCNC: 2.4 G/DL — LOW (ref 3.3–5)
ALBUMIN SERPL ELPH-MCNC: 2.4 G/DL — LOW (ref 3.3–5)
ALP SERPL-CCNC: 104 U/L — SIGNIFICANT CHANGE UP (ref 40–120)
ALP SERPL-CCNC: 104 U/L — SIGNIFICANT CHANGE UP (ref 40–120)
ALT FLD-CCNC: 19 U/L — SIGNIFICANT CHANGE UP (ref 4–41)
ALT FLD-CCNC: 19 U/L — SIGNIFICANT CHANGE UP (ref 4–41)
ANION GAP SERPL CALC-SCNC: 9 MMOL/L — SIGNIFICANT CHANGE UP (ref 7–14)
ANION GAP SERPL CALC-SCNC: 9 MMOL/L — SIGNIFICANT CHANGE UP (ref 7–14)
AST SERPL-CCNC: 23 U/L — SIGNIFICANT CHANGE UP (ref 4–40)
AST SERPL-CCNC: 23 U/L — SIGNIFICANT CHANGE UP (ref 4–40)
BASOPHILS # BLD AUTO: 0.06 K/UL — SIGNIFICANT CHANGE UP (ref 0–0.2)
BASOPHILS # BLD AUTO: 0.06 K/UL — SIGNIFICANT CHANGE UP (ref 0–0.2)
BASOPHILS NFR BLD AUTO: 0.6 % — SIGNIFICANT CHANGE UP (ref 0–2)
BASOPHILS NFR BLD AUTO: 0.6 % — SIGNIFICANT CHANGE UP (ref 0–2)
BILIRUB SERPL-MCNC: 0.7 MG/DL — SIGNIFICANT CHANGE UP (ref 0.2–1.2)
BILIRUB SERPL-MCNC: 0.7 MG/DL — SIGNIFICANT CHANGE UP (ref 0.2–1.2)
BUN SERPL-MCNC: 10 MG/DL — SIGNIFICANT CHANGE UP (ref 7–23)
BUN SERPL-MCNC: 10 MG/DL — SIGNIFICANT CHANGE UP (ref 7–23)
CALCIUM SERPL-MCNC: 8.3 MG/DL — LOW (ref 8.4–10.5)
CALCIUM SERPL-MCNC: 8.3 MG/DL — LOW (ref 8.4–10.5)
CHLORIDE SERPL-SCNC: 103 MMOL/L — SIGNIFICANT CHANGE UP (ref 98–107)
CHLORIDE SERPL-SCNC: 103 MMOL/L — SIGNIFICANT CHANGE UP (ref 98–107)
CO2 SERPL-SCNC: 25 MMOL/L — SIGNIFICANT CHANGE UP (ref 22–31)
CO2 SERPL-SCNC: 25 MMOL/L — SIGNIFICANT CHANGE UP (ref 22–31)
CREAT SERPL-MCNC: 1.25 MG/DL — SIGNIFICANT CHANGE UP (ref 0.5–1.3)
CREAT SERPL-MCNC: 1.25 MG/DL — SIGNIFICANT CHANGE UP (ref 0.5–1.3)
EGFR: 62 ML/MIN/1.73M2 — SIGNIFICANT CHANGE UP
EGFR: 62 ML/MIN/1.73M2 — SIGNIFICANT CHANGE UP
EOSINOPHIL # BLD AUTO: 0.17 K/UL — SIGNIFICANT CHANGE UP (ref 0–0.5)
EOSINOPHIL # BLD AUTO: 0.17 K/UL — SIGNIFICANT CHANGE UP (ref 0–0.5)
EOSINOPHIL NFR BLD AUTO: 1.7 % — SIGNIFICANT CHANGE UP (ref 0–6)
EOSINOPHIL NFR BLD AUTO: 1.7 % — SIGNIFICANT CHANGE UP (ref 0–6)
GLUCOSE SERPL-MCNC: 109 MG/DL — HIGH (ref 70–99)
GLUCOSE SERPL-MCNC: 109 MG/DL — HIGH (ref 70–99)
HCT VFR BLD CALC: 26.1 % — LOW (ref 39–50)
HCT VFR BLD CALC: 26.1 % — LOW (ref 39–50)
HGB BLD-MCNC: 8.2 G/DL — LOW (ref 13–17)
HGB BLD-MCNC: 8.2 G/DL — LOW (ref 13–17)
IANC: 7.72 K/UL — HIGH (ref 1.8–7.4)
IANC: 7.72 K/UL — HIGH (ref 1.8–7.4)
IMM GRANULOCYTES NFR BLD AUTO: 0.4 % — SIGNIFICANT CHANGE UP (ref 0–0.9)
IMM GRANULOCYTES NFR BLD AUTO: 0.4 % — SIGNIFICANT CHANGE UP (ref 0–0.9)
LYMPHOCYTES # BLD AUTO: 1.55 K/UL — SIGNIFICANT CHANGE UP (ref 1–3.3)
LYMPHOCYTES # BLD AUTO: 1.55 K/UL — SIGNIFICANT CHANGE UP (ref 1–3.3)
LYMPHOCYTES # BLD AUTO: 15.3 % — SIGNIFICANT CHANGE UP (ref 13–44)
LYMPHOCYTES # BLD AUTO: 15.3 % — SIGNIFICANT CHANGE UP (ref 13–44)
MAGNESIUM SERPL-MCNC: 1.9 MG/DL — SIGNIFICANT CHANGE UP (ref 1.6–2.6)
MAGNESIUM SERPL-MCNC: 1.9 MG/DL — SIGNIFICANT CHANGE UP (ref 1.6–2.6)
MCHC RBC-ENTMCNC: 30.1 PG — SIGNIFICANT CHANGE UP (ref 27–34)
MCHC RBC-ENTMCNC: 30.1 PG — SIGNIFICANT CHANGE UP (ref 27–34)
MCHC RBC-ENTMCNC: 31.4 GM/DL — LOW (ref 32–36)
MCHC RBC-ENTMCNC: 31.4 GM/DL — LOW (ref 32–36)
MCV RBC AUTO: 96 FL — SIGNIFICANT CHANGE UP (ref 80–100)
MCV RBC AUTO: 96 FL — SIGNIFICANT CHANGE UP (ref 80–100)
MONOCYTES # BLD AUTO: 0.61 K/UL — SIGNIFICANT CHANGE UP (ref 0–0.9)
MONOCYTES # BLD AUTO: 0.61 K/UL — SIGNIFICANT CHANGE UP (ref 0–0.9)
MONOCYTES NFR BLD AUTO: 6 % — SIGNIFICANT CHANGE UP (ref 2–14)
MONOCYTES NFR BLD AUTO: 6 % — SIGNIFICANT CHANGE UP (ref 2–14)
NEUTROPHILS # BLD AUTO: 7.72 K/UL — HIGH (ref 1.8–7.4)
NEUTROPHILS # BLD AUTO: 7.72 K/UL — HIGH (ref 1.8–7.4)
NEUTROPHILS NFR BLD AUTO: 76 % — SIGNIFICANT CHANGE UP (ref 43–77)
NEUTROPHILS NFR BLD AUTO: 76 % — SIGNIFICANT CHANGE UP (ref 43–77)
NRBC # BLD: 0 /100 WBCS — SIGNIFICANT CHANGE UP (ref 0–0)
NRBC # BLD: 0 /100 WBCS — SIGNIFICANT CHANGE UP (ref 0–0)
NRBC # FLD: 0 K/UL — SIGNIFICANT CHANGE UP (ref 0–0)
NRBC # FLD: 0 K/UL — SIGNIFICANT CHANGE UP (ref 0–0)
PHOSPHATE SERPL-MCNC: 3.9 MG/DL — SIGNIFICANT CHANGE UP (ref 2.5–4.5)
PHOSPHATE SERPL-MCNC: 3.9 MG/DL — SIGNIFICANT CHANGE UP (ref 2.5–4.5)
PLATELET # BLD AUTO: 346 K/UL — SIGNIFICANT CHANGE UP (ref 150–400)
PLATELET # BLD AUTO: 346 K/UL — SIGNIFICANT CHANGE UP (ref 150–400)
POTASSIUM SERPL-MCNC: 4 MMOL/L — SIGNIFICANT CHANGE UP (ref 3.5–5.3)
POTASSIUM SERPL-MCNC: 4 MMOL/L — SIGNIFICANT CHANGE UP (ref 3.5–5.3)
POTASSIUM SERPL-SCNC: 4 MMOL/L — SIGNIFICANT CHANGE UP (ref 3.5–5.3)
POTASSIUM SERPL-SCNC: 4 MMOL/L — SIGNIFICANT CHANGE UP (ref 3.5–5.3)
PROT SERPL-MCNC: 5.9 G/DL — LOW (ref 6–8.3)
PROT SERPL-MCNC: 5.9 G/DL — LOW (ref 6–8.3)
RBC # BLD: 2.72 M/UL — LOW (ref 4.2–5.8)
RBC # BLD: 2.72 M/UL — LOW (ref 4.2–5.8)
RBC # FLD: 18.6 % — HIGH (ref 10.3–14.5)
RBC # FLD: 18.6 % — HIGH (ref 10.3–14.5)
SODIUM SERPL-SCNC: 137 MMOL/L — SIGNIFICANT CHANGE UP (ref 135–145)
SODIUM SERPL-SCNC: 137 MMOL/L — SIGNIFICANT CHANGE UP (ref 135–145)
WBC # BLD: 10.15 K/UL — SIGNIFICANT CHANGE UP (ref 3.8–10.5)
WBC # BLD: 10.15 K/UL — SIGNIFICANT CHANGE UP (ref 3.8–10.5)
WBC # FLD AUTO: 10.15 K/UL — SIGNIFICANT CHANGE UP (ref 3.8–10.5)
WBC # FLD AUTO: 10.15 K/UL — SIGNIFICANT CHANGE UP (ref 3.8–10.5)

## 2023-11-25 PROCEDURE — 74018 RADEX ABDOMEN 1 VIEW: CPT | Mod: 26

## 2023-11-25 PROCEDURE — 71045 X-RAY EXAM CHEST 1 VIEW: CPT | Mod: 26

## 2023-11-25 PROCEDURE — 99233 SBSQ HOSP IP/OBS HIGH 50: CPT | Mod: GC

## 2023-11-25 RX ORDER — LIDOCAINE 4 G/100G
1 CREAM TOPICAL EVERY 24 HOURS
Refills: 0 | Status: DISCONTINUED | OUTPATIENT
Start: 2023-11-25 | End: 2023-11-28

## 2023-11-25 RX ADMIN — GABAPENTIN 300 MILLIGRAM(S): 400 CAPSULE ORAL at 14:56

## 2023-11-25 RX ADMIN — HEPARIN SODIUM 1200 UNIT(S)/HR: 5000 INJECTION INTRAVENOUS; SUBCUTANEOUS at 18:46

## 2023-11-25 RX ADMIN — LIDOCAINE 1 PATCH: 4 CREAM TOPICAL at 14:56

## 2023-11-25 RX ADMIN — Medication 1 MILLIGRAM(S): at 12:46

## 2023-11-25 RX ADMIN — TAMSULOSIN HYDROCHLORIDE 0.4 MILLIGRAM(S): 0.4 CAPSULE ORAL at 22:43

## 2023-11-25 RX ADMIN — PANTOPRAZOLE SODIUM 40 MILLIGRAM(S): 20 TABLET, DELAYED RELEASE ORAL at 05:36

## 2023-11-25 RX ADMIN — LIDOCAINE 1 PATCH: 4 CREAM TOPICAL at 19:30

## 2023-11-25 RX ADMIN — GABAPENTIN 300 MILLIGRAM(S): 400 CAPSULE ORAL at 05:37

## 2023-11-25 RX ADMIN — GABAPENTIN 300 MILLIGRAM(S): 400 CAPSULE ORAL at 22:43

## 2023-11-25 RX ADMIN — HEPARIN SODIUM 1200 UNIT(S)/HR: 5000 INJECTION INTRAVENOUS; SUBCUTANEOUS at 19:18

## 2023-11-25 RX ADMIN — OXYCODONE HYDROCHLORIDE 5 MILLIGRAM(S): 5 TABLET ORAL at 00:00

## 2023-11-25 RX ADMIN — PIPERACILLIN AND TAZOBACTAM 25 GRAM(S): 4; .5 INJECTION, POWDER, LYOPHILIZED, FOR SOLUTION INTRAVENOUS at 05:37

## 2023-11-25 RX ADMIN — AMLODIPINE BESYLATE 5 MILLIGRAM(S): 2.5 TABLET ORAL at 05:37

## 2023-11-25 RX ADMIN — PIPERACILLIN AND TAZOBACTAM 25 GRAM(S): 4; .5 INJECTION, POWDER, LYOPHILIZED, FOR SOLUTION INTRAVENOUS at 14:56

## 2023-11-25 RX ADMIN — PANTOPRAZOLE SODIUM 40 MILLIGRAM(S): 20 TABLET, DELAYED RELEASE ORAL at 17:46

## 2023-11-25 RX ADMIN — PIPERACILLIN AND TAZOBACTAM 25 GRAM(S): 4; .5 INJECTION, POWDER, LYOPHILIZED, FOR SOLUTION INTRAVENOUS at 22:44

## 2023-11-25 NOTE — PROGRESS NOTE ADULT - PROBLEM SELECTOR PLAN 1
Hgb dropped to 5.7 overnight with 1 episode bloody BM bright red  Hx of diverticulosis in the past with GIB   On IV heparin for thrombus, now discontinued  EGD unremarkable, Colonoscopy with pandiverticulosis c/f diverticular bleed as source, noted non-bleeding large internal hemorrhoids    Plan  >consult: GI recs  - Protonix 40 BID  - restarted heparin per discussion w/ GI  - f/u CBC q8h  - maintain 2 large bore IVs  - active T&S  - transfuse goal >7 Hgb dropped to 5.7 overnight with 1 episode bloody BM bright red  Hx of diverticulosis in the past with GIB   On IV heparin for thrombus, now discontinued  EGD unremarkable, Colonoscopy with pandiverticulosis c/f diverticular bleed as source, noted non-bleeding large internal hemorrhoids    Plan  >consult: GI recs  - Protonix 40 BID  - f/u CBC q8h  - maintain 2 large bore IVs  - active T&S  - transfuse goal >7

## 2023-11-25 NOTE — PROGRESS NOTE ADULT - PROBLEM SELECTOR PLAN 5
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  Apr Heme, no hypercoag workup needed, likely 2/2 to infection. Transition to DOAC on d/c    Plan  - held heparin   - continue to monitor CT c/f portal vein thrombus pending official read  Duplex US Abdomen occlusive thrombosis of the main and left portal veins with avascular thrombus.  Apr Heme, no hypercoag workup needed, likely 2/2 to infection. Transition to DOAC on d/c    Plan  - heparin resume 11/25 PM  - continue to monitor

## 2023-11-25 NOTE — PROGRESS NOTE ADULT - ATTENDING COMMENTS
67 yo M PMH HTN, BPH, prostate cancer s/p prostatectomy 2012, diverticulosis presenting with 5 weeks of generalized weakness, 25 lb weight loss, and poor PO intake and subjective chills. + travel to [place] in 2021 returned in 2021. asymptomatic until 5 weeks ago. + treated for malaria by PCP. + hiccups + recent travel to [place]. febrile 101 11/13; RVP neg; 11/11 initial UA neg; repeat UA + w/o overt symptoms;  Ucx Neg. + portal vein thrombus. RRT 11/21 for BRBPR and hypotension. colonoscopy and EGD 11/22 with diverticulosis likely etiology of bleed      BRBPR-  - likely diverticular bleed            - s/p 3 uprbc             - endocsopy 2021 normal; colonoscopy 2021 polyps (tubular and infectious polyp), internal hemm and diverticulosis            - EGD/colonscopy 11/22 no evidence of GIB on EGD; colonoscopy notable for non bleeding hemmorhoid and diverticula             - goal H/H >7/21                Hepatic lesion- wt loss and decreased PO intake concern for malignancy/abscess                        - ID appreciated low suspicion for malaria.                       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 Fragments of benign liver parenchyma. 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 US doppler occlusive thrombus main and Lt portal veins with avascular thrombus. MRI as above RT and Lt and main portal vein thrombosis                          -  heparin drip on hold  till 6 PM today     CKD - poss poor PO intake baseline Cr 1.2-1.5                  - Cr rising in setting of GIB                  - currently at baseline     Anemia- no overt GIB; fe studies consistent with AOCD; s/p 1 uprbc (11/16) with appropriate rise.  3 uprbc 11/21 without overt evidence of GIB . 69 yo M PMH HTN, BPH, prostate cancer s/p prostatectomy 2012, diverticulosis presenting with 5 weeks of generalized weakness, 25 lb weight loss, and poor PO intake and subjective chills. + travel to [place] in 2021 returned in 2021. asymptomatic until 5 weeks ago. + treated for malaria by PCP. + hiccups + recent travel to [place]. febrile 101 11/13; RVP neg; 11/11 initial UA neg; repeat UA + w/o overt symptoms;  Ucx Neg. + portal vein thrombus. RRT 11/21 for BRBPR and hypotension. colonoscopy and EGD 11/22 with diverticulosis likely etiology of bleed    Hepatic lesion- wt loss and decreased PO intake concern for malignancy/abscess                        - ID appreciated low suspicion for malaria.                       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 Fragments of benign liver parenchyma. 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- s/p baclofen PRN; c/w gabapentin 300TID; hiccups improving; monitor Qtc; improved                        - c/w zosyn (11/13- )                        - GI with concern for poss abscess                        - IR drain;  cx NGTD                        - pain at site of billary drain; CXR/AXR f/u read f/u IR recs;                        - lidoderm patch        BRBPR-  - likely diverticular bleed            - s/p 3 uprbc             - endocsopy 2021 normal; colonoscopy 2021 polyps (tubular and infectious polyp), internal hemm and diverticulosis            - EGD/colonscopy 11/22 no evidence of GIB on EGD; colonoscopy notable for non bleeding hemmorhoid and diverticula             - goal H/H >7/21            - stable no further bleeding     Portal vein thrombus- CT expansile filling defect in the right portal, left portal and main hepatic veins, which may represent tumor thrombus versus bland thrombus. US doppler occlusive thrombus main and Lt portal veins with avascular thrombus. MRI as above RT and Lt and main portal vein thrombosis                          -  heparin drip on hold  till 6 PM today     CKD - poss poor PO intake baseline Cr 1.2-1.5                  - Cr rising in setting of GIB                  - currently at baseline     Anemia- no overt GIB; fe studies consistent with AOCD; s/p 1 uprbc (11/16) with appropriate rise.  3 uprbc 11/21 without overt evidence of GIB . 67 yo M PMH HTN, BPH, prostate cancer s/p prostatectomy 2012, diverticulosis presenting with 5 weeks of generalized weakness, 25 lb weight loss, and poor PO intake and subjective chills. + travel to [place] in 2021 returned in 2021. asymptomatic until 5 weeks ago. + treated for malaria by PCP. + hiccups + recent travel to [place]. febrile 101 11/13; RVP neg; 11/11 initial UA neg; repeat UA + w/o overt symptoms;  Ucx Neg. + portal vein thrombus. RRT 11/21 for BRBPR and hypotension. colonoscopy and EGD 11/22 with diverticulosis likely etiology of bleed. s/p IR drain hepatic abscess     Hepatic lesion- wt loss and decreased PO intake concern for malignancy/abscess                        - ID appreciated low suspicion for malaria.                       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 Fragments of benign liver parenchyma. 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- s/p baclofen; c/w gabapentin 300TID; hiccups improved                        - c/w zosyn (11/13- )                        - GI with concern for poss abscess                        - repeat 11/24 CT A/P with residual abscess                         - IR drain;  cx NGTD                        - pain at site of billary drain; CXR/AXR f/u read f/u IR recs;                        - lidoderm patch        BRBPR-  - likely diverticular bleed            - s/p 3 uprbc             - endocsopy 2021 normal; colonoscopy 2021 polyps (tubular and infectious polyp), internal hemm and diverticulosis            - EGD/colonscopy 11/22 no evidence of GIB on EGD; colonoscopy notable for non bleeding hemmorhoid and diverticula             - goal H/H >7/21            - stable no further bleeding     Portal vein thrombus- CT expansile filling defect in the right portal, left portal and main hepatic veins, which may represent tumor thrombus versus bland thrombus. US doppler occlusive thrombus main and Lt portal veins with avascular thrombus. MRI as above RT and Lt and main portal vein thrombosis                          -  heparin drip on hold  till 6 PM today     CKD - poss poor PO intake baseline Cr 1.2-1.5                  - Cr rising in setting of GIB                  - currently at baseline     Anemia- no overt GIB; fe studies consistent with AOCD; s/p 1 uprbc (11/16) with appropriate rise.  3 uprbc 11/21 without overt evidence of GIB .

## 2023-11-25 NOTE — PROGRESS NOTE ADULT - ASSESSMENT
68M PMH HTN, BPH, prostate cancer s/p prostatectomy 2012, hepatic abscess, Latent TB, diverticulosis presenting with 5 weeks of generalized weakness, 25 lb weight loss, and poor PO intake, recently treated for suspected malaria (unclear if confirmed with blood work), found to have evidence of new right hepatic lesion with suspected portal venous thrombosis c/f pyogenic abscess course c/b LGIB while on Heparin

## 2023-11-25 NOTE — PROGRESS NOTE ADULT - ASSESSMENT
Interventional Radiology Follow-Up Note    68M PMH PMH Latent TB, diverticulosis presenting with 5 weeks of generalized weakness, 25 lb weight loss, and poor PO intake, recently treated for suspected malaria (unclear if confirmed with blood work), found to have evidence of new right hepatic lesion with suspected portal venous thrombosis (heparin on hold for hx of LGIB) s/p CT guided hepatic abscess drainage yesterday with IR with concern for pleural transgression.     S: Patient seen and examined @ bedside. Patient complains of pain at site with movement.     Medication:   amLODIPine   Tablet: (11-25)  heparin  Infusion.: (11-23)  piperacillin/tazobactam IVPB..: (11-25)    Vitals:   T(F): 98, Max: 98.2 (01:00)  HR: 88  BP: 104/60  RR: 18  SpO2: 98%    Physical Exam:  General: Nontoxic, in NAD, A&O x3.  Abdomen: soft, NTND, no peritoneal signs.  Drain Device: Drain intact attached to gravity. Dressing clean, dry, intact.    LABS:  WBC 10.15 / Hgb 8.2 / Hct 26.1 / Plt 346  Na 137 / K 4.0 / CO2 25 / Cl 103 / BUN 10 / Cr 1.25 / Glucose 109  ALT 19 / AST 23 / Alk Phos 104 / Tbili 0.7    Assessment/Plan:  68y Male admitted with weakness found with hepatic abscess s/p drainage 11/24 with concern for pleural transgression.     -continue global management per primary team  -CT surgery consult for pleural transgression.   -trend vs/labs  -flush drain with 5cc NS daily forward only; DO NOT aspirate  -change dressing q3 days or when dressing is saturated  -regarding outpatient follow up with IR, if the patient is d/c home with drainage catheter they can make an appointment with IR by calling the IR booking office.

## 2023-11-25 NOTE — PROGRESS NOTE ADULT - PROBLEM SELECTOR PLAN 8
Anemia noted on admission, suggestive of AOCD  s/p 1u PRBCs, stable Hgb    Plan  - ctm Anemia noted on admission, suggestive of AOCD  s/p 3u PRBCs total during hospitalization, stable Hgb    Plan  - ctm

## 2023-11-25 NOTE — CONSULT NOTE ADULT - ATTENDING COMMENTS
68 y.o. after hepatic abscess drainage and possible transpleural catheter placement.  No thoracic surgical intervention is warranted at this time.

## 2023-11-25 NOTE — PROGRESS NOTE ADULT - PROBLEM SELECTOR PLAN 3
WBC 25K, spiked fever to 101.1  Previously treated for ? malaria, possibly 2/2 to malignancy vs malaria flare ( ID low suspicion) vs infection of unclear etiology  Prior hx of eosinophilic leukocytosis  Apr ID recs, less likely infectious more likely malignancy vs thrombus  May be 2/2 to hepatic abscess, no eosinophilia noted, improving, entamoeba Ab neg    Plan  - c/w Zosyn  - f/u BCx, UCx  - f/u Entamoeba Ab, Stool Ova and Parasites  - f/u IR drainage bacterial Cx, fungal, AFB Cx WBC 25K, spiked fever to 101.1  Previously treated for ? malaria, possibly 2/2 to malignancy vs malaria flare ( ID low suspicion) vs infection of unclear etiology  Prior hx of eosinophilic leukocytosis  Apr ID recs, less likely infectious more likely malignancy vs thrombus  May be 2/2 to hepatic abscess, no eosinophilia noted, improving, entamoeba Ab neg    Plan  - c/w Zosyn  - f/u BCx, UCx  - f/u Entamoeba Ab, Stool Ova and Parasites  - f/u IR drainage bacterial Cx, fungal, AFB Cx  - f/u abscess cultures and sensitivities for abx selection, will likely need PICC and 4-6 weeks abx

## 2023-11-25 NOTE — PROGRESS NOTE ADULT - SUBJECTIVE AND OBJECTIVE BOX
INTERVAL HPI/OVERNIGHT EVENTS:    SUBJECTIVE: Patient seen and examined at bedside.         OBJECTIVE:    VITAL SIGNS:  Vital Signs Last 24 Hrs  T(C): 36.3 (25 Nov 2023 05:07), Max: 36.8 (25 Nov 2023 01:00)  T(F): 97.4 (25 Nov 2023 05:07), Max: 98.2 (25 Nov 2023 01:00)  HR: 96 (25 Nov 2023 05:07) (85 - 96)  BP: 104/63 (25 Nov 2023 05:07) (100/64 - 126/64)  BP(mean): 79 (24 Nov 2023 23:00) (73 - 80)  ABP: --  ABP(mean): --  RR: 16 (25 Nov 2023 05:07) (11 - 17)  SpO2: 98% (25 Nov 2023 05:07) (96% - 99%)    O2 Parameters below as of 25 Nov 2023 05:07  Patient On (Oxygen Delivery Method): room air            Plateau pressure:   P/F ratio:     11-24 @ 07:01  -  11-25 @ 07:00  --------------------------------------------------------  IN: 365 mL / OUT: 1075 mL / NET: -710 mL      CAPILLARY BLOOD GLUCOSE        PHYSICAL EXAM:  GENERAL: Laying comfortably, NAD  HEENT: NCAT, PERRLA, EOMI, no scleral icterus, no LAD  LUNG: CTABL; No wheezes, crackles, or rhonchi  HEART: RRR; normal S1/S2; No murmurs, rubs, or gallops  ABDOMEN: +BS, soft, nontender, nondistended, no HSM; No rebound, guarding, or rigidity  EXTREMITIES:  No LE edema b/l, 2+ Peripheral Pulses, No clubbing or cyanosis  NEUROLOGY: AOx3, non-focal, strength 5/5 in all extremities, sensation intact      MEDICATIONS:  MEDICATIONS  (STANDING):  amLODIPine   Tablet 5 milliGRAM(s) Oral daily  chlorproMAZINE    Injectable 25 milliGRAM(s) IntraMuscular once  folic acid 1 milliGRAM(s) Oral daily  gabapentin 300 milliGRAM(s) Oral three times a day  heparin  Infusion.  Unit(s)/Hr (12 mL/Hr) IV Continuous <Continuous>  pantoprazole  Injectable 40 milliGRAM(s) IV Push two times a day  piperacillin/tazobactam IVPB.. 3.375 Gram(s) IV Intermittent every 8 hours  sodium chloride 0.9%. 1000 milliLiter(s) (50 mL/Hr) IV Continuous <Continuous>  tamsulosin 0.4 milliGRAM(s) Oral at bedtime    MEDICATIONS  (PRN):  baclofen 5 milliGRAM(s) Oral every 12 hours PRN hiccups  heparin   Injectable 2500 Unit(s) IV Push every 6 hours PRN For aPTT between 40 - 57  heparin   Injectable 5500 Unit(s) IV Push every 6 hours PRN For aPTT less than 40      LABS:                        8.9    11.40 )-----------( 365      ( 24 Nov 2023 03:10 )             27.8     11-24    136  |  102  |  10  ----------------------------<  117<H>  4.1   |  22  |  1.36<H>    Ca    8.1<L>      24 Nov 2023 03:10  Phos  3.0     11-24  Mg     1.90     11-24    TPro  6.0  /  Alb  2.4<L>  /  TBili  0.8  /  DBili  0.5<H>  /  AST  17  /  ALT  18  /  AlkPhos  112  11-24    PT/INR - ( 24 Nov 2023 03:10 )   PT: 12.6 sec;   INR: 1.12 ratio         PTT - ( 24 Nov 2023 03:10 )  PTT:56.6 sec  Urinalysis Basic - ( 24 Nov 2023 03:10 )    Color: x / Appearance: x / SG: x / pH: x  Gluc: 117 mg/dL / Ketone: x  / Bili: x / Urobili: x   Blood: x / Protein: x / Nitrite: x   Leuk Esterase: x / RBC: x / WBC x   Sq Epi: x / Non Sq Epi: x / Bacteria: x        RADIOLOGY & ADDITIONAL TESTS: Reviewed.     INTERVAL HPI/OVERNIGHT EVENTS:    SUBJECTIVE: Patient seen and examined at bedside. CHANTALE. Pt denied cp, sob in AM. Reports pleuritic like abdominal pain at the site of the drain, IR made aware      OBJECTIVE:    VITAL SIGNS:  Vital Signs Last 24 Hrs  T(C): 36.3 (25 Nov 2023 05:07), Max: 36.8 (25 Nov 2023 01:00)  T(F): 97.4 (25 Nov 2023 05:07), Max: 98.2 (25 Nov 2023 01:00)  HR: 96 (25 Nov 2023 05:07) (85 - 96)  BP: 104/63 (25 Nov 2023 05:07) (100/64 - 126/64)  BP(mean): 79 (24 Nov 2023 23:00) (73 - 80)  ABP: --  ABP(mean): --  RR: 16 (25 Nov 2023 05:07) (11 - 17)  SpO2: 98% (25 Nov 2023 05:07) (96% - 99%)    O2 Parameters below as of 25 Nov 2023 05:07  Patient On (Oxygen Delivery Method): room air            Plateau pressure:   P/F ratio:     11-24 @ 07:01  -  11-25 @ 07:00  --------------------------------------------------------  IN: 365 mL / OUT: 1075 mL / NET: -710 mL      CAPILLARY BLOOD GLUCOSE        PHYSICAL EXAM:  GENERAL: Laying comfortably, NAD  HEENT: NCAT, PERRLA, EOMI, no scleral icterus, no LAD  LUNG: CTABL; No wheezes, crackles, or rhonchi  HEART: RRR; normal S1/S2; No murmurs, rubs, or gallops  ABDOMEN: +BS, soft, nontender, nondistended, no HSM; No rebound, guarding, or rigidity  EXTREMITIES:  No LE edema b/l, 2+ Peripheral Pulses, No clubbing or cyanosis  NEUROLOGY: AOx3, non-focal, strength 5/5 in all extremities, sensation intact      MEDICATIONS:  MEDICATIONS  (STANDING):  amLODIPine   Tablet 5 milliGRAM(s) Oral daily  chlorproMAZINE    Injectable 25 milliGRAM(s) IntraMuscular once  folic acid 1 milliGRAM(s) Oral daily  gabapentin 300 milliGRAM(s) Oral three times a day  heparin  Infusion.  Unit(s)/Hr (12 mL/Hr) IV Continuous <Continuous>  pantoprazole  Injectable 40 milliGRAM(s) IV Push two times a day  piperacillin/tazobactam IVPB.. 3.375 Gram(s) IV Intermittent every 8 hours  sodium chloride 0.9%. 1000 milliLiter(s) (50 mL/Hr) IV Continuous <Continuous>  tamsulosin 0.4 milliGRAM(s) Oral at bedtime    MEDICATIONS  (PRN):  baclofen 5 milliGRAM(s) Oral every 12 hours PRN hiccups  heparin   Injectable 2500 Unit(s) IV Push every 6 hours PRN For aPTT between 40 - 57  heparin   Injectable 5500 Unit(s) IV Push every 6 hours PRN For aPTT less than 40      LABS:                        8.9    11.40 )-----------( 365      ( 24 Nov 2023 03:10 )             27.8     11-24    136  |  102  |  10  ----------------------------<  117<H>  4.1   |  22  |  1.36<H>    Ca    8.1<L>      24 Nov 2023 03:10  Phos  3.0     11-24  Mg     1.90     11-24    TPro  6.0  /  Alb  2.4<L>  /  TBili  0.8  /  DBili  0.5<H>  /  AST  17  /  ALT  18  /  AlkPhos  112  11-24    PT/INR - ( 24 Nov 2023 03:10 )   PT: 12.6 sec;   INR: 1.12 ratio         PTT - ( 24 Nov 2023 03:10 )  PTT:56.6 sec  Urinalysis Basic - ( 24 Nov 2023 03:10 )    Color: x / Appearance: x / SG: x / pH: x  Gluc: 117 mg/dL / Ketone: x  / Bili: x / Urobili: x   Blood: x / Protein: x / Nitrite: x   Leuk Esterase: x / RBC: x / WBC x   Sq Epi: x / Non Sq Epi: x / Bacteria: x        RADIOLOGY & ADDITIONAL TESTS: Reviewed.     INTERVAL HPI/OVERNIGHT EVENTS:    SUBJECTIVE: Patient seen and examined at bedside. CHANTALE. Pt denied cp, sob in AM. Reports pleuritic like abdominal pain at the site of the drain, IR made aware      OBJECTIVE:    VITAL SIGNS:  Vital Signs Last 24 Hrs  T(C): 36.3 (25 Nov 2023 05:07), Max: 36.8 (25 Nov 2023 01:00)  T(F): 97.4 (25 Nov 2023 05:07), Max: 98.2 (25 Nov 2023 01:00)  HR: 96 (25 Nov 2023 05:07) (85 - 96)  BP: 104/63 (25 Nov 2023 05:07) (100/64 - 126/64)  BP(mean): 79 (24 Nov 2023 23:00) (73 - 80)  ABP: --  ABP(mean): --  RR: 16 (25 Nov 2023 05:07) (11 - 17)  SpO2: 98% (25 Nov 2023 05:07) (96% - 99%)    O2 Parameters below as of 25 Nov 2023 05:07  Patient On (Oxygen Delivery Method): room air            Plateau pressure:   P/F ratio:     11-24 @ 07:01  -  11-25 @ 07:00  --------------------------------------------------------  IN: 365 mL / OUT: 1075 mL / NET: -710 mL      CAPILLARY BLOOD GLUCOSE        PHYSICAL EXAM:  GENERAL: Laying comfortably, NAD  HEENT: NCAT, PERRLA, EOMI, no scleral icterus, no LAD  LUNG: CTABL; No wheezes, crackles, or rhonchi  HEART: RRR; normal S1/S2; No murmurs, rubs, or gallops  ABDOMEN: +BS, soft, nontender, nondistended, no HSM; No rebound, guarding, or rigidity  EXTREMITIES:  No LE edema b/l, 2+ Peripheral Pulses, No clubbing or cyanosis  NEUROLOGY: AOx3, non-focal, strength 5/5 in all extremities, sensation intact      MEDICATIONS:  MEDICATIONS  (STANDING):  amLODIPine   Tablet 5 milliGRAM(s) Oral daily  chlorproMAZINE    Injectable 25 milliGRAM(s) IntraMuscular once  folic acid 1 milliGRAM(s) Oral daily  gabapentin 300 milliGRAM(s) Oral three times a day  heparin  Infusion.  Unit(s)/Hr (12 mL/Hr) IV Continuous <Continuous>  pantoprazole  Injectable 40 milliGRAM(s) IV Push two times a day  piperacillin/tazobactam IVPB.. 3.375 Gram(s) IV Intermittent every 8 hours  sodium chloride 0.9%. 1000 milliLiter(s) (50 mL/Hr) IV Continuous <Continuous>  tamsulosin 0.4 milliGRAM(s) Oral at bedtime    MEDICATIONS  (PRN):  baclofen 5 milliGRAM(s) Oral every 12 hours PRN hiccups  heparin   Injectable 2500 Unit(s) IV Push every 6 hours PRN For aPTT between 40 - 57  heparin   Injectable 5500 Unit(s) IV Push every 6 hours PRN For aPTT less than 40      LABS:                        8.9    11.40 )-----------( 365      ( 24 Nov 2023 03:10 )             27.8     11-24    136  |  102  |  10  ----------------------------<  117<H>  4.1   |  22  |  1.36<H>    Ca    8.1<L>      24 Nov 2023 03:10  Phos  3.0     11-24  Mg     1.90     11-24    TPro  6.0  /  Alb  2.4<L>  /  TBili  0.8  /  DBili  0.5<H>  /  AST  17  /  ALT  18  /  AlkPhos  112  11-24    PT/INR - ( 24 Nov 2023 03:10 )   PT: 12.6 sec;   INR: 1.12 ratio         PTT - ( 24 Nov 2023 03:10 )  PTT:56.6 sec  Urinalysis Basic - ( 24 Nov 2023 03:10 )    Color: x / Appearance: x / SG: x / pH: x  Gluc: 117 mg/dL / Ketone: x  / Bili: x / Urobili: x   Blood: x / Protein: x / Nitrite: x   Leuk Esterase: x / RBC: x / WBC x   Sq Epi: x / Non Sq Epi: x / Bacteria: x        RADIOLOGY & ADDITIONAL TESTS: Reviewed. < from: CT Abdomen and Pelvis w/ IV Cont (11.24.23 @ 12:25) >  IMPRESSION:  Right and left hepatic abscesses, grossly similarin size compared to   prior 11/14/2023.    Redemonstrated expansile filling defects of the main, right and left   portal veins.    Trace bilateral pleural effusions, ascites, and diffuse subcutaneous   edema.    < end of copied text >

## 2023-11-25 NOTE — CONSULT NOTE ADULT - CONSULT REASON
Portal Vein Thrombosis, Hypercoagulable Workup
hepatic abscess drainage
fever
liver abscess
Hematochezia
Pleural transgression during IR procedure

## 2023-11-25 NOTE — CONSULT NOTE ADULT - CONSULT REQUESTED DATE/TIME
25-Nov-2023 13:42
21-Nov-2023 07:47
15-Nov-2023 17:17
14-Nov-2023 11:19
15-Nov-2023 10:36
15-Nov-2023 13:01

## 2023-11-25 NOTE — PROGRESS NOTE ADULT - PROBLEM SELECTOR PLAN 2
MRCP with hepatic abscess 7x6.7x6.9cm and 5x4.6x5.3, mild intrahepatic biliary duct dilation, increasing size  CEA elevated, CA 19-9 elevated, iron studies suggest anemia of chronic disease, LFTs worsening  Likely source is infection, patient recently traveled to [facility], [place] and has dog at home, no Eosinophilia noted  Prior hx of eosinophilia with hepatic abscesses in 2018, pos strongyloides treated with Ivermectin, pos echinococcus ab, Latent TB hx untreated    Plan  - f/u IR drain today with IR  - hold heparin   - ERCP after abscess drainage with IR  - hold Aspirin 5 days prior to IR MRCP with hepatic abscess 7x6.7x6.9cm and 5x4.6x5.3, mild intrahepatic biliary duct dilation, increasing size  CEA elevated, CA 19-9 elevated, iron studies suggest anemia of chronic disease, LFTs worsening  Likely source is infection, patient recently traveled to [facility] and has dog at home, no Eosinophilia noted  Prior hx of eosinophilia with hepatic abscesses in 2018, pos strongyloides treated with Ivermectin, pos echinococcus ab, Latent TB hx untreated    Plan  - s/p IR drain 11/25 with IR  - per IR, restart hep gtt for PVT 11/25 PM  - ERCP after abscess drainage with IR  - hold Aspirin 5 days prior to IR  - IR consulted for pain around liver drain site, said will evaluate

## 2023-11-26 ENCOUNTER — TRANSCRIPTION ENCOUNTER (OUTPATIENT)
Age: 68
End: 2023-11-26

## 2023-11-26 LAB
ALBUMIN SERPL ELPH-MCNC: 2.4 G/DL — LOW (ref 3.3–5)
ALBUMIN SERPL ELPH-MCNC: 2.4 G/DL — LOW (ref 3.3–5)
ALP SERPL-CCNC: 102 U/L — SIGNIFICANT CHANGE UP (ref 40–120)
ALP SERPL-CCNC: 102 U/L — SIGNIFICANT CHANGE UP (ref 40–120)
ALT FLD-CCNC: 16 U/L — SIGNIFICANT CHANGE UP (ref 4–41)
ALT FLD-CCNC: 16 U/L — SIGNIFICANT CHANGE UP (ref 4–41)
ANION GAP SERPL CALC-SCNC: 9 MMOL/L — SIGNIFICANT CHANGE UP (ref 7–14)
ANION GAP SERPL CALC-SCNC: 9 MMOL/L — SIGNIFICANT CHANGE UP (ref 7–14)
APTT BLD: 65.3 SEC — HIGH (ref 24.5–35.6)
APTT BLD: 65.3 SEC — HIGH (ref 24.5–35.6)
APTT BLD: 93 SEC — HIGH (ref 24.5–35.6)
APTT BLD: 93 SEC — HIGH (ref 24.5–35.6)
AST SERPL-CCNC: 15 U/L — SIGNIFICANT CHANGE UP (ref 4–40)
AST SERPL-CCNC: 15 U/L — SIGNIFICANT CHANGE UP (ref 4–40)
BASOPHILS # BLD AUTO: 0.06 K/UL — SIGNIFICANT CHANGE UP (ref 0–0.2)
BASOPHILS # BLD AUTO: 0.06 K/UL — SIGNIFICANT CHANGE UP (ref 0–0.2)
BASOPHILS NFR BLD AUTO: 0.7 % — SIGNIFICANT CHANGE UP (ref 0–2)
BASOPHILS NFR BLD AUTO: 0.7 % — SIGNIFICANT CHANGE UP (ref 0–2)
BILIRUB SERPL-MCNC: 0.7 MG/DL — SIGNIFICANT CHANGE UP (ref 0.2–1.2)
BILIRUB SERPL-MCNC: 0.7 MG/DL — SIGNIFICANT CHANGE UP (ref 0.2–1.2)
BUN SERPL-MCNC: 8 MG/DL — SIGNIFICANT CHANGE UP (ref 7–23)
BUN SERPL-MCNC: 8 MG/DL — SIGNIFICANT CHANGE UP (ref 7–23)
CALCIUM SERPL-MCNC: 8.6 MG/DL — SIGNIFICANT CHANGE UP (ref 8.4–10.5)
CALCIUM SERPL-MCNC: 8.6 MG/DL — SIGNIFICANT CHANGE UP (ref 8.4–10.5)
CHLORIDE SERPL-SCNC: 104 MMOL/L — SIGNIFICANT CHANGE UP (ref 98–107)
CHLORIDE SERPL-SCNC: 104 MMOL/L — SIGNIFICANT CHANGE UP (ref 98–107)
CO2 SERPL-SCNC: 24 MMOL/L — SIGNIFICANT CHANGE UP (ref 22–31)
CO2 SERPL-SCNC: 24 MMOL/L — SIGNIFICANT CHANGE UP (ref 22–31)
CREAT SERPL-MCNC: 1.26 MG/DL — SIGNIFICANT CHANGE UP (ref 0.5–1.3)
CREAT SERPL-MCNC: 1.26 MG/DL — SIGNIFICANT CHANGE UP (ref 0.5–1.3)
EGFR: 62 ML/MIN/1.73M2 — SIGNIFICANT CHANGE UP
EGFR: 62 ML/MIN/1.73M2 — SIGNIFICANT CHANGE UP
EOSINOPHIL # BLD AUTO: 0.25 K/UL — SIGNIFICANT CHANGE UP (ref 0–0.5)
EOSINOPHIL # BLD AUTO: 0.25 K/UL — SIGNIFICANT CHANGE UP (ref 0–0.5)
EOSINOPHIL NFR BLD AUTO: 3 % — SIGNIFICANT CHANGE UP (ref 0–6)
EOSINOPHIL NFR BLD AUTO: 3 % — SIGNIFICANT CHANGE UP (ref 0–6)
GLUCOSE SERPL-MCNC: 91 MG/DL — SIGNIFICANT CHANGE UP (ref 70–99)
GLUCOSE SERPL-MCNC: 91 MG/DL — SIGNIFICANT CHANGE UP (ref 70–99)
HCT VFR BLD CALC: 26.3 % — LOW (ref 39–50)
HGB BLD-MCNC: 8.5 G/DL — LOW (ref 13–17)
IANC: 5.81 K/UL — SIGNIFICANT CHANGE UP (ref 1.8–7.4)
IANC: 5.81 K/UL — SIGNIFICANT CHANGE UP (ref 1.8–7.4)
IMM GRANULOCYTES NFR BLD AUTO: 0.4 % — SIGNIFICANT CHANGE UP (ref 0–0.9)
IMM GRANULOCYTES NFR BLD AUTO: 0.4 % — SIGNIFICANT CHANGE UP (ref 0–0.9)
LYMPHOCYTES # BLD AUTO: 1.69 K/UL — SIGNIFICANT CHANGE UP (ref 1–3.3)
LYMPHOCYTES # BLD AUTO: 1.69 K/UL — SIGNIFICANT CHANGE UP (ref 1–3.3)
LYMPHOCYTES # BLD AUTO: 20.4 % — SIGNIFICANT CHANGE UP (ref 13–44)
LYMPHOCYTES # BLD AUTO: 20.4 % — SIGNIFICANT CHANGE UP (ref 13–44)
MAGNESIUM SERPL-MCNC: 1.9 MG/DL — SIGNIFICANT CHANGE UP (ref 1.6–2.6)
MAGNESIUM SERPL-MCNC: 1.9 MG/DL — SIGNIFICANT CHANGE UP (ref 1.6–2.6)
MCHC RBC-ENTMCNC: 30 PG — SIGNIFICANT CHANGE UP (ref 27–34)
MCHC RBC-ENTMCNC: 30 PG — SIGNIFICANT CHANGE UP (ref 27–34)
MCHC RBC-ENTMCNC: 30.1 PG — SIGNIFICANT CHANGE UP (ref 27–34)
MCHC RBC-ENTMCNC: 30.1 PG — SIGNIFICANT CHANGE UP (ref 27–34)
MCHC RBC-ENTMCNC: 32.3 GM/DL — SIGNIFICANT CHANGE UP (ref 32–36)
MCV RBC AUTO: 92.9 FL — SIGNIFICANT CHANGE UP (ref 80–100)
MCV RBC AUTO: 92.9 FL — SIGNIFICANT CHANGE UP (ref 80–100)
MCV RBC AUTO: 93.3 FL — SIGNIFICANT CHANGE UP (ref 80–100)
MCV RBC AUTO: 93.3 FL — SIGNIFICANT CHANGE UP (ref 80–100)
MONOCYTES # BLD AUTO: 0.46 K/UL — SIGNIFICANT CHANGE UP (ref 0–0.9)
MONOCYTES # BLD AUTO: 0.46 K/UL — SIGNIFICANT CHANGE UP (ref 0–0.9)
MONOCYTES NFR BLD AUTO: 5.5 % — SIGNIFICANT CHANGE UP (ref 2–14)
MONOCYTES NFR BLD AUTO: 5.5 % — SIGNIFICANT CHANGE UP (ref 2–14)
NEUTROPHILS # BLD AUTO: 5.81 K/UL — SIGNIFICANT CHANGE UP (ref 1.8–7.4)
NEUTROPHILS # BLD AUTO: 5.81 K/UL — SIGNIFICANT CHANGE UP (ref 1.8–7.4)
NEUTROPHILS NFR BLD AUTO: 70 % — SIGNIFICANT CHANGE UP (ref 43–77)
NEUTROPHILS NFR BLD AUTO: 70 % — SIGNIFICANT CHANGE UP (ref 43–77)
NRBC # BLD: 0 /100 WBCS — SIGNIFICANT CHANGE UP (ref 0–0)
NRBC # FLD: 0 K/UL — SIGNIFICANT CHANGE UP (ref 0–0)
PHOSPHATE SERPL-MCNC: 3.3 MG/DL — SIGNIFICANT CHANGE UP (ref 2.5–4.5)
PHOSPHATE SERPL-MCNC: 3.3 MG/DL — SIGNIFICANT CHANGE UP (ref 2.5–4.5)
PLATELET # BLD AUTO: 357 K/UL — SIGNIFICANT CHANGE UP (ref 150–400)
PLATELET # BLD AUTO: 357 K/UL — SIGNIFICANT CHANGE UP (ref 150–400)
PLATELET # BLD AUTO: 358 K/UL — SIGNIFICANT CHANGE UP (ref 150–400)
PLATELET # BLD AUTO: 358 K/UL — SIGNIFICANT CHANGE UP (ref 150–400)
POTASSIUM SERPL-MCNC: 4.3 MMOL/L — SIGNIFICANT CHANGE UP (ref 3.5–5.3)
POTASSIUM SERPL-MCNC: 4.3 MMOL/L — SIGNIFICANT CHANGE UP (ref 3.5–5.3)
POTASSIUM SERPL-SCNC: 4.3 MMOL/L — SIGNIFICANT CHANGE UP (ref 3.5–5.3)
POTASSIUM SERPL-SCNC: 4.3 MMOL/L — SIGNIFICANT CHANGE UP (ref 3.5–5.3)
PROT SERPL-MCNC: 6.2 G/DL — SIGNIFICANT CHANGE UP (ref 6–8.3)
PROT SERPL-MCNC: 6.2 G/DL — SIGNIFICANT CHANGE UP (ref 6–8.3)
RBC # BLD: 2.82 M/UL — LOW (ref 4.2–5.8)
RBC # BLD: 2.82 M/UL — LOW (ref 4.2–5.8)
RBC # BLD: 2.83 M/UL — LOW (ref 4.2–5.8)
RBC # BLD: 2.83 M/UL — LOW (ref 4.2–5.8)
RBC # FLD: 17.9 % — HIGH (ref 10.3–14.5)
RBC # FLD: 17.9 % — HIGH (ref 10.3–14.5)
RBC # FLD: 18 % — HIGH (ref 10.3–14.5)
RBC # FLD: 18 % — HIGH (ref 10.3–14.5)
SODIUM SERPL-SCNC: 137 MMOL/L — SIGNIFICANT CHANGE UP (ref 135–145)
SODIUM SERPL-SCNC: 137 MMOL/L — SIGNIFICANT CHANGE UP (ref 135–145)
WBC # BLD: 8.3 K/UL — SIGNIFICANT CHANGE UP (ref 3.8–10.5)
WBC # BLD: 8.3 K/UL — SIGNIFICANT CHANGE UP (ref 3.8–10.5)
WBC # BLD: 9.22 K/UL — SIGNIFICANT CHANGE UP (ref 3.8–10.5)
WBC # BLD: 9.22 K/UL — SIGNIFICANT CHANGE UP (ref 3.8–10.5)
WBC # FLD AUTO: 8.3 K/UL — SIGNIFICANT CHANGE UP (ref 3.8–10.5)
WBC # FLD AUTO: 8.3 K/UL — SIGNIFICANT CHANGE UP (ref 3.8–10.5)
WBC # FLD AUTO: 9.22 K/UL — SIGNIFICANT CHANGE UP (ref 3.8–10.5)
WBC # FLD AUTO: 9.22 K/UL — SIGNIFICANT CHANGE UP (ref 3.8–10.5)

## 2023-11-26 PROCEDURE — 99233 SBSQ HOSP IP/OBS HIGH 50: CPT | Mod: GC

## 2023-11-26 PROCEDURE — 99223 1ST HOSP IP/OBS HIGH 75: CPT

## 2023-11-26 PROCEDURE — 71045 X-RAY EXAM CHEST 1 VIEW: CPT | Mod: 26

## 2023-11-26 RX ORDER — SENNA PLUS 8.6 MG/1
2 TABLET ORAL AT BEDTIME
Refills: 0 | Status: DISCONTINUED | OUTPATIENT
Start: 2023-11-26 | End: 2023-11-28

## 2023-11-26 RX ORDER — ACETAMINOPHEN 500 MG
650 TABLET ORAL EVERY 6 HOURS
Refills: 0 | Status: DISCONTINUED | OUTPATIENT
Start: 2023-11-26 | End: 2023-11-28

## 2023-11-26 RX ADMIN — GABAPENTIN 300 MILLIGRAM(S): 400 CAPSULE ORAL at 05:49

## 2023-11-26 RX ADMIN — HEPARIN SODIUM 1200 UNIT(S)/HR: 5000 INJECTION INTRAVENOUS; SUBCUTANEOUS at 08:32

## 2023-11-26 RX ADMIN — HEPARIN SODIUM 1200 UNIT(S)/HR: 5000 INJECTION INTRAVENOUS; SUBCUTANEOUS at 01:26

## 2023-11-26 RX ADMIN — GABAPENTIN 300 MILLIGRAM(S): 400 CAPSULE ORAL at 22:26

## 2023-11-26 RX ADMIN — Medication 650 MILLIGRAM(S): at 01:26

## 2023-11-26 RX ADMIN — HEPARIN SODIUM 1200 UNIT(S)/HR: 5000 INJECTION INTRAVENOUS; SUBCUTANEOUS at 07:10

## 2023-11-26 RX ADMIN — GABAPENTIN 300 MILLIGRAM(S): 400 CAPSULE ORAL at 13:09

## 2023-11-26 RX ADMIN — LIDOCAINE 1 PATCH: 4 CREAM TOPICAL at 03:00

## 2023-11-26 RX ADMIN — Medication 1 MILLIGRAM(S): at 13:09

## 2023-11-26 RX ADMIN — HEPARIN SODIUM 1200 UNIT(S)/HR: 5000 INJECTION INTRAVENOUS; SUBCUTANEOUS at 13:18

## 2023-11-26 RX ADMIN — LIDOCAINE 1 PATCH: 4 CREAM TOPICAL at 13:08

## 2023-11-26 RX ADMIN — Medication 650 MILLIGRAM(S): at 13:18

## 2023-11-26 RX ADMIN — PANTOPRAZOLE SODIUM 40 MILLIGRAM(S): 20 TABLET, DELAYED RELEASE ORAL at 05:50

## 2023-11-26 RX ADMIN — PIPERACILLIN AND TAZOBACTAM 25 GRAM(S): 4; .5 INJECTION, POWDER, LYOPHILIZED, FOR SOLUTION INTRAVENOUS at 05:49

## 2023-11-26 RX ADMIN — HEPARIN SODIUM 1200 UNIT(S)/HR: 5000 INJECTION INTRAVENOUS; SUBCUTANEOUS at 19:09

## 2023-11-26 RX ADMIN — PIPERACILLIN AND TAZOBACTAM 25 GRAM(S): 4; .5 INJECTION, POWDER, LYOPHILIZED, FOR SOLUTION INTRAVENOUS at 22:27

## 2023-11-26 RX ADMIN — AMLODIPINE BESYLATE 5 MILLIGRAM(S): 2.5 TABLET ORAL at 05:49

## 2023-11-26 RX ADMIN — Medication 650 MILLIGRAM(S): at 14:18

## 2023-11-26 RX ADMIN — PIPERACILLIN AND TAZOBACTAM 25 GRAM(S): 4; .5 INJECTION, POWDER, LYOPHILIZED, FOR SOLUTION INTRAVENOUS at 13:08

## 2023-11-26 RX ADMIN — PANTOPRAZOLE SODIUM 40 MILLIGRAM(S): 20 TABLET, DELAYED RELEASE ORAL at 17:44

## 2023-11-26 RX ADMIN — LIDOCAINE 1 PATCH: 4 CREAM TOPICAL at 19:34

## 2023-11-26 RX ADMIN — SENNA PLUS 2 TABLET(S): 8.6 TABLET ORAL at 22:26

## 2023-11-26 RX ADMIN — Medication 650 MILLIGRAM(S): at 02:26

## 2023-11-26 RX ADMIN — TAMSULOSIN HYDROCHLORIDE 0.4 MILLIGRAM(S): 0.4 CAPSULE ORAL at 22:26

## 2023-11-26 NOTE — PROGRESS NOTE ADULT - ATTENDING COMMENTS
67 yo M PMH HTN, BPH, prostate cancer s/p prostatectomy 2012, diverticulosis presenting with 5 weeks of generalized weakness, 25 lb weight loss, and poor PO intake and subjective chills. + travel to [place] in 2021 returned in 2021. asymptomatic until 5 weeks ago. + treated for malaria by PCP. + hiccups + recent travel to [place]. febrile 101 11/13; RVP neg; 11/11 initial UA neg; repeat UA + w/o overt symptoms;  Ucx Neg. + portal vein thrombus. RRT 11/21 for BRBPR and hypotension. colonoscopy and EGD 11/22 with diverticulosis likely etiology of bleed. s/p IR drain hepatic abscess     Hepatic lesion- wt loss and decreased PO intake concern for malignancy/abscess                        - ID appreciated low suspicion for malaria.                       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 Fragments of benign liver parenchyma. No fungal or mycobacteria neg for malignancy; 6/15/18 s/p Ivermectin x 2 days,                        - prior Hx latent TB quant gold indeterminate refused treatment                         - entameoba/stronglyiodies/ecchinococcus neg;                         -  hiccups poss from Diaphragmatic irration-  baclofen PRN; c/w gabapentin 300TID; hiccups improved                        - c/w zosyn (11/13- )                        - GI/ID with concern for poss abscess                        - repeat 11/24 CT A/P with residual abscess                         - IR drain 60 cc of purulent fluid obtained; drain in place;  cx NGTD                        - pain at site of billary drain; add lidoacine patch; IR concern for pleural transgression; Ct surgery consulted;                         - CXR/AXR - multiple air/stool filled loops. start senna                            BRBPR-  - likely diverticular bleed            - s/p 3 uprbc             - endocsopy 2021 normal; colonoscopy 2021 polyps (tubular and infectious polyp), internal hemm and diverticulosis            - EGD/colonscopy 11/22 no evidence of GIB on EGD; colonoscopy notable for non bleeding hemmorhoid and diverticula             - goal H/H >7/21            - stable no further bleeding     Portal vein thrombus- CT expansile filling defect in the right portal, left portal and main hepatic veins, which may represent tumor thrombus versus bland thrombus. US doppler occlusive thrombus main and Lt portal veins with avascular thrombus. MRI as above RT and Lt and main portal vein thrombosis                          -  heparin drip restarted     CKD - poss poor PO intake baseline Cr 1.2-1.5                  - Cr rising in setting of GIB                  - currently at baseline     Anemia- no overt GIB; fe studies consistent with AOCD; s/p 1 uprbc (11/16) with appropriate rise.  3 uprbc 11/21 without overt evidence of GIB .

## 2023-11-26 NOTE — PROGRESS NOTE ADULT - PROBLEM SELECTOR PLAN 8
Anemia noted on admission, suggestive of AOCD  s/p 3u PRBCs total during hospitalization, stable Hgb    Plan  - ctm

## 2023-11-26 NOTE — PROGRESS NOTE ADULT - TIME BILLING
Face to face encounter. Reviewed labs, vitals, imaging. Discussed plan of care with patient and daughters. Documentation in [place].
Time spent on review of vital signs, labs, prior documentation. Patient interviewed and examined during this encounter. Plan of care was discussed with patient, and resident team. Encounter documented.
Reviewed lab data, radiology results, consultants' recommendations, documentation in [place], discussed with family, ACP, interdisciplinary staff and/or intervention were performed.
Reviewed lab data, radiology results, consultants' recommendations, documentation in [place], discussed with family, ACP, interdisciplinary staff and/or intervention were performed.
Time spent on review of vital signs, labs, prior documentation. Patient interviewed and examined during this encounter. Plan of care was discussed with patient, and resident team. Encounter documented.
Reviewed lab data, radiology results, consultants' recommendations, documentation in [place], discussed with family, ACP, interdisciplinary staff and/or intervention were performed.
Time spent on review of vital signs, labs, prior documentation. Patient interviewed and examined during this encounter. Plan of care was discussed with patient, and resident team. Encounter documented.
Reviewed lab data, radiology results, consultants' recommendations, documentation in [place], discussed with family, ACP, interdisciplinary staff and/or intervention were performed.
Time spent on review of vital signs, labs, prior documentation. Patient interviewed and examined during this encounter. Plan of care was discussed with patient, and resident team. Encounter documented.
Reviewed lab data, radiology results, consultants' recommendations, documentation in [place], discussed with family, ACP, interdisciplinary staff and/or intervention were performed.
Time spent on review of vital signs, labs, prior documentation. Patient interviewed and examined during this encounter. Plan of care was discussed with patient, and resident team. Encounter documented.

## 2023-11-26 NOTE — PROGRESS NOTE ADULT - PROBLEM SELECTOR PLAN 3
WBC 25K, spiked fever to 101.1  Previously treated for ? malaria, possibly 2/2 to malignancy vs malaria flare ( ID low suspicion) vs infection of unclear etiology  Prior hx of eosinophilic leukocytosis  Apr ID recs, less likely infectious more likely malignancy vs thrombus  May be 2/2 to hepatic abscess, no eosinophilia noted, improving, entamoeba Ab neg    Plan  - c/w Zosyn  - f/u BCx, UCx  - f/u Entamoeba Ab, Stool Ova and Parasites  - f/u IR drainage bacterial Cx, fungal, AFB Cx  - f/u abscess cultures and sensitivities for abx selection, will likely need PICC and 4-6 weeks abx

## 2023-11-26 NOTE — DISCHARGE NOTE PROVIDER - NSFOLLOWUPCLINICS_GEN_ALL_ED_FT
[facility]  Gastroenterology  [address] [place] [address] [address]  [place] [address]  Phone: [phone]  Fax:

## 2023-11-26 NOTE — DISCHARGE NOTE PROVIDER - NSDCCPCAREPLAN_GEN_ALL_CORE_FT
PRINCIPAL DISCHARGE DIAGNOSIS  Diagnosis: Hepatic lesion  Assessment and Plan of Treatment: You were admitted to the hospital because you were feeling weak and losing weight and had imaging findings concerning for a liver lesion. Further imaging revealed a large abscess in your liver which is a walled off infection that required further management. As a result you were started on antibiotics and had labs sent to better evaluate the source of infection. Additionally you started to have hiccups which were likely related to your abscess pressing against your diaphragm which may cause irritaiton and hiccups. This was treated with medication and improved over time. In order to fully drain the abscess, the Interventional Radiologists were called who helped place a drain into the abscess and tried to obtain samples of the cause of infection. Subsequently, the infectious disease doctors felt you would benefit from prolonged antibiotics in order to prevent your infection from happening agian  TO DO  - Please make sure to take your antibiotcs AS PRESCRIBED  - Please make sure to follow up with your Infectious Disease doctor  - Please make sure to follow up with the Interventional Radiology doctors to assess your liver drain      SECONDARY DISCHARGE DIAGNOSES  Diagnosis: Portal vein thrombosis  Assessment and Plan of Treatment: While in the hospital you were noted to have a blood clot in your liver vessels. While at first it was unclear what the cause of this clot was, further testing suggested that the reason you had a blood clot was due to the liver infection. As a result you were started on a blood thinning medicaiton called Heparin which helped to break down your clot. Unfortunately, while on Heparin you had a bleed in your colon likely due to many diverticula that you have which are prone to bleeding especially if you eat a low fiber diet. Fortunately the bleeding stopped and you remained stable on the Heparin. On the day of discharge you were transitioned to _____ and were stable for discharge with close outpatient follow up.   TO DO  - Please make sure to take ____ AS PRESCRIBED as not doing do may lead to life threatening bleeding events  - Please make sure to monitor your bowel movements for signs of bleeding as you already had a bleeding episode on the blood thinner and are at risk for another bleed. If you do have another bleeding episode, please call your doctor right away and come to the emergency room immediatly    Diagnosis: GIB (gastrointestinal bleeding)  Assessment and Plan of Treatment: While in the hospital you were noted to have bleeding from your colon into your stool. The likely cause of this bleed is similar to why you had a bleed in the past which is due to many diverticula in your colon as well as that you were on a blood thinner which increased the risk of this happening. These diverticula are common outpouching of the colon with blood vessels that are prone to bleeding. One reason for developing these as well as bleeding complications is constipation. As a result, it is important that you eat a HIGH FIBER diet to avoid further bleeding events. Fortunately, you were stable after your innital bleeding episode and were monitored closely. On the day of discharge you were stable for discahrge with close outpatient monitoring  TO DO  - Please make sure to follow up with a Gastroenterologist within 1-2 weeks of discharge  - Please attempt to eat a HIGH fiber diet full of vegetables to prevent further bleeding episodes  - Please monitor your stools on ____ as you are at higher risk of having another bleeding episode     PRINCIPAL DISCHARGE DIAGNOSIS  Diagnosis: Hepatic lesion  Assessment and Plan of Treatment: You were admitted to the hospital because you were feeling weak and losing weight and had imaging findings concerning for a liver lesion. Further imaging revealed a large abscess in your liver, which is a walled off infection. As a result you were started on antibiotics and had labs sent to better evaluate the source of infection. Additionally you started to have hiccups which were likely related to your abscess pressing against your diaphragm. This was treated with medication and improved over time. In order to fully drain the abscess, the Interventional Radiologists were called who helped place a drain into the abscess and tried to obtain samples of the cause of infection. Subsequently, the infectious disease doctors felt you would benefit from prolonged antibiotics in order to prevent your infection from happening agian  TO DO  - Please make sure to take your antibiotcs AS PRESCRIBED  - Please make sure to follow up with your Infectious Disease doctor  - Please make sure to follow up with the Interventional Radiology doctors to assess your liver drain      SECONDARY DISCHARGE DIAGNOSES  Diagnosis: Portal vein thrombosis  Assessment and Plan of Treatment: While in the hospital you were noted to have a blood clot in your liver vessels. While at first it was unclear what the cause of this clot was, further testing suggested that the reason you had a blood clot was due to the liver infection. As a result you were started on a blood thinning medicaiton called Heparin which helped to break down your clot. Unfortunately, while on Heparin you had a bleed in your colon likely due to many diverticula that you have which are prone to bleeding especially if you eat a low fiber diet. Fortunately the bleeding stopped and you remained stable on the Heparin. Prior to discharge you were transitioned to eliquis and were stable for discharge with close outpatient follow up.   TO DO  - Please make sure to take eliquis as prescribed, as not doing do may lead to life threatening bleeding events  - Please make sure to monitor your bowel movements for signs of bleeding as you already had a bleeding episode on the blood thinner and are at risk for another bleed. If you do have another bleeding episode, please call your doctor right away and come to the emergency room immediatly    Diagnosis: GIB (gastrointestinal bleeding)  Assessment and Plan of Treatment: While in the hospital you were noted to have bleeding from your colon into your stool. The likely cause of this bleed is similar to why you had a bleed in the past which is due to many diverticula in your colon as well as that you were on a blood thinner which increased the risk of this happening. These diverticula are common outpouching of the colon with blood vessels that are prone to bleeding. One reason for developing these as well as bleeding complications is constipation. As a result, it is important that you eat a HIGH FIBER diet to avoid further bleeding events. Fortunately, you were stable after your innital bleeding episode and were monitored closely. On the day of discharge you were stable for discahrge with close outpatient monitoring  TO DO  - Please make sure to follow up with a Gastroenterologist within 1-2 weeks of discharge  - Please attempt to eat a HIGH fiber diet full of vegetables to prevent further bleeding episodes  - Please monitor your stools on eliquis as you are at higher risk of having another bleeding episode     PRINCIPAL DISCHARGE DIAGNOSIS  Diagnosis: Hepatic lesion  Assessment and Plan of Treatment: You were admitted to the hospital because you were feeling weak and losing weight and had imaging findings concerning for a liver lesion. Further imaging revealed a large abscess in your liver, which is a walled off infection. As a result you were started on antibiotics and had labs sent to better evaluate the source of infection. Additionally you started to have hiccups which were likely related to your abscess pressing against your diaphragm. This was treated with medication and improved over time. In order to fully drain the abscess, the Interventional Radiologists were called who helped place a drain into the abscess and tried to obtain samples of the cause of infection. Subsequently, the infectious disease doctors felt you would benefit from prolonged antibiotics in order to prevent your infection from happening agian  TO DO  - Please make sure to take your antibiotcs AS PRESCRIBED  - Please make sure to follow up with your Infectious Disease doctor on 12/21/23. You will need a repeat CAT scans which your doctors will arrange.  - Please make sure to follow up with the Interventional Radiology doctors to assess your liver drain  - Please follow-up with your primary care doctor to discuss your recent hospitalization.      SECONDARY DISCHARGE DIAGNOSES  Diagnosis: Portal vein thrombosis  Assessment and Plan of Treatment: While in the hospital you were noted to have a blood clot in your liver vessels. While at first it was unclear what the cause of this clot was, further testing suggested that the reason you had a blood clot was due to the liver infection. As a result you were started on a blood thinning medicaiton called Heparin which helped to break down your clot. Unfortunately, while on Heparin you had a bleed in your colon likely due to many diverticula that you have which are prone to bleeding especially if you eat a low fiber diet. Fortunately the bleeding stopped and you remained stable on the Heparin. Prior to discharge you were transitioned to eliquis and were stable for discharge with close outpatient follow up.   TO DO  - Please make sure to take eliquis as prescribed, as not doing do may lead to life threatening clotting events  - Please make sure to monitor your bowel movements for signs of bleeding as you already had a bleeding episode on the blood thinner and are at risk for another bleed. If you do have another bleeding episode, please call your doctor right away and come to the emergency room immediatly    Diagnosis: GIB (gastrointestinal bleeding)  Assessment and Plan of Treatment: While in the hospital you were noted to have bleeding from your colon into your stool. The likely cause of this bleed is similar to why you had a bleed in the past which is due to many diverticula in your colon as well as that you were on a blood thinner which increased the risk of this happening. These diverticula are common outpouching of the colon with blood vessels that are prone to bleeding. One reason for developing these as well as bleeding complications is constipation. As a result, it is important that you eat a HIGH FIBER diet to avoid further bleeding events. Fortunately, you were stable after your innital bleeding episode and were monitored closely. On the day of discharge you were stable for discahrge with close outpatient monitoring  TO DO  - Please make sure to follow up with a Gastroenterologist within 1-2 weeks of discharge  - Please attempt to eat a HIGH fiber diet full of vegetables to prevent further bleeding episodes  - Please monitor your stools on eliquis as you are at higher risk of having another bleeding episode     PRINCIPAL DISCHARGE DIAGNOSIS  Diagnosis: Hepatic lesion  Assessment and Plan of Treatment: You were admitted to the hospital because you were feeling weak and losing weight and had imaging findings concerning for a liver lesion. Further imaging revealed a large abscess in your liver, which is a walled off infection. As a result you were started on antibiotics and had labs sent to better evaluate the source of infection. Additionally you started to have hiccups which were likely related to your abscess pressing against your diaphragm. This was treated with medication and improved over time. In order to fully drain the abscess, the Interventional Radiologists were called who helped place a drain into the abscess and tried to obtain samples of the cause of infection. Subsequently, the infectious disease doctors felt you would benefit from prolonged antibiotics in order to prevent your infection from happening agian  TO DO  - Please make sure to take your antibiotcs AS PRESCRIBED  - Please make sure to follow up with your Infectious Disease doctor on 12/21/23. You will need a repeat CAT scans which your doctors will arrange.  - Please make sure to follow up with the Interventional Radiology ( [phone]) doctors to assess your liver drain  - Please follow-up with your primary care doctor to discuss your recent hospitalization.      SECONDARY DISCHARGE DIAGNOSES  Diagnosis: Portal vein thrombosis  Assessment and Plan of Treatment: While in the hospital you were noted to have a blood clot in your liver vessels. While at first it was unclear what the cause of this clot was, further testing suggested that the reason you had a blood clot was due to the liver infection. As a result you were started on a blood thinning medicaiton called Heparin which helped to break down your clot. Unfortunately, while on Heparin you had a bleed in your colon likely due to many diverticula that you have which are prone to bleeding especially if you eat a low fiber diet. Fortunately the bleeding stopped and you remained stable on the Heparin. Prior to discharge you were transitioned to eliquis and were stable for discharge with close outpatient follow up.   TO DO  - Please make sure to take eliquis as prescribed, as not doing do may lead to life threatening clotting events  - Please make sure to monitor your bowel movements for signs of bleeding as you already had a bleeding episode on the blood thinner and are at risk for another bleed. If you do have another bleeding episode, please call your doctor right away and come to the emergency room immediatly    Diagnosis: GIB (gastrointestinal bleeding)  Assessment and Plan of Treatment: While in the hospital you were noted to have bleeding from your colon into your stool. The likely cause of this bleed is similar to why you had a bleed in the past which is due to many diverticula in your colon as well as that you were on a blood thinner which increased the risk of this happening. These diverticula are common outpouching of the colon with blood vessels that are prone to bleeding. One reason for developing these as well as bleeding complications is constipation. As a result, it is important that you eat a HIGH FIBER diet to avoid further bleeding events. Fortunately, you were stable after your innital bleeding episode and were monitored closely. On the day of discharge you were stable for discahrge with close outpatient monitoring  TO DO  - Please make sure to follow up with a Gastroenterologist within 1-2 weeks of discharge  - Please attempt to eat a HIGH fiber diet full of vegetables to prevent further bleeding episodes  - Please monitor your stools on eliquis as you are at higher risk of having another bleeding episode

## 2023-11-26 NOTE — PROGRESS NOTE ADULT - PROBLEM SELECTOR PLAN 2
MRCP with hepatic abscess 7x6.7x6.9cm and 5x4.6x5.3, mild intrahepatic biliary duct dilation, increasing size  CEA elevated, CA 19-9 elevated, iron studies suggest anemia of chronic disease, LFTs worsening  Likely source is infection, patient recently traveled to [facility] and has dog at home, no Eosinophilia noted  Prior hx of eosinophilia with hepatic abscesses in 2018    Plan  - s/p IR drain 11/25 with IR  - per IR, restart hep gtt for PVT 11/25 PM  - ERCP after abscess drainage with IR  - hold Aspirin 5 days prior to IR  - IR consulted for pain around liver drain site, said will evaluate MRCP with hepatic abscess 7x6.7x6.9cm and 5x4.6x5.3, mild intrahepatic biliary duct dilation, increasing size  CEA elevated, CA 19-9 elevated, iron studies suggest anemia of chronic disease, LFTs worsening  Likely source is infection, patient recently traveled to [facility] and has dog at home, no Eosinophilia noted  Prior hx of eosinophilia with hepatic abscesses in 2018    Plan  - s/p IR drain 11/25 with IR  - f/u CXR for pleural transgression concern  - f/u CT Surg  - per IR, restart hep gtt for PVT 11/25 PM  - ERCP after abscess drainage with IR  - hold Aspirin 5 days prior to IR  - IR consulted for pain around liver drain site, said will evaluate MRCP with hepatic abscess 7x6.7x6.9cm and 5x4.6x5.3, mild intrahepatic biliary duct dilation, increasing size  CEA elevated, CA 19-9 elevated, iron studies suggest anemia of chronic disease, LFTs worsening  Likely source is infection, patient recently traveled to [facility] and has dog at home, no Eosinophilia noted  Prior hx of eosinophilia with hepatic abscesses in 2018    Plan  - s/p IR drain 11/25 with IR  - f/u CXR for pleural transgression concern  - f/u CT Surg  - per IR, restart hep gtt for PVT 11/25 PM  - hold Aspirin 5 days prior to IR  - IR consulted for pain around liver drain site, said will evaluate, will try lidocain patches

## 2023-11-26 NOTE — DISCHARGE NOTE PROVIDER - NSDCMRMEDTOKEN_GEN_ALL_CORE_FT
amLODIPine 5 mg oral tablet: 1 tab(s) orally once a day  aspirin 81 mg oral delayed release tablet: 1 tab(s) orally once a day  Flomax 0.4 mg oral capsule: 1 cap(s) orally once a day  folic acid 1 mg oral tablet: 1 tab(s) orally once a day  pantoprazole 40 mg oral delayed release tablet: 1 tab(s) orally once a day (before a meal)   amLODIPine 5 mg oral tablet: 1 tab(s) orally once a day  amoxicillin-clavulanate 875 mg-125 mg oral tablet: 1 tab(s) orally 2 times a day  aspirin 81 mg oral delayed release tablet: 1 tab(s) orally once a day  ciprofloxacin 500 mg oral tablet: 1 tab(s) orally 2 times a day  Eliquis 5 mg oral tablet: 1 tab(s) orally 2 times a day  Flomax 0.4 mg oral capsule: 1 cap(s) orally once a day  folic acid 1 mg oral tablet: 1 tab(s) orally once a day  pantoprazole 40 mg oral delayed release tablet: 1 tab(s) orally once a day (before a meal)   amLODIPine 5 mg oral tablet: 1 tab(s) orally once a day  amoxicillin-clavulanate 875 mg-125 mg oral tablet: 1 tab(s) orally 2 times a day  apixaban 5 mg oral tablet: 1 tab(s) orally 2 times a day  aspirin 81 mg oral delayed release tablet: 1 tab(s) orally once a day  ciprofloxacin 500 mg oral tablet: 1 tab(s) orally 2 times a day  Flomax 0.4 mg oral capsule: 1 cap(s) orally once a day  folic acid 1 mg oral tablet: 1 tab(s) orally once a day  gabapentin 300 mg oral capsule: 1 cap(s) orally 3 times a day  pantoprazole 40 mg oral delayed release tablet: 1 tab(s) orally once a day (before a meal)   amLODIPine 5 mg oral tablet: 1 tab(s) orally once a day  amoxicillin-clavulanate 875 mg-125 mg oral tablet: 1 tab(s) orally 2 times a day  apixaban 5 mg oral tablet: 1 tab(s) orally 2 times a day  apixaban 5 mg oral tablet: 1 tab(s) orally 2 times a day  aspirin 81 mg oral delayed release tablet: 1 tab(s) orally once a day  ciprofloxacin 500 mg oral tablet: 1 tab(s) orally 2 times a day  Flomax 0.4 mg oral capsule: 1 cap(s) orally once a day  folic acid 1 mg oral tablet: 1 tab(s) orally once a day  pantoprazole 40 mg oral delayed release tablet: 1 tab(s) orally once a day (before a meal)

## 2023-11-26 NOTE — DISCHARGE NOTE PROVIDER - HOSPITAL COURSE
HPI:  68M PMH HTN, BPH, prostate cancer s/p prostatectomy 2012, diverticulosis presenting with 5 weeks of generalized weakness, 25 lb weight loss, and poor PO intake. Per pt, started having cold sensation as well as shivers 5 weeks ago after which his PMD trailed ibuprofen and abx for possible UTI. Patients symptoms persisted and PMD felt presentation was consistent with malaria (traveled to [facility] 1 year ago, unclear if malaria blood work was performed) after which pt was treated with mefloquine 5 tablets PO 5 days ago. Patient endorsed that his shivers and chills resolved but he continued to have weight loss and poor PO intake. Tried to eat yesterday and then had an episode of vomiting. Has been taking in liquids without difficulty. Also c/o generalized weakness- has to stop walking every few blocks secondary to weakness. Saw his PMD yesterday and was told he had "sepsis" which concerned the daughter. Denies any fever, chills, headache, visual changes, sore throat, cough, runny nose, chest pain, abdominal pain, n/v/d.    In ED patient was afebrile and hemodynamically stable. Labs were significant for leukocytosis to 25 with elevated Tbili and LFTs. Blood parasite screen was negative. CT imaging was obtained which showed a new indeterminate right hepatic lesion concerning for malignancy as well as an expansile filling defect in the right portal, left portal and main hepatic veins, Patient was given IVF and admitted to medicine for further workup, (11 Nov 2023 16:44)    Hospital Course:      Important Medication Changes and Reason:    Active or Pending Issues Requiring Follow-up:    Advanced Directives:   [ ] Full code  [ ] DNR  [ ] Hospice    Discharge Diagnoses:         HPI:  68M PMH HTN, BPH, prostate cancer s/p prostatectomy 2012, diverticulosis presenting with 5 weeks of generalized weakness, 25 lb weight loss, and poor PO intake. Per pt, started having cold sensation as well as shivers 5 weeks ago after which his PMD trailed ibuprofen and abx for possible UTI. Patients symptoms persisted and PMD felt presentation was consistent with malaria (traveled to [facility] 1 year ago, unclear if malaria blood work was performed) after which pt was treated with mefloquine 5 tablets PO 5 days ago. Patient endorsed that his shivers and chills resolved but he continued to have weight loss and poor PO intake. Tried to eat yesterday and then had an episode of vomiting. Has been taking in liquids without difficulty. Also c/o generalized weakness- has to stop walking every few blocks secondary to weakness. Saw his PMD yesterday and was told he had "sepsis" which concerned the daughter. Denies any fever, chills, headache, visual changes, sore throat, cough, runny nose, chest pain, abdominal pain, n/v/d.    In ED patient was afebrile and hemodynamically stable. Labs were significant for leukocytosis to 25 with elevated Tbili and LFTs. Blood parasite screen was negative. CT imaging was obtained which showed a new indeterminate right hepatic lesion concerning for malignancy as well as an expansile filling defect in the right portal, left portal and main hepatic veins, Patient was given IVF and admitted to medicine for further workup, (11 Nov 2023 16:44)    Hospital Course:  While in the hospital patient was started on IV heparin for management of his portal vein thrombus. Subsequently, patient underwent MRCP imaging to better characterize his liver after which patient was noted to have large growing abscess's. On further review of patients records, as seen by Dr. Huerta in 2018, h/o positive QuantiFeron Gold, declined treatment for latent TB, had eosinophilia, Strongyloides AB+, treated with Ivermectin. Also history of positive Echinococcus AB, however not seen in our system. Testing in our system from 2018 for echinococcus IgG was negative. Patient was evaluated by GI who felt patient would benefit from IR guided drainage of the abscesses followed by possible ERCP. ID was consulted who felt presentation resembled a pyogenic abscess although still recommended echinococcus and entamaoeba work up which turned out negative. Patient underwent hepatic abscess drainage with IR after which ____. Of note, patient also had Hiccups that were likely related to diaphragmatic irritation and improved with baclofen and gabapentin. Additionally, patients course was c/b a LGIB episode which on colonoscopy was presumed to be 2/2 to diverticular bleed. Patient remained stable on Heparin and was monitored for further signs of bleeding. On the day of discharge patient was stable for discharge with close outpatient followup.     Important Medication Changes and Reason:  - Patient will require ____ via PICC line     Active or Pending Issues Requiring Follow-up:  [ ] follow up with ID  [ ] follow up with GI regarding GIB and hepatic abscess  [ ] follow up with PCP for management of anticoagulation  [ ] follow up with IR    Advanced Directives:   [x] Full code  [ ] DNR  [ ] Hospice    Discharge Diagnoses:  Hepatic abscess, Portal Vein Thrombus, GIB       HPI:  68M PMH HTN, BPH, prostate cancer s/p prostatectomy 2012, diverticulosis presenting with 5 weeks of generalized weakness, 25 lb weight loss, and poor PO intake. Per pt, started having cold sensation as well as shivers 5 weeks ago after which his PMD trailed ibuprofen and abx for possible UTI. Patients symptoms persisted and PMD felt presentation was consistent with malaria (traveled to [facility] 1 year ago, unclear if malaria blood work was performed) after which pt was treated with mefloquine 5 tablets PO 5 days ago. Patient endorsed that his shivers and chills resolved but he continued to have weight loss and poor PO intake. Tried to eat yesterday and then had an episode of vomiting. Has been taking in liquids without difficulty. Also c/o generalized weakness- has to stop walking every few blocks secondary to weakness. Saw his PMD yesterday and was told he had "sepsis" which concerned the daughter. Denies any fever, chills, headache, visual changes, sore throat, cough, runny nose, chest pain, abdominal pain, n/v/d.    In ED patient was afebrile and hemodynamically stable. Labs were significant for leukocytosis to 25 with elevated Tbili and LFTs. Blood parasite screen was negative. CT imaging was obtained which showed a new indeterminate right hepatic lesion concerning for malignancy as well as an expansile filling defect in the right portal, left portal and main hepatic veins, Patient was given IVF and admitted to medicine for further workup, (11 Nov 2023 16:44)    Hospital Course:  While in the hospital patient was started on IV heparin for management of his portal vein thrombus. Subsequently, patient underwent MRCP imaging to better characterize his liver after which patient was noted to have large growing abscess's. On further review of patients records, as seen by Dr. Huerta in 2018, h/o positive QuantiFeron Gold, declined treatment for latent TB, had eosinophilia, Strongyloides AB+, treated with Ivermectin. Also history of positive Echinococcus AB, however not seen in our system. Testing in our system from 2018 for echinococcus IgG was negative. Patient was evaluated by GI who felt patient would benefit from IR guided drainage of the abscesses followed by possible ERCP. ID was consulted who felt presentation resembled a pyogenic abscess although still recommended echinococcus and entamaoeba work up which turned out negative. Patient underwent hepatic abscess drainage with IR with drain insertion. Of note, patient also had Hiccups that were likely related to diaphragmatic irritation and improved with baclofen and gabapentin. Additionally, patients course was c/b a LGIB episode which on colonoscopy was presumed to be 2/2 to diverticular bleed. Patient remained stable on Heparin and was monitored for further signs of bleeding. Transitioned to eliquis prior to discharge (price check $10). On the day of discharge patient was stable for discharge with close outpatient followup.    Important Medication Changes and Reason:  - Cipro 500 BID + Augmentin 875/125 BID for 30 days.    Active or Pending Issues Requiring Follow-up:  [ ] follow up with ID  [ ] follow up with GI regarding GIB and hepatic abscess  [ ] follow up with PCP for management of anticoagulation  [ ] follow up with IR for tube check after drainage <10cc/day  [ ] Repeat CT after drainage <10cc/day    Advanced Directives:   [x] Full code  [ ] DNR  [ ] Hospice    Discharge Diagnoses:  Hepatic abscess, Portal Vein Thrombus, GIB HPI:  68M PMH HTN, BPH, prostate cancer s/p prostatectomy 2012, diverticulosis presenting with 5 weeks of generalized weakness, 25 lb weight loss, and poor PO intake. Per pt, started having cold sensation as well as shivers 5 weeks ago after which his PMD trailed ibuprofen and abx for possible UTI. Patients symptoms persisted and PMD felt presentation was consistent with malaria (traveled to [facility] 1 year ago, unclear if malaria blood work was performed) after which pt was treated with mefloquine 5 tablets PO 5 days ago. Patient endorsed that his shivers and chills resolved but he continued to have weight loss and poor PO intake. Tried to eat yesterday and then had an episode of vomiting. Has been taking in liquids without difficulty. Also c/o generalized weakness- has to stop walking every few blocks secondary to weakness. Saw his PMD yesterday and was told he had "sepsis" which concerned the daughter. Denies any fever, chills, headache, visual changes, sore throat, cough, runny nose, chest pain, abdominal pain, n/v/d.    In ED patient was afebrile and hemodynamically stable. Labs were significant for leukocytosis to 25 with elevated Tbili and LFTs. Blood parasite screen was negative. CT imaging was obtained which showed a new indeterminate right hepatic lesion concerning for malignancy as well as an expansile filling defect in the right portal, left portal and main hepatic veins, Patient was given IVF and admitted to medicine for further workup, (11 Nov 2023 16:44)    Hospital Course:  While in the hospital patient was started on IV heparin for management of his portal vein thrombus. Subsequently, patient underwent MRCP imaging to better characterize his liver after which patient was noted to have large growing abscess's. On further review of patients records, as seen by Dr. Huerta in 2018, h/o positive QuantiFeron Gold, declined treatment for latent TB, had eosinophilia, Strongyloides AB+, treated with Ivermectin. Also history of positive Echinococcus AB, however not seen in our system. Testing in our system from 2018 for echinococcus IgG was negative. Patient was evaluated by GI who felt patient would benefit from IR guided drainage of the abscesses followed by possible ERCP. ID was consulted who felt presentation resembled a pyogenic abscess although still recommended echinococcus and entamaoeba work up which turned out negative. Patient underwent hepatic abscess drainage with IR with drain insertion. Hepatic abscess cultures were negative. Of note, patient also had Hiccups that were likely related to diaphragmatic irritation and improved with baclofen and gabapentin. Additionally, patients course was c/b a LGIB episode which on colonoscopy was presumed to be 2/2 to diverticular bleed. Patient remained stable on Heparin and was monitored for further signs of bleeding. Transitioned to eliquis prior to discharge (price check $10). On the day of discharge patient was stable for discharge with close outpatient followup.    Important Medication Changes and Reason:  - Cipro 500 BID + Augmentin 875/125 BID for 30 days.    Active or Pending Issues Requiring Follow-up:  [ ] follow up with ID  [ ] follow up with GI regarding GIB and hepatic abscess  [ ] follow up with PCP for management of anticoagulation  [ ] follow up with IR for tube check after drainage <10cc/day  [ ] Repeat CT after drainage <10cc/day    Advanced Directives:   [x] Full code  [ ] DNR  [ ] Hospice    Discharge Diagnoses:  Hepatic abscess, Portal Vein Thrombus, GIB HPI:  68M PMH HTN, BPH, prostate cancer s/p prostatectomy 2012, diverticulosis presenting with 5 weeks of generalized weakness, 25 lb weight loss, and poor PO intake. Per pt, started having cold sensation as well as shivers 5 weeks ago after which his PMD trailed ibuprofen and abx for possible UTI. Patients symptoms persisted and PMD felt presentation was consistent with malaria (traveled to [facility] 1 year ago, unclear if malaria blood work was performed) after which pt was treated with mefloquine 5 tablets PO 5 days ago. Patient endorsed that his shivers and chills resolved but he continued to have weight loss and poor PO intake. Tried to eat yesterday and then had an episode of vomiting. Has been taking in liquids without difficulty. Also c/o generalized weakness- has to stop walking every few blocks secondary to weakness. Saw his PMD yesterday and was told he had "sepsis" which concerned the daughter. Denies any fever, chills, headache, visual changes, sore throat, cough, runny nose, chest pain, abdominal pain, n/v/d.    In ED patient was afebrile and hemodynamically stable. Labs were significant for leukocytosis to 25 with elevated Tbili and LFTs. Blood parasite screen was negative. CT imaging was obtained which showed a new indeterminate right hepatic lesion concerning for malignancy as well as an expansile filling defect in the right portal, left portal and main hepatic veins, Patient was given IVF and admitted to medicine for further workup, (11 Nov 2023 16:44)    Hospital Course:  While in the hospital patient was started on IV heparin for management of his portal vein thrombus. Subsequently, patient underwent MRCP imaging to better characterize his liver after which patient was noted to have large growing abscess's. On further review of patients records, as seen by Dr. Huerta in 2018, h/o positive QuantiFeron Gold, declined treatment for latent TB, had eosinophilia, Strongyloides AB+, treated with Ivermectin. Also history of positive Echinococcus AB, however not seen in our system. Testing in our system from 2018 for echinococcus IgG was negative. Patient was evaluated by GI who felt patient would benefit from IR guided drainage of the abscesses followed by possible ERCP. ID was consulted who felt presentation resembled a pyogenic abscess although still recommended echinococcus and entamaoeba work up which turned out negative. Patient underwent hepatic abscess drainage with IR with drain insertion. Hepatic abscess cultures were negative, although were likely sterilized by previous antibiotics.    Of note, patient also had Hiccups that were likely related to diaphragmatic irritation and improved with baclofen and gabapentin. Additionally, patients course was c/b a LGIB episode which on colonoscopy was presumed to be 2/2 to diverticular bleed. Patient remained stable on Heparin and was monitored for further signs of bleeding. Transitioned to eliquis prior to discharge (price check $10). On the day of discharge patient was stable for discharge with close outpatient followup.    Important Medication Changes and Reason:  - Cipro 500 BID + Augmentin 875/125 BID for 30 days.    Active or Pending Issues Requiring Follow-up:  [ ] follow up with ID  [ ] follow up with GI regarding GIB and hepatic abscess  [ ] follow up with PCP for management of anticoagulation  [ ] follow up with IR for tube check after drainage <10cc/day  [ ] Repeat CT after drainage <10cc/day    Advanced Directives:   [x] Full code  [ ] DNR  [ ] Hospice    Discharge Diagnoses:  Hepatic abscess, Portal Vein Thrombus, GIB HPI:  68M PMH HTN, BPH, prostate cancer s/p prostatectomy 2012, diverticulosis presenting with 5 weeks of generalized weakness, 25 lb weight loss, and poor PO intake. Per pt, started having cold sensation as well as shivers 5 weeks ago after which his PMD trailed ibuprofen and abx for possible UTI. Patients symptoms persisted and PMD felt presentation was consistent with malaria (traveled to [facility] 1 year ago, unclear if malaria blood work was performed) after which pt was treated with mefloquine 5 tablets PO 5 days ago. Patient endorsed that his shivers and chills resolved but he continued to have weight loss and poor PO intake. Tried to eat yesterday and then had an episode of vomiting. Has been taking in liquids without difficulty. Also c/o generalized weakness- has to stop walking every few blocks secondary to weakness. Saw his PMD yesterday and was told he had "sepsis" which concerned the daughter. Denies any fever, chills, headache, visual changes, sore throat, cough, runny nose, chest pain, abdominal pain, n/v/d.    In ED patient was afebrile and hemodynamically stable. Labs were significant for leukocytosis to 25 with elevated Tbili and LFTs. Blood parasite screen was negative. CT imaging was obtained which showed a new indeterminate right hepatic lesion concerning for malignancy as well as an expansile filling defect in the right portal, left portal and main hepatic veins, Patient was given IVF and admitted to medicine for further workup, (11 Nov 2023 16:44)    Hospital Course:  While in the hospital patient was started on IV heparin for management of his portal vein thrombus. Subsequently, patient underwent MRCP imaging to better characterize his liver after which patient was noted to have large growing abscess's. On further review of patients records, as seen by Dr. Huerta in 2018, h/o positive QuantiFeron Gold, declined treatment for latent TB, had eosinophilia, Strongyloides AB+, treated with Ivermectin. Also history of positive Echinococcus AB, however not seen in our system. Testing in our system from 2018 for echinococcus IgG was negative. Patient was evaluated by GI who felt patient would benefit from IR guided drainage of the abscesses followed by possible ERCP. ID was consulted who felt presentation resembled a pyogenic abscess although still recommended echinococcus and entamaoeba work up which turned out negative. Patient underwent hepatic abscess drainage with IR with drain insertion. Hepatic abscess cultures were negative, although were likely sterilized by previous antibiotics.    Of note, patient also had Hiccups that were likely related to diaphragmatic irritation and improved with baclofen and gabapentin. Additionally, patients course was c/b a LGIB episode which on colonoscopy was presumed to be 2/2 to diverticular bleed. Patient remained stable on Heparin and was monitored for further signs of bleeding. Transitioned to eliquis prior to discharge (price check $10). On the day of discharge patient was stable for discharge with close outpatient followup.    Important Medication Changes and Reason:  - Cipro 500 BID + Augmentin 875/125 BID for 30 days.    Active or Pending Issues Requiring Follow-up:  [ ] follow up with ID on 12/21/23  [ ] follow up with GI regarding GIB and hepatic abscess  [ ] follow up with PCP for management of anticoagulation  [ ] follow up with IR for tube check after drainage <10cc/day  [ ] Repeat CT without contrast arranged by IR after drainage <10cc/day  [ ] Repeat CT after 12/21/23 arranged by ID to monitor hepatic abscess    Advanced Directives:   [x] Full code  [ ] DNR  [ ] Hospice    Discharge Diagnoses:  Hepatic abscess, Portal Vein Thrombus, GIB

## 2023-11-26 NOTE — DISCHARGE NOTE PROVIDER - NSDCFUSCHEDAPPT_GEN_ALL_CORE_FT
Ubaldo Marr  [facility]  [address]  Scheduled Appointment: 12/21/2023     [facility]  [address]  Scheduled Appointment: 12/13/2023    Ubaldo Marr  [facility]  [address]  Scheduled Appointment: 12/21/2023

## 2023-11-26 NOTE — PROGRESS NOTE ADULT - PROBLEM SELECTOR PLAN 5
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  Apr Heme, no hypercoag workup needed, likely 2/2 to infection. 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  Apr Heme, no hypercoag workup needed, likely 2/2 to infection. Transition to DOAC on d/c    Plan  - heparin resume 11/25 PM, if stable may consider transitioning to Eliquis after PICC placement  - continue to monitor

## 2023-11-26 NOTE — PROGRESS NOTE ADULT - PROBLEM SELECTOR PLAN 1
Hgb dropped to 5.7 overnight with 1 episode bloody BM bright red  Hx of diverticulosis in the past with GIB   On IV heparin for thrombus, now discontinued  EGD unremarkable, Colonoscopy with pandiverticulosis c/f diverticular bleed as source, noted non-bleeding large internal hemorrhoids    Plan  >consult: GI recs  - Protonix 40 BID  - f/u CBC q8h  - maintain 2 large bore IVs  - active T&S  - transfuse goal >7

## 2023-11-26 NOTE — DISCHARGE NOTE PROVIDER - PROVIDER TOKENS
PROVIDER:[TOKEN:[[id number]]] PROVIDER:[TOKEN:[[id number]]],PROVIDER:[TOKEN:[[id number]]]

## 2023-11-26 NOTE — DISCHARGE NOTE PROVIDER - ATTENDING DISCHARGE PHYSICAL EXAMINATION:
Discharge Physical Exam:   General: appears well NAD  Cardiac: normal S1 and S2 no murmurs rubs or gallops  Respiratory: Lungs CTAB no wheezes rales or rhonchi  Abdomen: soft non-tender non-distended, drain w/ scant serosanguinous fluid   Lower extremities w/o edema

## 2023-11-26 NOTE — PROGRESS NOTE ADULT - SUBJECTIVE AND OBJECTIVE BOX
***************************************************************  Antoine Martinez, PGY1  Internal Medicine   TEAMS Preferred  ***************************************************************    ALVARO CEJA  68y Male  MRN: [id number]  11-11-23 (15d)    Patient is a 68y old  Male who presents with a chief complaint of weakness, weight loss, liver abscess (21 Nov 2023 07:47)      SUBJECTIVE / OVERNIGHT EVENTS:   No acute overnight events.   Patient seen and examined at bedside this AM.  Denies any CP, SOB, N/V, constipation, diarrhea, abdominal pain, dysuria, fever, chills, or headaches.     12 Point ROS negative with the exception of the above    MEDICATIONS  (STANDING):  amLODIPine   Tablet 5 milliGRAM(s) Oral daily  chlorproMAZINE    Injectable 25 milliGRAM(s) IntraMuscular once  folic acid 1 milliGRAM(s) Oral daily  gabapentin 300 milliGRAM(s) Oral three times a day  heparin  Infusion.  Unit(s)/Hr (12 mL/Hr) IV Continuous <Continuous>  lidocaine   4% Patch 1 Patch Transdermal every 24 hours  pantoprazole  Injectable 40 milliGRAM(s) IV Push two times a day  piperacillin/tazobactam IVPB.. 3.375 Gram(s) IV Intermittent every 8 hours  tamsulosin 0.4 milliGRAM(s) Oral at bedtime    MEDICATIONS  (PRN):  acetaminophen     Tablet .. 650 milliGRAM(s) Oral every 6 hours PRN Mild Pain (1 - 3), Moderate Pain (4 - 6)  baclofen 5 milliGRAM(s) Oral every 12 hours PRN hiccups  heparin   Injectable 5500 Unit(s) IV Push every 6 hours PRN For aPTT less than 40  heparin   Injectable 2500 Unit(s) IV Push every 6 hours PRN For aPTT between 40 - 57      OBJECTIVE:  Vital Signs Last 24 Hrs  T(C): 36.7 (26 Nov 2023 05:27), Max: 36.7 (25 Nov 2023 12:12)  T(F): 98 (26 Nov 2023 05:27), Max: 98 (25 Nov 2023 12:12)  HR: 83 (26 Nov 2023 05:27) (80 - 91)  BP: 114/75 (26 Nov 2023 05:27) (100/67 - 116/62)  BP(mean): --  RR: 17 (26 Nov 2023 05:27) (17 - 18)  SpO2: 100% (26 Nov 2023 05:27) (98% - 100%)    Parameters below as of 26 Nov 2023 05:27  Patient On (Oxygen Delivery Method): room air        I&O's Summary    25 Nov 2023 07:01  -  26 Nov 2023 07:00  --------------------------------------------------------  IN: 0 mL / OUT: 40 mL / NET: -40 mL        PHYSICAL EXAM:  GENERAL: Laying comfortably, NAD  HEENT: NCAT, PERRLA, EOMI, no scleral icterus, no LAD  NECK: No JVD, supple  LUNG: CTABL; No wheezes, crackles, or rhonchi  HEART: RRR; normal S1/S2; No murmurs, rubs, or gallops  ABDOMEN: +BS, soft, nontender, nondistended, no HSM; No rebound, guarding, or rigidity  EXTREMITIES:  No LE edema b/l, 2+ Peripheral Pulses, No clubbing or cyanosis  NEUROLOGY: AOx3, non-focal, strength 5/5 in all extremities, sensation intact  PSYCH: calm and cooperative  SKIN: No rashes or lesions    LABS:                        8.5    9.22  )-----------( 357      ( 26 Nov 2023 00:17 )             26.3     Auto Eosinophil # x     / Auto Eosinophil % x     / Auto Neutrophil # x     / Auto Neutrophil % x     / BANDS % x                            8.2    10.15 )-----------( 346      ( 25 Nov 2023 06:44 )             26.1     Auto Eosinophil # 0.17  / Auto Eosinophil % 1.7   / Auto Neutrophil # 7.72  / Auto Neutrophil % 76.0  / BANDS % x        11-25    137  |  103  |  10  ----------------------------<  109<H>  4.0   |  25  |  1.25  11-24    136  |  102  |  10  ----------------------------<  117<H>  4.1   |  22  |  1.36<H>  11-23    136  |  105  |  8   ----------------------------<  86  4.0   |  21<L>  |  1.17    Ca    8.3<L>      25 Nov 2023 06:44  Ca    8.1<L>      24 Nov 2023 03:10  Ca    8.5      23 Nov 2023 07:42  Phos  3.9     11-25  Mg     1.90     11-25    TPro  5.9<L>  /  Alb  2.4<L>  /  TBili  0.7  /  DBili  x   /  AST  23  /  ALT  19  /  AlkPhos  104  11-25  TPro  6.0  /  Alb  2.4<L>  /  TBili  0.8  /  DBili  0.5<H>  /  AST  17  /  ALT  18  /  AlkPhos  112  11-24  TPro  6.1  /  Alb  2.2<L>  /  TBili  0.9  /  DBili  x   /  AST  24  /  ALT  20  /  AlkPhos  115  11-23    PTT - ( 26 Nov 2023 00:17 )  PTT:65.3 sec      Urinalysis Basic - ( 25 Nov 2023 06:44 )    Color: x / Appearance: x / SG: x / pH: x  Gluc: 109 mg/dL / Ketone: x  / Bili: x / Urobili: x   Blood: x / Protein: x / Nitrite: x   Leuk Esterase: x / RBC: x / WBC x   Sq Epi: x / Non Sq Epi: x / Bacteria: x          CAPILLARY BLOOD GLUCOSE          Culture - Fungal, Body Fluid (collected 11-24-23 @ 19:15)  Source: .Body Fluid HEPATIC ABSCESS  Preliminary Report (11-25-23 @ 10:08):    Testing in progress    Culture - Body Fluid with Gram Stain (collected 11-24-23 @ 19:15)  Source: .Body Fluid HEPATIC ABSCESS  Gram Stain (11-25-23 @ 02:17):    Moderate polymorphonuclear leukocytes seen per low power field    No organisms seen per oil power field  Preliminary Report (11-25-23 @ 15:59):    No growth        RADIOLOGY & ADDITIONAL TESTS:     ***************************************************************  Antoine Martinez, PGY1  Internal Medicine   TEAMS Preferred  ***************************************************************    ALVARO CEJA  68y Male  MRN: [id number]  11-11-23 (15d)    Patient is a 68y old  Male who presents with a chief complaint of weakness, weight loss, liver abscess (21 Nov 2023 07:47)      SUBJECTIVE / OVERNIGHT EVENTS:   No acute overnight events.   Patient seen and examined at bedside this AM. Drain is in place. No SOB or chest pain. Having nonbloody BM. Hiccups controlled  Denies any CP, SOB, N/V, constipation, diarrhea, abdominal pain, dysuria, fever, chills, or headaches.     12 Point ROS negative with the exception of the above    MEDICATIONS  (STANDING):  amLODIPine   Tablet 5 milliGRAM(s) Oral daily  chlorproMAZINE    Injectable 25 milliGRAM(s) IntraMuscular once  folic acid 1 milliGRAM(s) Oral daily  gabapentin 300 milliGRAM(s) Oral three times a day  heparin  Infusion.  Unit(s)/Hr (12 mL/Hr) IV Continuous <Continuous>  lidocaine   4% Patch 1 Patch Transdermal every 24 hours  pantoprazole  Injectable 40 milliGRAM(s) IV Push two times a day  piperacillin/tazobactam IVPB.. 3.375 Gram(s) IV Intermittent every 8 hours  tamsulosin 0.4 milliGRAM(s) Oral at bedtime    MEDICATIONS  (PRN):  acetaminophen     Tablet .. 650 milliGRAM(s) Oral every 6 hours PRN Mild Pain (1 - 3), Moderate Pain (4 - 6)  baclofen 5 milliGRAM(s) Oral every 12 hours PRN hiccups  heparin   Injectable 5500 Unit(s) IV Push every 6 hours PRN For aPTT less than 40  heparin   Injectable 2500 Unit(s) IV Push every 6 hours PRN For aPTT between 40 - 57      OBJECTIVE:  Vital Signs Last 24 Hrs  T(C): 36.7 (26 Nov 2023 05:27), Max: 36.7 (25 Nov 2023 12:12)  T(F): 98 (26 Nov 2023 05:27), Max: 98 (25 Nov 2023 12:12)  HR: 83 (26 Nov 2023 05:27) (80 - 91)  BP: 114/75 (26 Nov 2023 05:27) (100/67 - 116/62)  BP(mean): --  RR: 17 (26 Nov 2023 05:27) (17 - 18)  SpO2: 100% (26 Nov 2023 05:27) (98% - 100%)    Parameters below as of 26 Nov 2023 05:27  Patient On (Oxygen Delivery Method): room air        I&O's Summary    25 Nov 2023 07:01  -  26 Nov 2023 07:00  --------------------------------------------------------  IN: 0 mL / OUT: 40 mL / NET: -40 mL        PHYSICAL EXAM:  GENERAL: Laying comfortably, NAD  HEENT: NCAT, PERRLA, EOMI, no scleral icterus, no LAD  NECK: No JVD, supple  LUNG: Decreased breath sounds in bases bilaterally  HEART: RRR; normal S1/S2; No murmurs, rubs, or gallops  ABDOMEN: +BS, soft, nontender, nondistended, no HSM; No rebound, guarding, or rigidity, IR drain in place producing serosanguinous/bloody fluids  EXTREMITIES:  No LE edema b/l, 2+ Peripheral Pulses, No clubbing or cyanosis  NEUROLOGY: AOx3, non-focal    LABS:                        8.5    9.22  )-----------( 357      ( 26 Nov 2023 00:17 )             26.3     Auto Eosinophil # x     / Auto Eosinophil % x     / Auto Neutrophil # x     / Auto Neutrophil % x     / BANDS % x                            8.2    10.15 )-----------( 346      ( 25 Nov 2023 06:44 )             26.1     Auto Eosinophil # 0.17  / Auto Eosinophil % 1.7   / Auto Neutrophil # 7.72  / Auto Neutrophil % 76.0  / BANDS % x        11-25    137  |  103  |  10  ----------------------------<  109<H>  4.0   |  25  |  1.25  11-24    136  |  102  |  10  ----------------------------<  117<H>  4.1   |  22  |  1.36<H>  11-23    136  |  105  |  8   ----------------------------<  86  4.0   |  21<L>  |  1.17    Ca    8.3<L>      25 Nov 2023 06:44  Ca    8.1<L>      24 Nov 2023 03:10  Ca    8.5      23 Nov 2023 07:42  Phos  3.9     11-25  Mg     1.90     11-25    TPro  5.9<L>  /  Alb  2.4<L>  /  TBili  0.7  /  DBili  x   /  AST  23  /  ALT  19  /  AlkPhos  104  11-25  TPro  6.0  /  Alb  2.4<L>  /  TBili  0.8  /  DBili  0.5<H>  /  AST  17  /  ALT  18  /  AlkPhos  112  11-24  TPro  6.1  /  Alb  2.2<L>  /  TBili  0.9  /  DBili  x   /  AST  24  /  ALT  20  /  AlkPhos  115  11-23    PTT - ( 26 Nov 2023 00:17 )  PTT:65.3 sec      Urinalysis Basic - ( 25 Nov 2023 06:44 )    Color: x / Appearance: x / SG: x / pH: x  Gluc: 109 mg/dL / Ketone: x  / Bili: x / Urobili: x   Blood: x / Protein: x / Nitrite: x   Leuk Esterase: x / RBC: x / WBC x   Sq Epi: x / Non Sq Epi: x / Bacteria: x          CAPILLARY BLOOD GLUCOSE          Culture - Fungal, Body Fluid (collected 11-24-23 @ 19:15)  Source: .Body Fluid HEPATIC ABSCESS  Preliminary Report (11-25-23 @ 10:08):    Testing in progress    Culture - Body Fluid with Gram Stain (collected 11-24-23 @ 19:15)  Source: .Body Fluid HEPATIC ABSCESS  Gram Stain (11-25-23 @ 02:17):    Moderate polymorphonuclear leukocytes seen per low power field    No organisms seen per oil power field  Preliminary Report (11-25-23 @ 15:59):    No growth        RADIOLOGY & ADDITIONAL TESTS:     ***************************************************************  Antoine Martinez, PGY1  Internal Medicine   TEAMS Preferred  ***************************************************************    ALVARO CEJA  68y Male  MRN: [id number]  11-11-23 (15d)    Patient is a 68y old  Male who presents with a chief complaint of weakness, weight loss, liver abscess (21 Nov 2023 07:47)      SUBJECTIVE / OVERNIGHT EVENTS:   No acute overnight events.   Patient seen and examined at bedside this AM. Drain is in place. No SOB or chest pain. Having nonbloody BM. Hiccups controlled  Denies any CP, SOB, N/V, constipation, diarrhea, abdominal pain, dysuria, fever, chills, or headaches.     12 Point ROS negative with the exception of the above    MEDICATIONS  (STANDING):  amLODIPine   Tablet 5 milliGRAM(s) Oral daily  chlorproMAZINE    Injectable 25 milliGRAM(s) IntraMuscular once  folic acid 1 milliGRAM(s) Oral daily  gabapentin 300 milliGRAM(s) Oral three times a day  heparin  Infusion.  Unit(s)/Hr (12 mL/Hr) IV Continuous <Continuous>  lidocaine   4% Patch 1 Patch Transdermal every 24 hours  pantoprazole  Injectable 40 milliGRAM(s) IV Push two times a day  piperacillin/tazobactam IVPB.. 3.375 Gram(s) IV Intermittent every 8 hours  tamsulosin 0.4 milliGRAM(s) Oral at bedtime    MEDICATIONS  (PRN):  acetaminophen     Tablet .. 650 milliGRAM(s) Oral every 6 hours PRN Mild Pain (1 - 3), Moderate Pain (4 - 6)  baclofen 5 milliGRAM(s) Oral every 12 hours PRN hiccups  heparin   Injectable 5500 Unit(s) IV Push every 6 hours PRN For aPTT less than 40  heparin   Injectable 2500 Unit(s) IV Push every 6 hours PRN For aPTT between 40 - 57      OBJECTIVE:  Vital Signs Last 24 Hrs  T(C): 36.7 (26 Nov 2023 05:27), Max: 36.7 (25 Nov 2023 12:12)  T(F): 98 (26 Nov 2023 05:27), Max: 98 (25 Nov 2023 12:12)  HR: 83 (26 Nov 2023 05:27) (80 - 91)  BP: 114/75 (26 Nov 2023 05:27) (100/67 - 116/62)  BP(mean): --  RR: 17 (26 Nov 2023 05:27) (17 - 18)  SpO2: 100% (26 Nov 2023 05:27) (98% - 100%)    Parameters below as of 26 Nov 2023 05:27  Patient On (Oxygen Delivery Method): room air        I&O's Summary    25 Nov 2023 07:01  -  26 Nov 2023 07:00  --------------------------------------------------------  IN: 0 mL / OUT: 40 mL / NET: -40 mL        PHYSICAL EXAM:  GENERAL: Laying comfortably, NAD  HEENT: NCAT, PERRLA, EOMI, no scleral icterus, no LAD  NECK: No JVD, supple  LUNG: Decreased breath sounds in bases bilaterally  HEART: RRR; normal S1/S2; No murmurs, rubs, or gallops  ABDOMEN: +BS, soft, nontender, nondistended, no HSM; No rebound, guarding, or rigidity, IR drain in place producing serosanguinous/bloody fluids  EXTREMITIES:  No LE edema b/l, 2+ Peripheral Pulses, No clubbing or cyanosis  NEUROLOGY: AOx3, non-focal    LABS:                        8.5    9.22  )-----------( 357      ( 26 Nov 2023 00:17 )             26.3     Auto Eosinophil # x     / Auto Eosinophil % x     / Auto Neutrophil # x     / Auto Neutrophil % x     / BANDS % x                            8.2    10.15 )-----------( 346      ( 25 Nov 2023 06:44 )             26.1     Auto Eosinophil # 0.17  / Auto Eosinophil % 1.7   / Auto Neutrophil # 7.72  / Auto Neutrophil % 76.0  / BANDS % x        11-25    137  |  103  |  10  ----------------------------<  109<H>  4.0   |  25  |  1.25  11-24    136  |  102  |  10  ----------------------------<  117<H>  4.1   |  22  |  1.36<H>  11-23    136  |  105  |  8   ----------------------------<  86  4.0   |  21<L>  |  1.17    Ca    8.3<L>      25 Nov 2023 06:44  Ca    8.1<L>      24 Nov 2023 03:10  Ca    8.5      23 Nov 2023 07:42  Phos  3.9     11-25  Mg     1.90     11-25    TPro  5.9<L>  /  Alb  2.4<L>  /  TBili  0.7  /  DBili  x   /  AST  23  /  ALT  19  /  AlkPhos  104  11-25  TPro  6.0  /  Alb  2.4<L>  /  TBili  0.8  /  DBili  0.5<H>  /  AST  17  /  ALT  18  /  AlkPhos  112  11-24  TPro  6.1  /  Alb  2.2<L>  /  TBili  0.9  /  DBili  x   /  AST  24  /  ALT  20  /  AlkPhos  115  11-23    PTT - ( 26 Nov 2023 00:17 )  PTT:65.3 sec      Urinalysis Basic - ( 25 Nov 2023 06:44 )    Color: x / Appearance: x / SG: x / pH: x  Gluc: 109 mg/dL / Ketone: x  / Bili: x / Urobili: x   Blood: x / Protein: x / Nitrite: x   Leuk Esterase: x / RBC: x / WBC x   Sq Epi: x / Non Sq Epi: x / Bacteria: x          CAPILLARY BLOOD GLUCOSE          Culture - Fungal, Body Fluid (collected 11-24-23 @ 19:15)  Source: .Body Fluid HEPATIC ABSCESS  Preliminary Report (11-25-23 @ 10:08):    Testing in progress    Culture - Body Fluid with Gram Stain (collected 11-24-23 @ 19:15)  Source: .Body Fluid HEPATIC ABSCESS  Gram Stain (11-25-23 @ 02:17):    Moderate polymorphonuclear leukocytes seen per low power field    No organisms seen per oil power field  Preliminary Report (11-25-23 @ 15:59):    No growth        RADIOLOGY & ADDITIONAL TESTS:  xr< from: Xray Chest 1 View- PORTABLE-Routine (Xray Chest 1 View- PORTABLE-Routine in AM.) (11.26.23 @ 09:15) >  MPRESSION: Drainage catheter overlies the liver with lungs free of focal   consolidations.    < end of copied text >    < from: Xray Abdomen 1 View PORTABLE -Urgent (Xray Abdomen 1 View PORTABLE -Urgent .) (11.25.23 @ 12:18) >  IMPRESSION:  No evidence of pneumothorax. Clear lungs.  Nonobstructive bowel gas pattern.    --- End of Report ---    < end of copied text >  < from: Xray Chest 1 View- PORTABLE-Urgent (Xray Chest 1 View- PORTABLE-Urgent .) (11.25.23 @ 12:19) >  IMPRESSION:  No evidence of pneumothorax. Clear lungs.  Nonobstructive bowel gas pattern.    < end of copied text >

## 2023-11-26 NOTE — DISCHARGE NOTE PROVIDER - NSDCCPTREATMENT_GEN_ALL_CORE_FT
PRINCIPAL PROCEDURE  Procedure: MR MRCP  Findings and Treatment: FINDINGS:  Limited by motion artifact.  LOWER CHEST: Within normal limits.  LIVER: 2 centrally T2 hyperintense lesion with peripheral enhancement,   surrounding parenchymal hyperemia, and edema consistent with abscess,   increased in size compared to prior exam. These measure as follows:  *  Segment 7/8 lesion measuring 7.0 x 6.7 x 6.9 cm  *  Segment 2 lesion measuring 5.0 x 4.6 x 5.3 cm.  BILE DUCTS: Mild intrahepatic biliary ductal dilatation.  GALLBLADDER: Cholelithiasis.  SPLEEN: Within normal limits.  PANCREAS: Within normal limits.  ADRENALS: Within normal limits.  KIDNEYS/URETERS: No hydronephrosis. Bilateral renal cysts.  VISUALIZED PORTIONS:  BOWEL: Colonic diverticulosis.  PERITONEUM: No ascites.  VESSELS: Expansile, nonenhancing filling defect again noted in the main,   right, and left portal veins. The portosplenic confluence, splenic vein,   and SMV are patent.  RETROPERITONEUM/LYMPH NODES: No lymphadenopathy.  ABDOMINAL WALL: Within normal limits.  BONES: Degenerative changes.  IMPRESSION:  Left and right hepatic lobe liver abscesses, increased in size since   prior CT scan 11/11/2023.  [place] thrombus involving the right, left, and main portal veins.

## 2023-11-26 NOTE — DISCHARGE NOTE PROVIDER - NSDCFUADDAPPT_GEN_ALL_CORE_FT
APPTS ARE READY TO BE MADE: [ ] YES    Best Family or Patient Contact (if needed):    Additional Information about above appointments (if needed):    1: GI  2: ID  3: IR  4. PCP    Other comments or requests:    APPTS ARE READY TO BE MADE: [X] YES    Best Family or Patient Contact (if needed):    Additional Information about above appointments (if needed):    1: GI  2: ID  3: IR  4. PCP    Other comments or requests:    APPTS ARE READY TO BE MADE: [X] YES    Best Family or Patient Contact (if needed):    Additional Information about above appointments (if needed):    1: GI  2: ID  3: IR  4. PCP    Other comments or requests:   Patient was scheduled with Dr. Jenni Pardo on 12/6 at 11am at [address] [place] [address] for gastroenterology. Daughter was provided with appointment details.     Patient was scheduled with Dr. Ubaldo Marr on 12/21/23 at 10A at [address] [place] [address] for infectious disease.     Patient will schedule their own PCP appointment and has an IR appointment previously scheduled on 12/13 for a catscan at [facility].

## 2023-11-26 NOTE — DISCHARGE NOTE PROVIDER - CARE PROVIDER_API CALL
BRIANA DECKER  [address] [place] [address]  Phone: [phone]  Fax: ()-  Follow Up Time:    BRIANA DECKER  [address] [place] [address]  Phone: [phone]  Fax: ()-  Follow Up Time:     Ubaldo Marr  Infectious Disease  [address] [place] [id number]  Phone: [phone]  Fax: [phone]  Follow Up Time:

## 2023-11-27 ENCOUNTER — TRANSCRIPTION ENCOUNTER (OUTPATIENT)
Age: 68
End: 2023-11-27

## 2023-11-27 DIAGNOSIS — K57.91 DIVERTICULOSIS OF INTESTINE, PART UNSPECIFIED, WITHOUT PERFORATION OR ABSCESS WITH BLEEDING: ICD-10-CM

## 2023-11-27 DIAGNOSIS — K75.0 ABSCESS OF LIVER: ICD-10-CM

## 2023-11-27 PROBLEM — C61 MALIGNANT NEOPLASM OF PROSTATE: Chronic | Status: ACTIVE | Noted: 2023-11-11

## 2023-11-27 LAB
ALBUMIN SERPL ELPH-MCNC: 2.6 G/DL — LOW (ref 3.3–5)
ALBUMIN SERPL ELPH-MCNC: 2.6 G/DL — LOW (ref 3.3–5)
ALP SERPL-CCNC: 112 U/L — SIGNIFICANT CHANGE UP (ref 40–120)
ALP SERPL-CCNC: 112 U/L — SIGNIFICANT CHANGE UP (ref 40–120)
ALT FLD-CCNC: 17 U/L — SIGNIFICANT CHANGE UP (ref 4–41)
ALT FLD-CCNC: 17 U/L — SIGNIFICANT CHANGE UP (ref 4–41)
ANION GAP SERPL CALC-SCNC: 10 MMOL/L — SIGNIFICANT CHANGE UP (ref 7–14)
ANION GAP SERPL CALC-SCNC: 10 MMOL/L — SIGNIFICANT CHANGE UP (ref 7–14)
APTT BLD: 83.6 SEC — HIGH (ref 24.5–35.6)
APTT BLD: 83.6 SEC — HIGH (ref 24.5–35.6)
AST SERPL-CCNC: 20 U/L — SIGNIFICANT CHANGE UP (ref 4–40)
AST SERPL-CCNC: 20 U/L — SIGNIFICANT CHANGE UP (ref 4–40)
BASOPHILS # BLD AUTO: 0.09 K/UL — SIGNIFICANT CHANGE UP (ref 0–0.2)
BASOPHILS # BLD AUTO: 0.09 K/UL — SIGNIFICANT CHANGE UP (ref 0–0.2)
BASOPHILS NFR BLD AUTO: 0.8 % — SIGNIFICANT CHANGE UP (ref 0–2)
BASOPHILS NFR BLD AUTO: 0.8 % — SIGNIFICANT CHANGE UP (ref 0–2)
BILIRUB SERPL-MCNC: 0.7 MG/DL — SIGNIFICANT CHANGE UP (ref 0.2–1.2)
BILIRUB SERPL-MCNC: 0.7 MG/DL — SIGNIFICANT CHANGE UP (ref 0.2–1.2)
BUN SERPL-MCNC: 13 MG/DL — SIGNIFICANT CHANGE UP (ref 7–23)
BUN SERPL-MCNC: 13 MG/DL — SIGNIFICANT CHANGE UP (ref 7–23)
CALCIUM SERPL-MCNC: 8.7 MG/DL — SIGNIFICANT CHANGE UP (ref 8.4–10.5)
CALCIUM SERPL-MCNC: 8.7 MG/DL — SIGNIFICANT CHANGE UP (ref 8.4–10.5)
CHLORIDE SERPL-SCNC: 103 MMOL/L — SIGNIFICANT CHANGE UP (ref 98–107)
CHLORIDE SERPL-SCNC: 103 MMOL/L — SIGNIFICANT CHANGE UP (ref 98–107)
CO2 SERPL-SCNC: 23 MMOL/L — SIGNIFICANT CHANGE UP (ref 22–31)
CO2 SERPL-SCNC: 23 MMOL/L — SIGNIFICANT CHANGE UP (ref 22–31)
CREAT SERPL-MCNC: 1.34 MG/DL — HIGH (ref 0.5–1.3)
CREAT SERPL-MCNC: 1.34 MG/DL — HIGH (ref 0.5–1.3)
EGFR: 57 ML/MIN/1.73M2 — LOW
EGFR: 57 ML/MIN/1.73M2 — LOW
EOSINOPHIL # BLD AUTO: 0.26 K/UL — SIGNIFICANT CHANGE UP (ref 0–0.5)
EOSINOPHIL # BLD AUTO: 0.26 K/UL — SIGNIFICANT CHANGE UP (ref 0–0.5)
EOSINOPHIL NFR BLD AUTO: 2.4 % — SIGNIFICANT CHANGE UP (ref 0–6)
EOSINOPHIL NFR BLD AUTO: 2.4 % — SIGNIFICANT CHANGE UP (ref 0–6)
GLUCOSE SERPL-MCNC: 98 MG/DL — SIGNIFICANT CHANGE UP (ref 70–99)
GLUCOSE SERPL-MCNC: 98 MG/DL — SIGNIFICANT CHANGE UP (ref 70–99)
HCT VFR BLD CALC: 30.9 % — LOW (ref 39–50)
HCT VFR BLD CALC: 30.9 % — LOW (ref 39–50)
HGB BLD-MCNC: 9.9 G/DL — LOW (ref 13–17)
HGB BLD-MCNC: 9.9 G/DL — LOW (ref 13–17)
IANC: 7.62 K/UL — HIGH (ref 1.8–7.4)
IANC: 7.62 K/UL — HIGH (ref 1.8–7.4)
IMM GRANULOCYTES NFR BLD AUTO: 0.4 % — SIGNIFICANT CHANGE UP (ref 0–0.9)
IMM GRANULOCYTES NFR BLD AUTO: 0.4 % — SIGNIFICANT CHANGE UP (ref 0–0.9)
LYMPHOCYTES # BLD AUTO: 2.47 K/UL — SIGNIFICANT CHANGE UP (ref 1–3.3)
LYMPHOCYTES # BLD AUTO: 2.47 K/UL — SIGNIFICANT CHANGE UP (ref 1–3.3)
LYMPHOCYTES # BLD AUTO: 22.5 % — SIGNIFICANT CHANGE UP (ref 13–44)
LYMPHOCYTES # BLD AUTO: 22.5 % — SIGNIFICANT CHANGE UP (ref 13–44)
MAGNESIUM SERPL-MCNC: 1.9 MG/DL — SIGNIFICANT CHANGE UP (ref 1.6–2.6)
MAGNESIUM SERPL-MCNC: 1.9 MG/DL — SIGNIFICANT CHANGE UP (ref 1.6–2.6)
MCHC RBC-ENTMCNC: 31.1 PG — SIGNIFICANT CHANGE UP (ref 27–34)
MCHC RBC-ENTMCNC: 31.1 PG — SIGNIFICANT CHANGE UP (ref 27–34)
MCHC RBC-ENTMCNC: 32 GM/DL — SIGNIFICANT CHANGE UP (ref 32–36)
MCHC RBC-ENTMCNC: 32 GM/DL — SIGNIFICANT CHANGE UP (ref 32–36)
MCV RBC AUTO: 97.2 FL — SIGNIFICANT CHANGE UP (ref 80–100)
MCV RBC AUTO: 97.2 FL — SIGNIFICANT CHANGE UP (ref 80–100)
MONOCYTES # BLD AUTO: 0.48 K/UL — SIGNIFICANT CHANGE UP (ref 0–0.9)
MONOCYTES # BLD AUTO: 0.48 K/UL — SIGNIFICANT CHANGE UP (ref 0–0.9)
MONOCYTES NFR BLD AUTO: 4.4 % — SIGNIFICANT CHANGE UP (ref 2–14)
MONOCYTES NFR BLD AUTO: 4.4 % — SIGNIFICANT CHANGE UP (ref 2–14)
NEUTROPHILS # BLD AUTO: 7.62 K/UL — HIGH (ref 1.8–7.4)
NEUTROPHILS # BLD AUTO: 7.62 K/UL — HIGH (ref 1.8–7.4)
NEUTROPHILS NFR BLD AUTO: 69.5 % — SIGNIFICANT CHANGE UP (ref 43–77)
NEUTROPHILS NFR BLD AUTO: 69.5 % — SIGNIFICANT CHANGE UP (ref 43–77)
NRBC # BLD: 0 /100 WBCS — SIGNIFICANT CHANGE UP (ref 0–0)
NRBC # BLD: 0 /100 WBCS — SIGNIFICANT CHANGE UP (ref 0–0)
NRBC # FLD: 0 K/UL — SIGNIFICANT CHANGE UP (ref 0–0)
NRBC # FLD: 0 K/UL — SIGNIFICANT CHANGE UP (ref 0–0)
PHOSPHATE SERPL-MCNC: 2.5 MG/DL — SIGNIFICANT CHANGE UP (ref 2.5–4.5)
PHOSPHATE SERPL-MCNC: 2.5 MG/DL — SIGNIFICANT CHANGE UP (ref 2.5–4.5)
PLATELET # BLD AUTO: 418 K/UL — HIGH (ref 150–400)
PLATELET # BLD AUTO: 418 K/UL — HIGH (ref 150–400)
POTASSIUM SERPL-MCNC: 4.2 MMOL/L — SIGNIFICANT CHANGE UP (ref 3.5–5.3)
POTASSIUM SERPL-MCNC: 4.2 MMOL/L — SIGNIFICANT CHANGE UP (ref 3.5–5.3)
POTASSIUM SERPL-SCNC: 4.2 MMOL/L — SIGNIFICANT CHANGE UP (ref 3.5–5.3)
POTASSIUM SERPL-SCNC: 4.2 MMOL/L — SIGNIFICANT CHANGE UP (ref 3.5–5.3)
PROT SERPL-MCNC: 6.7 G/DL — SIGNIFICANT CHANGE UP (ref 6–8.3)
PROT SERPL-MCNC: 6.7 G/DL — SIGNIFICANT CHANGE UP (ref 6–8.3)
RBC # BLD: 3.18 M/UL — LOW (ref 4.2–5.8)
RBC # BLD: 3.18 M/UL — LOW (ref 4.2–5.8)
RBC # FLD: 17.4 % — HIGH (ref 10.3–14.5)
RBC # FLD: 17.4 % — HIGH (ref 10.3–14.5)
SODIUM SERPL-SCNC: 136 MMOL/L — SIGNIFICANT CHANGE UP (ref 135–145)
SODIUM SERPL-SCNC: 136 MMOL/L — SIGNIFICANT CHANGE UP (ref 135–145)
WBC # BLD: 10.96 K/UL — HIGH (ref 3.8–10.5)
WBC # BLD: 10.96 K/UL — HIGH (ref 3.8–10.5)
WBC # FLD AUTO: 10.96 K/UL — HIGH (ref 3.8–10.5)
WBC # FLD AUTO: 10.96 K/UL — HIGH (ref 3.8–10.5)

## 2023-11-27 PROCEDURE — 99232 SBSQ HOSP IP/OBS MODERATE 35: CPT

## 2023-11-27 PROCEDURE — 99232 SBSQ HOSP IP/OBS MODERATE 35: CPT | Mod: GC

## 2023-11-27 RX ORDER — POLYETHYLENE GLYCOL 3350 17 G/17G
17 POWDER, FOR SOLUTION ORAL DAILY
Refills: 0 | Status: DISCONTINUED | OUTPATIENT
Start: 2023-11-27 | End: 2023-11-28

## 2023-11-27 RX ORDER — APIXABAN 2.5 MG/1
1 TABLET, FILM COATED ORAL
Qty: 60 | Refills: 0
Start: 2023-11-27 | End: 2023-12-26

## 2023-11-27 RX ORDER — APIXABAN 2.5 MG/1
5 TABLET, FILM COATED ORAL
Refills: 0 | Status: DISCONTINUED | OUTPATIENT
Start: 2023-11-27 | End: 2023-11-28

## 2023-11-27 RX ADMIN — Medication 650 MILLIGRAM(S): at 02:32

## 2023-11-27 RX ADMIN — LIDOCAINE 1 PATCH: 4 CREAM TOPICAL at 01:06

## 2023-11-27 RX ADMIN — AMLODIPINE BESYLATE 5 MILLIGRAM(S): 2.5 TABLET ORAL at 05:33

## 2023-11-27 RX ADMIN — TAMSULOSIN HYDROCHLORIDE 0.4 MILLIGRAM(S): 0.4 CAPSULE ORAL at 21:10

## 2023-11-27 RX ADMIN — POLYETHYLENE GLYCOL 3350 17 GRAM(S): 17 POWDER, FOR SOLUTION ORAL at 12:55

## 2023-11-27 RX ADMIN — Medication 1 MILLIGRAM(S): at 12:55

## 2023-11-27 RX ADMIN — GABAPENTIN 300 MILLIGRAM(S): 400 CAPSULE ORAL at 12:55

## 2023-11-27 RX ADMIN — GABAPENTIN 300 MILLIGRAM(S): 400 CAPSULE ORAL at 05:32

## 2023-11-27 RX ADMIN — PIPERACILLIN AND TAZOBACTAM 25 GRAM(S): 4; .5 INJECTION, POWDER, LYOPHILIZED, FOR SOLUTION INTRAVENOUS at 12:54

## 2023-11-27 RX ADMIN — APIXABAN 5 MILLIGRAM(S): 2.5 TABLET, FILM COATED ORAL at 18:07

## 2023-11-27 RX ADMIN — Medication 650 MILLIGRAM(S): at 01:32

## 2023-11-27 RX ADMIN — PIPERACILLIN AND TAZOBACTAM 25 GRAM(S): 4; .5 INJECTION, POWDER, LYOPHILIZED, FOR SOLUTION INTRAVENOUS at 05:33

## 2023-11-27 RX ADMIN — HEPARIN SODIUM 1200 UNIT(S)/HR: 5000 INJECTION INTRAVENOUS; SUBCUTANEOUS at 08:08

## 2023-11-27 RX ADMIN — PIPERACILLIN AND TAZOBACTAM 25 GRAM(S): 4; .5 INJECTION, POWDER, LYOPHILIZED, FOR SOLUTION INTRAVENOUS at 21:11

## 2023-11-27 RX ADMIN — SENNA PLUS 2 TABLET(S): 8.6 TABLET ORAL at 21:11

## 2023-11-27 RX ADMIN — PANTOPRAZOLE SODIUM 40 MILLIGRAM(S): 20 TABLET, DELAYED RELEASE ORAL at 05:32

## 2023-11-27 RX ADMIN — GABAPENTIN 300 MILLIGRAM(S): 400 CAPSULE ORAL at 21:11

## 2023-11-27 RX ADMIN — HEPARIN SODIUM 1200 UNIT(S)/HR: 5000 INJECTION INTRAVENOUS; SUBCUTANEOUS at 09:34

## 2023-11-27 RX ADMIN — HEPARIN SODIUM 1200 UNIT(S)/HR: 5000 INJECTION INTRAVENOUS; SUBCUTANEOUS at 07:07

## 2023-11-27 NOTE — PROGRESS NOTE ADULT - PROBLEM SELECTOR PLAN 7
Cr 1.46 on admission, b/l 1.2-1.5  Likely chronic    Continue to monitor Cr 1.46 on admission, baseline 1.2-1.5  - Continue to monitor  - Renally dose medications, avoid nephrotoxins

## 2023-11-27 NOTE — PROVIDER CONTACT NOTE (OTHER) - BACKGROUND
Patient diagnosed with weakness

## 2023-11-27 NOTE — PROGRESS NOTE ADULT - ASSESSMENT
68M PMH HTN, BPH, prostate cancer s/p prostatectomy 2012, hepatic abscess, Latent TB, diverticulosis presenting with 5 weeks of generalized weakness, 25 lb weight loss, and poor PO intake, recently treated for suspected malaria (unclear if confirmed with blood work), found to have evidence of new right hepatic lesion with suspected portal venous thrombosis c/f pyogenic abscess course c/b LGIB while on Heparin 68M PMH HTN, BPH, prostate cancer s/p prostatectomy 2012, hepatic abscess, Latent TB, diverticulosis presenting with 5 weeks of generalized weakness, 25 lb weight loss, and poor PO intake, recently treated for suspected malaria (unclear if confirmed with blood work), found to have evidence of new right hepatic lesion with suspected portal venous thrombosis c/f pyogenic abscess course c/b LGIB while on Heparin due to diverticular bleed.

## 2023-11-27 NOTE — PROVIDER CONTACT NOTE (OTHER) - SITUATION
Patient with heart rate 118, low diastolic
Patients heart rate 105
Patient with elevated heart rate
patients blood pressure 150/62 heart rate 93 and repeat was 108/66 heart rate 98
Patient with low diastolic
Patient with heart rate 105

## 2023-11-27 NOTE — PROVIDER CONTACT NOTE (OTHER) - ACTION/TREATMENT ORDERED:
MD Curry made aware. to call RRT. . Nursing care to continue
MD Curry made aware. No interventions ordered.  Nursing care to continue
MD Curry made aware. Nursing care to continue
MD Curry made aware. ok to hold amlodipine.  Nursing care to continue
MD Curry made aware. No interventions ordered. Nursing care to continue
MD Curry made aware. no intervention ordered.  Nursing care to continue

## 2023-11-27 NOTE — DISCHARGE NOTE NURSING/CASE MANAGEMENT/SOCIAL WORK - NSDCPEELIQUISREACT_GEN_ALL_CORE
Apixaban/Eliquis increases your risk for bleeding. Notify your doctor if you experience any of the following side effects: bleeding, coughing or vomiting blood, red or black stool, unexpected pain or swelling, itching or hives, chest pain, chest tightness, trouble breathing, changes in how much or how often you urinate, red or pink urine, numbness or tingling in your feet, or unusual muscle weakness. When Apixaban/Eliquis is taken with other medicines, they can affect how it works. Taking other medications such as aspirin, blood thinners, nonsteroidal anti-inflammatories, and medications that treat depression can increase your risk of bleeding. It is very important to tell your health care provider about all of the other medicines, including over-the-counter medications, herbs, and vitamins you are taking. DO NOT start, stop, or change the dosage of any medicine, including over-the-counter medicines, vitamins, and herbal products without your doctor’s approval. Any products containing aspirin or are nonsteroidal anti-inflammatories lessen the blood’s ability to form clots and add to the effect of Apixaban/Eliquis. Never take aspirin or medicines that contain aspirin without speaking to your doctor. Urine Pregnancy Test Result: negative Detail Level: None Performed By: [place]

## 2023-11-27 NOTE — PROGRESS NOTE ADULT - ATTENDING COMMENTS
#Diverticular Bleed 2/2 to Chronic Diverticulosis (Improving) -> monitor CBC daily. Monitor BM, now improved. Discontinue PPI given bleed not upper.   #Hepatic Abscess (Improving)--> change to Augmentin 875/125 mg BID, Ciprofloxacin 500 mg BID (last EKG w/ QTC ~450). Will send 1 month supply of antibiotics per ID recs. Will need ID and IR follow up outpatient for repeat imaging. Patient education on drain care and home care set up. Improving demonstrated by afebrile status and improving leukocytosis.   #Portal Vein Thrombus (Stable)--> transition heparin ggt-> Eliquis. Price checked affordable to patient.   #CKD (stable)--> avoid nephrotoxic meds and continue to trend crt     CBC and BMP ordered and reviewed. WBC count reviewed w/ improving leukocytosis throughout hospital course. 24-> 10k (11/27). Hemoglobin stable 9.9 from 8.5 day prior. Crt 1.34 on metabolic 11/27 which is patients baseline.     Case discussed w/ ID and IR.   ID: change to Augmentin 875/125 mg BID, Ciprofloxacin 500 mg BID (last EKG w/ QTC ~450). Will send 1 month supply of antibiotics. Outpatient followup with Dr. Marr on 12/21/23. Will need repeat CT A/P with IV contrast at that time.   IR:  Flush drain with 5cc NS qd. Can be discharged home w/ drain w/ outpatient follow up given continued high output.     Moderate Complexity for prescription drug management. Changing heparin ggt -> Eliquis in anticipation of discharge 11/28.

## 2023-11-27 NOTE — PROGRESS NOTE ADULT - PROBLEM SELECTOR PLAN 2
MRCP with hepatic abscess 7x6.7x6.9cm and 5x4.6x5.3, mild intrahepatic biliary duct dilation, increasing size  CEA elevated, CA 19-9 elevated, iron studies suggest anemia of chronic disease, LFTs worsening  Likely source is infection, patient recently traveled to [facility] and has dog at home, no Eosinophilia noted  Prior hx of eosinophilia with hepatic abscesses in 2018    Plan  - s/p IR drain 11/25 with IR  - f/u CXR for pleural transgression concern  - f/u CT Surg  - per IR, restart hep gtt for PVT 11/25 PM  - hold Aspirin 5 days prior to IR  - IR consulted for pain around liver drain site, said will evaluate, will try lidocain patches MRCP with hepatic abscess 7x6.7x6.9cm and 5x4.6x5.3, mild intrahepatic biliary duct dilation, increasing size. CEA elevated, CA 19-9 elevated, iron studies suggest anemia of chronic disease, LFTs worsening  Likely source is infection, patient recently traveled to [facility] and has dog at home, no Eosinophilia noted  Prior hx of eosinophilia with hepatic abscesses in 2018    Plan  - s/p IR drain 11/25 with IR  - f/u CXR for pleural transgression concern  - f/u CT Surg  - per IR, restart hep gtt for PVT 11/25 PM  - hold Aspirin 5 days prior to IR  - IR consulted for pain around liver drain site, said will evaluate, will try lidocain patches MRCP with hepatic abscess 7x6.7x6.9cm and 5x4.6x5.3, mild intrahepatic biliary duct dilation, increasing size. CEA elevated, CA 19-9 elevated, iron studies suggest anemia of chronic disease, LFTs worsening. Likely source is infection, patient recently traveled to [facility] and has dog at home, no Eosinophilia noted. Prior hx of eosinophilia with hepatic abscesses in 2018  - s/p IR drain 11/25 with IR  - f/u CXR for pleural transgression concern  - f/u CT Surg  - hep gtt for PVT restarted on 11/25  - IR consulted for pain around liver drain site, said will evaluate, will try lidocain patches MRCP with hepatic abscess 7x6.7x6.9cm and 5x4.6x5.3, mild intrahepatic biliary duct dilation, increasing size. CEA elevated, CA 19-9 elevated, iron studies suggest anemia of chronic disease, LFTs worsening. Likely source is infection, patient recently traveled to [facility] and has dog at home, no Eosinophilia noted. Prior hx of eosinophilia with hepatic abscesses in 2018  - s/p IR drain 11/25 with IR, will need reimaging once <10cc/day  - f/u CXR for pleural transgression concern  - hep gtt for PVT restarted on 11/25  - IR consulted for pain around liver drain site, said will evaluate, will try lidocaine patches MRCP with hepatic abscess 7x6.7x6.9cm and 5x4.6x5.3, mild intrahepatic biliary duct dilation, increasing size. CEA elevated, CA 19-9 elevated, iron studies suggest anemia of chronic disease, LFTs worsening. Likely source is infection, patient recently traveled to [facility] and has dog at home, no Eosinophilia noted. Prior hx of eosinophilia with hepatic abscesses in 2018  - s/p IR drain 11/25 with IR, will need reimaging once <10cc/day  - CXR's reviewed no pneumothorax or pleural effusion.   - hep gtt for PVT restarted on 11/25  - encouraged patient to ask for Tylenol, states helps with his pain  - change to Augmentin 875/125 mg BID, Ciprofloxacin 500 mg BID (last EKG w/ QTC ~450). Will send 1 month supply of antibiotics per ID recs.

## 2023-11-27 NOTE — PROGRESS NOTE ADULT - PROBLEM SELECTOR PLAN 9
F: IVF  E: replete PRN  N: Regular  A: OOBAT    DVT ppx: full AC  GI ppx: Protonix 40mg qD    Code status: Full code  Dispo: active Diet: Regular  DVT ppx: full AC  GI ppx: Protonix 40mg qD  Code status: Full code  Dispo: Pending clinical course Diet: Regular  DVT ppx: full AC  Code status: Full code  Dispo: Tomorrow pending set up of home care and patient demonstration that he can care for drain.

## 2023-11-27 NOTE — PROVIDER CONTACT NOTE (OTHER) - DATE AND TIME:
20-Nov-2023 21:30
21-Nov-2023 04:11
22-Nov-2023 06:31
21-Nov-2023 05:25
27-Nov-2023 05:25
21-Nov-2023 21:38

## 2023-11-27 NOTE — MEDICAL STUDENT PROGRESS NOTE(EDUCATION) - SUBJECTIVE AND OBJECTIVE BOX
SUBJECTIVE / OVERNIGHT EVENTS: No acute events overnight per night team. The patient reports pain at the site of the drain. He endorses his most recent bowel movement was last night, and there was a "little bit" of blood on the paper. None in the bowl. He notes eating without any nausea of vomiting.     ADDITIONAL REVIEW OF SYSTEMS: Negative ROS    MEDICATIONS  (STANDING):  amLODIPine   Tablet 5 milliGRAM(s) Oral daily  folic acid 1 milliGRAM(s) Oral daily  gabapentin 300 milliGRAM(s) Oral three times a day  heparin  Infusion.  Unit(s)/Hr (12 mL/Hr) IV Continuous <Continuous>  lidocaine   4% Patch 1 Patch Transdermal every 24 hours  pantoprazole  Injectable 40 milliGRAM(s) IV Push two times a day  piperacillin/tazobactam IVPB.. 3.375 Gram(s) IV Intermittent every 8 hours  senna 2 Tablet(s) Oral at bedtime  tamsulosin 0.4 milliGRAM(s) Oral at bedtime    MEDICATIONS  (PRN):  acetaminophen     Tablet .. 650 milliGRAM(s) Oral every 6 hours PRN Mild Pain (1 - 3), Moderate Pain (4 - 6)  baclofen 5 milliGRAM(s) Oral every 12 hours PRN hiccups  heparin   Injectable 2500 Unit(s) IV Push every 6 hours PRN For aPTT between 40 - 57  heparin   Injectable 5500 Unit(s) IV Push every 6 hours PRN For aPTT less than 40      CAPILLARY BLOOD GLUCOSE        I&O's Summary    26 Nov 2023 07:01  -  27 Nov 2023 07:00  --------------------------------------------------------  IN: 0 mL / OUT: 12.5 mL / NET: -12.5 mL        PHYSICAL EXAM:  Vital Signs Last 24 Hrs  T(C): 36.3 (27 Nov 2023 05:16), Max: 37 (26 Nov 2023 13:00)  T(F): 97.3 (27 Nov 2023 05:16), Max: 98.6 (26 Nov 2023 13:00)  HR: 105 (27 Nov 2023 05:16) (86 - 105)  BP: 123/64 (27 Nov 2023 05:16) (101/62 - 123/64)  BP(mean): --  RR: 18 (27 Nov 2023 05:16) (16 - 18)  SpO2: 98% (27 Nov 2023 05:16) (95% - 100%)    Parameters below as of 27 Nov 2023 05:16  Patient On (Oxygen Delivery Method): room air        CONSTITUTIONAL: NAD  RESPIRATORY: Normal respiratory effort; lungs are clear to auscultation bilaterally, no wheezing/rhonchi/rales  CARDIOVASCULAR: Regular rate and rhythm, normal S1 and S2, no murmur/rub/gallop; No lower extremity edema; Peripheral pulses are 2+ bilaterally  ABDOMEN: +BS, nontender to palpation, normoactive bowel sounds, no rebound/guarding; No hepatosplenomegaly  MUSCULOSKELETAL: no clubbing or cyanosis of digits; no joint swelling or tenderness to palpation  PSYCH: A+O to person, place, and time; affect appropriate    LABS:                        9.9    10.96 )-----------( 418      ( 27 Nov 2023 06:30 )             30.9     11-27    136  |  103  |  x   ----------------------------<  x   4.2   |  x   |  x     Ca    8.6      26 Nov 2023 07:19  Phos  3.3     11-26  Mg     1.90     11-27    TPro  6.2  /  Alb  2.4<L>  /  TBili  0.7  /  DBili  x   /  AST  15  /  ALT  16  /  AlkPhos  102  11-26    PTT - ( 27 Nov 2023 06:30 )  PTT:83.6 sec      Urinalysis Basic - ( 26 Nov 2023 07:19 )    Color: x / Appearance: x / SG: x / pH: x  Gluc: 91 mg/dL / Ketone: x  / Bili: x / Urobili: x   Blood: x / Protein: x / Nitrite: x   Leuk Esterase: x / RBC: x / WBC x   Sq Epi: x / Non Sq Epi: x / Bacteria: x        Culture - Fungal, Body Fluid (collected 24 Nov 2023 19:15)  Source: .Body Fluid HEPATIC ABSCESS  Preliminary Report (25 Nov 2023 10:08):    Testing in progress    Culture - Body Fluid with Gram Stain (collected 24 Nov 2023 19:15)  Source: .Body Fluid HEPATIC ABSCESS  Gram Stain (25 Nov 2023 02:17):    Moderate polymorphonuclear leukocytes seen per low power field    No organisms seen per oil power field  Preliminary Report (25 Nov 2023 15:59):    No growth            ******************************  Authored By: Edison Schultz MS3  [facility]  MS Teams - Call or Text  ******************************

## 2023-11-27 NOTE — PROVIDER CONTACT NOTE (OTHER) - ASSESSMENT
Patients blood pressure 102/57 heart rate 93 . Patient asymptomatic
patients blood pressure 150/62 heart rate 93 and repeat was 108/66 heart rate 98. Due for amlodipine.  Patient asymptomatic
Patients blood pressure 109/61 heart rate 105 post 1st unit  of PRBC and blood pressure 100/60 heart rate 103 before starting 2nd unit of PRBC  .
Patients blood pressure 103/65 heart rate 105 . Patient asymptomatic. No distress noted. Patient on heparin drip, normal saline at 50mL/hr as ordered
Patients blood pressure 85/51 heart rate 118 . Patient felt light headed and stated he had a bloody bowel movement.
Patients blood pressure 123/64 heart rate 105. Patient asymptomatic due for amlodipine.

## 2023-11-27 NOTE — PROGRESS NOTE ADULT - PROBLEM SELECTOR PLAN 8
Anemia noted on admission, suggestive of AOCD  s/p 3u PRBCs total during hospitalization, stable Hgb    Plan  - ctm Anemia noted on admission, suggestive of AOCD. s/p 3u PRBCs total during hospitalization, stable Hgb  - Continue to monitor, active type and screen, transfuse if hgb <7 Anemia noted on admission, suggestive of AOCD. s/p 3u PRBCs total during hospitalization, stable Hgb. Component of anemia GI bleed from diverticular bleed as above.   - Continue to monitor, active type and screen, transfuse if hgb <7

## 2023-11-27 NOTE — PROVIDER CONTACT NOTE (OTHER) - REASON
Patients heart rate 105
heart rate 105
elevated heart rate
diastolic low
patients blood pressure 150/62 heart rate 93 and repeat was 108/66 heart rate 98
heart rate 118 low diastolic

## 2023-11-27 NOTE — PROGRESS NOTE ADULT - ASSESSMENT
This is a 69 y/o M w/ PMHx of HTN, BPH, prostate CA s/p prostatectomy 2012, diverticulosis who is presenting to [facility] on 11/14/23 for fevers a for 5 weeks, seen by his PMD, started on ibuprofen/antibiotic for possible UTI, however given persistence of fevers, there was a concern for malaria, and patient was given Mefloquine 5 days PTA. In the ED, pt was febrile with leukocytosis to 24. CT A/P with findings of 3.8 x 3.1 cm lobulated hypodense R hepatic lobe lesion, filling defect in R/L portal, main hepatic vein.   Pt was initially started on Vancomycin/Zosyn, ID was consulted for further recommendations.     #Liver abscess   #Fevers  #Leukocytosis   #R hepatic lobe lesion c/f malignancy   #Hepatic vein thrombosis     Overall, 69 yo M PMH HTN, BPH, prostate cancer s/p prostatectomy 2012, diverticulosis presenting for fevers/weight loss, found to have R hepatic lesions and hepatic vein thrombosis, c/f malignancy. MRCP w/ findings of liver abscess.    Pt w/ complicated ID history, was seen by Dr. Huerta in 2018, h/o positive QuantiFeron Gold, declined treatment for latent TB, had eosinophilia, Strongy AB+, treated with Ivermectin. Also history of positive Echinococcus AB, however not seen in our system. Testing in our system from 2018 for echinococcus IgG was negative.     Discussed with Dr. Kendrick of radiology, the liver abscess seen on MRI do not appear cystic, favor pyogenic at this time. Echinococcus, Entamoeba AB negative, course complicated by GI bleed s/p colposcopy with likely diverticular bleed. S/p IR drain on 11/24 with 60 cc of purulent fluid aspirated, GS, Cx are negative to date.      Pt now improved on Zosyn, long discussion regarding plan of therapy. Pt does not want IV antibiotics, he lives with his daughter who is a , he cannot give himself IV antibiotics and states his daughter cannot assist. Ideally given no growth from liver abscess (likely sterilization after 1 week of IV Zosyn), would continue with IV antibiotic course outpatient, however given patient does not want IV abx, will change to Ciprofloxacin, Augmentin on discharge. This would offer coverage of Strep (ie anginosus), enterococcus, GNRs including pseudomonas, and anaerobes.   Pt will need close follow up with ID outpatient and repeat CT A/P w/ IV contrast on follow up     Plan:   1. C/w Zosyn for now while inpatient, on discharge, change to Augmentin 875/125 mg BID, Ciprofloxacin 500 mg BID (last EKG w/ QTC ~450). Please send 1 month supply of antibiotics   2. Will schedule outpatient followup with me on 12/21/23. Will need repeat CT A/P with IV contrast at that time.   3. Outpatient IR followup if discharge with planned with drain  4. Consider outpatient GI followup given GIB, portal vein thrombosis, liver abscess     If no improvement on oral therapy, will have to consider repeat drain/change to IV therapy. Patient is understanding of plan.     D/w primary team     Thank you for this consult. Inpatient ID consult team will sign off.    Further changes in lab values, imaging studies, or clinical status will not be known to ID inpatient consultants unless specifically communicated by primary team.    Ubaldo Marr MD  Attending Physician  Division of Infectious Diseases  Department of Medicine    Please contact through MS Teams message.  Office: [phone] (after 5 PM or weekend)

## 2023-11-27 NOTE — DISCHARGE NOTE NURSING/CASE MANAGEMENT/SOCIAL WORK - NSDCFUADDAPPT_GEN_ALL_CORE_FT
APPTS ARE READY TO BE MADE: [ ] YES    Best Family or Patient Contact (if needed):    Additional Information about above appointments (if needed):    1: GI  2: ID  3: IR  4. PCP    Other comments or requests:

## 2023-11-27 NOTE — PROGRESS NOTE ADULT - ASSESSMENT
68M PMH PMH Latent TB, diverticulosis presenting with 5 weeks of generalized weakness, 25 lb weight loss, and poor PO intake, recently treated for suspected malaria (unclear if confirmed with blood work), found to have evidence of new right hepatic lesion with suspected portal venous thrombosis (heparin on hold for hx of LGIB) now s/p CT guided hepatic abscess drainage yesterday with IR with concern for pleural transgression. CXR's reviewed no pneumothorax or pleural effusion.     Plan:  - Continue drainage, monitor output  - Flush drain with 5cc NS qd  - Can be discharged home with drain if outputs remain high  - Will need noncontrast CT + IR tube check once outputs < 10cc/24hr, can be arranged as outpatient follow-up. Outpatient IR office [phone]    [phone]

## 2023-11-27 NOTE — PROVIDER CONTACT NOTE (OTHER) - NAME OF MD/NP/PA/DO NOTIFIED:
MD Patricio Sherwood
MD Patricio Sherwood
MD Curry
MD Patricio Sherwood

## 2023-11-27 NOTE — PROGRESS NOTE ADULT - PROBLEM SELECTOR PLAN 4
Developed Hiccups, likely 2/2 to diaphragmatic irritation  - s/p Thorazine 25mg x1  Improving    Plan  - Baclofen  - EKG with QTc 470s  - c/w gabapentin 300 TID Developed Hiccups, likely 2/2 to diaphragmatic irritation. Resolved this AM (11/27)  - s/p Thorazine 25mg x1  - Baclofen  - EKG with QTc 470s  - C/w gabapentin 300 TID

## 2023-11-27 NOTE — MEDICAL STUDENT PROGRESS NOTE(EDUCATION) - PLAN 1
- Hbg to 5.7 with bright red blood with bowel movement while on heparin; was temporarily held  - given 3u pRBC total during hospital stay  - EGD WNL  - colonoscopy w pandiverticulosis and large internal hemorrhoids  - protonix 40 BID  - continue to monitor CBC - Hbg to 5.7 with bright red blood with bowel movement while on heparin; was temporarily held  - given 3u pRBC total during hospital stay  - EGD WNL  - colonoscopy w pandiverticulosis and large internal hemorrhoids  - discontinue protonix 40 BID  - continue to monitor CBC

## 2023-11-27 NOTE — PROGRESS NOTE ADULT - PROBLEM SELECTOR PLAN 3
WBC 25K, spiked fever to 101.1  Previously treated for ? malaria, possibly 2/2 to malignancy vs malaria flare ( ID low suspicion) vs infection of unclear etiology  Prior hx of eosinophilic leukocytosis  Apr ID recs, less likely infectious more likely malignancy vs thrombus  May be 2/2 to hepatic abscess, no eosinophilia noted, improving, entamoeba Ab neg    Plan  - c/w Zosyn  - f/u BCx, UCx  - f/u Entamoeba Ab, Stool Ova and Parasites  - f/u IR drainage bacterial Cx, fungal, AFB Cx  - f/u abscess cultures and sensitivities for abx selection, will likely need PICC and 4-6 weeks abx WBC 25K, spiked fever to 101.1. Previously treated for malaria, possibly 2/2 to malignancy vs malaria flare (ID low suspicion) vs infection of unclear etiology. Prior hx of eosinophilic leukocytosis. Apr ID recs: less likely infectious more likely malignancy vs thrombus. May be 2/2 to hepatic abscess, no eosinophilia noted, improving, entamoeba Ab neg  - c/w Zosyn  - f/u BCx, UCx  - f/u Entamoeba Ab, Stool Ova and Parasites  - f/u IR drainage bacterial Cx, fungal, AFB Cx  - f/u abscess cultures and sensitivities for abx selection, will likely need PICC and 4-6 weeks abx WBC 25K, spiked fever to 101.1. Previously treated for malaria, possibly 2/2 to malignancy vs malaria flare (ID low suspicion) vs infection of unclear etiology. Prior hx of eosinophilic leukocytosis. Apr ID recs: less likely infectious more likely malignancy vs thrombus. May be 2/2 to hepatic abscess, no eosinophilia noted, improving, entamoeba Ab neg  - c/w Zosyn  - f/u BCx, UCx--> NGTD   - f/u Entamoeba Ab, Stool Ova and Parasites  - f/u IR drainage bacterial Cx, fungal, AFB Cx--> NGTD  - abx as above

## 2023-11-27 NOTE — MEDICAL STUDENT PROGRESS NOTE(EDUCATION) - PLAN 5
-heme: no hypercoag workup; likely due to malignancy  - heparin restarted per CT surg  - continue to monitor - heme: no hypercoag workup; likely due to malignancy  - heparin restarted per CT surg  - continue to monitor

## 2023-11-27 NOTE — MEDICAL STUDENT PROGRESS NOTE(EDUCATION) - PLAN 2
- MRCP with hepatic abscess 7x6.7x6.9cm and 5x4.6x5.3, mild intrahepatic biliary duct dilation, increasing size  - elevated CEA, Ca 19-9, LFTs  - infectious cause most likely  - IR drainage of abscess, current biliary drain in place  - pain at site of drain: order lidocaine patch  - pending abscess cultures  - CT surg consulted for concern for pleural transgression: restarted heparin - MRCP with hepatic abscess 7x6.7x6.9cm and 5x4.6x5.3, mild intrahepatic biliary duct dilation, increasing size  - elevated CEA, Ca 19-9, LFTs  - infectious cause most likely  - IR drainage of abscess, current biliary drain in place  - pain at site of drain: order lidocaine patch  - abscess cultures negative, possible 2/2 sterilization  - CT surg consulted for concern for pleural transgression: restarted heparin

## 2023-11-27 NOTE — DISCHARGE NOTE NURSING/CASE MANAGEMENT/SOCIAL WORK - PATIENT PORTAL LINK FT
You can access the FollowMyHealth Patient Portal offered by [facility] by registering at the following website: [email or web address]. By joining [facility]’s FollowMyHealth portal, you will also be able to view your health information using other applications (apps) compatible with our system.

## 2023-11-27 NOTE — PROGRESS NOTE ADULT - SUBJECTIVE AND OBJECTIVE BOX
Patient is a 68y old  Male who presents with a chief complaint of weakness, weight loss, liver abscess (21 Nov 2023 07:47)    SUBJECTIVE / OVERNIGHT EVENTS: Patient seen and examined at bedside. No overnight events.    MEDICATIONS  (STANDING):  amLODIPine   Tablet 5 milliGRAM(s) Oral daily  folic acid 1 milliGRAM(s) Oral daily  gabapentin 300 milliGRAM(s) Oral three times a day  heparin  Infusion.  Unit(s)/Hr (12 mL/Hr) IV Continuous <Continuous>  lidocaine   4% Patch 1 Patch Transdermal every 24 hours  pantoprazole  Injectable 40 milliGRAM(s) IV Push two times a day  piperacillin/tazobactam IVPB.. 3.375 Gram(s) IV Intermittent every 8 hours  senna 2 Tablet(s) Oral at bedtime  tamsulosin 0.4 milliGRAM(s) Oral at bedtime    MEDICATIONS  (PRN):  acetaminophen     Tablet .. 650 milliGRAM(s) Oral every 6 hours PRN Mild Pain (1 - 3), Moderate Pain (4 - 6)  baclofen 5 milliGRAM(s) Oral every 12 hours PRN hiccups  heparin   Injectable 5500 Unit(s) IV Push every 6 hours PRN For aPTT less than 40  heparin   Injectable 2500 Unit(s) IV Push every 6 hours PRN For aPTT between 40 - 57    Vital Signs Last 24 Hrs  T(C): 36.3 (27 Nov 2023 05:16), Max: 37 (26 Nov 2023 13:00)  T(F): 97.3 (27 Nov 2023 05:16), Max: 98.6 (26 Nov 2023 13:00)  HR: 105 (27 Nov 2023 05:16) (86 - 105)  BP: 123/64 (27 Nov 2023 05:16) (101/62 - 123/64)  BP(mean): --  RR: 18 (27 Nov 2023 05:16) (16 - 18)  SpO2: 98% (27 Nov 2023 05:16) (95% - 100%)    Parameters below as of 27 Nov 2023 05:16  Patient On (Oxygen Delivery Method): room air    CAPILLARY BLOOD GLUCOSE    I&O's Summary    26 Nov 2023 07:01  -  27 Nov 2023 07:00  --------------------------------------------------------  IN: 0 mL / OUT: 12.5 mL / NET: -12.5 mL    PHYSICAL EXAM:  GENERAL: Laying comfortably, NAD  HEENT: NCAT, PERRLA, EOMI, no scleral icterus, no LAD  NECK: No JVD, supple  LUNG: Decreased breath sounds in bases bilaterally  HEART: RRR; normal S1/S2; No murmurs, rubs, or gallops  ABDOMEN: +BS, soft, nontender, nondistended, no HSM; No rebound, guarding, or rigidity, IR drain in place producing serosanguinous/bloody fluids  EXTREMITIES:  No LE edema b/l, 2+ Peripheral Pulses, No clubbing or cyanosis  NEUROLOGY: AOx3, non-focal    LABS:                        8.5    8.30  )-----------( 358      ( 26 Nov 2023 07:19 )             26.3     11-26    137  |  104  |  8   ----------------------------<  91  4.3   |  24  |  1.26    Ca    8.6      26 Nov 2023 07:19  Phos  3.3     11-26  Mg     1.90     11-26    TPro  6.2  /  Alb  2.4<L>  /  TBili  0.7  /  DBili  x   /  AST  15  /  ALT  16  /  AlkPhos  102  11-26    PTT - ( 26 Nov 2023 07:19 )  PTT:93.0 sec    Urinalysis Basic - ( 26 Nov 2023 07:19 )    Color: x / Appearance: x / SG: x / pH: x  Gluc: 91 mg/dL / Ketone: x  / Bili: x / Urobili: x   Blood: x / Protein: x / Nitrite: x   Leuk Esterase: x / RBC: x / WBC x   Sq Epi: x / Non Sq Epi: x / Bacteria: x    RADIOLOGY & ADDITIONAL TESTS:    Imaging Personally Reviewed:    Consultant(s) Notes Reviewed:      Care Discussed with Consultants/Other Providers:    Cj Trevino MD, Internal Medicine Resident Patient is a 68y old  Male who presents with a chief complaint of weakness, weight loss, liver abscess (21 Nov 2023 07:47)    SUBJECTIVE / OVERNIGHT EVENTS: Patient seen and examined at bedside. No overnight events. Patient without acute concerns this AM, states his hiccups that were present over the weekend have subsided today. Denies CP, SOB, n/v/d.     MEDICATIONS  (STANDING):  amLODIPine   Tablet 5 milliGRAM(s) Oral daily  folic acid 1 milliGRAM(s) Oral daily  gabapentin 300 milliGRAM(s) Oral three times a day  heparin  Infusion.  Unit(s)/Hr (12 mL/Hr) IV Continuous <Continuous>  lidocaine   4% Patch 1 Patch Transdermal every 24 hours  pantoprazole  Injectable 40 milliGRAM(s) IV Push two times a day  piperacillin/tazobactam IVPB.. 3.375 Gram(s) IV Intermittent every 8 hours  senna 2 Tablet(s) Oral at bedtime  tamsulosin 0.4 milliGRAM(s) Oral at bedtime    MEDICATIONS  (PRN):  acetaminophen     Tablet .. 650 milliGRAM(s) Oral every 6 hours PRN Mild Pain (1 - 3), Moderate Pain (4 - 6)  baclofen 5 milliGRAM(s) Oral every 12 hours PRN hiccups  heparin   Injectable 5500 Unit(s) IV Push every 6 hours PRN For aPTT less than 40  heparin   Injectable 2500 Unit(s) IV Push every 6 hours PRN For aPTT between 40 - 57    Vital Signs Last 24 Hrs  T(C): 36.3 (27 Nov 2023 05:16), Max: 37 (26 Nov 2023 13:00)  T(F): 97.3 (27 Nov 2023 05:16), Max: 98.6 (26 Nov 2023 13:00)  HR: 105 (27 Nov 2023 05:16) (86 - 105)  BP: 123/64 (27 Nov 2023 05:16) (101/62 - 123/64)  BP(mean): --  RR: 18 (27 Nov 2023 05:16) (16 - 18)  SpO2: 98% (27 Nov 2023 05:16) (95% - 100%)    Parameters below as of 27 Nov 2023 05:16  Patient On (Oxygen Delivery Method): room air    CAPILLARY BLOOD GLUCOSE    I&O's Summary    26 Nov 2023 07:01  -  27 Nov 2023 07:00  --------------------------------------------------------  IN: 0 mL / OUT: 12.5 mL / NET: -12.5 mL    PHYSICAL EXAM:  GENERAL: Laying comfortably, NAD  HEENT: NCAT, PERRLA, EOMI, no scleral icterus, no LAD  NECK: No JVD, supple  LUNG: Decreased breath sounds in bases bilaterally  HEART: RRR; normal S1/S2; No murmurs, rubs, or gallops  ABDOMEN: +BS, soft, nontender, nondistended, no HSM; No rebound, guarding, or rigidity, IR drain in place producing serosanguinous/bloody fluids  EXTREMITIES:  No LE edema b/l, 2+ Peripheral Pulses, No clubbing or cyanosis  NEUROLOGY: AOx3, non-focal    LABS:                        8.5    8.30  )-----------( 358      ( 26 Nov 2023 07:19 )             26.3     11-26    137  |  104  |  8   ----------------------------<  91  4.3   |  24  |  1.26    Ca    8.6      26 Nov 2023 07:19  Phos  3.3     11-26  Mg     1.90     11-26    TPro  6.2  /  Alb  2.4<L>  /  TBili  0.7  /  DBili  x   /  AST  15  /  ALT  16  /  AlkPhos  102  11-26    PTT - ( 26 Nov 2023 07:19 )  PTT:93.0 sec    Urinalysis Basic - ( 26 Nov 2023 07:19 )    Color: x / Appearance: x / SG: x / pH: x  Gluc: 91 mg/dL / Ketone: x  / Bili: x / Urobili: x   Blood: x / Protein: x / Nitrite: x   Leuk Esterase: x / RBC: x / WBC x   Sq Epi: x / Non Sq Epi: x / Bacteria: x    RADIOLOGY & ADDITIONAL TESTS:    Imaging Personally Reviewed:    Consultant(s) Notes Reviewed:      Care Discussed with Consultants/Other Providers:    Cj Trevino MD, Internal Medicine Resident

## 2023-11-27 NOTE — MEDICAL STUDENT PROGRESS NOTE(EDUCATION) - PLAN 3
- leukocytosis max 25, now 10.96  - F max 101,1; now 97.3  - ID: more likely due to malignancy/thrombus  - continue zosyn  - ucx negative  - pending abscess cultures  - will likely need PICC for 4-6 weeks of abx - leukocytosis max 25, now 10.96  - F max 101,1; now 97.3  - ID: more likely due to malignancy/thrombus  - continue zosyn  - ucx negative  - abscess cultures negative, possible 2/2 sterilization  - pt refusing IV on discharge, will consult ID  - ID rec: d/c w/ PO cipro/amox  - will likely need PICC for 4-6 weeks of abx

## 2023-11-27 NOTE — PROGRESS NOTE ADULT - PROBLEM SELECTOR PLAN 1
Hgb dropped to 5.7 overnight with 1 episode bloody BM bright red  Hx of diverticulosis in the past with GIB   On IV heparin for thrombus, now discontinued  EGD unremarkable, Colonoscopy with pandiverticulosis c/f diverticular bleed as source, noted non-bleeding large internal hemorrhoids    Plan  >consult: GI recs  - Protonix 40 BID  - f/u CBC q8h  - maintain 2 large bore IVs  - active T&S  - transfuse goal >7 Hgb dropped to 5.7 first night with 1 episode BR BM. Hx of diverticulosis in the past with GIB. EGD unremarkable, Colonoscopy with pandiverticulosis c/f diverticular bleed as source, noted non-bleeding large internal hemorrhoids  - GI consulted, recs appreciated  - D/c Protonix 40 BID as likely not UGIB  - CBC qD  - maintain 2 large bore IVs, active T&S, transfuse goal >7  - C/w heparin for thrombus Hgb dropped to 5.7 first night with 1 episode BR BM. Hx of diverticulosis in the past with GIB. EGD unremarkable, Colonoscopy with pandiverticulosis c/f diverticular bleed as source, noted non-bleeding large internal hemorrhoids  - GI consulted, recs appreciated  - D/c Protonix 40 BID as likely not UGIB  - CBC qD  - Maintain 2 large bore IVs, active T&S, transfuse goal >7  - C/w heparin for thrombus Hgb dropped to 5.7 first night with 1 episode BR BM. Hx of diverticulosis in the past with GIB. EGD unremarkable, Colonoscopy with pandiverticulosis c/f diverticular bleed as source, noted non-bleeding large internal hemorrhoids  - GI consulted, recs appreciated  - D/c Protonix 40 BID as likely not UGIB  - CBC qD  - Maintain 2 large bore IVs, active T&S, transfuse goal >7  - transition to Eliquis for thrombus, monitor for repeat bleeding

## 2023-11-27 NOTE — PROGRESS NOTE ADULT - NS ATTEST RISK PROBLEM GEN_ALL_CORE FT
Pt at high risk requiring heparin drip and frequent monitoring of coagulation profile and need for liver bx
As documented above.
Pt at high risk requiring multiple units of prbc and HD comporomise.

## 2023-11-27 NOTE — PROGRESS NOTE ADULT - PROBLEM SELECTOR PLAN 5
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  Apr Heme, no hypercoag workup needed, likely 2/2 to infection. Transition to DOAC on d/c    Plan  - heparin resume 11/25 PM, if stable may consider transitioning to Eliquis after PICC placement  - continue to monitor CT c/f portal vein thrombus. 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

## 2023-11-27 NOTE — PROGRESS NOTE ADULT - SUBJECTIVE AND OBJECTIVE BOX
Infectious Diseases Follow Up:    Patient is a 68y old  Male who presents with a chief complaint of weakness, weight loss, liver abscess (21 Nov 2023 07:47)      Interval History/ROS:  No acute events ON. Pt is doing well, has a little pain near drain site.     Allergies  No Known Allergies        ANTIMICROBIALS:  piperacillin/tazobactam IVPB.. 3.375 every 8 hours      Current Abx:     Previous Abx     OTHER MEDS:  MEDICATIONS  (STANDING):  acetaminophen     Tablet .. 650 every 6 hours PRN  amLODIPine   Tablet 5 daily  baclofen 5 every 12 hours PRN  gabapentin 300 three times a day  heparin   Injectable 2500 every 6 hours PRN  heparin   Injectable 5500 every 6 hours PRN  heparin  Infusion.  <Continuous>  polyethylene glycol 3350 17 daily  senna 2 at bedtime  tamsulosin 0.4 at bedtime      Vital Signs Last 24 Hrs  T(C): 36.3 (27 Nov 2023 05:16), Max: 37 (26 Nov 2023 13:00)  T(F): 97.3 (27 Nov 2023 05:16), Max: 98.6 (26 Nov 2023 13:00)  HR: 105 (27 Nov 2023 05:16) (86 - 105)  BP: 123/64 (27 Nov 2023 05:16) (102/66 - 123/64)  BP(mean): --  RR: 18 (27 Nov 2023 05:16) (16 - 18)  SpO2: 98% (27 Nov 2023 05:16) (95% - 100%)    Parameters below as of 27 Nov 2023 05:16  Patient On (Oxygen Delivery Method): room air        PHYSICAL EXAM:  GENERAL: NAD, well-developed  HEAD:  Atraumatic, Normocephalic  EYES: EOMI, PERRLA, conjunctiva and sclera clear  NECK: Supple, No JVD  CHEST/LUNG: Clear to auscultation bilaterally; No wheeze  HEART: Regular rate and rhythm; No murmurs, rubs, or gallops  ABDOMEN: +RUQ drain, mild tenderness   EXTREMITIES:  2+ Peripheral Pulses, No clubbing, cyanosis, or edema  PSYCH: AAOx3  NEUROLOGY: non-focal  SKIN: No rashes or lesions                          9.9    10.96 )-----------( 418      ( 27 Nov 2023 06:30 )             30.9       11-27    136  |  103  |  13  ----------------------------<  98  4.2   |  23  |  1.34<H>    Ca    8.7      27 Nov 2023 06:30  Phos  2.5     11-27  Mg     1.90     11-27    TPro  6.7  /  Alb  2.6<L>  /  TBili  0.7  /  DBili  x   /  AST  20  /  ALT  17  /  AlkPhos  112  11-27      Urinalysis Basic - ( 27 Nov 2023 06:30 )    Color: x / Appearance: x / SG: x / pH: x  Gluc: 98 mg/dL / Ketone: x  / Bili: x / Urobili: x   Blood: x / Protein: x / Nitrite: x   Leuk Esterase: x / RBC: x / WBC x   Sq Epi: x / Non Sq Epi: x / Bacteria: x        MICROBIOLOGY:  v  .Body Fluid HEPATIC ABSCESS  11-24-23   No growth  --    Moderate polymorphonuclear leukocytes seen per low power field  No organisms seen per oil power field      Clean Catch Clean Catch (Midstream)  11-14-23   No growth  --  --      .Blood Blood-Peripheral  11-13-23   No growth at 5 days  --  --      .Blood Blood-Peripheral  11-13-23   No growth at 5 days  --  --      .Blood  11-11-23   No Blood Parasites observed by giemsa stain  One negative set of blood smears does not rule out  the possibility of a parasitic infection.  A minimum of 3  specimens should be collected, at least 12-24 hours apart,  over a 36 hour time period.  ************************************************************  NEGATIVE for Plasmodium antigens. Microscopy is performed for  confirmation.  The Malaria Rapid antigen test does not detect the  presence of Babesia species. If Babesiosis is suspected  please order test Babesia PCR: Babesia microti PCR Bld  ************************************************************  --  --                RADIOLOGY:

## 2023-11-27 NOTE — PROGRESS NOTE ADULT - SUBJECTIVE AND OBJECTIVE BOX
69yo Male s/p hepatic abscess drainage on 11/24 in Interventional Radiology.     Patient seen and examined at bedside, resting comfortably.   Reports slight pain at drain site when coughing and with deep breaths. Denies difficulty breathing.  CXR's reviewed no pneumothorax or pleural effusion.     T(F): 97.3 (11-27-23 @ 05:16), Max: 98.6 (11-26-23 @ 13:00)  HR: 105 (11-27-23 @ 05:16) (86 - 105)  BP: 123/64 (11-27-23 @ 05:16) (102/66 - 123/64)  RR: 18 (11-27-23 @ 05:16) (16 - 18)  SpO2: 98% (11-27-23 @ 05:16) (95% - 100%)    LABS:                        9.9    10.96 )-----------( 418      ( 27 Nov 2023 06:30 )             30.9     11-27    136  |  103  |  13  ----------------------------<  98  4.2   |  23  |  1.34<H>    Ca    8.7      27 Nov 2023 06:30  Phos  2.5     11-27  Mg     1.90     11-27    TPro  6.7  /  Alb  2.6<L>  /  TBili  0.7  /  DBili  x   /  AST  20  /  ALT  17  /  AlkPhos  112  11-27    PTT - ( 27 Nov 2023 06:30 )  PTT:83.6 sec  I&O's Detail    26 Nov 2023 07:01  -  27 Nov 2023 07:00  --------------------------------------------------------  IN:  Total IN: 0 mL    OUT:    Drain (mL): 12.5 mL  Total OUT: 12.5 mL    Total NET: -12.5 mL    PHYSICAL EXAM:  General: Nontoxic, in NAD  Hepatic drain: dressing c/d/i, drain bag with serosanguinous drainage, flushed with 5cc NS

## 2023-11-27 NOTE — DISCHARGE NOTE NURSING/CASE MANAGEMENT/SOCIAL WORK - NSDCPEFALRISK_GEN_ALL_CORE
For information on Fall & Injury Prevention, visit: [email or web address] OR  [email or web address] OR  https://www.cdc.gov/steadi/patient.html

## 2023-11-28 VITALS
RESPIRATION RATE: 18 BRPM | DIASTOLIC BLOOD PRESSURE: 61 MMHG | TEMPERATURE: 98 F | OXYGEN SATURATION: 97 % | HEART RATE: 97 BPM | SYSTOLIC BLOOD PRESSURE: 107 MMHG

## 2023-11-28 LAB
ALBUMIN SERPL ELPH-MCNC: 2.5 G/DL — LOW (ref 3.3–5)
ALBUMIN SERPL ELPH-MCNC: 2.5 G/DL — LOW (ref 3.3–5)
ALP SERPL-CCNC: 105 U/L — SIGNIFICANT CHANGE UP (ref 40–120)
ALP SERPL-CCNC: 105 U/L — SIGNIFICANT CHANGE UP (ref 40–120)
ALT FLD-CCNC: 17 U/L — SIGNIFICANT CHANGE UP (ref 4–41)
ALT FLD-CCNC: 17 U/L — SIGNIFICANT CHANGE UP (ref 4–41)
ANION GAP SERPL CALC-SCNC: 9 MMOL/L — SIGNIFICANT CHANGE UP (ref 7–14)
ANION GAP SERPL CALC-SCNC: 9 MMOL/L — SIGNIFICANT CHANGE UP (ref 7–14)
AST SERPL-CCNC: 19 U/L — SIGNIFICANT CHANGE UP (ref 4–40)
AST SERPL-CCNC: 19 U/L — SIGNIFICANT CHANGE UP (ref 4–40)
BASOPHILS # BLD AUTO: 0.07 K/UL — SIGNIFICANT CHANGE UP (ref 0–0.2)
BASOPHILS # BLD AUTO: 0.07 K/UL — SIGNIFICANT CHANGE UP (ref 0–0.2)
BASOPHILS NFR BLD AUTO: 0.9 % — SIGNIFICANT CHANGE UP (ref 0–2)
BASOPHILS NFR BLD AUTO: 0.9 % — SIGNIFICANT CHANGE UP (ref 0–2)
BILIRUB SERPL-MCNC: 0.7 MG/DL — SIGNIFICANT CHANGE UP (ref 0.2–1.2)
BILIRUB SERPL-MCNC: 0.7 MG/DL — SIGNIFICANT CHANGE UP (ref 0.2–1.2)
BUN SERPL-MCNC: 16 MG/DL — SIGNIFICANT CHANGE UP (ref 7–23)
BUN SERPL-MCNC: 16 MG/DL — SIGNIFICANT CHANGE UP (ref 7–23)
CALCIUM SERPL-MCNC: 8.6 MG/DL — SIGNIFICANT CHANGE UP (ref 8.4–10.5)
CALCIUM SERPL-MCNC: 8.6 MG/DL — SIGNIFICANT CHANGE UP (ref 8.4–10.5)
CHLORIDE SERPL-SCNC: 104 MMOL/L — SIGNIFICANT CHANGE UP (ref 98–107)
CHLORIDE SERPL-SCNC: 104 MMOL/L — SIGNIFICANT CHANGE UP (ref 98–107)
CO2 SERPL-SCNC: 23 MMOL/L — SIGNIFICANT CHANGE UP (ref 22–31)
CO2 SERPL-SCNC: 23 MMOL/L — SIGNIFICANT CHANGE UP (ref 22–31)
CREAT SERPL-MCNC: 1.26 MG/DL — SIGNIFICANT CHANGE UP (ref 0.5–1.3)
CREAT SERPL-MCNC: 1.26 MG/DL — SIGNIFICANT CHANGE UP (ref 0.5–1.3)
EGFR: 62 ML/MIN/1.73M2 — SIGNIFICANT CHANGE UP
EGFR: 62 ML/MIN/1.73M2 — SIGNIFICANT CHANGE UP
EOSINOPHIL # BLD AUTO: 0.23 K/UL — SIGNIFICANT CHANGE UP (ref 0–0.5)
EOSINOPHIL # BLD AUTO: 0.23 K/UL — SIGNIFICANT CHANGE UP (ref 0–0.5)
EOSINOPHIL NFR BLD AUTO: 3.1 % — SIGNIFICANT CHANGE UP (ref 0–6)
EOSINOPHIL NFR BLD AUTO: 3.1 % — SIGNIFICANT CHANGE UP (ref 0–6)
GLUCOSE SERPL-MCNC: 92 MG/DL — SIGNIFICANT CHANGE UP (ref 70–99)
GLUCOSE SERPL-MCNC: 92 MG/DL — SIGNIFICANT CHANGE UP (ref 70–99)
HCT VFR BLD CALC: 27.4 % — LOW (ref 39–50)
HCT VFR BLD CALC: 27.4 % — LOW (ref 39–50)
HGB BLD-MCNC: 8.7 G/DL — LOW (ref 13–17)
HGB BLD-MCNC: 8.7 G/DL — LOW (ref 13–17)
IANC: 4.97 K/UL — SIGNIFICANT CHANGE UP (ref 1.8–7.4)
IANC: 4.97 K/UL — SIGNIFICANT CHANGE UP (ref 1.8–7.4)
IMM GRANULOCYTES NFR BLD AUTO: 0.3 % — SIGNIFICANT CHANGE UP (ref 0–0.9)
IMM GRANULOCYTES NFR BLD AUTO: 0.3 % — SIGNIFICANT CHANGE UP (ref 0–0.9)
LYMPHOCYTES # BLD AUTO: 1.73 K/UL — SIGNIFICANT CHANGE UP (ref 1–3.3)
LYMPHOCYTES # BLD AUTO: 1.73 K/UL — SIGNIFICANT CHANGE UP (ref 1–3.3)
LYMPHOCYTES # BLD AUTO: 23.3 % — SIGNIFICANT CHANGE UP (ref 13–44)
LYMPHOCYTES # BLD AUTO: 23.3 % — SIGNIFICANT CHANGE UP (ref 13–44)
MAGNESIUM SERPL-MCNC: 1.9 MG/DL — SIGNIFICANT CHANGE UP (ref 1.6–2.6)
MAGNESIUM SERPL-MCNC: 1.9 MG/DL — SIGNIFICANT CHANGE UP (ref 1.6–2.6)
MCHC RBC-ENTMCNC: 30.3 PG — SIGNIFICANT CHANGE UP (ref 27–34)
MCHC RBC-ENTMCNC: 30.3 PG — SIGNIFICANT CHANGE UP (ref 27–34)
MCHC RBC-ENTMCNC: 31.8 GM/DL — LOW (ref 32–36)
MCHC RBC-ENTMCNC: 31.8 GM/DL — LOW (ref 32–36)
MCV RBC AUTO: 95.5 FL — SIGNIFICANT CHANGE UP (ref 80–100)
MCV RBC AUTO: 95.5 FL — SIGNIFICANT CHANGE UP (ref 80–100)
MONOCYTES # BLD AUTO: 0.42 K/UL — SIGNIFICANT CHANGE UP (ref 0–0.9)
MONOCYTES # BLD AUTO: 0.42 K/UL — SIGNIFICANT CHANGE UP (ref 0–0.9)
MONOCYTES NFR BLD AUTO: 5.6 % — SIGNIFICANT CHANGE UP (ref 2–14)
MONOCYTES NFR BLD AUTO: 5.6 % — SIGNIFICANT CHANGE UP (ref 2–14)
NEUTROPHILS # BLD AUTO: 4.97 K/UL — SIGNIFICANT CHANGE UP (ref 1.8–7.4)
NEUTROPHILS # BLD AUTO: 4.97 K/UL — SIGNIFICANT CHANGE UP (ref 1.8–7.4)
NEUTROPHILS NFR BLD AUTO: 66.8 % — SIGNIFICANT CHANGE UP (ref 43–77)
NEUTROPHILS NFR BLD AUTO: 66.8 % — SIGNIFICANT CHANGE UP (ref 43–77)
NRBC # BLD: 0 /100 WBCS — SIGNIFICANT CHANGE UP (ref 0–0)
NRBC # BLD: 0 /100 WBCS — SIGNIFICANT CHANGE UP (ref 0–0)
NRBC # FLD: 0 K/UL — SIGNIFICANT CHANGE UP (ref 0–0)
NRBC # FLD: 0 K/UL — SIGNIFICANT CHANGE UP (ref 0–0)
PHOSPHATE SERPL-MCNC: 2.8 MG/DL — SIGNIFICANT CHANGE UP (ref 2.5–4.5)
PHOSPHATE SERPL-MCNC: 2.8 MG/DL — SIGNIFICANT CHANGE UP (ref 2.5–4.5)
PLATELET # BLD AUTO: 390 K/UL — SIGNIFICANT CHANGE UP (ref 150–400)
PLATELET # BLD AUTO: 390 K/UL — SIGNIFICANT CHANGE UP (ref 150–400)
POTASSIUM SERPL-MCNC: 3.9 MMOL/L — SIGNIFICANT CHANGE UP (ref 3.5–5.3)
POTASSIUM SERPL-MCNC: 3.9 MMOL/L — SIGNIFICANT CHANGE UP (ref 3.5–5.3)
POTASSIUM SERPL-SCNC: 3.9 MMOL/L — SIGNIFICANT CHANGE UP (ref 3.5–5.3)
POTASSIUM SERPL-SCNC: 3.9 MMOL/L — SIGNIFICANT CHANGE UP (ref 3.5–5.3)
PROT SERPL-MCNC: 6.3 G/DL — SIGNIFICANT CHANGE UP (ref 6–8.3)
PROT SERPL-MCNC: 6.3 G/DL — SIGNIFICANT CHANGE UP (ref 6–8.3)
RBC # BLD: 2.87 M/UL — LOW (ref 4.2–5.8)
RBC # BLD: 2.87 M/UL — LOW (ref 4.2–5.8)
RBC # FLD: 17.9 % — HIGH (ref 10.3–14.5)
RBC # FLD: 17.9 % — HIGH (ref 10.3–14.5)
SODIUM SERPL-SCNC: 136 MMOL/L — SIGNIFICANT CHANGE UP (ref 135–145)
SODIUM SERPL-SCNC: 136 MMOL/L — SIGNIFICANT CHANGE UP (ref 135–145)
WBC # BLD: 7.44 K/UL — SIGNIFICANT CHANGE UP (ref 3.8–10.5)
WBC # BLD: 7.44 K/UL — SIGNIFICANT CHANGE UP (ref 3.8–10.5)
WBC # FLD AUTO: 7.44 K/UL — SIGNIFICANT CHANGE UP (ref 3.8–10.5)
WBC # FLD AUTO: 7.44 K/UL — SIGNIFICANT CHANGE UP (ref 3.8–10.5)

## 2023-11-28 PROCEDURE — 99239 HOSP IP/OBS DSCHRG MGMT >30: CPT | Mod: GC

## 2023-11-28 PROCEDURE — 99232 SBSQ HOSP IP/OBS MODERATE 35: CPT

## 2023-11-28 RX ORDER — APIXABAN 2.5 MG/1
1 TABLET, FILM COATED ORAL
Qty: 0 | Refills: 0 | DISCHARGE
Start: 2023-11-28

## 2023-11-28 RX ORDER — APIXABAN 2.5 MG/1
1 TABLET, FILM COATED ORAL
Qty: 60 | Refills: 0
Start: 2023-11-28 | End: 2023-12-27

## 2023-11-28 RX ORDER — CIPROFLOXACIN LACTATE 400MG/40ML
1 VIAL (ML) INTRAVENOUS
Qty: 60 | Refills: 0
Start: 2023-11-28 | End: 2023-12-27

## 2023-11-28 RX ORDER — GABAPENTIN 400 MG/1
1 CAPSULE ORAL
Qty: 0 | Refills: 0 | DISCHARGE
Start: 2023-11-28

## 2023-11-28 RX ADMIN — PIPERACILLIN AND TAZOBACTAM 25 GRAM(S): 4; .5 INJECTION, POWDER, LYOPHILIZED, FOR SOLUTION INTRAVENOUS at 15:01

## 2023-11-28 RX ADMIN — AMLODIPINE BESYLATE 5 MILLIGRAM(S): 2.5 TABLET ORAL at 05:29

## 2023-11-28 RX ADMIN — GABAPENTIN 300 MILLIGRAM(S): 400 CAPSULE ORAL at 05:29

## 2023-11-28 RX ADMIN — Medication 1 MILLIGRAM(S): at 12:32

## 2023-11-28 RX ADMIN — APIXABAN 5 MILLIGRAM(S): 2.5 TABLET, FILM COATED ORAL at 05:30

## 2023-11-28 RX ADMIN — PIPERACILLIN AND TAZOBACTAM 25 GRAM(S): 4; .5 INJECTION, POWDER, LYOPHILIZED, FOR SOLUTION INTRAVENOUS at 05:30

## 2023-11-28 RX ADMIN — GABAPENTIN 300 MILLIGRAM(S): 400 CAPSULE ORAL at 12:32

## 2023-11-28 NOTE — PROGRESS NOTE ADULT - NUTRITIONAL ASSESSMENT
This patient has been assessed with a concern for Malnutrition and has been determined to have a diagnosis/diagnoses of Severe protein-calorie malnutrition.    This patient is being managed with:   Diet NPO after Midnight-     NPO Start Date: 21-Nov-2023   NPO Start Time: 23:59  Except Medications  Entered: Nov 21 2023  8:31PM    Diet Clear Liquid-  Entered: Nov 21 2023 12:22PM  
This patient has been assessed with a concern for Malnutrition and has been determined to have a diagnosis/diagnoses of Severe protein-calorie malnutrition.    This patient is being managed with:   Diet Regular-  DASH/TLC {Sodium & Cholesterol Restricted} (DASH)  Entered: Nov 27 2023  4:31PM  
This patient has been assessed with a concern for Malnutrition and has been determined to have a diagnosis/diagnoses of Severe protein-calorie malnutrition.    This patient is being managed with:   Diet Regular-  DASH/TLC {Sodium & Cholesterol Restricted} (DASH)  Supplement Feeding Modality:  Oral  Ensure Enlive Cans or Servings Per Day:  2       Frequency:  Daily  Entered: Nov 23 2023  8:36AM  
This patient has been assessed with a concern for Malnutrition and has been determined to have a diagnosis/diagnoses of Severe protein-calorie malnutrition.    This patient is being managed with:   Diet Regular-  DASH/TLC {Sodium & Cholesterol Restricted} (DASH)  Supplement Feeding Modality:  Oral  Ensure Enlive Cans or Servings Per Day:  2       Frequency:  Daily  Entered: Nov 12 2023  6:37PM  
This patient has been assessed with a concern for Malnutrition and has been determined to have a diagnosis/diagnoses of Severe protein-calorie malnutrition.    This patient is being managed with:   Diet Regular-  DASH/TLC {Sodium & Cholesterol Restricted} (DASH)  Supplement Feeding Modality:  Oral  Ensure Enlive Cans or Servings Per Day:  2       Frequency:  Daily  Entered: Nov 17 2023  9:06AM    Diet NPO after Midnight-     NPO Start Date: 16-Nov-2023   NPO Start Time: 23:59  Except Medications  Entered: Nov 16 2023  5:26PM    Diet NPO after Midnight-     NPO Start Date: 15-Nov-2023   NPO Start Time: 23:59  Except Medications  Entered: Nov 16 2023  2:06PM  
This patient has been assessed with a concern for Malnutrition and has been determined to have a diagnosis/diagnoses of Severe protein-calorie malnutrition.    This patient is being managed with:   Diet Regular-  DASH/TLC {Sodium & Cholesterol Restricted} (DASH)  Supplement Feeding Modality:  Oral  Ensure Enlive Cans or Servings Per Day:  2       Frequency:  Daily  Entered: Nov 23 2023  8:36AM    Diet NPO after Midnight-     NPO Start Date: 23-Nov-2023   NPO Start Time: 23:59  Entered: Nov 23 2023  8:16AM    Diet NPO after Midnight-     NPO Start Date: 21-Nov-2023   NPO Start Time: 23:59  Except Medications  Entered: Nov 21 2023  8:31PM  
This patient has been assessed with a concern for Malnutrition and has been determined to have a diagnosis/diagnoses of Severe protein-calorie malnutrition.    This patient is being managed with:   Diet NPO after Midnight-     NPO Start Date: 16-Nov-2023   NPO Start Time: 23:59  Except Medications  Entered: Nov 16 2023  5:26PM    Diet NPO after Midnight-     NPO Start Date: 15-Nov-2023   NPO Start Time: 23:59  Except Medications  Entered: Nov 16 2023  2:06PM    Diet Regular-  Entered: Nov 16 2023 12:28PM  
This patient has been assessed with a concern for Malnutrition and has been determined to have a diagnosis/diagnoses of Severe protein-calorie malnutrition.    This patient is being managed with:   Diet Regular-  DASH/TLC {Sodium & Cholesterol Restricted} (DASH)  Supplement Feeding Modality:  Oral  Ensure Enlive Cans or Servings Per Day:  2       Frequency:  Daily  Entered: Nov 23 2023  8:36AM    Diet NPO after Midnight-     NPO Start Date: 23-Nov-2023   NPO Start Time: 23:59  Entered: Nov 23 2023  8:16AM    Diet NPO after Midnight-     NPO Start Date: 21-Nov-2023   NPO Start Time: 23:59  Except Medications  Entered: Nov 21 2023  8:31PM  
This patient has been assessed with a concern for Malnutrition and has been determined to have a diagnosis/diagnoses of Severe protein-calorie malnutrition.    This patient is being managed with:   Diet Regular-  DASH/TLC {Sodium & Cholesterol Restricted} (DASH)  Supplement Feeding Modality:  Oral  Ensure Enlive Cans or Servings Per Day:  2       Frequency:  Daily  Entered: Nov 17 2023  9:06AM    Diet NPO after Midnight-     NPO Start Date: 16-Nov-2023   NPO Start Time: 23:59  Except Medications  Entered: Nov 16 2023  5:26PM    Diet NPO after Midnight-     NPO Start Date: 15-Nov-2023   NPO Start Time: 23:59  Except Medications  Entered: Nov 16 2023  2:06PM  
This patient has been assessed with a concern for Malnutrition and has been determined to have a diagnosis/diagnoses of Severe protein-calorie malnutrition.    This patient is being managed with:   Diet Soft and Bite Sized-  Entered: Nov 14 2023  5:31PM  
This patient has been assessed with a concern for Malnutrition and has been determined to have a diagnosis/diagnoses of Severe protein-calorie malnutrition.    This patient is being managed with:   Diet NPO after Midnight-     NPO Start Date: 15-Nov-2023   NPO Start Time: 23:59  Except Medications  Entered: Nov 15 2023 11:45AM    Diet Soft and Bite Sized-  Entered: Nov 14 2023  5:31PM  
This patient has been assessed with a concern for Malnutrition and has been determined to have a diagnosis/diagnoses of Severe protein-calorie malnutrition.    This patient is being managed with:   Diet Regular-  DASH/TLC {Sodium & Cholesterol Restricted} (DASH)  Supplement Feeding Modality:  Oral  Ensure Enlive Cans or Servings Per Day:  2       Frequency:  Daily  Entered: Nov 23 2023  8:36AM  
This patient has been assessed with a concern for Malnutrition and has been determined to have a diagnosis/diagnoses of Severe protein-calorie malnutrition.    This patient is being managed with:   Diet Regular-  DASH/TLC {Sodium & Cholesterol Restricted} (DASH)  Supplement Feeding Modality:  Oral  Ensure Enlive Cans or Servings Per Day:  2       Frequency:  Daily  Entered: Nov 17 2023  9:06AM    Diet NPO after Midnight-     NPO Start Date: 16-Nov-2023   NPO Start Time: 23:59  Except Medications  Entered: Nov 16 2023  5:26PM    Diet NPO after Midnight-     NPO Start Date: 15-Nov-2023   NPO Start Time: 23:59  Except Medications  Entered: Nov 16 2023  2:06PM  
This patient has been assessed with a concern for Malnutrition and has been determined to have a diagnosis/diagnoses of Severe protein-calorie malnutrition.    This patient is being managed with:   Diet NPO-  Except Medications  Entered: Nov 21 2023  7:14AM    Diet NPO after Midnight-     NPO Start Date: 20-Nov-2023   NPO Start Time: 23:59  Except Medications  Entered: Nov 20 2023  4:33PM  
This patient has been assessed with a concern for Malnutrition and has been determined to have a diagnosis/diagnoses of Severe protein-calorie malnutrition.    This patient is being managed with:   Diet Full Liquid-  Entered: Nov 22 2023  2:55PM    Diet NPO after Midnight-     NPO Start Date: 21-Nov-2023   NPO Start Time: 23:59  Except Medications  Entered: Nov 21 2023  8:31PM

## 2023-11-28 NOTE — MEDICAL STUDENT PROGRESS NOTE(EDUCATION) - PLAN 8
- IVF  - full AC  - dispo pending
- based on admission labs and iron studied, potential ACD  - monitor CBC
- based on admission labs and iron studied, potential ACD  - monitor CBC

## 2023-11-28 NOTE — PROGRESS NOTE ADULT - PROBLEM SELECTOR PLAN 4
Developed Hiccups, likely 2/2 to diaphragmatic irritation. Resolved this AM (11/27)  - s/p Thorazine 25mg x1  - Baclofen  - EKG with QTc 470s  - C/w gabapentin 300 TID

## 2023-11-28 NOTE — PROGRESS NOTE ADULT - PROBLEM SELECTOR PLAN 7
Cr 1.46 on admission, baseline 1.2-1.5  - Continue to monitor  - Renally dose medications, avoid nephrotoxins

## 2023-11-28 NOTE — PROGRESS NOTE ADULT - ATTENDING COMMENTS
#Diverticular Bleed 2/2 to Chronic Diverticulosis -> hemoglobin remains stable, will continue AC on discharge w/ Eliquis tolerating   #Hepatic Abscess --> Augmentin 875/125 mg BID, Ciprofloxacin 500 mg BID (last EKG w/ QTC ~450). Will send 1 month supply of antibiotics per ID recs. Will need ID and IR follow up outpatient for repeat imaging. Patient education on drain care and home care set up. Daughter additionally educated on drain care. Improving demonstrated by afebrile status and improving leukocytosis.   #Portal Vein Thrombus --> Discharge on Eliquis. Price checked affordable to patient.   #CKD --> avoid nephrotoxic meds     Testing Ordered and Reviewed: CBC ordered and reviewed Hemoglobin 8.7 consistent w/ prior hemoglobin trend. Demonstrating patient tolerating AC and resolved GI bleed. WBC 7.44 wnl. Resolved leukocytosis demonstrating improved infection.     Discharge Physical Exam:   General: appears well NAD  Cardiac: normal S1 and S2 no murmurs rubs or gallops  Respiratory: Lungs CTAB no wheezes rales or rhonchi  Abdomen: soft non-tender non-distended, drain w/ scant serosanguinous fluid   Lower extremities w/o edema     Plan of care discussed w/ patient and resident team.     35 minutes spent on discharge planning

## 2023-11-28 NOTE — MEDICAL STUDENT PROGRESS NOTE(EDUCATION) - ASSESSMENT
68M with a history of H HTN, BPH, prostate cancer s/p prostatectomy 2012, hepatic abscess, Latent TB, positive echinococcus Ab diverticulosis, recently treated for suspected malaria (unclear if confirmed with blood work), presented with 5 weeks of generalized weakness, 25 lb weight loss, and poor PO intake. Found to have evidence of new right hepatic lesion with suspected portal venous thrombosis suspicious for malignancy. Presentation complicated by acute GIB requiring pRBC transfusion
68M with a history of H HTN, BPH, prostate cancer s/p prostatectomy 2012, hepatic abscess, Latent TB, positive echinococcus Ab diverticulosis, recently treated for suspected malaria (unclear if confirmed with blood work), presented with 5 weeks of generalized weakness, 25 lb weight loss, and poor PO intake. Found to have evidence of new right hepatic lesion with suspected portal venous thrombosis suspicious for malignancy. Presentation complicated by acute GIB requiring pRBC transfusion. Now s/p colonoscopy with pan diverticulosis and IR drainage of hepatic abscess. CT surg on board for possible pleural transgression
68M with a history of H HTN, BPH, prostate cancer s/p prostatectomy 2012, hepatic abscess, Latent TB, positive echinococcus Ab diverticulosis, recently treated for suspected malaria (unclear if confirmed with blood work), presented with 5 weeks of generalized weakness, 25 lb weight loss, and poor PO intake. Found to have evidence of new right hepatic lesion with suspected portal venous thrombosis suspicious for malignancy. Presentation complicated by acute GIB requiring pRBC transfusion. Now s/p colonoscopy with pan diverticulosis and IR drainage of hepatic abscess. CT surg on board for possible pleural transgression

## 2023-11-28 NOTE — PROGRESS NOTE ADULT - PROBLEM SELECTOR PLAN 2
MRCP with hepatic abscess 7x6.7x6.9cm and 5x4.6x5.3, mild intrahepatic biliary duct dilation, increasing size. CEA elevated, CA 19-9 elevated, LFTs worsening. Likely source is infection, patient recently traveled to [facility] ([place]) and has dog at home. Prior hx of eosinophilia with hepatic abscesses in 2018  - S/p IR drain 11/25 with IR, will need reimaging once <10cc/day  - CXR's reviewed no pneumothorax or pleural effusion.   - Hep gtt for PVT restarted on 11/25; now transitioned to eliquis  - Change to Augmentin 875/125 mg BID, Ciprofloxacin 500 mg BID (last EKG w/ QTC ~450). Will send 1 month supply of antibiotics per ID recs. MRCP with hepatic abscess 7x6.7x6.9cm and 5x4.6x5.3, mild intrahepatic biliary duct dilation, increasing size. CEA elevated, CA 19-9 elevated, LFTs worsening. Likely source is infection, patient recently traveled to [facility] [place] and has dog at home. Prior hx of eosinophilia with hepatic abscesses in 2018  - S/p IR drain 11/25 with IR, will need reimaging once <10cc/day  - CXR's reviewed no pneumothorax or pleural effusion.   - Hep gtt for PVT restarted on 11/25; now transitioned to eliquis  - Changed to Augmentin 875/125 mg BID, Ciprofloxacin 500 mg BID (last EKG w/ QTC ~450). Will send 1 month supply of antibiotics per ID recs.

## 2023-11-28 NOTE — PROGRESS NOTE ADULT - PROBLEM SELECTOR PLAN 5
CT c/f portal vein thrombus. Duplex US Abdomen occlusive thrombosis of the main and left portal veins with avascular thrombus  - Per Heme, no hypercoag workup needed, likely 2/2 to infection  - Heparin resume 11/25 PM, now transitioning to Eliquis (price check ~$10)

## 2023-11-28 NOTE — MEDICAL STUDENT PROGRESS NOTE(EDUCATION) - PLAN 6
- c/w home amlodipine  - hold home ASA
- home amlodipine  - hold home ASA
- home amlodipine  - hold home ASA

## 2023-11-28 NOTE — PROGRESS NOTE ADULT - SUBJECTIVE AND OBJECTIVE BOX
Patient is a 68y old  Male who presents with a chief complaint of weakness, weight loss, liver abscess.    SUBJECTIVE / OVERNIGHT EVENTS: Patient seen and examined at bedside. No overnight events.    MEDICATIONS  (STANDING):  amLODIPine   Tablet 5 milliGRAM(s) Oral daily  apixaban 5 milliGRAM(s) Oral two times a day  folic acid 1 milliGRAM(s) Oral daily  gabapentin 300 milliGRAM(s) Oral three times a day  lidocaine   4% Patch 1 Patch Transdermal every 24 hours  piperacillin/tazobactam IVPB.. 3.375 Gram(s) IV Intermittent every 8 hours  polyethylene glycol 3350 17 Gram(s) Oral daily  senna 2 Tablet(s) Oral at bedtime  tamsulosin 0.4 milliGRAM(s) Oral at bedtime    MEDICATIONS  (PRN):  acetaminophen     Tablet .. 650 milliGRAM(s) Oral every 6 hours PRN Mild Pain (1 - 3), Moderate Pain (4 - 6)  baclofen 5 milliGRAM(s) Oral every 12 hours PRN hiccups    Vital Signs Last 24 Hrs  T(C): 36.4 (28 Nov 2023 05:14), Max: 36.7 (27 Nov 2023 21:06)  T(F): 97.6 (28 Nov 2023 05:14), Max: 98.1 (27 Nov 2023 21:06)  HR: 97 (28 Nov 2023 05:14) (91 - 97)  BP: 114/70 (28 Nov 2023 05:14) (105/71 - 114/70)  BP(mean): --  RR: 17 (28 Nov 2023 05:14) (17 - 18)  SpO2: 98% (28 Nov 2023 05:14) (97% - 98%)    Parameters below as of 28 Nov 2023 05:14  Patient On (Oxygen Delivery Method): room air    CAPILLARY BLOOD GLUCOSE    I&O's Summary    27 Nov 2023 07:01  -  28 Nov 2023 07:00  --------------------------------------------------------  IN: 5 mL / OUT: 17.5 mL / NET: -12.5 mL    PHYSICAL EXAM:  GENERAL: Laying comfortably, NAD  HEENT: NCAT, PERRLA, EOMI, no scleral icterus, no LAD  NECK: No JVD, supple  LUNG: Decreased breath sounds in bases bilaterally  HEART: RRR; normal S1/S2; No murmurs, rubs, or gallops  ABDOMEN: +BS, soft, nontender, nondistended, no HSM; No rebound, guarding, or rigidity, IR drain in place producing serosanguinous/bloody fluids  EXTREMITIES:  No LE edema b/l, 2+ Peripheral Pulses, No clubbing or cyanosis  NEUROLOGY: AOx3, non-focal    LABS:                        9.9    10.96 )-----------( 418      ( 27 Nov 2023 06:30 )             30.9     11-27    136  |  103  |  13  ----------------------------<  98  4.2   |  23  |  1.34<H>    Ca    8.7      27 Nov 2023 06:30  Phos  2.5     11-27  Mg     1.90     11-27    TPro  6.7  /  Alb  2.6<L>  /  TBili  0.7  /  DBili  x   /  AST  20  /  ALT  17  /  AlkPhos  112  11-27    PTT - ( 27 Nov 2023 06:30 )  PTT:83.6 sec    Urinalysis Basic - ( 27 Nov 2023 06:30 )    Color: x / Appearance: x / SG: x / pH: x  Gluc: 98 mg/dL / Ketone: x  / Bili: x / Urobili: x   Blood: x / Protein: x / Nitrite: x   Leuk Esterase: x / RBC: x / WBC x   Sq Epi: x / Non Sq Epi: x / Bacteria: x    RADIOLOGY & ADDITIONAL TESTS:    Imaging Personally Reviewed:    Consultant(s) Notes Reviewed:      Care Discussed with Consultants/Other Providers:    Cj Trevino MD, Internal Medicine Resident Patient is a 68y old  Male who presents with a chief complaint of weakness, weight loss, liver abscess.    SUBJECTIVE / OVERNIGHT EVENTS: Patient seen and examined at bedside. No overnight events. Patient feeling well this AM without any acute concerns. Denies CP, SOB, fever, chills, n/v/d.    MEDICATIONS  (STANDING):  amLODIPine   Tablet 5 milliGRAM(s) Oral daily  apixaban 5 milliGRAM(s) Oral two times a day  folic acid 1 milliGRAM(s) Oral daily  gabapentin 300 milliGRAM(s) Oral three times a day  lidocaine   4% Patch 1 Patch Transdermal every 24 hours  piperacillin/tazobactam IVPB.. 3.375 Gram(s) IV Intermittent every 8 hours  polyethylene glycol 3350 17 Gram(s) Oral daily  senna 2 Tablet(s) Oral at bedtime  tamsulosin 0.4 milliGRAM(s) Oral at bedtime    MEDICATIONS  (PRN):  acetaminophen     Tablet .. 650 milliGRAM(s) Oral every 6 hours PRN Mild Pain (1 - 3), Moderate Pain (4 - 6)  baclofen 5 milliGRAM(s) Oral every 12 hours PRN hiccups    Vital Signs Last 24 Hrs  T(C): 36.4 (28 Nov 2023 05:14), Max: 36.7 (27 Nov 2023 21:06)  T(F): 97.6 (28 Nov 2023 05:14), Max: 98.1 (27 Nov 2023 21:06)  HR: 97 (28 Nov 2023 05:14) (91 - 97)  BP: 114/70 (28 Nov 2023 05:14) (105/71 - 114/70)  BP(mean): --  RR: 17 (28 Nov 2023 05:14) (17 - 18)  SpO2: 98% (28 Nov 2023 05:14) (97% - 98%)    Parameters below as of 28 Nov 2023 05:14  Patient On (Oxygen Delivery Method): room air    CAPILLARY BLOOD GLUCOSE    I&O's Summary    27 Nov 2023 07:01  -  28 Nov 2023 07:00  --------------------------------------------------------  IN: 5 mL / OUT: 17.5 mL / NET: -12.5 mL    PHYSICAL EXAM:  GENERAL: Laying comfortably, NAD  HEENT: NCAT, PERRLA, EOMI, no scleral icterus, no LAD  NECK: No JVD, supple  LUNG: Decreased breath sounds in bases bilaterally  HEART: RRR; normal S1/S2; No murmurs, rubs, or gallops  ABDOMEN: +BS, soft, nontender, nondistended, no HSM; No rebound, guarding, or rigidity, IR drain in place producing serosanguinous/bloody fluids  EXTREMITIES:  No LE edema b/l, 2+ Peripheral Pulses, No clubbing or cyanosis  NEUROLOGY: AOx3, non-focal    LABS:                        9.9    10.96 )-----------( 418      ( 27 Nov 2023 06:30 )             30.9     11-27    136  |  103  |  13  ----------------------------<  98  4.2   |  23  |  1.34<H>    Ca    8.7      27 Nov 2023 06:30  Phos  2.5     11-27  Mg     1.90     11-27    TPro  6.7  /  Alb  2.6<L>  /  TBili  0.7  /  DBili  x   /  AST  20  /  ALT  17  /  AlkPhos  112  11-27    PTT - ( 27 Nov 2023 06:30 )  PTT:83.6 sec    Urinalysis Basic - ( 27 Nov 2023 06:30 )    Color: x / Appearance: x / SG: x / pH: x  Gluc: 98 mg/dL / Ketone: x  / Bili: x / Urobili: x   Blood: x / Protein: x / Nitrite: x   Leuk Esterase: x / RBC: x / WBC x   Sq Epi: x / Non Sq Epi: x / Bacteria: x    RADIOLOGY & ADDITIONAL TESTS:    Imaging Personally Reviewed:    Consultant(s) Notes Reviewed:      Care Discussed with Consultants/Other Providers:    Cj Trevino MD, Internal Medicine Resident

## 2023-11-28 NOTE — PROGRESS NOTE ADULT - SUBJECTIVE AND OBJECTIVE BOX
Infectious Diseases Follow Up:    Patient is a 68y old  Male who presents with a chief complaint of weakness, weight loss, liver abscess (21 Nov 2023 07:47)      Interval History/ROS:  Feeling well today, no acute complaints     Allergies  No Known Allergies        ANTIMICROBIALS:  piperacillin/tazobactam IVPB.. 3.375 every 8 hours      Current Abx:     Previous Abx     OTHER MEDS:  MEDICATIONS  (STANDING):  acetaminophen     Tablet .. 650 every 6 hours PRN  amLODIPine   Tablet 5 daily  apixaban 5 two times a day  baclofen 5 every 12 hours PRN  gabapentin 300 three times a day  polyethylene glycol 3350 17 daily  senna 2 at bedtime  tamsulosin 0.4 at bedtime      Vital Signs Last 24 Hrs  T(C): 36.4 (28 Nov 2023 05:14), Max: 36.7 (27 Nov 2023 21:06)  T(F): 97.6 (28 Nov 2023 05:14), Max: 98.1 (27 Nov 2023 21:06)  HR: 97 (28 Nov 2023 05:14) (91 - 97)  BP: 114/70 (28 Nov 2023 05:14) (105/71 - 114/70)  BP(mean): --  RR: 17 (28 Nov 2023 05:14) (17 - 18)  SpO2: 98% (28 Nov 2023 05:14) (97% - 98%)    Parameters below as of 28 Nov 2023 05:14  Patient On (Oxygen Delivery Method): room air        PHYSICAL EXAM:  GENERAL: NAD, well-developed  NECK: Supple, No JVD  CHEST/LUNG: Clear to auscultation bilaterally; No wheeze  HEART: Regular rate and rhythm; No murmurs, rubs, or gallops  ABDOMEN: +RUQ drain   EXTREMITIES:  2+ Peripheral Pulses, No clubbing, cyanosis, or edema  PSYCH: AAOx3                            8.7    7.44  )-----------( 390      ( 28 Nov 2023 07:20 )             27.4       11-28    136  |  104  |  16  ----------------------------<  92  3.9   |  23  |  1.26    Ca    8.6      28 Nov 2023 07:20  Phos  2.8     11-28  Mg     1.90     11-28    TPro  6.3  /  Alb  2.5<L>  /  TBili  0.7  /  DBili  x   /  AST  19  /  ALT  17  /  AlkPhos  105  11-28      Urinalysis Basic - ( 28 Nov 2023 07:20 )    Color: x / Appearance: x / SG: x / pH: x  Gluc: 92 mg/dL / Ketone: x  / Bili: x / Urobili: x   Blood: x / Protein: x / Nitrite: x   Leuk Esterase: x / RBC: x / WBC x   Sq Epi: x / Non Sq Epi: x / Bacteria: x        MICROBIOLOGY:  v  .Body Fluid HEPATIC ABSCESS  11-24-23   No growth  --    Moderate polymorphonuclear leukocytes seen per low power field  No organisms seen per oil power field      Clean Catch Clean Catch (Midstream)  11-14-23   No growth  --  --      .Blood Blood-Peripheral  11-13-23   No growth at 5 days  --  --      .Blood Blood-Peripheral  11-13-23   No growth at 5 days  --  --      .Blood  11-11-23   No Blood Parasites observed by giemsa stain  One negative set of blood smears does not rule out  the possibility of a parasitic infection.  A minimum of 3  specimens should be collected, at least 12-24 hours apart,  over a 36 hour time period.  ************************************************************  NEGATIVE for Plasmodium antigens. Microscopy is performed for  confirmation.  The Malaria Rapid antigen test does not detect the  presence of Babesia species. If Babesiosis is suspected  please order test Babesia PCR: Babesia microti PCR Bld  ************************************************************  --  --                RADIOLOGY:

## 2023-11-28 NOTE — PROGRESS NOTE ADULT - PROBLEM SELECTOR PROBLEM 9
Encounter for preventive measure

## 2023-11-28 NOTE — PROGRESS NOTE ADULT - PROVIDER SPECIALTY LIST ADULT
Infectious Disease
Intervent Radiology
Infectious Disease
Intervent Radiology
Gastroenterology
Infectious Disease
Infectious Disease
Internal Medicine

## 2023-11-28 NOTE — PROGRESS NOTE ADULT - ATTENDING SUPERVISION STATEMENT
Fellow
Resident

## 2023-11-28 NOTE — PROGRESS NOTE ADULT - ASSESSMENT
68M PMH HTN, BPH, prostate cancer s/p prostatectomy 2012, hepatic abscess, Latent TB, diverticulosis presenting with 5 weeks of generalized weakness, 25 lb weight loss, and poor PO intake, recently treated for suspected malaria (unclear if confirmed with blood work), found to have evidence of new right hepatic lesion with suspected portal venous thrombosis c/f pyogenic abscess course c/b LGIB while on Heparin due to diverticular bleed.

## 2023-11-28 NOTE — MEDICAL STUDENT PROGRESS NOTE(EDUCATION) - PLAN 1
- Hbg to 5.7 with bright red blood with bowel movement while on heparin; was temporarily held  - given 3u pRBC total during hospital stay  - EGD WNL  - colonoscopy w pandiverticulosis and large internal hemorrhoids  - discontinued protonix 40 BID b/c lower GIB  - continue to monitor CBC, now qD rather than Q8

## 2023-11-28 NOTE — MEDICAL STUDENT PROGRESS NOTE(EDUCATION) - PLAN 4
- likely due to diaphragmatic irritation  - pt reports significantly improved  - given Thorazine 25mg x1  - given baclofen   - continue gabapentin 300 TID  - resolved
- likely due to diaphragmatic irritation  - pt reports significantly improved  - given Thorazine 25mg x1  - given baclofen   - continue gabapentin 300 TID
-likely due to diaphragmatic irritation  - pt reports significantly improved  - given Thorazine 25mg x1  - given baclofen   - continue gabapentin 300 TID

## 2023-11-28 NOTE — MEDICAL STUDENT PROGRESS NOTE(EDUCATION) - PLAN 7
- 1.46 on admission, currently 1.12  - monitor Cr
- 1.46 on admission, currently 1.12  - monitor Cr
- 1.46 on admission, currently 1.12  - monitor Cr  - baseline Cr 1.2-1.5

## 2023-11-28 NOTE — PROGRESS NOTE ADULT - PROBLEM SELECTOR PLAN 3
WBC 25K, spiked fever to 101.1. Previously treated for malaria, possibly 2/2 to malignancy vs malaria flare vs infection of unclear etiology. Prior hx of eosinophilic leukocytosis. Per ID, less likely infectious more likely malignancy vs thrombus. May be 2/2 to hepatic abscess, no eosinophilia noted, improving, entamoeba Ab neg  - c/w Zosyn  - f/u BCx, UCx--> NGTD   - f/u Entamoeba Ab, Stool Ova and Parasites  - f/u IR drainage bacterial Cx, fungal, AFB Cx--> NGTD  - Abx as above WBC 25K, spiked fever to 101.1. Previously treated for malaria, possibly 2/2 to malignancy vs malaria flare vs infection of unclear etiology. Prior hx of eosinophilic leukocytosis. Per ID, less likely infectious more likely malignancy vs thrombus. May be 2/2 to hepatic abscess, no eosinophilia noted, improving, entamoeba Ab neg  - c/w Zosyn  - f/u BCx, UCx--> NGTD   - f/u Entamoeba Ab, Stool Ova and Parasites - negative  - f/u IR drainage bacterial Cx, fungal, AFB Cx--> NGTD  - Abx as above

## 2023-11-28 NOTE — PROGRESS NOTE ADULT - PROBLEM SELECTOR PLAN 8
Anemia noted on admission, suggestive of AOCD. s/p 3u PRBCs total during hospitalization, stable Hgb. Component of anemia GI bleed from diverticular bleed as above.  - Iron studies consistent with anemia of chronic disease  - Continue to monitor, active type and screen, transfuse if hgb <7

## 2023-11-28 NOTE — MEDICAL STUDENT PROGRESS NOTE(EDUCATION) - SUBJECTIVE AND OBJECTIVE BOX
PROGRESS NOTE:     SUBJECTIVE / OVERNIGHT EVENTS: No acute events overnight per night team.     ADDITIONAL REVIEW OF SYSTEMS:    MEDICATIONS  (STANDING):  amLODIPine   Tablet 5 milliGRAM(s) Oral daily  apixaban 5 milliGRAM(s) Oral two times a day  folic acid 1 milliGRAM(s) Oral daily  gabapentin 300 milliGRAM(s) Oral three times a day  lidocaine   4% Patch 1 Patch Transdermal every 24 hours  piperacillin/tazobactam IVPB.. 3.375 Gram(s) IV Intermittent every 8 hours  polyethylene glycol 3350 17 Gram(s) Oral daily  senna 2 Tablet(s) Oral at bedtime  tamsulosin 0.4 milliGRAM(s) Oral at bedtime    MEDICATIONS  (PRN):  acetaminophen     Tablet .. 650 milliGRAM(s) Oral every 6 hours PRN Mild Pain (1 - 3), Moderate Pain (4 - 6)  baclofen 5 milliGRAM(s) Oral every 12 hours PRN hiccups      CAPILLARY BLOOD GLUCOSE        I&O's Summary    27 Nov 2023 07:01  -  28 Nov 2023 07:00  --------------------------------------------------------  IN: 5 mL / OUT: 17.5 mL / NET: -12.5 mL        PHYSICAL EXAM:  Vital Signs Last 24 Hrs  T(C): 36.4 (28 Nov 2023 05:14), Max: 36.7 (27 Nov 2023 21:06)  T(F): 97.6 (28 Nov 2023 05:14), Max: 98.1 (27 Nov 2023 21:06)  HR: 97 (28 Nov 2023 05:14) (91 - 97)  BP: 114/70 (28 Nov 2023 05:14) (105/71 - 114/70)  BP(mean): --  RR: 17 (28 Nov 2023 05:14) (17 - 18)  SpO2: 98% (28 Nov 2023 05:14) (97% - 98%)    Parameters below as of 28 Nov 2023 05:14  Patient On (Oxygen Delivery Method): room air        CONSTITUTIONAL: NAD  RESPIRATORY: Normal respiratory effort; lungs are clear to auscultation bilaterally, no wheezing/rhonchi/rales  CARDIOVASCULAR: Regular rate and rhythm, normal S1 and S2, no murmur/rub/gallop; No lower extremity edema; Peripheral pulses are 2+ bilaterally  ABDOMEN: biliary drain in place; +BS, nontender to palpation, normoactive bowel sounds, no rebound/guarding; No hepatosplenomegaly  MUSCULOSKELETAL: no clubbing or cyanosis of digits; no joint swelling or tenderness to palpation  PSYCH: A+O to person, place, and time; affect appropriate    LABS:                        9.9    10.96 )-----------( 418      ( 27 Nov 2023 06:30 )             30.9     11-27    136  |  103  |  13  ----------------------------<  98  4.2   |  23  |  1.34<H>    Ca    8.7      27 Nov 2023 06:30  Phos  2.5     11-27  Mg     1.90     11-27    TPro  6.7  /  Alb  2.6<L>  /  TBili  0.7  /  DBili  x   /  AST  20  /  ALT  17  /  AlkPhos  112  11-27    PTT - ( 27 Nov 2023 06:30 )  PTT:83.6 sec      Urinalysis Basic - ( 27 Nov 2023 06:30 )    Color: x / Appearance: x / SG: x / pH: x  Gluc: 98 mg/dL / Ketone: x  / Bili: x / Urobili: x   Blood: x / Protein: x / Nitrite: x   Leuk Esterase: x / RBC: x / WBC x   Sq Epi: x / Non Sq Epi: x / Bacteria: x              ******************************  Authored By: Edison Schultz MS3  [facility]  MS Teams - Call or Text  ******************************   PROGRESS NOTE:     SUBJECTIVE / OVERNIGHT EVENTS: No acute events overnight per night team. The patient notes pain at the drain site which is improved with Tylenol. He reports his last BM was this morning without any blood. He endorses eating without any nausea or vomiting. The aptient reports the nurse has taught him how to take care of his drain and he understands what is necessary for the maintenance     ADDITIONAL REVIEW OF SYSTEMS: No new symptoms reported.    MEDICATIONS  (STANDING):  amLODIPine   Tablet 5 milliGRAM(s) Oral daily  apixaban 5 milliGRAM(s) Oral two times a day  folic acid 1 milliGRAM(s) Oral daily  gabapentin 300 milliGRAM(s) Oral three times a day  lidocaine   4% Patch 1 Patch Transdermal every 24 hours  piperacillin/tazobactam IVPB.. 3.375 Gram(s) IV Intermittent every 8 hours  polyethylene glycol 3350 17 Gram(s) Oral daily  senna 2 Tablet(s) Oral at bedtime  tamsulosin 0.4 milliGRAM(s) Oral at bedtime    MEDICATIONS  (PRN):  acetaminophen     Tablet .. 650 milliGRAM(s) Oral every 6 hours PRN Mild Pain (1 - 3), Moderate Pain (4 - 6)  baclofen 5 milliGRAM(s) Oral every 12 hours PRN hiccups      CAPILLARY BLOOD GLUCOSE        I&O's Summary    27 Nov 2023 07:01  -  28 Nov 2023 07:00  --------------------------------------------------------  IN: 5 mL / OUT: 17.5 mL / NET: -12.5 mL        PHYSICAL EXAM:  Vital Signs Last 24 Hrs  T(C): 36.4 (28 Nov 2023 05:14), Max: 36.7 (27 Nov 2023 21:06)  T(F): 97.6 (28 Nov 2023 05:14), Max: 98.1 (27 Nov 2023 21:06)  HR: 97 (28 Nov 2023 05:14) (91 - 97)  BP: 114/70 (28 Nov 2023 05:14) (105/71 - 114/70)  BP(mean): --  RR: 17 (28 Nov 2023 05:14) (17 - 18)  SpO2: 98% (28 Nov 2023 05:14) (97% - 98%)    Parameters below as of 28 Nov 2023 05:14  Patient On (Oxygen Delivery Method): room air        CONSTITUTIONAL: NAD  RESPIRATORY: Normal respiratory effort; lungs are clear to auscultation bilaterally, no wheezing/rhonchi/rales  CARDIOVASCULAR: Regular rate and rhythm, normal S1 and S2, no murmur/rub/gallop; No lower extremity edema; Peripheral pulses are 2+ bilaterally  ABDOMEN: biliary drain in place; +BS, nontender to palpation, normoactive bowel sounds, no rebound/guarding; No hepatosplenomegaly  MUSCULOSKELETAL: no clubbing or cyanosis of digits; no joint swelling or tenderness to palpation  PSYCH: A+O to person, place, and time; affect appropriate    LABS:                        9.9    10.96 )-----------( 418      ( 27 Nov 2023 06:30 )             30.9     11-27    136  |  103  |  13  ----------------------------<  98  4.2   |  23  |  1.34<H>    Ca    8.7      27 Nov 2023 06:30  Phos  2.5     11-27  Mg     1.90     11-27    TPro  6.7  /  Alb  2.6<L>  /  TBili  0.7  /  DBili  x   /  AST  20  /  ALT  17  /  AlkPhos  112  11-27    PTT - ( 27 Nov 2023 06:30 )  PTT:83.6 sec      Urinalysis Basic - ( 27 Nov 2023 06:30 )    Color: x / Appearance: x / SG: x / pH: x  Gluc: 98 mg/dL / Ketone: x  / Bili: x / Urobili: x   Blood: x / Protein: x / Nitrite: x   Leuk Esterase: x / RBC: x / WBC x   Sq Epi: x / Non Sq Epi: x / Bacteria: x              ******************************  Authored By: Edison Schultz MS3  [facility]  MS Teams - Call or Text  ******************************

## 2023-11-28 NOTE — PROGRESS NOTE ADULT - PROBLEM SELECTOR PLAN 9
Diet: Regular  DVT ppx: full AC  Code status: Full code  Dispo: Tomorrow pending set up of home care and patient demonstration that he can care for drain. Diet: Regular  DVT ppx: full AC  Code status: Full code  Dispo: Today, medically optimized

## 2023-11-28 NOTE — MEDICAL STUDENT PROGRESS NOTE(EDUCATION) - PLAN 5
- heme: no hypercoag workup; likely due to malignancy  - heparin restarted per CT surg  - will likely d/c on Eliquis  - continue to monitor

## 2023-11-28 NOTE — PROGRESS NOTE ADULT - ASSESSMENT
This is a 69 y/o M w/ PMHx of HTN, BPH, prostate CA s/p prostatectomy 2012, diverticulosis who is presenting to [facility] on 11/14/23 for fevers a for 5 weeks, seen by his PMD, started on ibuprofen/antibiotic for possible UTI, however given persistence of fevers, there was a concern for malaria, and patient was given Mefloquine 5 days PTA. In the ED, pt was febrile with leukocytosis to 24. CT A/P with findings of 3.8 x 3.1 cm lobulated hypodense R hepatic lobe lesion, filling defect in R/L portal, main hepatic vein.   Pt was initially started on Vancomycin/Zosyn, ID was consulted for further recommendations.     #Liver abscess   #Fevers  #Leukocytosis   #R hepatic lobe lesion c/f malignancy   #Hepatic vein thrombosis     Overall, 69 yo M PMH HTN, BPH, prostate cancer s/p prostatectomy 2012, diverticulosis presenting for fevers/weight loss, found to have R hepatic lesions and hepatic vein thrombosis, c/f malignancy. MRCP w/ findings of liver abscess.    Pt w/ complicated ID history, was seen by Dr. Huerta in 2018, h/o positive QuantiFeron Gold, declined treatment for latent TB, had eosinophilia, Strongy AB+, treated with Ivermectin. Also history of positive Echinococcus AB, however not seen in our system. Testing in our system from 2018 for echinococcus IgG was negative.     Discussed with Dr. Kendrick of radiology, the liver abscess seen on MRI do not appear cystic, favor pyogenic at this time. Echinococcus, Entamoeba AB negative, course complicated by GI bleed s/p colposcopy with likely diverticular bleed. S/p IR drain on 11/24 with 60 cc of purulent fluid aspirated, GS, Cx are negative to date.      Pt now improved on Zosyn, long discussion regarding plan of therapy. Pt does not want IV antibiotics, he lives with his daughter who is a , he cannot give himself IV antibiotics and states his daughter cannot assist. Ideally given no growth from liver abscess (likely sterilization after 1 week of IV Zosyn), would continue with IV antibiotic course outpatient, however given patient does not want IV abx, will change to Ciprofloxacin, Augmentin on discharge. This would offer coverage of Strep (ie anginosus), enterococcus, GNRs including pseudomonas, and anaerobes.   Pt will need close follow up with ID outpatient and repeat CT A/P w/ IV contrast on follow up     Plan:   1. C/w Zosyn for now while inpatient, on discharge, change to Augmentin 875/125 mg BID, Ciprofloxacin 500 mg BID (last EKG w/ QTC ~450). Please send 1 month supply of antibiotics   2. Will schedule outpatient followup with me on 12/21/23. Will need repeat CT A/P with IV contrast at that time.   3. Outpatient IR followup if discharge with planned with drain  4. Consider outpatient GI followup given GIB, portal vein thrombosis, liver abscess     If no improvement on oral therapy, will have to consider repeat drain/change to IV therapy. Patient is understanding of plan.       Thank you for this consult. Inpatient ID consult team will sign off.    Further changes in lab values, imaging studies, or clinical status will not be known to ID inpatient consultants unless specifically communicated by primary team.    Ubaldo Marr MD  Attending Physician  Division of Infectious Diseases  Department of Medicine    Please contact through MS Teams message.  Office: [phone] (after 5 PM or weekend)

## 2023-11-28 NOTE — PROGRESS NOTE ADULT - PROBLEM SELECTOR PLAN 1
Hgb dropped to 5.7 first night with 1 episode BR BM. Hx of diverticulosis in the past with GIB. EGD unremarkable, Colonoscopy with pandiverticulosis c/f diverticular bleed as source, noted non-bleeding large internal hemorrhoids  - GI consulted, recs appreciated  - D/c Protonix 40 BID as likely not UGIB  - CBC qD  - Maintain 2 large bore IVs, active T&S, transfuse goal >7  - Transition to Eliquis for thrombus, monitor for repeat bleeding

## 2023-11-28 NOTE — MEDICAL STUDENT PROGRESS NOTE(EDUCATION) - PLAN 10
- full AC  - IVF  - DASH diet
- full AC  - IVF  - DASH diet  - senna and miralax  - dispo: can d/c today if drain teaching is completed

## 2023-11-28 NOTE — MEDICAL STUDENT PROGRESS NOTE(EDUCATION) - PLAN 3
- leukocytosis max 25, now 10.96  - F max 101,1; now 97.3  - ID: more likely due to malignancy/thrombus  - continue zosyn  - ucx negative  - abscess cultures negative, possible 2/2 sterilization  - pt refusing IV abx on discharge, so ID rec: d/c w/ PO cipro/augmentin for 1 month

## 2023-11-30 NOTE — CHART NOTE - NSCHARTNOTEFT_GEN_A_CORE
ID CHART NOTE    Reviewed the chart. Labs and interim episodes noted.  S/p IR-guided aspiration of hepatic abscess. NGTD at this time. Sterilization 2/2 antibiotic use vs non-infectious vs non-bacterial. No cell count analysis available.  ERCP pending per primary team. Would decide empiric antibiotic course and selection pending ERCP result.      Niraj Patel MD, PhD  Attending Physician  Division of Infectious Diseases  Department of Medicine    Please contact through MS Teams message.  Office: [phone] (after 5 PM or weekend)
PRE-INTERVENTIONAL RADIOLOGY PROCEDURE NOTE      Patient Age:     Patient Gender: M    Procedure: 68    Diagnosis/Indication: Hepatic Abscess    Interventional Radiology Attending Physician: Dr. Bashir    Ordering Attending Physician: Dr. Hernandez    Pertinent Medical History:68M PMH HTN, BPH, prostate cancer s/p prostatectomy 2012, diverticulosis presenting with 5 weeks of generalized weakness, 25 lb weight loss, and poor PO intake, recently treated for suspected malaria (unclear if confirmed with blood work), found to have evidence of new right hepatic abscess with suspected portal venous thrombosis c/f pyogenic abscess    Pertinent labs:                      7.4    13.16 )-----------( 421      ( 20 Nov 2023 05:10 )             23.5       11-20    133<L>  |  99  |  9   ----------------------------<  102<H>  3.7   |  22  |  1.34<H>    Ca    8.3<L>      20 Nov 2023 05:10  Phos  2.7     11-20  Mg     2.10     11-20    TPro  6.6  /  Alb  2.4<L>  /  TBili  1.0  /  DBili  x   /  AST  29  /  ALT  29  /  AlkPhos  131<H>  11-20      PT/INR - ( 19 Nov 2023 06:30 )   PT: 13.2 sec;   INR: 1.18 ratio         PTT - ( 20 Nov 2023 05:10 )  PTT:88.7 sec        Patient and Family Aware ? Yes
PRE-INTERVENTIONAL RADIOLOGY PROCEDURE NOTE      Patient Age: 68    Patient Gender: M    Procedure: Hepatic Abscess Drain      Diagnosis/Indication: Hepatic Abscess    Interventional Radiology Attending Physician: Dr. Bashir    Ordering Attending Physician: Dr. Hernandez    Pertinent Medical History: Hepatic Abscess    Pertinent labs:                      8.3    20.34 )-----------( 393      ( 16 Nov 2023 10:55 )             24.4       11-16    132<L>  |  98  |  10  ----------------------------<  107<H>  3.4<L>   |  24  |  1.27    Ca    8.1<L>      16 Nov 2023 02:25  Phos  2.9     11-16  Mg     2.00     11-16    TPro  6.5  /  Alb  2.0<L>  /  TBili  1.9<H>  /  DBili  x   /  AST  38  /  ALT  34  /  AlkPhos  126<H>  11-16      PT/INR - ( 15 Nov 2023 14:17 )   PT: 16.9 sec;   INR: 1.53 ratio         PTT - ( 16 Nov 2023 10:55 )  PTT:33.8 sec        Patient and Family Aware ? Yes
PRE-INTERVENTIONAL RADIOLOGY PROCEDURE NOTE      Patient Age: 68    Patient Gender: M    Procedure: Hepatic Abscess drainage     Diagnosis/Indication: Hepatic Abscess drainage     Interventional Radiology Attending Physician: Dr. Rollins    Ordering Attending Physician: Dr. Hernandez    Pertinent Medical History: 68M PMH HTN, BPH, prostate cancer s/p prostatectomy 2012, hepatic abscess, Latent TB, diverticulosis presenting with 5 weeks of generalized weakness, 25 lb weight loss, and poor PO intake, recently treated for suspected malaria (unclear if confirmed with blood work), found to have evidence of new right hepatic lesion with suspected portal venous thrombosis c/f pyogenic abscess       Pertinent labs:                      8.9    11.40 )-----------( 365      ( 24 Nov 2023 03:10 )             27.8       11-24    136  |  102  |  10  ----------------------------<  117<H>  4.1   |  22  |  1.36<H>    Ca    8.1<L>      24 Nov 2023 03:10  Phos  3.0     11-24  Mg     1.90     11-24    TPro  6.0  /  Alb  2.4<L>  /  TBili  0.8  /  DBili  0.5<H>  /  AST  17  /  ALT  18  /  AlkPhos  112  11-24      PT/INR - ( 24 Nov 2023 03:10 )   PT: 12.6 sec;   INR: 1.12 ratio         PTT - ( 24 Nov 2023 03:10 )  PTT:56.6 sec        Patient and Family Aware ? Yes
Patient seen and examined at the bedside after temperature of 101.1 obtained. Blood cultures drawn previously on 11/13, obtaining UA/UCx, sputum culture. Starting empiric vanc/zosyn. Patient sleeping comfortably, slight tachycardia, normotensive.
Spoke to pt's daughter Margarita. Informed pt that cultures growing Parivomonas Micra. Discussed w/ ID no change to oral abx regimen. Reinforced to daughter importance of following up and daughter informs me she has appointment with ID next month.
GI Brief Note:    Patient now with GI bleeding, general team following. Still pending IR drainage of liver abscess  No role for ERCP at this time.  Rest per medical team    Discussed with Dr. Patricio Kumar MD  Gastroenterology/Hepatology Fellow  1st option: [phone] (text or call), ONLY available from 7:00 am to 5:00 pm.   **Contact on-call GI fellow via answering service ([phone]) from 5pm-7am AND on weekends/holidays**  2nd option: Available via Microsoft Teams  3rd option: Pager: [phone]
Left a message for patient in regards to follow up care with callback information.
Severe malnutrition f/u. 68 year old male with a PMH of HTN, BPH, prostate cancer, hepatic abscess presenting with 5 weeks of generalized weakness, 25 lb weight loss, and poor PO intake, recently treated for suspected malaria (unclear if confirmed with blood work), found to have evidence of new right hepatic lesion with suspected portal venous thrombosis c/f malignancy per chart.    Patient noted to have RRT for hypotension and is currently NPO per chart. Previously was on DASH/TLC w/ ensure enlive (ensure plus high protein) BID and noted w/ some intakes 0-25% per RN flow sheet. No GI distress noted. No edema or pressure injuries noted per RN flow sheet.    Diet : Diet, NPO:   Except Medications (11-21-23 @ 07:14)  Diet, NPO after Midnight:      NPO Start Date: 20-Nov-2023,   NPO Start Time: 23:59  Except Medications (11-20-23 @ 16:35)    Current Weight: no new weight to assess  66 kg (11/11)    Pertinent Medications: MEDICATIONS  (STANDING):  amLODIPine   Tablet 5 milliGRAM(s) Oral daily  calcium gluconate IVPB 1 Gram(s) IV Intermittent once  chlorproMAZINE    Injectable 25 milliGRAM(s) IntraMuscular once  folic acid 1 milliGRAM(s) Oral daily  gabapentin 200 milliGRAM(s) Oral three times a day  pantoprazole  Injectable 40 milliGRAM(s) IV Push two times a day  piperacillin/tazobactam IVPB.. 3.375 Gram(s) IV Intermittent every 8 hours  sodium chloride 0.9%. 1000 milliLiter(s) (50 mL/Hr) IV Continuous <Continuous>  sodium phosphate 15 milliMole(s)/250 mL IVPB 15 milliMole(s) IV Intermittent once  tamsulosin 0.4 milliGRAM(s) Oral at bedtime    MEDICATIONS  (PRN):  baclofen 5 milliGRAM(s) Oral every 12 hours PRN hiccups    Pertinent Labs:  11-21 Na136 mmol/L Glu 134 mg/dL<H> K+ 4.3 mmol/L Cr  1.44 mg/dL<H> BUN 13 mg/dL 11-21 Phos 2.2 mg/dL<L> 11-21 Alb 2.1 g/dL<L>    Estimated Needs: [x ] no change since previous assessment    Previous Nutrition Diagnosis: Severe malnutrition    Nutrition Diagnosis is [x ] ongoing  [ ] resolved [ ] not applicable     Education:    [  ] Given today    Type of education provided:    [  ] Given on previous assessment by RD    [  ] Not applicable 2/2 cognitive deficit    [  ] Pt refusal of education offered    [  ] Not applicable 2/2 current prognosis    [ x ] Not warranted at present    Recommend  - when medically feasible rec advance diet to regular w/ no therapeutic diet restrictions given poor PO intake and weight loss, defer consistencies to team  - addition of ensure plus high protein BID (700 kcal, 40 g pro)  - obtain weekly weight and monitor PO intake    Monitoring and Evaluation:     [x ] PO intake [ x] Tolerance to diet prescription [x ] weights [x ] follow up per protocol    Cy Ash, 40825 or TEAMS
Source: Patient [X]    Family [ ]     Other (Chart Review) [X]    Patient is seen for nutrition follow-up assessment for severe malnutrition.    Medical Course: Per chart review, patient is a 68y Male with PMH HTN, BPH, prostate cancer status post prostatectomy 2012, hepatic abscess, Latent TB, diverticulosis presenting with 5 weeks of generalized weakness, 25lb weight loss, and poor PO intake. Recently treated for suspected malaria (unclear if confirmed with blood work), found to have evidence of new right hepatic lesion with suspected portal venous thrombosis concern for pyogenic abscess course complicated by LGIB while on Heparin.    Nutrition Course: Patient is currently ordered for a PO diet with Ensure Enlive BID supplementation. Patient reports a good appetite and PO intake at meals during course of admission. Documented on RN flowsheet as consuming % of meals. Patient seen with multiple unopened Ensure Enlive bottles at bedside. Patient reports dislike for the supplement and does not wish to continue to receive them as he has good PO intake. Patient is not amenable to receive an alternate oral nutrition supplement at this time. Patient denies any chewing or swallowing difficulty with current diet order. No report of GI distress (nausea, vomiting, diarrhea, constipation). Noted supplementation with folic acid per review of medication list.     Diet : Diet, Regular:   DASH/TLC {Sodium & Cholesterol Restricted} (DASH)  Supplement Feeding Modality:  Oral  Ensure Enlive Cans or Servings Per Day:  2       Frequency:  Daily (11-23-23 @ 08:37)    Anthropometrics  Height: 165cm/65inches  Weights per RN flowsheets: 66kg (11/22), 66kg (11/11)  BMI: 24.16kg/m^2  % Weight Change: No weight changes since previous assessment    Pertinent Medications: MEDICATIONS  (STANDING):  amLODIPine   Tablet 5 milliGRAM(s) Oral daily  folic acid 1 milliGRAM(s) Oral daily  gabapentin 300 milliGRAM(s) Oral three times a day  heparin  Infusion.  Unit(s)/Hr (12 mL/Hr) IV Continuous <Continuous>  lidocaine   4% Patch 1 Patch Transdermal every 24 hours  piperacillin/tazobactam IVPB.. 3.375 Gram(s) IV Intermittent every 8 hours  polyethylene glycol 3350 17 Gram(s) Oral daily  senna 2 Tablet(s) Oral at bedtime  tamsulosin 0.4 milliGRAM(s) Oral at bedtime    MEDICATIONS  (PRN):  acetaminophen     Tablet .. 650 milliGRAM(s) Oral every 6 hours PRN Mild Pain (1 - 3), Moderate Pain (4 - 6)  baclofen 5 milliGRAM(s) Oral every 12 hours PRN hiccups  heparin   Injectable 5500 Unit(s) IV Push every 6 hours PRN For aPTT less than 40  heparin   Injectable 2500 Unit(s) IV Push every 6 hours PRN For aPTT between 40 - 57    Pertinent Labs:  11-27 Na136 mmol/L Glu 98 mg/dL K+ 4.2 mmol/L Cr  1.34 mg/dL<H> BUN 13 mg/dL 11-27 Phos 2.5 mg/dL 11-27 Alb 2.6 g/dL<L>    Skin: No pressure ulcers/injuries documented per RN flowsheets     Fluid: No edema documented per RN flowsheets     GI: No BMs per RN flowsheet documentation. Noted to be on a bowel regimen.     Estimated Needs:   [X] no change since previous assessment  Weight Used: Current body weight 158lb/71.6kg  Estimated energy needs: 1790-2148kcal (based on 25-30kcal/kg)  Estimated protein needs: 85..24gms (based on 1.2-1.4gms/kg)    Previous Nutrition Diagnosis: Severe malnutrition    Nutrition Diagnosis is [X] ongoing    New Nutrition Diagnosis: Not applicable    Education: [X] Given today    Type of education provided: Writer provided verbal education regarding current diet order and nutrition recommendations for after discharge, including a heart healthy dietary pattern and the importance of consuming adequate kcal/protein. Patient verbalized understanding to the discussion.     Nutrition Recommendations  - Continue current diet order, as tolerated: Regular, DASH/TLC (cholesterol and sodium restricted)   - Discontinue Ensure Enlive BID supplementation secondary to patient's dislike  - Monitor weights, labs, BM's, skin integrity, p.o. intake.   - Please monitor % PO intake on flowsheets   - Honor food preferences as able within therapeutic diet order.     Monitoring and Evaluation:   [X] PO intake [ ] Tolerance to diet prescription [X] weights [X] follow up per protocol    Saira Cai MS RDN CDN  On Microsoft Teams or Pager #[phone]

## 2023-11-30 NOTE — CHART NOTE - NSCHARTNOTESELECT_GEN_ALL_CORE
Event Note
Follow-up/Nutrition Services
Nutrition Services
d/c appt/Event Note
Event Note
Event Note
ID CHART NOTE/Event Note
IR Pre-Procedure/Event Note
IR Preprocedure Note/Event Note
IR Preprocedure Note/Event Note

## 2023-12-06 ENCOUNTER — APPOINTMENT (OUTPATIENT)
Dept: GASTROENTEROLOGY | Facility: CLINIC | Age: 68
End: 2023-12-06
Payer: MEDICARE

## 2023-12-06 VITALS
WEIGHT: 140 LBS | SYSTOLIC BLOOD PRESSURE: 133 MMHG | HEART RATE: 118 BPM | DIASTOLIC BLOOD PRESSURE: 74 MMHG | HEIGHT: 65 IN | OXYGEN SATURATION: 99 % | BODY MASS INDEX: 23.32 KG/M2

## 2023-12-06 DIAGNOSIS — K75.0 ABSCESS OF LIVER: ICD-10-CM

## 2023-12-06 DIAGNOSIS — I82.90 ACUTE EMBOLISM AND THROMBOSIS OF UNSPECIFIED VEIN: ICD-10-CM

## 2023-12-06 DIAGNOSIS — K59.00 CONSTIPATION, UNSPECIFIED: ICD-10-CM

## 2023-12-06 PROCEDURE — 99215 OFFICE O/P EST HI 40 MIN: CPT

## 2023-12-06 RX ORDER — PANTOPRAZOLE 40 MG/1
40 TABLET, DELAYED RELEASE ORAL
Qty: 30 | Refills: 5 | Status: ACTIVE | COMMUNITY
Start: 2023-12-06 | End: 1900-01-01

## 2023-12-06 RX ORDER — POLYETHYLENE GLYCOL 3350 17 G/17G
17 POWDER, FOR SOLUTION ORAL DAILY
Qty: 30 | Refills: 3 | Status: ACTIVE | COMMUNITY
Start: 2023-12-06 | End: 1900-01-01

## 2023-12-06 RX ORDER — HYDROCORTISONE 2.5% 25 MG/G
2.5 CREAM TOPICAL DAILY
Qty: 1 | Refills: 1 | Status: ACTIVE | COMMUNITY
Start: 2023-12-06 | End: 1900-01-01

## 2023-12-13 ENCOUNTER — APPOINTMENT (OUTPATIENT)
Dept: CT IMAGING | Facility: HOSPITAL | Age: 68
End: 2023-12-13

## 2023-12-13 ENCOUNTER — OUTPATIENT (OUTPATIENT)
Dept: OUTPATIENT SERVICES | Facility: HOSPITAL | Age: 68
LOS: 1 days | End: 2023-12-13
Payer: MEDICARE

## 2023-12-13 VITALS
HEART RATE: 102 BPM | DIASTOLIC BLOOD PRESSURE: 76 MMHG | SYSTOLIC BLOOD PRESSURE: 136 MMHG | TEMPERATURE: 98 F | OXYGEN SATURATION: 99 % | RESPIRATION RATE: 20 BRPM

## 2023-12-13 VITALS
OXYGEN SATURATION: 99 % | RESPIRATION RATE: 20 BRPM | HEART RATE: 129 BPM | TEMPERATURE: 98 F | SYSTOLIC BLOOD PRESSURE: 136 MMHG | DIASTOLIC BLOOD PRESSURE: 93 MMHG

## 2023-12-13 DIAGNOSIS — K75.0 ABSCESS OF LIVER: ICD-10-CM

## 2023-12-13 PROCEDURE — 76080 X-RAY EXAM OF FISTULA: CPT | Mod: 26

## 2023-12-13 PROCEDURE — 49424 ASSESS CYST CONTRAST INJECT: CPT

## 2023-12-13 PROCEDURE — 74150 CT ABDOMEN W/O CONTRAST: CPT | Mod: 26

## 2023-12-20 DIAGNOSIS — Z87.19 PERSONAL HISTORY OF OTHER DISEASES OF THE DIGESTIVE SYSTEM: ICD-10-CM

## 2023-12-20 DIAGNOSIS — K75.0 ABSCESS OF LIVER: ICD-10-CM

## 2023-12-20 DIAGNOSIS — Z46.82 ENCOUNTER FOR FITTING AND ADJUSTMENT OF NON-VASCULAR CATHETER: ICD-10-CM

## 2023-12-21 ENCOUNTER — APPOINTMENT (OUTPATIENT)
Dept: INFECTIOUS DISEASE | Facility: CLINIC | Age: 68
End: 2023-12-21
Payer: MEDICARE

## 2023-12-21 VITALS
OXYGEN SATURATION: 98 % | HEART RATE: 112 BPM | SYSTOLIC BLOOD PRESSURE: 115 MMHG | DIASTOLIC BLOOD PRESSURE: 75 MMHG | BODY MASS INDEX: 23.16 KG/M2 | HEIGHT: 65 IN | WEIGHT: 139 LBS | TEMPERATURE: 97.6 F

## 2023-12-21 LAB
ALBUMIN SERPL ELPH-MCNC: 3.7 G/DL
ALP BLD-CCNC: 149 U/L
ALT SERPL-CCNC: 21 U/L
ANION GAP SERPL CALC-SCNC: 11 MMOL/L
AST SERPL-CCNC: 35 U/L
BASOPHILS # BLD AUTO: 0.07 K/UL
BASOPHILS NFR BLD AUTO: 1 %
BILIRUB SERPL-MCNC: 0.5 MG/DL
BUN SERPL-MCNC: 13 MG/DL
CALCIUM SERPL-MCNC: 9.3 MG/DL
CHLORIDE SERPL-SCNC: 105 MMOL/L
CO2 SERPL-SCNC: 24 MMOL/L
CREAT SERPL-MCNC: 1.04 MG/DL
EGFR: 78 ML/MIN/1.73M2
EOSINOPHIL # BLD AUTO: 0.05 K/UL
EOSINOPHIL NFR BLD AUTO: 0.7 %
GLUCOSE SERPL-MCNC: 79 MG/DL
HCT VFR BLD CALC: 33.3 %
HGB BLD-MCNC: 9.9 G/DL
IMM GRANULOCYTES NFR BLD AUTO: 0.1 %
LYMPHOCYTES # BLD AUTO: 2.03 K/UL
LYMPHOCYTES NFR BLD AUTO: 27.7 %
MAN DIFF?: NORMAL
MCHC RBC-ENTMCNC: 29.2 PG
MCHC RBC-ENTMCNC: 29.7 GM/DL
MCV RBC AUTO: 98.2 FL
MONOCYTES # BLD AUTO: 0.52 K/UL
MONOCYTES NFR BLD AUTO: 7.1 %
NEUTROPHILS # BLD AUTO: 4.64 K/UL
NEUTROPHILS NFR BLD AUTO: 63.4 %
PLATELET # BLD AUTO: 318 K/UL
POTASSIUM SERPL-SCNC: 4.1 MMOL/L
PROT SERPL-MCNC: 6.7 G/DL
RBC # BLD: 3.39 M/UL
RBC # FLD: 15.9 %
SODIUM SERPL-SCNC: 140 MMOL/L
WBC # FLD AUTO: 7.32 K/UL

## 2023-12-21 PROCEDURE — 99213 OFFICE O/P EST LOW 20 MIN: CPT

## 2023-12-21 NOTE — PHYSICAL EXAM
[General Appearance - Alert] : alert [General Appearance - In No Acute Distress] : in no acute distress [Sclera] : the sclera and conjunctiva were normal [PERRL With Normal Accommodation] : pupils were equal in size, round, reactive to light [Extraocular Movements] : extraocular movements were intact [Outer Ear] : the ears and nose were normal in appearance [Oropharynx] : the oropharynx was normal with no thrush [Auscultation Breath Sounds / Voice Sounds] : lungs were clear to auscultation bilaterally [Heart Rate And Rhythm] : heart rate was normal and rhythm regular [Heart Sounds] : normal S1 and S2 [Heart Sounds Gallop] : no gallops [Murmurs] : no murmurs [Heart Sounds Pericardial Friction Rub] : no pericardial rub [Bowel Sounds] : normal bowel sounds [Abdomen Soft] : soft [Abdomen Tenderness] : non-tender [Abdomen Mass (___ Cm)] : no abdominal mass palpated [Costovertebral Angle Tenderness] : no CVA tenderness [Musculoskeletal - Swelling] : no joint swelling [Nail Clubbing] : no clubbing  or cyanosis of the fingernails [Motor Tone] : muscle strength and tone were normal [Skin Color & Pigmentation] : normal skin color and pigmentation [] : no rash [Oriented To Time, Place, And Person] : oriented to person, place, and time [Affect] : the affect was normal

## 2023-12-21 NOTE — ASSESSMENT
[Treatment Education] : treatment education [Treatment Adherence] : treatment adherence [Drug Interactions / Side Effects] : drug interactions/side effects [Disclosure of Diagnosis] : disclosure of diagnosis [FreeTextEntry1] : Overall, 67 yo M PMH HTN, BPH, prostate cancer s/p prostatectomy 2012, diverticulosis presenting for fevers/weight loss, found to have R hepatic lesions and hepatic vein thrombosis, c/f malignancy. MRCP w/ findings of liver abscess.  Pt w/ complicated ID history, was seen by Dr. Huerta in 2018, h/o positive QuantiFeron Gold, declined treatment for latent TB, had eosinophilia, Strongy AB+, treated with Ivermectin. Also history of positive Echinococcus AB, however not seen in our system. Testing in our system from 2018 for echinococcus IgG was negative.   Discussed with Dr. Kendrick of radiology, the liver abscess seen on MRI do not appear cystic, favor pyogenic at this time. Echinococcus, Entamoeba AB negative, course complicated by GI bleed s/p colposcopy with likely diverticular bleed. S/p IR drain on 11/24 with 60 cc of purulent fluid aspirated, GS, Cx are negative to date.    Pt now improved on Zosyn, long discussion regarding plan of therapy. Pt does not want IV antibiotics, he lives with his daughter who is a , he cannot give himself IV antibiotics and states his daughter cannot assist. Ideally given no growth from liver abscess (likely sterilization after 1 week of IV Zosyn), would continue with IV antibiotic course outpatient, however given patient does not want IV abx, will change to Ciprofloxacin, Augmentin on discharge. This would offer coverage of Strep (ie anginosus), enterococcus, GNRs including pseudomonas, and anaerobes.   Now s/p removal of liver drain given repeat CT w/ tube check with only small cavity. Pt feeling back to his baseline, no further fevers. Pt with latent TB, discussed treatment. Given age, there is ~1% risk of activation before age of 80 and there is 3-5% risk of liver injury with INH. Pt does not wish to start treatment for latent Tb at this time.  Plan:  1. C/w Augmentin 875/125 mg BID, Ciprofloxacin 500 mg BID for 1 more week to finish 4 week course  2. Repeat CBC, BMP today  3. D/w patient, does not wish to start treatment for latent TB 4. Telephone visit on 1/12/24 5. Given recent IR procedure and CT, only small cavity remains from liver abscess. No need for repeat CT unless clinical status changes.

## 2023-12-21 NOTE — HISTORY OF PRESENT ILLNESS
[FreeTextEntry1] : Overall, 67 yo M PMH HTN, BPH, prostate cancer s/p prostatectomy 2012, diverticulosis presenting for fevers/weight loss, found to have R hepatic lesions and hepatic vein thrombosis, c/f malignancy. MRCP w/ findings of liver abscess.  Pt w/ complicated ID history, was seen by Dr. Huerta in 2018, h/o positive QuantiFeron Gold, declined treatment for latent TB, had eosinophilia, Strongy AB+, treated with Ivermectin. Also history of positive Echinococcus AB, however not seen in our system. Testing in our system from 2018 for echinococcus IgG was negative.   Discussed with Dr. Kendrick of radiology, the liver abscess seen on MRI do not appear cystic, favor pyogenic at this time. Echinococcus, Entamoeba AB negative, course complicated by GI bleed s/p colposcopy with likely diverticular bleed. S/p IR drain on 11/24 with 60 cc of purulent fluid aspirated, GS, Cx are negative to date.    Pt now improved on Zosyn, long discussion regarding plan of therapy. Pt does not want IV antibiotics, he lives with his daughter who is a , he cannot give himself IV antibiotics and states his daughter cannot assist. Ideally given no growth from liver abscess (likely sterilization after 1 week of IV Zosyn), would continue with IV antibiotic course outpatient, however given patient does not want IV abx, will change to Ciprofloxacin, Augmentin on discharge. This would offer coverage of Strep (ie anginous), enterococcus, GNRs including pseudomonas, and anaerobes.   Pt had liver abscess tube check with IR last Wednesday, per procedure note, "injection of 5cc contrast through the tube demonstrated a small, amorphous, nearly collapsed cavity along the drainage", drain was removed.   Today:  Pt states he is feeling well and back to his baseline. Denies any fevers, chills, weight loss, diaphoresis, abdominal pain, N/V/D. He is able to walk w/o any issues. He has been taking his Cipro/Augmentin as prescribed w/o any issues.

## 2024-01-12 ENCOUNTER — APPOINTMENT (OUTPATIENT)
Dept: INFECTIOUS DISEASE | Facility: CLINIC | Age: 69
End: 2024-01-12
Payer: MEDICARE

## 2024-01-12 PROCEDURE — 99442: CPT

## 2024-03-01 ENCOUNTER — APPOINTMENT (OUTPATIENT)
Dept: HEPATOLOGY | Facility: CLINIC | Age: 69
End: 2024-03-01

## 2024-09-09 ENCOUNTER — RX RENEWAL (OUTPATIENT)
Age: 69
End: 2024-09-09

## 2025-03-07 NOTE — MEDICAL STUDENT PROGRESS NOTE(EDUCATION) - PLAN 2
History of rapid decline over the last 9 months. Spent 2 months at [facility]. Staff there says patient has had difficulty adjusting.     - Physical therapy says that at this time, patient is functioning at their prior level of function and does not require further acute PT services.   - Occupational therapy: Patient is unable to continue work toward goals because of medical or psychosocial complications. and Therapist determines that the patient will no longer benefit from therapy services.   - will re consult considering patient's dramatic improvement in mentation    - MRCP with hepatic abscess 7x6.7x6.9cm and 5x4.6x5.3, mild intrahepatic biliary duct dilation, increasing size  - elevated CEA, Ca 19-9, LFTs  - infectious cause most likely  - IR drainage of abscess, current biliary drain in place  - pain at site of drain: order lidocaine patch  - abscess cultures negative, possible 2/2 sterilization  - CT surg consulted for concern for pleural transgression: restarted heparin

## (undated) DEVICE — LUBRICATING JELLY HR ONE SHOT 3G

## (undated) DEVICE — TUBING IV SET GRAVITY 3Y 100" MACRO

## (undated) DEVICE — ELCTR ECG CONDUCTIVE ADHESIVE

## (undated) DEVICE — CLAMP BX HOT RAD JAW 3

## (undated) DEVICE — BASIN EMESIS 10IN GRADUATED MAUVE

## (undated) DEVICE — BIOPSY FORCEP RADIAL JAW 4 STANDARD WITH NEEDLE

## (undated) DEVICE — BITE BLOCK ADULT 20 X 27MM (GREEN)

## (undated) DEVICE — LINE BREATHE SAMPLNG

## (undated) DEVICE — DRSG 2X2

## (undated) DEVICE — DENTURE CUP PINK

## (undated) DEVICE — BIOPSY FORCEP COLD DISP

## (undated) DEVICE — UNDERPAD LINEN SAVER 17 X 24"

## (undated) DEVICE — SALIVA EJECTOR (BLUE)

## (undated) DEVICE — CATH IV SAFE BC 22G X 1" (BLUE)

## (undated) DEVICE — TUBING MEDI-VAC W MAXIGRIP CONNECTORS 1/4"X6'

## (undated) DEVICE — CONTAINER FORMALIN 80ML YELLOW

## (undated) DEVICE — DRSG BANDAID 0.75X3"

## (undated) DEVICE — PACK IV START WITH CHG

## (undated) DEVICE — GOWN LG

## (undated) DEVICE — DRSG CURITY GAUZE SPONGE 4 X 4" 12-PLY NON-STERILE

## (undated) DEVICE — TUBING SUCTION NONCONDUCTIVE 6MM X 12FT

## (undated) DEVICE — ELCTR GROUNDING PAD ADULT COVIDIEN